# Patient Record
Sex: FEMALE | Race: WHITE | Employment: OTHER | URBAN - METROPOLITAN AREA
[De-identification: names, ages, dates, MRNs, and addresses within clinical notes are randomized per-mention and may not be internally consistent; named-entity substitution may affect disease eponyms.]

---

## 2019-05-13 NOTE — PROGRESS NOTES
Lawton Angst    Age 69 y.o.    female    1940    MRN [id number]    Date___________   Arrival Time_____________  OR Time____________Duration____     Procedure(s):  PHACOEMULSIFICATION OF CATARACT RIGHT EYE WITH INTRAOCULAR LENS IMPLANT    Surgeon  ________________________________  Lee Chin   General   [place]       Phone [phone] (home) [phone] (work)      [address]  Cell Work  ______________________________________________________________________________________________________________________________________________________________________________________________________________________________________________________________________________________________________________________________________________________________    PCP__________________________Phone__________________________________      H&P__________________Bringing      Chart              Epic    DOS           Called_______  EKG__________________Bringing      Chart              Epic    DOS           Called_______  LAB__________________ Bringing      Chart              Epic    DOS           Called_______  CardiacClearance _______Bringing      Chart              Epic    DOS           Called_______      Cardiologist________________________ Phone___________________________      ? Gnosticist concerns / Waiver on Chart            PAT Communications________________  ? Pre-op Instructions Given [place]          _________________________________  ? Directions to Surgery Center                          _________________________________  ? Transportation Home_______________      _________________________________  ?  Crutches/Walker__________________        _________________________________      ________Pre-op Orders   _______Transcribed    _______Wt.  ________Pharmacy          _______SCD  ______VTE     ______Beta Blocker  ________Consent

## 2019-05-20 ENCOUNTER — ANESTHESIA EVENT (OUTPATIENT)
Dept: OPERATING ROOM | Age: 79
End: 2019-05-20
Payer: MEDICARE

## 2019-05-21 ENCOUNTER — ANESTHESIA (OUTPATIENT)
Dept: OPERATING ROOM | Age: 79
End: 2019-05-21
Payer: MEDICARE

## 2019-05-21 ENCOUNTER — HOSPITAL ENCOUNTER (OUTPATIENT)
Age: 79
Setting detail: OUTPATIENT SURGERY
Discharge: HOME | End: 2019-05-21
Attending: OPHTHALMOLOGY | Admitting: OPHTHALMOLOGY
Payer: MEDICARE

## 2019-05-21 VITALS
HEART RATE: 74 BPM | HEIGHT: 65 IN | OXYGEN SATURATION: 100 % | WEIGHT: 109 LBS | RESPIRATION RATE: 15 BRPM | TEMPERATURE: 97.3 F | DIASTOLIC BLOOD PRESSURE: 68 MMHG | SYSTOLIC BLOOD PRESSURE: 164 MMHG | BODY MASS INDEX: 18.16 KG/M2

## 2019-05-21 VITALS — OXYGEN SATURATION: 100 % | DIASTOLIC BLOOD PRESSURE: 80 MMHG | SYSTOLIC BLOOD PRESSURE: 175 MMHG

## 2019-05-21 PROCEDURE — 6360000002 HC RX W HCPCS: Performed by: NURSE ANESTHETIST, CERTIFIED REGISTERED

## 2019-05-21 PROCEDURE — 7100000011 HC PHASE II RECOVERY - ADDTL 15 MIN: Performed by: OPHTHALMOLOGY

## 2019-05-21 PROCEDURE — 6370000000 HC RX 637 (ALT 250 FOR IP): Performed by: OPHTHALMOLOGY

## 2019-05-21 PROCEDURE — 6370000000 HC RX 637 (ALT 250 FOR IP)

## 2019-05-21 PROCEDURE — 3600000013 HC SURGERY LEVEL 3 ADDTL 15MIN: Performed by: OPHTHALMOLOGY

## 2019-05-21 PROCEDURE — V2632 POST CHMBR INTRAOCULAR LENS: HCPCS | Performed by: OPHTHALMOLOGY

## 2019-05-21 PROCEDURE — 2500000003 HC RX 250 WO HCPCS: Performed by: NURSE ANESTHETIST, CERTIFIED REGISTERED

## 2019-05-21 PROCEDURE — 2580000003 HC RX 258: Performed by: OPHTHALMOLOGY

## 2019-05-21 PROCEDURE — 7100000010 HC PHASE II RECOVERY - FIRST 15 MIN: Performed by: OPHTHALMOLOGY

## 2019-05-21 PROCEDURE — 6360000002 HC RX W HCPCS: Performed by: OPHTHALMOLOGY

## 2019-05-21 PROCEDURE — 2580000003 HC RX 258: Performed by: ANESTHESIOLOGY

## 2019-05-21 PROCEDURE — 3700000001 HC ADD 15 MINUTES (ANESTHESIA): Performed by: OPHTHALMOLOGY

## 2019-05-21 PROCEDURE — 3600000003 HC SURGERY LEVEL 3 BASE: Performed by: OPHTHALMOLOGY

## 2019-05-21 PROCEDURE — 3700000000 HC ANESTHESIA ATTENDED CARE: Performed by: OPHTHALMOLOGY

## 2019-05-21 PROCEDURE — 2500000003 HC RX 250 WO HCPCS: Performed by: OPHTHALMOLOGY

## 2019-05-21 PROCEDURE — 2709999900 HC NON-CHARGEABLE SUPPLY: Performed by: OPHTHALMOLOGY

## 2019-05-21 DEVICE — ACRYSOF(R) SINGLE-PIECE ACRYLIC FOLDABLE IOL,UV-ABSORBING POSTERIOR CHAMBER LENS (IOL/PC),13.0MM LENGTH, 6.0MM BICONVEX OPTIC, PLANARHAPTICS.
Type: IMPLANTABLE DEVICE | Site: LENS | Status: FUNCTIONAL
Brand: ACRYSOF®

## 2019-05-21 RX ORDER — LIDOCAINE HYDROCHLORIDE 10 MG/ML
0.3 INJECTION, SOLUTION EPIDURAL; INFILTRATION; INTRACAUDAL; PERINEURAL
Status: DISCONTINUED | OUTPATIENT
Start: 2019-05-21 | End: 2019-05-21 | Stop reason: HOSPADM

## 2019-05-21 RX ORDER — PROPOFOL 10 MG/ML
INJECTION, EMULSION INTRAVENOUS PRN
Status: DISCONTINUED | OUTPATIENT
Start: 2019-05-21 | End: 2019-05-21 | Stop reason: SDUPTHER

## 2019-05-21 RX ORDER — SODIUM CHLORIDE 0.9 % (FLUSH) 0.9 %
10 SYRINGE (ML) INJECTION EVERY 12 HOURS SCHEDULED
Status: DISCONTINUED | OUTPATIENT
Start: 2019-05-21 | End: 2019-05-21 | Stop reason: HOSPADM

## 2019-05-21 RX ORDER — SODIUM CHLORIDE 0.9 % (FLUSH) 0.9 %
10 SYRINGE (ML) INJECTION PRN
Status: DISCONTINUED | OUTPATIENT
Start: 2019-05-21 | End: 2019-05-21 | Stop reason: HOSPADM

## 2019-05-21 RX ORDER — MAGNESIUM HYDROXIDE 1200 MG/15ML
LIQUID ORAL PRN
Status: DISCONTINUED | OUTPATIENT
Start: 2019-05-21 | End: 2019-05-21 | Stop reason: ALTCHOICE

## 2019-05-21 RX ORDER — SODIUM CHLORIDE, SODIUM LACTATE, POTASSIUM CHLORIDE, CALCIUM CHLORIDE 600; 310; 30; 20 MG/100ML; MG/100ML; MG/100ML; MG/100ML
INJECTION, SOLUTION INTRAVENOUS CONTINUOUS
Status: DISCONTINUED | OUTPATIENT
Start: 2019-05-21 | End: 2019-05-21 | Stop reason: HOSPADM

## 2019-05-21 RX ORDER — TOBRAMYCIN AND DEXAMETHASONE 3; 1 MG/ML; MG/ML
SUSPENSION/ DROPS OPHTHALMIC PRN
Status: DISCONTINUED | OUTPATIENT
Start: 2019-05-21 | End: 2019-05-21 | Stop reason: ALTCHOICE

## 2019-05-21 RX ORDER — LIDOCAINE HYDROCHLORIDE 20 MG/ML
INJECTION, SOLUTION INFILTRATION; PERINEURAL PRN
Status: DISCONTINUED | OUTPATIENT
Start: 2019-05-21 | End: 2019-05-21 | Stop reason: SDUPTHER

## 2019-05-21 RX ADMIN — PROPOFOL 70 MG: 10 INJECTION, EMULSION INTRAVENOUS at 11:31

## 2019-05-21 RX ADMIN — Medication 0.3 ML: at 10:11

## 2019-05-21 RX ADMIN — LIDOCAINE HYDROCHLORIDE 50 MG: 20 INJECTION, SOLUTION INFILTRATION; PERINEURAL at 11:31

## 2019-05-21 RX ADMIN — BROMFENAC SODIUM 1 DROP: 0.7 SOLUTION/ DROPS OPHTHALMIC at 10:10

## 2019-05-21 RX ADMIN — SODIUM CHLORIDE, POTASSIUM CHLORIDE, SODIUM LACTATE AND CALCIUM CHLORIDE: 600; 310; 30; 20 INJECTION, SOLUTION INTRAVENOUS at 10:24

## 2019-05-21 ASSESSMENT — PULMONARY FUNCTION TESTS
PIF_VALUE: 1

## 2019-05-21 ASSESSMENT — PAIN - FUNCTIONAL ASSESSMENT: PAIN_FUNCTIONAL_ASSESSMENT: 0-10

## 2019-05-21 ASSESSMENT — PAIN SCALES - GENERAL: PAINLEVEL_OUTOF10: 0

## 2019-05-21 ASSESSMENT — LIFESTYLE VARIABLES: SMOKING_STATUS: 0

## 2019-05-21 NOTE — ANESTHESIA POSTPROCEDURE EVALUATION
Department of Anesthesiology  Postprocedure Note    Patient: Fabiola Thayer  MRN: [id number]  YOB: 1940  Date of evaluation: 5/21/2019    Procedure Summary     Date:  05/21/19 Room / Location:  [facility] OR 03 / [facility] OR    Anesthesia Start:  1128 Anesthesia Stop:  9576    Procedure:  PHACOEMULSIFICATION OF CATARACT RIGHT EYE WITH INTRAOCULAR LENS IMPLANT (Right Eye) Diagnosis:       Age-related nuclear cataract of right eye      (Age-related nuclear cataract of right eye [H25.11])    Surgeon:  Skye Winter MD Responsible Provider:  Sonda Kocher, MD    Anesthesia Type:  TIVA ASA Status:  2     Anesthesia Type: TIVA    Moon Phase I: Moon Score: 10    Moon Phase II: Moon Score: 10    Anesthesia Post Evaluation   Anesthetic Problems: no   Last Vitals:     BP: 143/82 Pulse: 71   Resp: 18 SpO2: 100   Temp: 97.3 °F (36.3 °C)     Cardiovascular System Stable: yes  Respiratory Function: Airway Patent yes  ETT no  Ventilator no  Level of consciousness: awake, alert and oriented  Post-op pain: adequate analgesia  Hydration Adequate: yes  Nausea/Vomiting:no  Other Issues:     Dave Corrales MD

## 2019-05-21 NOTE — PROGRESS NOTES
Family to bedside patient and family updated per md. Discharge instructions reviewed with patient and responsible adult. Discharge instructions signed and copy given with no additional questions. Patient to be discharged home with belongings. Eye kit and card given.

## 2019-05-21 NOTE — ANESTHESIA PRE PROCEDURE
Department of Anesthesiology  Preprocedure Note       Name:  Lilibeth Rondon   Age:  69 y.o.  :  1940                                          MRN:  [id number]         Date:  2019      Surgeon:  Samantha Lopez MD    Procedure: PHACOEMULSIFICATION OF CATARACT RIGHT EYE WITH INTRAOCULAR LENS IMPLANT (Right Eye)    Medications prior to admission:    Multiple Vitamins-Minerals  Take  by mouth. Calcium Carbonate (CALCIUM 500 PO) Take  by mouth. Allergies:     Penicillins      Problem List:     Left knee pain M25.562    Left patella fracture S82.002A    Contusion of left knee S80. Sundara.Lark     Past Medical History:  History reviewed. No pertinent past medical history.     Past Surgical History:     JOINT REPLACEMENT      both hips     Social History:     Smoking status: Never Smoker    Smokeless tobacco: Never Used   Substance Use Topics    Alcohol use: Yes     Comment: wine with dinner     Vital Signs (Current):            BP: 143/82 Pulse: 71   Resp: 18 SpO2: 100   Temp: 97.3 °F (36.3 °C)   Height: 5' 5\" (1.651 m)  (19) Weight: 109 lb (49.4 kg)  (19)   BMI: 18.2                            BP Readings from Last 3 Encounters:   06/15/16 132/67   06/10/16 112/72   01/07/15 115/70     NPO Status:> 8 hrs    Anesthesia Evaluation  Patient summary reviewed and Nursing notes reviewed  Airway: Mallampati: II  TM distance: >3 FB   Neck ROM: limited  Mouth opening: > = 3 FB Dental:          Pulmonary:       (-) COPD, asthma, sleep apnea and not a current smoker                           Cardiovascular:  Exercise tolerance: good (>4 METS),       (-) hypertension,  angina and  CANTU                Neuro/Psych:      (-) seizures, TIA and CVA           GI/Hepatic/Renal:        (-) GERD and no renal disease       Endo/Other:    (+) : arthritis: OA., .    (-) diabetes mellitus, hypothyroidism                ROS comment: S/P B MARGOTH  Abdominal:           Vascular:     - DVT and PE. Anesthesia Plan      TIVA     ASA 2       Induction: intravenous. MIPS: Prophylactic antiemetics administered. Anesthetic plan and risks discussed with patient. Plan discussed with CRNA.             Tommie Goodwin MD

## 2019-07-01 ENCOUNTER — ANESTHESIA EVENT (OUTPATIENT)
Dept: OPERATING ROOM | Age: 79
End: 2019-07-01
Payer: MEDICARE

## 2019-07-02 ENCOUNTER — HOSPITAL ENCOUNTER (OUTPATIENT)
Age: 79
Setting detail: OUTPATIENT SURGERY
Discharge: HOME | End: 2019-07-02
Attending: OPHTHALMOLOGY | Admitting: OPHTHALMOLOGY
Payer: MEDICARE

## 2019-07-02 ENCOUNTER — ANESTHESIA (OUTPATIENT)
Dept: OPERATING ROOM | Age: 79
End: 2019-07-02
Payer: MEDICARE

## 2019-07-02 VITALS
SYSTOLIC BLOOD PRESSURE: 118 MMHG | RESPIRATION RATE: 16 BRPM | HEART RATE: 80 BPM | WEIGHT: 110 LBS | OXYGEN SATURATION: 100 % | TEMPERATURE: 97 F | HEIGHT: 65 IN | BODY MASS INDEX: 18.33 KG/M2 | DIASTOLIC BLOOD PRESSURE: 58 MMHG

## 2019-07-02 VITALS — OXYGEN SATURATION: 100 % | DIASTOLIC BLOOD PRESSURE: 68 MMHG | SYSTOLIC BLOOD PRESSURE: 138 MMHG

## 2019-07-02 PROCEDURE — 6370000000 HC RX 637 (ALT 250 FOR IP): Performed by: OPHTHALMOLOGY

## 2019-07-02 PROCEDURE — 2580000003 HC RX 258: Performed by: ANESTHESIOLOGY

## 2019-07-02 PROCEDURE — 6360000002 HC RX W HCPCS: Performed by: OPHTHALMOLOGY

## 2019-07-02 PROCEDURE — 2500000003 HC RX 250 WO HCPCS: Performed by: OPHTHALMOLOGY

## 2019-07-02 PROCEDURE — 2580000003 HC RX 258: Performed by: OPHTHALMOLOGY

## 2019-07-02 PROCEDURE — 3600000003 HC SURGERY LEVEL 3 BASE: Performed by: OPHTHALMOLOGY

## 2019-07-02 PROCEDURE — 3700000000 HC ANESTHESIA ATTENDED CARE: Performed by: OPHTHALMOLOGY

## 2019-07-02 PROCEDURE — 3600000013 HC SURGERY LEVEL 3 ADDTL 15MIN: Performed by: OPHTHALMOLOGY

## 2019-07-02 PROCEDURE — 7100000010 HC PHASE II RECOVERY - FIRST 15 MIN: Performed by: OPHTHALMOLOGY

## 2019-07-02 PROCEDURE — V2632 POST CHMBR INTRAOCULAR LENS: HCPCS | Performed by: OPHTHALMOLOGY

## 2019-07-02 PROCEDURE — 3700000001 HC ADD 15 MINUTES (ANESTHESIA): Performed by: OPHTHALMOLOGY

## 2019-07-02 PROCEDURE — 7100000011 HC PHASE II RECOVERY - ADDTL 15 MIN: Performed by: OPHTHALMOLOGY

## 2019-07-02 PROCEDURE — 6370000000 HC RX 637 (ALT 250 FOR IP)

## 2019-07-02 PROCEDURE — 6360000002 HC RX W HCPCS: Performed by: NURSE ANESTHETIST, CERTIFIED REGISTERED

## 2019-07-02 PROCEDURE — 2500000003 HC RX 250 WO HCPCS: Performed by: NURSE ANESTHETIST, CERTIFIED REGISTERED

## 2019-07-02 PROCEDURE — 2709999900 HC NON-CHARGEABLE SUPPLY: Performed by: OPHTHALMOLOGY

## 2019-07-02 DEVICE — ACRYSOF(R) SINGLE-PIECE ACRYLIC FOLDABLE IOL,UV-ABSORBING POSTERIOR CHAMBER LENS (IOL/PC),13.0MM LENGTH, 6.0MM BICONVEX OPTIC, PLANARHAPTICS.
Type: IMPLANTABLE DEVICE | Site: EYE | Status: FUNCTIONAL
Brand: ACRYSOF®

## 2019-07-02 RX ORDER — LABETALOL HYDROCHLORIDE 5 MG/ML
5 INJECTION, SOLUTION INTRAVENOUS EVERY 10 MIN PRN
Status: DISCONTINUED | OUTPATIENT
Start: 2019-07-02 | End: 2019-07-02 | Stop reason: HOSPADM

## 2019-07-02 RX ORDER — OXYCODONE HYDROCHLORIDE AND ACETAMINOPHEN 5; 325 MG/1; MG/1
1 TABLET ORAL PRN
Status: DISCONTINUED | OUTPATIENT
Start: 2019-07-02 | End: 2019-07-02 | Stop reason: HOSPADM

## 2019-07-02 RX ORDER — TOBRAMYCIN AND DEXAMETHASONE 3; 1 MG/ML; MG/ML
SUSPENSION/ DROPS OPHTHALMIC PRN
Status: DISCONTINUED | OUTPATIENT
Start: 2019-07-02 | End: 2019-07-02 | Stop reason: ALTCHOICE

## 2019-07-02 RX ORDER — MEPERIDINE HYDROCHLORIDE 50 MG/ML
12.5 INJECTION INTRAMUSCULAR; INTRAVENOUS; SUBCUTANEOUS EVERY 5 MIN PRN
Status: DISCONTINUED | OUTPATIENT
Start: 2019-07-02 | End: 2019-07-02 | Stop reason: HOSPADM

## 2019-07-02 RX ORDER — SODIUM CHLORIDE 0.9 % (FLUSH) 0.9 %
10 SYRINGE (ML) INJECTION EVERY 12 HOURS SCHEDULED
Status: DISCONTINUED | OUTPATIENT
Start: 2019-07-02 | End: 2019-07-02 | Stop reason: HOSPADM

## 2019-07-02 RX ORDER — TETRACAINE HYDROCHLORIDE 5 MG/ML
SOLUTION OPHTHALMIC PRN
Status: DISCONTINUED | OUTPATIENT
Start: 2019-07-02 | End: 2019-07-02 | Stop reason: ALTCHOICE

## 2019-07-02 RX ORDER — SODIUM CHLORIDE, SODIUM LACTATE, POTASSIUM CHLORIDE, CALCIUM CHLORIDE 600; 310; 30; 20 MG/100ML; MG/100ML; MG/100ML; MG/100ML
INJECTION, SOLUTION INTRAVENOUS CONTINUOUS
Status: DISCONTINUED | OUTPATIENT
Start: 2019-07-02 | End: 2019-07-02 | Stop reason: HOSPADM

## 2019-07-02 RX ORDER — HYDRALAZINE HYDROCHLORIDE 20 MG/ML
5 INJECTION INTRAMUSCULAR; INTRAVENOUS EVERY 10 MIN PRN
Status: DISCONTINUED | OUTPATIENT
Start: 2019-07-02 | End: 2019-07-02 | Stop reason: HOSPADM

## 2019-07-02 RX ORDER — LIDOCAINE HYDROCHLORIDE 20 MG/ML
INJECTION, SOLUTION INFILTRATION; PERINEURAL PRN
Status: DISCONTINUED | OUTPATIENT
Start: 2019-07-02 | End: 2019-07-02 | Stop reason: SDUPTHER

## 2019-07-02 RX ORDER — ONDANSETRON 2 MG/ML
4 INJECTION INTRAMUSCULAR; INTRAVENOUS PRN
Status: DISCONTINUED | OUTPATIENT
Start: 2019-07-02 | End: 2019-07-02 | Stop reason: HOSPADM

## 2019-07-02 RX ORDER — PROPOFOL 10 MG/ML
INJECTION, EMULSION INTRAVENOUS PRN
Status: DISCONTINUED | OUTPATIENT
Start: 2019-07-02 | End: 2019-07-02 | Stop reason: SDUPTHER

## 2019-07-02 RX ORDER — DIPHENHYDRAMINE HYDROCHLORIDE 50 MG/ML
12.5 INJECTION INTRAMUSCULAR; INTRAVENOUS
Status: DISCONTINUED | OUTPATIENT
Start: 2019-07-02 | End: 2019-07-02 | Stop reason: HOSPADM

## 2019-07-02 RX ORDER — MORPHINE SULFATE 2 MG/ML
1 INJECTION, SOLUTION INTRAMUSCULAR; INTRAVENOUS EVERY 5 MIN PRN
Status: DISCONTINUED | OUTPATIENT
Start: 2019-07-02 | End: 2019-07-02 | Stop reason: HOSPADM

## 2019-07-02 RX ORDER — SODIUM CHLORIDE 0.9 % (FLUSH) 0.9 %
10 SYRINGE (ML) INJECTION PRN
Status: DISCONTINUED | OUTPATIENT
Start: 2019-07-02 | End: 2019-07-02 | Stop reason: HOSPADM

## 2019-07-02 RX ORDER — PROMETHAZINE HYDROCHLORIDE 25 MG/ML
6.25 INJECTION, SOLUTION INTRAMUSCULAR; INTRAVENOUS
Status: DISCONTINUED | OUTPATIENT
Start: 2019-07-02 | End: 2019-07-02 | Stop reason: HOSPADM

## 2019-07-02 RX ORDER — OXYCODONE HYDROCHLORIDE AND ACETAMINOPHEN 5; 325 MG/1; MG/1
2 TABLET ORAL PRN
Status: DISCONTINUED | OUTPATIENT
Start: 2019-07-02 | End: 2019-07-02 | Stop reason: HOSPADM

## 2019-07-02 RX ORDER — MORPHINE SULFATE 2 MG/ML
2 INJECTION, SOLUTION INTRAMUSCULAR; INTRAVENOUS EVERY 5 MIN PRN
Status: DISCONTINUED | OUTPATIENT
Start: 2019-07-02 | End: 2019-07-02 | Stop reason: HOSPADM

## 2019-07-02 RX ORDER — MAGNESIUM HYDROXIDE 1200 MG/15ML
LIQUID ORAL PRN
Status: DISCONTINUED | OUTPATIENT
Start: 2019-07-02 | End: 2019-07-02 | Stop reason: ALTCHOICE

## 2019-07-02 RX ORDER — ERGOCALCIFEROL 1.25 MG/1
50000 CAPSULE ORAL DAILY
Status: ON HOLD | COMMUNITY
Start: 2018-05-02 | End: 2022-04-06 | Stop reason: HOSPADM

## 2019-07-02 RX ORDER — PILOCARPINE HYDROCHLORIDE 20 MG/ML
SOLUTION/ DROPS OPHTHALMIC PRN
Status: DISCONTINUED | OUTPATIENT
Start: 2019-07-02 | End: 2019-07-02 | Stop reason: ALTCHOICE

## 2019-07-02 RX ADMIN — Medication 0.3 ML: at 07:24

## 2019-07-02 RX ADMIN — PROPOFOL 70 MG: 10 INJECTION, EMULSION INTRAVENOUS at 08:36

## 2019-07-02 RX ADMIN — SODIUM CHLORIDE, POTASSIUM CHLORIDE, SODIUM LACTATE AND CALCIUM CHLORIDE: 600; 310; 30; 20 INJECTION, SOLUTION INTRAVENOUS at 07:34

## 2019-07-02 RX ADMIN — LIDOCAINE HYDROCHLORIDE 40 MG: 20 INJECTION, SOLUTION INFILTRATION; PERINEURAL at 08:36

## 2019-07-02 RX ADMIN — BROMFENAC SODIUM 1 DROP: 0.7 SOLUTION/ DROPS OPHTHALMIC at 07:23

## 2019-07-02 ASSESSMENT — PULMONARY FUNCTION TESTS
PIF_VALUE: 0

## 2019-07-02 ASSESSMENT — PAIN SCALES - GENERAL: PAINLEVEL_OUTOF10: 0

## 2019-07-02 ASSESSMENT — PAIN - FUNCTIONAL ASSESSMENT: PAIN_FUNCTIONAL_ASSESSMENT: 0-10

## 2019-07-02 NOTE — ANESTHESIA PRE PROCEDURE
Department of Anesthesiology  Preprocedure Note       Name:  Tarsha Olivarez   Age:  69 y.o.  :  1940                                          MRN:  [id number]         Date:  2019      Surgeon: Alexa Mirza):  Gail Broderick MD    Procedure: PHACOEMULSIFICATION OF CATARACT LEFT EYE WITH INTRAOCULAR LENS IMPLANT (Left Eye)    Medications prior to admission:   Prior to Admission medications    Medication Sig Start Date End Date Taking? Authorizing Provider   Multiple Vitamins-Minerals (MULTIVITAMIN & MINERAL PO) Take  by mouth. Historical Provider, MD   Calcium Carbonate (CALCIUM 500 PO) Take  by mouth. Historical Provider, MD       Current medications:    Current Facility-Administered Medications   Medication Dose Route Frequency Provider Last Rate Last Dose    bupivacaine 0.75%, phenylephrine 10%, tropicamide 1%, cyclopentolate 1%, moxifloxacin 0.5%, ketorolac 0.5% in lidocaine 2% jelly ophthalmic solution  0.3 mL Left Eye Q10 Min Gail Broderick MD        bromfenac (PROLENSA) 0.07 % ophthalmic solution 1 drop  1 drop Left Eye Once Gail Broderick MD        lactated ringers infusion   Intravenous Continuous Briana Palmer MD        sodium chloride flush 0.9 % injection 10 mL  10 mL Intravenous 2 times per day Briana Palmer MD        sodium chloride flush 0.9 % injection 10 mL  10 mL Intravenous PRN Briana Palmer MD           Allergies: Allergies   Allergen Reactions    Penicillins        Problem List:    Patient Active Problem List   Diagnosis Code    Left knee pain M25.562    Left patella fracture S82.002A    Contusion of left knee S80. Wilmer.Dom       Past Medical History:  History reviewed. No pertinent past medical history.     Past Surgical History:        Procedure Laterality Date    INTRACAPSULAR CATARACT EXTRACTION Right 2019    PHACOEMULSIFICATION OF CATARACT RIGHT EYE WITH INTRAOCULAR LENS IMPLANT performed by Gail Broderick MD at [place] [address]

## 2019-12-31 ENCOUNTER — HOSPITAL ENCOUNTER (INPATIENT)
Age: 79
LOS: 10 days | Discharge: NURSING, REHAB OR LONG-TERM CARE | DRG: 312 | End: 2020-01-10
Attending: PHYSICAL MEDICINE & REHABILITATION | Admitting: PHYSICAL MEDICINE & REHABILITATION
Payer: MEDICARE

## 2019-12-31 PROBLEM — I95.1 ORTHOSTATIC HYPOTENSION: Status: ACTIVE | Noted: 2019-12-31

## 2019-12-31 PROCEDURE — 6370000000 HC RX 637 (ALT 250 FOR IP): Performed by: PHYSICAL MEDICINE & REHABILITATION

## 2019-12-31 PROCEDURE — 1280000000 HC REHAB R&B

## 2019-12-31 RX ORDER — ACETAMINOPHEN 325 MG/1
650 TABLET ORAL EVERY 4 HOURS PRN
Status: DISCONTINUED | OUTPATIENT
Start: 2019-12-31 | End: 2020-01-10 | Stop reason: HOSPADM

## 2019-12-31 RX ORDER — TRAZODONE HYDROCHLORIDE 50 MG/1
50 TABLET ORAL NIGHTLY PRN
Status: DISCONTINUED | OUTPATIENT
Start: 2019-12-31 | End: 2020-01-10 | Stop reason: HOSPADM

## 2019-12-31 RX ORDER — FLUDROCORTISONE ACETATE 0.1 MG/1
0.1 TABLET ORAL DAILY
Status: DISCONTINUED | OUTPATIENT
Start: 2019-12-31 | End: 2020-01-10 | Stop reason: HOSPADM

## 2019-12-31 RX ORDER — TRAMADOL HYDROCHLORIDE 50 MG/1
50 TABLET ORAL EVERY 6 HOURS PRN
Status: DISCONTINUED | OUTPATIENT
Start: 2019-12-31 | End: 2020-01-10 | Stop reason: HOSPADM

## 2019-12-31 RX ORDER — ERGOCALCIFEROL 1.25 MG/1
50000 CAPSULE ORAL WEEKLY
Status: DISCONTINUED | OUTPATIENT
Start: 2019-12-31 | End: 2020-01-10 | Stop reason: HOSPADM

## 2019-12-31 RX ORDER — POLYETHYLENE GLYCOL 3350 17 G/17G
17 POWDER, FOR SOLUTION ORAL DAILY
Status: DISCONTINUED | OUTPATIENT
Start: 2019-12-31 | End: 2020-01-10 | Stop reason: HOSPADM

## 2019-12-31 RX ORDER — ONDANSETRON 4 MG/1
8 TABLET, ORALLY DISINTEGRATING ORAL EVERY 8 HOURS PRN
Status: DISCONTINUED | OUTPATIENT
Start: 2019-12-31 | End: 2020-01-10 | Stop reason: HOSPADM

## 2019-12-31 RX ORDER — MIDODRINE HYDROCHLORIDE 5 MG/1
5 TABLET ORAL
Status: DISCONTINUED | OUTPATIENT
Start: 2019-12-31 | End: 2020-01-10 | Stop reason: HOSPADM

## 2019-12-31 RX ORDER — HYDRALAZINE HYDROCHLORIDE 25 MG/1
50 TABLET, FILM COATED ORAL EVERY 6 HOURS PRN
Status: DISCONTINUED | OUTPATIENT
Start: 2019-12-31 | End: 2020-01-10 | Stop reason: HOSPADM

## 2019-12-31 RX ORDER — FOLIC ACID 1 MG/1
1 TABLET ORAL DAILY
Status: DISCONTINUED | OUTPATIENT
Start: 2019-12-31 | End: 2020-01-10 | Stop reason: HOSPADM

## 2019-12-31 RX ADMIN — TRAZODONE HYDROCHLORIDE 50 MG: 50 TABLET ORAL at 20:59

## 2019-12-31 ASSESSMENT — PAIN SCALES - GENERAL
PAINLEVEL_OUTOF10: 0
PAINLEVEL_OUTOF10: 0

## 2019-12-31 NOTE — PROGRESS NOTES
4 Eyes Skin Assessment     The patient is being assess for   Admission    I agree that 2 RN's have performed a thorough Head to Toe Skin Assessment on the patient. ALL assessment sites listed below have been assessed. Areas assessed by both nurses:   [x]   Head, Face, and Ears   [x]   Shoulders, Back, and Chest, Abdomen  [x]   Arms, Elbows, and Hands   [x]   Coccyx, Sacrum, and Ischium  [x]   Legs, Feet, and Heels         **SHARE this note so that the co-signing nurse is able to place an eSignature**    Co-signer eSignature: {Esignature:[id number]}    Does the Patient have Skin Breakdown?   No          Mike Prevention initiated:  Yes  Wound Care Orders initiated:  No      WOC nurse consulted for Pressure Injury (Stage 3,4, Unstageable, DTI, NWPT, Complex wounds)and New or Established Ostomies:  No      Primary Nurse eSignature: Electronically signed by Kaitlynn Howard RN on 12/31/19 at 4:09 PM

## 2019-12-31 NOTE — PLAN OF CARE
injury/management      [] Medication Use   [] Surgical intervention   [] Safety   [] Body mechanics and or joint protection   [] Health maintenance         PHYSICAL THERAPY:  Goals:                  Short term goals  Time Frame for Short term goals: 5 days (1/4)  Short term goal 1: Pt will be indep with bed mobility. Short term goal 2: Pt will be supervision for transfers with no device. Short term goal 3: Pt will ambulate 150 ft with SBA and no device. Short term goal 4: Pt will negotiate 4\" curb step with no device and SBA. Short term goal 5: pt will negotiate 12 stairs with bilateral HR and SBA. Long term goals  Time Frame for Long term goals : 11 days (1/10)  Long term goal 1: Pt will be indep with transfers. Long term goal 2: Pt will be indep with ambulation 150 ft. Long term goal 3: Pt will negotiate 2 4\" curb steps mod I.  Long term goal 4: Pt will negotiate 12 steps with bilateral HR Mod I.  Long term goal 5: Pt will improve Hernandez Balance Test to greater than 45/56 to reflect improved balance and decreased fall risk. These goals were reviewed with this patient at the time of assessment and Sulema Goyal is in agreement.      Plan of Care: Pt to be seen 5 out of 7 days per week, 90   mins (exact) per day for 11 days (exact)                   Current Treatment Recommendations: Strengthening, Gait Training, Patient/Caregiver Education & Training, ROM, Stair training, Equipment Evaluation, Education, & procurement, Balance Training, Neuromuscular Re-education, Functional Mobility Training, Endurance Training, Transfer Training, Safety Education & Training, Home Exercise Program      OCCUPATIONAL THERAPY:  Goals:             Short term goals  Time Frame for Short term goals: 1/5/20  Short term goal 1: Pt will complete functional mobility in room/bathroom with LRAD SBA  Short term goal 2: Pt will complete UE/LE dressing SBA with LRAD and AE PRN  Short term goal 3: Pt will tolerate >7 minutes Comprehension    Expression    Social Interaction    Problem Solving    Memory         Brittany Dunbar will be seen a minimum of 3 hours of therapy per day, a minimum of 5 out of 7 days per week. [] In this rare instance due to the nature of this patient's medical involvement, this patient will be seen 15 hours per week (900 minutes within a 7 day period). Treatments may include therapeutic exercises, gait training, neuromuscular re-ed, transfer training, community reintegration, bed mobility, w/c mobility and training, self care, home mgmt, cognitive training, energy conservation,dysphagia tx, speech/language/communication therapy, group therapy, and patient/family education. In addition, dietician/nutritionist may monitor calorie count as well as intake and collaboratively work with SLP on dietary upgrades. Neuropsychology/Psychology may evaluate and provide necessary support. Medical issues being managed closely and that require 24 hour availability of a physician:   [] Swallowing Precautions  [x] Bowel/Bladder Fx  [] Weight bearing precautions   [] Wound Care    [x] Pain Mgmt   [x] Infection Protection   [x] DVT Prophylaxis   [x] Fall Precautions  [x] Fluid/Electrolyte/Nutrition Balance   [] Voice Protection   [] Respiratory  [] Other:    Medical Prognosis: [x] Good  [] Fair    [] Guarded   Total expected IRF days 10  Anticipated discharge destination:    [] Home Independently   [] Home Modified Independent  [x] Home with supervision    []SNF     [] Other                                           Physician anticipated functional outcomes:  Home w/ assist PRN  IPOC brief synthesis: 67year old female admitted to ARU to address strengthening, endurance, balance, gait, ADLs, assistive/adaptive device needs, patient/family training, pain control. This plan has been reviewed with Brittany Dunbar on 1/1  in a language the patient understands.   Brittany Dunbar has had the opportunity to include input with the

## 2020-01-01 LAB
A/G RATIO: 1 (ref 1.1–2.2)
ALBUMIN SERPL-MCNC: 3.1 G/DL (ref 3.4–5)
ALP BLD-CCNC: 110 U/L (ref 40–129)
ALT SERPL-CCNC: 11 U/L (ref 10–40)
ANION GAP SERPL CALCULATED.3IONS-SCNC: 12 MMOL/L (ref 3–16)
AST SERPL-CCNC: 18 U/L (ref 15–37)
BILIRUB SERPL-MCNC: 0.3 MG/DL (ref 0–1)
BUN BLDV-MCNC: 15 MG/DL (ref 7–20)
CALCIUM SERPL-MCNC: 8.5 MG/DL (ref 8.3–10.6)
CHLORIDE BLD-SCNC: 105 MMOL/L (ref 99–110)
CO2: 23 MMOL/L (ref 21–32)
CREAT SERPL-MCNC: <0.5 MG/DL (ref 0.6–1.2)
GFR AFRICAN AMERICAN: >60
GFR NON-AFRICAN AMERICAN: >60
GLOBULIN: 3.1 G/DL
GLUCOSE BLD-MCNC: 82 MG/DL (ref 70–99)
HCT VFR BLD CALC: 27.9 % (ref 36–48)
HEMOGLOBIN: 9.7 G/DL (ref 12–16)
MCH RBC QN AUTO: 34.6 PG (ref 26–34)
MCHC RBC AUTO-ENTMCNC: 34.8 G/DL (ref 31–36)
MCV RBC AUTO: 99.5 FL (ref 80–100)
PDW BLD-RTO: 17.6 % (ref 12.4–15.4)
PLATELET # BLD: 334 K/UL (ref 135–450)
PMV BLD AUTO: 6.8 FL (ref 5–10.5)
POTASSIUM REFLEX MAGNESIUM: 3.8 MMOL/L (ref 3.5–5.1)
RBC # BLD: 2.8 M/UL (ref 4–5.2)
SODIUM BLD-SCNC: 140 MMOL/L (ref 136–145)
TOTAL PROTEIN: 6.2 G/DL (ref 6.4–8.2)
WBC # BLD: 6 K/UL (ref 4–11)

## 2020-01-01 PROCEDURE — 97116 GAIT TRAINING THERAPY: CPT

## 2020-01-01 PROCEDURE — 6360000002 HC RX W HCPCS: Performed by: PHYSICAL MEDICINE & REHABILITATION

## 2020-01-01 PROCEDURE — 1280000000 HC REHAB R&B

## 2020-01-01 PROCEDURE — 97166 OT EVAL MOD COMPLEX 45 MIN: CPT

## 2020-01-01 PROCEDURE — 97162 PT EVAL MOD COMPLEX 30 MIN: CPT

## 2020-01-01 PROCEDURE — 80053 COMPREHEN METABOLIC PANEL: CPT

## 2020-01-01 PROCEDURE — 97530 THERAPEUTIC ACTIVITIES: CPT

## 2020-01-01 PROCEDURE — 97110 THERAPEUTIC EXERCISES: CPT

## 2020-01-01 PROCEDURE — 36415 COLL VENOUS BLD VENIPUNCTURE: CPT

## 2020-01-01 PROCEDURE — 85027 COMPLETE CBC AUTOMATED: CPT

## 2020-01-01 PROCEDURE — 97535 SELF CARE MNGMENT TRAINING: CPT

## 2020-01-01 PROCEDURE — 6370000000 HC RX 637 (ALT 250 FOR IP): Performed by: PHYSICAL MEDICINE & REHABILITATION

## 2020-01-01 RX ADMIN — MIDODRINE HYDROCHLORIDE 5 MG: 5 TABLET ORAL at 17:27

## 2020-01-01 RX ADMIN — TRAZODONE HYDROCHLORIDE 50 MG: 50 TABLET ORAL at 21:20

## 2020-01-01 RX ADMIN — POLYETHYLENE GLYCOL 3350 17 G: 17 POWDER, FOR SOLUTION ORAL at 07:22

## 2020-01-01 RX ADMIN — FOLIC ACID 1 MG: 1 TABLET ORAL at 07:23

## 2020-01-01 RX ADMIN — ENOXAPARIN SODIUM 40 MG: 40 INJECTION SUBCUTANEOUS at 07:22

## 2020-01-01 ASSESSMENT — PAIN SCALES - GENERAL
PAINLEVEL_OUTOF10: 0
PAINLEVEL_OUTOF10: 0

## 2020-01-01 NOTE — H&P
weight change. EYES: negative for blurred vision, eye discharge, visual disturbance and icterus. HEENT: negative for hearing loss, tinnitus, ear drainage, sinus pressure, nasal congestion, epistaxis and snoring. RESPIRATORY: Negative for hemoptysis, cough, sputum production. CARDIOVASCULAR: negative for chest pain, palpitations, exertional chest pressure/discomfort, edema, syncope. GASTROINTESTINAL: negative for nausea, vomiting, diarrhea, constipation, blood in stool and abdominal pain. GENITOURINARY: negative for frequency, dysuria, urinary incontinence, decreased urine volume, and hematuria. HEMATOLOGIC/LYMPHATIC: negative for easy bruising, bleeding and lymphadenopathy. ALLERGIC/IMMUNOLOGIC: negative for recurrent infections, angioedema, anaphylaxis and drug reactions. ENDOCRINE: negative for weight changes and diabetic symptoms including polyuria, polydipsia and polyphagia. MUSCULOSKELETAL: negative for pain, joint swelling, decreased range of motion and muscle weakness. NEUROLOGICAL: negative for headaches, slurred speech, unilateral weakness. PSYCHIATRIC/BEHAVIORAL: negative for hallucinations, behavioral problems, confusion and agitation. All pertinent positives are noted in the HPI. Physical Examination:  Vitals:   Patient Vitals for the past 24 hrs:   BP Temp Temp src Pulse Resp SpO2 Height Weight   01/01/20 0715 (!) 172/77 97.5 °F (36.4 °C) Oral 72 18 97 % -- --   12/31/19 2045 -- -- -- -- -- -- 5' 5\" (1.651 m) 116 lb (52.6 kg)   12/31/19 2000 (!) 157/77 98.1 °F (36.7 °C) Oral 81 18 98 % -- --   12/31/19 1542 (!) 165/83 98.4 °F (36.9 °C) Oral 84 18 99 % 5' 5\" (1.651 m) --     Psych: Stable mood, normal judgement, normal affect   Const: No distress  Eyes: Conjunctiva noninjected, no icterus noted; pupils equal, round, and reactive to light. HENT: Atraumatic, normocephalic; Oral mucosa moist  Neck: Trachea midline, neck supple. No thyromegaly noted.   CV: Regular rate and rhythm, no murmur rub or gallop noted  Resp: Lungs clear to auscultation bilaterally, no rales wheezes or ronchi, no retractions. Respirations unlabored. GI: Soft, nontender, nondistended. Normal bowel sounds. No palpable masses. Neuro: Alert, oriented, appropriate. No cranial nerve deficits appreciated. Sensation intact to light touch. Motor examination reveals normal strength in all four limbs diffusely. No abnormalities with finger/nose or heel/shin noted. Reflexes normal and symmetric. Skin: Normal temperature and turgor  MSK: No joint abnormalities noted. Ext: No significant edema appreciated. No varicosities. Lab Results   Component Value Date    WBC 6.0 2020    HGB 9.7 (L) 2020    HCT 27.9 (L) 2020    MCV 99.5 2020     2020     No results found for: INR, PROTIME  Lab Results   Component Value Date    CREATININE <0.5 (L) 2020    BUN 15 2020     2020    K 3.8 2020     2020    CO2 23 2020     Lab Results   Component Value Date    ALT 11 2020    AST 18 2020    ALKPHOS 110 2020    BILITOT 0.3 2020                                 Carotid Duplex Report        Name: Diana mSith   : 1940                   Age: 67 yrs    EPI: [id number]              GenderCherylene Lyons   Accession Number: [id number]    Order Number: [id number]    Account Number: [[id number]]   [facility]    Study Date: 2019 10:09 AM        Procedure: 78107 Complete Carotid Duplex/ B Mode study. Study was performed as    an inpatient.            Indication: I77.1 Arterial stenosis ([address]         Quality: Adequate.        Study Description: The carotid vessels were visualized with ultrasound and    correlated with color flow Doppler and spectral analysis.         Right Carotid Velocities       Left Carotid Velocities    Prox CCA = .35 m/sec.          Prox CCA = .41 m/sec.    Dist CCA = .44 m/sec.   disease left lung base. HEART: Unremarkable  MEDIASTINUM/RENALDO: Unremarkable  BONES: Unremarkable  OTHER: None     IMPRESSION      Patchy left lower lobe airspace disease which could represent atelectasis or developing pneumonia.                       XR CHEST AP PORTABLE (12/23/2019 3:45 PM EST)   Performing Organization Address City/State/Zipcode Phone Number   P[facility]             Back to top of Imaging Results       CT CERVICAL SPINE WO CONTRAST (12/23/2019 3:16 PM EST)  CT CERVICAL SPINE WO CONTRAST (12/23/2019 3:16 PM EST)   Specimen         CT CERVICAL SPINE WO CONTRAST (12/23/2019 3:16 PM EST)   Impressions Performed At           Multilevel degenerative disc changes and facet arthropathy        No evidence of acute traumatic abnormality               [facility]       CT CERVICAL SPINE WO CONTRAST (12/23/2019 3:16 PM EST)   Narrative Performed At   HISTORY:  C-spine trauma, high clinical risk (NEXUS/CCR)  fall, syncopy    COMPARISON: None    TECHNIQUE:  Noncontrast axial CT images with coronal and sagittal reconstructions of the cervical spine    NOTE:  If there are questions about the content of this report, please contact [address] [place] [address] radiology by calling [phone]        FINDINGS:     ALIGNMENT: No abnormal curvature    BONES: Unremarkable.  No aggressive osseous lesion or fracture    SOFT TISSUES:  Unremarkable        DISC LEVELS:    C1-2:  Severe narrowing and spurring of the predental joint.     C2-3:  Disc is unremarkable. Mild bilateral facet arthropathy.      C3-4:  Mild degenerative change in the disc. Moderate bilateral facet arthropathy.      C4-5:  Moderate degenerative change in the disc. Moderate bilateral facet arthropathy.      C5-6:  Moderate degenerative change in the disc. Moderate bilateral facet arthropathy.      C6-7:  Mild degenerative change in the disc. Moderate bilateral facet arthropathy.      C7-T1:  Disc is unremarkable.  Moderate bilateral facet arthropathy.          OTHER:  None       [facility]       CT CERVICAL SPINE WO CONTRAST (12/23/2019 3:16 PM EST)   Procedure Note   Interface, Rad Results In - 12/23/2019 3:33 PM EST    HISTORY: C-spine trauma, high clinical risk (NEXUS/CCR) fall, syncopy  COMPARISON: None  TECHNIQUE: Noncontrast axial CT images with coronal and sagittal reconstructions of the cervical spine  NOTE: If there are questions about the content of this report, please contact [address] [place] [address] radiology by calling [phone]    FINDINGS:   ALIGNMENT: No abnormal curvature  BONES: Unremarkable. No aggressive osseous lesion or fracture  SOFT TISSUES: Unremarkable    DISC LEVELS:  C1-2: Severe narrowing and spurring of the predental joint. C2-3: Disc is unremarkable. Mild bilateral facet arthropathy. C3-4: Mild degenerative change in the disc. Moderate bilateral facet arthropathy. C4-5: Moderate degenerative change in the disc. Moderate bilateral facet arthropathy. C5-6: Moderate degenerative change in the disc. Moderate bilateral facet arthropathy. C6-7: Mild degenerative change in the disc. Moderate bilateral facet arthropathy. C7-T1: Disc is unremarkable. Moderate bilateral facet arthropathy. OTHER: None    IMPRESSION      Multilevel degenerative disc changes and facet arthropathy    No evidence of acute traumatic abnormality        The above laboratory data have been reviewed. The above imaging data have been reviewed. The above medical testing have been reviewed. Body mass index is 19.3 kg/m². Barriers to Discharge: Weakness, endurance, balance, orthostasis    POST ADMISSION PHYSICIAN EVALUATION  The patient has agreed to being admitted to our comprehensive inpatient  rehabilitation facility consisting of at least 180 minutes of therapy a day,  5 out of 7 days a week. The patient/family has a good understanding of our discharge process.  The  patient has potential to make improvement and is in need of at least two of  the following

## 2020-01-01 NOTE — PROGRESS NOTES
Tonsillectomy; Intracapsular cataract extraction (Right, 5/21/2019); and Intracapsular cataract extraction (Left, 7/2/2019). Restrictions  Restrictions/Precautions  Restrictions/Precautions: General Precautions  Position Activity Restriction  Other position/activity restrictions: Medium fall risk per nursing    Subjective   General  Chart Reviewed: Yes, Imaging, Labs, Orders, History and Physical  Patient assessed for rehabilitation services?: Yes  Family / Caregiver Present: No  Referring Practitioner: Dr. Cale Russell  Diagnosis: Frequent falls due to syncope/hypotension  Subjective  Subjective: Pt in chair very pleasant and agreeable to OT eval and tx. General Comment  Comments: RN cleared pt for eval/tx, states held BP meds due to BP being high this AM    Social/Functional History  Social/Functional History  Lives With: Alone(daughter lives one block away; works part time)  Type of Home: [place]: One level, Laundry in basement(laundry in basement with bilaterall HR. Pt states ~11 steps)  Home Access: Stairs to enter without rails  Entrance Stairs - Number of Steps: 2 FELIX through front door, no rails, but uses wall for balance if needed. States ~4\" steps  Bathroom Shower/Tub: Walk-in shower  Bathroom Toilet: Standard  Bathroom Equipment: Grab bars in shower  Home Equipment: 4 wheeled walker  ADL Assistance: Independent  Homemaking Assistance: [facility] Responsibilities: Yes  Ambulation Assistance: Independent  Transfer Assistance: Independent  Active : Yes  Occupation: Retired  Type of occupation:   Leisure & Hobbies: has a cat, read, go out to dinner  Additional Comments: Frequent falls d/t syncope; states too many falls to count. Pt able to stand up from floor or crawl to couch to stand without difficulty. Denies injuries besides bruises and stiffness after falls.        Objective        Orientation  Overall Orientation Status: Within Functional Coordinated: Yes     Bed mobility  Supine to Sit: Unable to assess  Sit to Supine: Unable to assess  Scooting: Unable to assess  Comment: in recliner upon arrival and departure  Transfers  Stand Step Transfers: Contact guard assistance  Sit to stand: Contact guard assistance  Stand to sit: Contact guard assistance  Transfer Comments: CGA with cues for safety/mechanics  Vision - Basic Assessment  Prior Vision: Wears glasses only for reading  Visual History: No significant visual history  Patient Visual Report: No visual complaint reported. Visual Field Cut: No  Oculo Motor Control:  WNL  Cognition  Overall Cognitive Status: WFL  Perception  Overall Perceptual Status: WFL     Sensation  Overall Sensation Status: WFL(\"sometimes my feet will turn numb\")  Type of ROM/Therapeutic Exercise  Type of ROM/Therapeutic Exercise: Free weights  Comment: 1# free weights seated in recliner, 2x10 reps with PRN rest breaks  Exercises  Scapular Protraction: x20 no weight  Scapular Retraction: x20 no weight  Shoulder Depression: x20 no weight  Shoulder Elevation: x20 no weight  Shoulder Flexion: 2x10 1#  Shoulder ABduction: 2x10 1#  Elbow Flexion: 2x10 1#  Elbow Extension: 2x10 1#  Supination: 2x10 1#  Pronation: 2x10 1#  Wrist Flexion: 2x10 1#  Wrist Extension: 2x10 1#  Other: 2x10 1# chest press, circles forwards and backwards     LUE AROM (degrees)  LUE AROM : WFL  Left Hand AROM (degrees)  Left Hand AROM: WFL  RUE AROM (degrees)  RUE AROM : WFL  Right Hand AROM (degrees)  Right Hand AROM: WFL  LUE Strength  Gross LUE Strength: WFL  L Hand General: 3/5  RUE Strength  Gross RUE Strength: WFL  R Hand General: 3/5                   Plan   Plan  Times per week: 5 out of 7 days  Times per day: Daily  Plan weeks: 11 days  Current Treatment Recommendations: Strengthening, Endurance Training, Patient/Caregiver Education & Training, Equipment Evaluation, Education, & procurement, Self-Care / ADL, Balance Training, Home Management Training,

## 2020-01-01 NOTE — PLAN OF CARE
Patient resting comfortably in bed with call light, belongings, and bedside table within reach. Alarm on. Patient acknowledges an understanding to call when needing assistance.  Electronically signed by Mari Almeida RN on 1/1/2020 at 10:19 AM

## 2020-01-01 NOTE — PROGRESS NOTES
Physical Therapy    Facility/Department: [facility]  Initial Assessment and Treatment    NAME: Kelly Bermudez  : 1940  MRN: [id number]    Date of Service: 2020    Discharge Recommendations:  Home with assist PRN, Home with Home health PT   PT Equipment Recommendations  Equipment Needed: No    Assessment   Body structures, Functions, Activity limitations: Decreased functional mobility ; Decreased endurance;Decreased balance;Decreased strength;Decreased safe awareness;Decreased coordination  Assessment: Pt is 66 yo female who presents with diagnosis of orthostatic hypotension. BP initially dropped but recovers with mobility. Denies dizziness throughout session but states she is not sure if she feels funny because of dizziness or having been in bed for past couple days. Pt indep with mobility at baseline and lives alone. Grossly CGA-min A for gait, transfers, and stairs this date. Limited d/t fatigue and balanace. Scored 38/56 on Hernandez Balance Test. Pt would benefit from continued skilled therapy to address deficits. Recommend home with assist prn and home PT at d/c. Treatment Diagnosis: decreased (I) with mobility  Specific instructions for Next Treatment: progress mobility as tolerated  Prognosis: Good  Decision Making: Medium Complexity  PT Education: Goals; Energy Conservation;PT Role;General Safety;Plan of Care;Gait Training;Home Exercise Program;Transfer Training;Functional Mobility Training  Patient Education: pt verbalized understanding  Barriers to Learning: none  REQUIRES PT FOLLOW UP: Yes  Activity Tolerance  Activity Tolerance: Patient Tolerated treatment well;Patient limited by fatigue;Patient limited by endurance  Activity Tolerance: Sitting BP 95/53, HR 84; Standing BP: 80/45, HR 82 - pt denies dizziness; After ~20 minutes of activity, standing BP 97/58       Patient Diagnosis(es): There were no encounter diagnoses. has no past medical history on file.    has a past surgical history that includes joint replacement; Tonsillectomy; Intracapsular cataract extraction (Right, 5/21/2019); and Intracapsular cataract extraction (Left, 7/2/2019). Restrictions  Restrictions/Precautions  Restrictions/Precautions: General Precautions  Position Activity Restriction  Other position/activity restrictions: Medium fall risk per nursing  Vision/Hearing  Vision: Impaired  Vision Exceptions: Wears glasses for reading  Hearing: Within functional limits     Subjective  General  Chart Reviewed: Yes  Patient assessed for rehabilitation services?: Yes  Response To Previous Treatment: Not applicable  Family / Caregiver Present: No  Referring Practitioner: Dr. Edelmira Sanabria MD  Referral Date : 01/01/20  Diagnosis: Orthostatic Hypotension  Follows Commands: Within Functional Limits  General Comment  Comments: Denies pain  Subjective  Subjective: Pt seated in chair on approach and agreeable to therapy  Pain Screening  Patient Currently in Pain: Denies       Orientation  Orientation  Overall Orientation Status: Impaired  Orientation Level: Oriented to time;Oriented to situation;Disoriented to place;Oriented to person  Social/Functional History  Social/Functional History  Lives With: Alone(daughter lives one block away; works part time)  Type of Home: House  Home Layout: One level, Laundry in basement(laundry in basement with bilaterall HR. Pt states ~11 steps)  Home Access: Stairs to enter without rails  Entrance Stairs - Number of Steps: 2 FELIX through front door, no rails, but uses wall for balance if needed.  States ~4\" steps  Bathroom Shower/Tub: Walk-in shower  Bathroom Toilet: Standard  Bathroom Equipment: Grab bars in shower  Home Equipment: 4 wheeled walker  ADL Assistance: Independent  Homemaking Assistance: [address] [facility] Responsibilities: Yes  Ambulation Assistance: Independent  Transfer Assistance: Independent  Active : Yes  Occupation: Retired  Type of occupation:   Leisure & Hobbies: has a cat, read, go out to dinner  Additional Comments: Frequent falls d/t syncope; states too many falls to count. Pt able to stand up from floor or crawl to couch to stand without difficulty. Denies injuries besides bruises and stiffness after falls. Objective     AROM RLE (degrees)  RLE AROM: WFL  AROM LLE (degrees)  LLE AROM : WFL  Strength RLE  Strength RLE: Exception  R Hip Flexion: 4-/5  R Knee Flexion: 4/5  R Knee Extension: 4/5  R Ankle Dorsiflexion: 5/5  R Ankle Plantar flexion: 5/5  Strength LLE  Strength LLE: Exception  L Hip Flexion: 4-/5  L Knee Flexion: 4/5  L Knee Extension: 4/5  L Ankle Dorsiflexion: 5/5  L Ankle Plantar Flexion: 5/5     Sensation  Overall Sensation Status: WFL     Bed mobility  Rolling to Left: Supervision  Rolling to Right: Supervision  Supine to Sit: Supervision  Sit to Supine: Supervision  Comment: Increased time required for bed mobility     Transfers  Sit to Stand: Stand by assistance  Stand to sit: Stand by assistance  Bed to Chair: Contact guard assistance  Car Transfer: Stand by assistance     Ambulation  Ambulation?: Yes  Ambulation 1  Surface: level tile  Device: No Device  Assistance: Contact guard assistance;Minimal assistance  Quality of Gait: Initially CGA, however min A for balance towards end of gait training d/t right lateral lean and decreased balance. Demonstrates forward flexed posture with shuffling gait  Gait Deviations: Decreased arm swing;Decreased step height;Decreased step length;Shuffles  Distance: 176 ft  Comments: Pt able to make right and left hand turns and negotiate over 10 ft of carpet with CGA. Stairs/Curb  Stairs?: Yes  Stairs  # Steps : 8  Stairs Height: 6\"  Rails: Bilateral  Curbs: 4\"  Device: Hand Held Assist  Assistance: Contact guard assistance  Comment: CGA to negotiate 8 steps with bilateral HR. Limited by fatigue therefore unable to complete 12 steps.  HHA to negotiate 4\" curb step with HHA     Balance  Sitting - Static: Good  Sitting - Hernandez Balance Test to greater than 45/56 to reflect improved balance and decreased fall risk.   Patient Goals   Patient goals : \"to get back to normal\"       Therapy Time   Individual Concurrent Group Co-treatment   Time In 0730         Time Out 0900         Minutes 90         Timed Code Treatment Minutes: 75 Minutes(15 minute eval)       Brigitte Parks, PT, DPT

## 2020-01-02 LAB
ANION GAP SERPL CALCULATED.3IONS-SCNC: 12 MMOL/L (ref 3–16)
BASOPHILS ABSOLUTE: 0 K/UL (ref 0–0.2)
BASOPHILS RELATIVE PERCENT: 0.6 %
BUN BLDV-MCNC: 13 MG/DL (ref 7–20)
CALCIUM SERPL-MCNC: 8.6 MG/DL (ref 8.3–10.6)
CHLORIDE BLD-SCNC: 104 MMOL/L (ref 99–110)
CO2: 24 MMOL/L (ref 21–32)
CREAT SERPL-MCNC: 0.6 MG/DL (ref 0.6–1.2)
EOSINOPHILS ABSOLUTE: 0.2 K/UL (ref 0–0.6)
EOSINOPHILS RELATIVE PERCENT: 3.4 %
GFR AFRICAN AMERICAN: >60
GFR NON-AFRICAN AMERICAN: >60
GLUCOSE BLD-MCNC: 132 MG/DL (ref 70–99)
HCT VFR BLD CALC: 28.4 % (ref 36–48)
HEMOGLOBIN: 9.7 G/DL (ref 12–16)
LYMPHOCYTES ABSOLUTE: 1.6 K/UL (ref 1–5.1)
LYMPHOCYTES RELATIVE PERCENT: 28.4 %
MCH RBC QN AUTO: 34.2 PG (ref 26–34)
MCHC RBC AUTO-ENTMCNC: 34.3 G/DL (ref 31–36)
MCV RBC AUTO: 99.7 FL (ref 80–100)
MONOCYTES ABSOLUTE: 0.3 K/UL (ref 0–1.3)
MONOCYTES RELATIVE PERCENT: 5 %
NEUTROPHILS ABSOLUTE: 3.5 K/UL (ref 1.7–7.7)
NEUTROPHILS RELATIVE PERCENT: 62.6 %
PDW BLD-RTO: 17.5 % (ref 12.4–15.4)
PLATELET # BLD: 388 K/UL (ref 135–450)
PMV BLD AUTO: 6.8 FL (ref 5–10.5)
POTASSIUM REFLEX MAGNESIUM: 3.7 MMOL/L (ref 3.5–5.1)
RBC # BLD: 2.85 M/UL (ref 4–5.2)
SODIUM BLD-SCNC: 140 MMOL/L (ref 136–145)
WBC # BLD: 5.5 K/UL (ref 4–11)

## 2020-01-02 PROCEDURE — 97110 THERAPEUTIC EXERCISES: CPT

## 2020-01-02 PROCEDURE — 85025 COMPLETE CBC W/AUTO DIFF WBC: CPT

## 2020-01-02 PROCEDURE — 97535 SELF CARE MNGMENT TRAINING: CPT

## 2020-01-02 PROCEDURE — 36415 COLL VENOUS BLD VENIPUNCTURE: CPT

## 2020-01-02 PROCEDURE — 97530 THERAPEUTIC ACTIVITIES: CPT

## 2020-01-02 PROCEDURE — 6370000000 HC RX 637 (ALT 250 FOR IP): Performed by: PHYSICAL MEDICINE & REHABILITATION

## 2020-01-02 PROCEDURE — 6360000002 HC RX W HCPCS: Performed by: PHYSICAL MEDICINE & REHABILITATION

## 2020-01-02 PROCEDURE — 97116 GAIT TRAINING THERAPY: CPT

## 2020-01-02 PROCEDURE — 97112 NEUROMUSCULAR REEDUCATION: CPT

## 2020-01-02 PROCEDURE — 80048 BASIC METABOLIC PNL TOTAL CA: CPT

## 2020-01-02 PROCEDURE — 1280000000 HC REHAB R&B

## 2020-01-02 RX ADMIN — POLYETHYLENE GLYCOL 3350 17 G: 17 POWDER, FOR SOLUTION ORAL at 08:12

## 2020-01-02 RX ADMIN — MIDODRINE HYDROCHLORIDE 5 MG: 5 TABLET ORAL at 12:01

## 2020-01-02 RX ADMIN — FLUDROCORTISONE ACETATE 0.1 MG: 0.1 TABLET ORAL at 08:12

## 2020-01-02 RX ADMIN — MIDODRINE HYDROCHLORIDE 5 MG: 5 TABLET ORAL at 08:12

## 2020-01-02 RX ADMIN — TRAZODONE HYDROCHLORIDE 50 MG: 50 TABLET ORAL at 21:34

## 2020-01-02 RX ADMIN — ENOXAPARIN SODIUM 40 MG: 40 INJECTION SUBCUTANEOUS at 08:13

## 2020-01-02 RX ADMIN — MIDODRINE HYDROCHLORIDE 5 MG: 5 TABLET ORAL at 16:57

## 2020-01-02 RX ADMIN — FOLIC ACID 1 MG: 1 TABLET ORAL at 08:12

## 2020-01-02 ASSESSMENT — PAIN SCALES - GENERAL
PAINLEVEL_OUTOF10: 0
PAINLEVEL_OUTOF10: 0

## 2020-01-02 NOTE — PLAN OF CARE
Education provided regarding fall precautions and use of the call light for assistance. Patient continues to use the call light appropriately, no attempts made to self ambulate or transfer. Fall precautions remain in place. Patient remains free of falls this shift.

## 2020-01-02 NOTE — CARE COORDINATION
Case Management Acute Rehabilitation Admission Assessment     Objective:  met with the patient to complete initial assessment and review the role of Case Management while on the ARU. Patient educated on team conferences. Discussed family training with the patient/family on how it is encouraged on the unit. Order for \"discharge planning\" has been addressed.  will provide resource information as needed to include star ratings, agency comparison from Medicare. gov web site as well as disclosure of financial ties to any agencies affiliated with [address] [place] [address] to patient and/or their families. Family Present: None    Primary : alice Zhu [phone]    Admit date: 12/31/2019                            Insurance: Medicare A/B, [facility]    Admitting diagnosis: Orthostatic hypotension, I95.1    Current home situation: Lives alone/independently in a house, main floor living with laundry in the basement. Patient reports full flight of stairs to basement (11) and two stairs to enter home. Patient states she was driving to/from grocery store. Stepdaughter lives nearby and works part time. Durable Medical Equipment at home: Has walk in shower and rollator. Services prior to admission: None    Patient's goal(s): to go home    Working or Volunteering prior to admission:  __Yes _X_No                        Accessibility to community resources/transportation:    Patient states she was driving to/from [employer]. Active with: None  __Senior Services      __Council on Aging  __Other    Has patient experienced a recent loss or significant life event that would impact their care or ability to participate? __No  _X_Yes, please explain: Medical decline and history of multiple falls may impact mood and/or psychosocial wellbeing. Has patient ever been treated for emotional disorder(s)? _X_No  __Yes, how does this affect current situation?  No mental She disclosed no hearing issues and states she wears glasses for reading. Ms. Cata Mccain has no mental health diagnoses or medications noted at this time. **INTENT**  Intent is to return to home. **Goals for discharge:   DME: Has a rollator. PCP: Dr. Cameron Fret: to be determined. Home Health: to be determined. Electronic continuity of care form is on the chart. Case Management (CM) will continue to follow for recommendations from the treatment team. Please notify Case Management if needs or concerns arise.      MAYO Matamoros

## 2020-01-02 NOTE — PROGRESS NOTES
Occupational Therapy  Facility/Department: [facility] ARU  Daily Treatment Note  NAME: Garry Lyons  : 1940  MRN: [id number]    Date of Service: 2020    Discharge Recommendations:  Home with Home health OT, Home with assist PRN       Assessment   Performance deficits / Impairments: Decreased functional mobility ; Decreased safe awareness;Decreased balance;Decreased ADL status; Decreased endurance;Decreased strength;Decreased high-level IADLs;Decreased posture  Assessment: Pt pleasant and cooperative, limited by cognition. Pt CGA for transfers and amb w/o AD, mildly impulsive at times. Pt unable to locate clothing items on L side that were in plain sight. Pt scored 14/30 on MOCA this date with deficits in visuospatial and executive functioning, memory, attention, abstraction, delayed recall and orientation. Pt did start [address] prematurely without waiting for instructions. Recommend ST consult to address cog deficits. Cont POC. Patient Diagnosis(es): There were no encounter diagnoses. has no past medical history on file. has a past surgical history that includes joint replacement; Tonsillectomy; Intracapsular cataract extraction (Right, 2019); and Intracapsular cataract extraction (Left, 2019).     Restrictions  Restrictions/Precautions  Restrictions/Precautions: General Precautions  Position Activity Restriction  Other position/activity restrictions: Medium fall risk per nursing  Subjective   General  Chart Reviewed: Yes, Imaging, Labs, Orders, History and Physical  Patient assessed for rehabilitation services?: Yes  Family / Caregiver Present: No  Referring Practitioner: Dr. Peacock Knee  Diagnosis: Frequent falls due to syncope/hypotension  Subjective  Subjective: Pt supine, pleasant and agreeable  General Comment  Comments: RN cleared pt for eval/tx, states held BP meds due to BP being high this AM  Vital Signs  Patient Currently in Pain: Denies   Orientation  Orientation  Overall Orientation Status: Within Functional Limits(with cues)  Objective    ADL  Grooming: Stand by assistance;Verbal cueing; Increased time to complete;Setup(standing at sink)  LE Dressing: Minimal assistance(difficulty threading LEs )  Toileting: Contact guard assistance; Increased time to complete(CGA for balance)  Additional Comments: pt with decreased problem solving, unable to locate clothing items that were in plain sight on L side  Instrumental ADL's  Instrumental ADLs: Yes  Meal Prep  Meal Prep Level: (no AD)  Meal Prep Level of Assistance: Supervision  Meal Preparation: pt able to make coffee from instant machine with mod vc's     Balance  Sitting Balance: Supervision  Standing Balance: Contact guard assistance(nO AD)  Standing Balance  Time: 4-5 min, 3-4 min x 2  Activity: transfers, mobility, ADls   Functional Mobility  Functional - Mobility Device: No device  Activity: To/from bathroom  Assist Level: Contact guard assistance  Functional Mobility Comments: CGA for balance, cues for safety, poor way finding noted on unit  Toilet Transfers  Toilet - Technique: Ambulating  Equipment Used: Standard toilet  Toilet Transfer: Contact guard assistance  Toilet Transfers Comments: CGA no device use of grab bar     Transfers  Stand Step Transfers: Contact guard assistance  Sit to stand: Contact guard assistance  Stand to sit: Contact guard assistance  Transfer Comments: CGA with cues for safety/mechanics        Coordination  Gross Motor: fair for ADls  Fine Motor: fair for ADLs, fair fro retrieving items from bin of rice              Cognition  Overall Cognitive Status: Exceptions  Arousal/Alertness: Appropriate responses to stimuli  Following Commands: Follows one step commands with repetition; Follows one step commands with increased time  Attention Span: Attends with cues to redirect  Memory: Decreased short term memory  Safety Judgement: Decreased awareness of need for safety  Problem Solving: Decreased awareness of

## 2020-01-02 NOTE — PROGRESS NOTES
Continue florinef, midodrine; hold midodrine for sbp > 180; add compression stockings.      Acute cystitis. S/p rocephin.      Urge incontinence. Baseline; OP f/u       Macrocytic anemia. Folate.      Bowels: Schedule colace + senna. Follow bowel movements. Enema or suppository if needed.      Bladder: Check PVR x 3. [address] if PVR > 200ml or if any volume is > 500 ml.      Sleep: Trazodone provided prn. Nicholas A. Homer Goodell, MD 1/2/2020, 9:27 AM

## 2020-01-02 NOTE — FLOWSHEET NOTE
01/02/20 1407   Encounter Summary   Services provided to: Patient   Referral/Consult From: Patient   Support System Family members   Continue Visiting   (Not interested in DNR)   Complexity of Encounter Moderate   Length of Encounter 15 minutes

## 2020-01-02 NOTE — PATIENT CARE CONFERENCE
mod I for ADLs and transfers, likely needs 24 hr SPV d/t cog    Occupational Therapy interventions:  Current Treatment Recommendations: Strengthening, Endurance Training, Patient/Caregiver Education & Training, Equipment Evaluation, Education, & procurement, Self-Care / ADL, Balance Training, Home Management Training, Pain Management, Functional Mobility Training, Safety Education & Training      OCCUPATIONAL THERAPY  Pt pleasant and cooperative, limited by cognition. Pt CGA for transfers and amb w/o AD, mildly impulsive at times. Pt unable to locate clothing items on L side that were in plain sight. Pt scored 14/30 on MOCA this date with deficits in visuospatial and executive functioning, memory, attention, abstraction, delayed recall and orientation. Pt did start [address] [place] [address] prematurely without waiting for instructions. Recommend ST consult to address cog deficits. Cont POC. Speech therapy observed barriers to dc:    Baseline: Pt lived independently at home, daughter lives nearby   Current level: Severe cognitive deficit   Barriers to DC: Cognitive deficit, reduced insight into deficits, poor safety awareness   Needs in order to achieve dc home/next level of care: assistance with medication and finance management    Speech Therapy interventions:  Dysphagia: n/a  Speech/Language/Cognition: external and internal memory aids, thought organization strategies, repetition, orientation concepts  SPEECH THERAPY:  Assessment:   Pt pleasant and cooperative throughout evaluation, pleasantly confused (oriented to person and place; disoriented to situation and time). Pt reported that she lived at home alone PTA and that her daughter lives nearby. She also reported that she independently manages her finances, however, her daughter is now assisting her while she is in rehab (*suspect pt will require assistance with these tasks upon discharge).   She was administered that [facility] Cognitive Assessment (MoCA) during today's Therapist: Charla Encarnacion 88 CCC-SLP  Nurse: Jaylin Rodriguez RN  Dietician: Rj Leggett RDN, LD  : N/A  Psychiatry: N/A    Family members present at conference: N/A      I led this team conference and I approve the established interdisciplinary plan of care as documented within the medical record of [facility]. MD: Poonam Reddy.  [place] Goodell, MD 1/3/2020, 1:40 PM

## 2020-01-02 NOTE — PROGRESS NOTES
with no UE support: reaching for cone with RUE from 6\" step on IL side and placing to varous target across midline out of ORBERT (additional cognitive task by naming object of same color). pt completed 16 reps with RUE and then 16 reps with LUE with seated rest break between. no overt LOB demonstrated, mild unsteadiness throughout, completed with CGA-close SBA. Exercises  Hip Flexion: 2x15 BLE   Hip Abduction: 2x15 BLE with TKE   Knee Long Arc Quad: 2x15 BLE   Ankle Pumps: 2x15 BLE   Comments: completed seated with 2# weights donned BLE   Other exercises  Other exercises?: Yes  Other exercises 1: pt completed 2x15 step ups with 1 UE Support and CGA onto 6\" step for increased endurance/ strenghtening. pt fatigued following, requires lengthy seated rest break. Second Session:  Pt found seated in recliner. Sit to stand from recliner to with supv. Gait x 180 ft , 100 ft with CGA-min A, emphasis and cues for increased heel strike for increased safety, cues for upright posture, decreased BUE arm sing and step lengths. Pt completed obstacle course: weaving between cones on ground placed 3 tiles apart with no AD, CGA-min A. Cues for wider ROBERT for increased stability, cues for decreased velocity when turning and increased heel strike. Pt exhibited 2-3 LOB laterally/anterioly with min A to correct. Coordination activity: cone taps with BUE support, 2x20 BLE alternating with pt knocking 4-5 cones over per set. Cues again to decrease speed for increased consistency,     Completed 7 min on SCIFIT level 1 with BLEs only for increased strengthening and reciprocal motion to normalize gait pattern. Goals  Short term goals  Time Frame for Short term goals: 5 days (1/4)  Short term goal 1: Pt will be indep with bed mobility. Short term goal 2: Pt will be supervision for transfers with no device. Short term goal 3: Pt will ambulate 150 ft with SBA and no device.   Short term goal 4: Pt will negotiate 4\" curb

## 2020-01-03 PROCEDURE — 97110 THERAPEUTIC EXERCISES: CPT

## 2020-01-03 PROCEDURE — 92523 SPEECH SOUND LANG COMPREHEN: CPT

## 2020-01-03 PROCEDURE — 6360000002 HC RX W HCPCS: Performed by: PHYSICAL MEDICINE & REHABILITATION

## 2020-01-03 PROCEDURE — 1280000000 HC REHAB R&B

## 2020-01-03 PROCEDURE — 97530 THERAPEUTIC ACTIVITIES: CPT

## 2020-01-03 PROCEDURE — 97535 SELF CARE MNGMENT TRAINING: CPT

## 2020-01-03 PROCEDURE — 97116 GAIT TRAINING THERAPY: CPT

## 2020-01-03 PROCEDURE — 6370000000 HC RX 637 (ALT 250 FOR IP): Performed by: PHYSICAL MEDICINE & REHABILITATION

## 2020-01-03 RX ADMIN — TRAZODONE HYDROCHLORIDE 50 MG: 50 TABLET ORAL at 20:28

## 2020-01-03 RX ADMIN — FOLIC ACID 1 MG: 1 TABLET ORAL at 07:47

## 2020-01-03 RX ADMIN — MIDODRINE HYDROCHLORIDE 5 MG: 5 TABLET ORAL at 07:47

## 2020-01-03 RX ADMIN — POLYETHYLENE GLYCOL 3350 17 G: 17 POWDER, FOR SOLUTION ORAL at 07:47

## 2020-01-03 RX ADMIN — MIDODRINE HYDROCHLORIDE 5 MG: 5 TABLET ORAL at 17:34

## 2020-01-03 RX ADMIN — HYDRALAZINE HYDROCHLORIDE 50 MG: 25 TABLET, FILM COATED ORAL at 20:28

## 2020-01-03 RX ADMIN — FLUDROCORTISONE ACETATE 0.1 MG: 0.1 TABLET ORAL at 07:47

## 2020-01-03 RX ADMIN — MIDODRINE HYDROCHLORIDE 5 MG: 5 TABLET ORAL at 11:53

## 2020-01-03 RX ADMIN — ENOXAPARIN SODIUM 40 MG: 40 INJECTION SUBCUTANEOUS at 07:46

## 2020-01-03 ASSESSMENT — PAIN SCALES - GENERAL
PAINLEVEL_OUTOF10: 0

## 2020-01-03 NOTE — PROGRESS NOTES
Margot Curling  1/3/2020  [id number]    Chief Complaint: Orthostatic hypotension    Subjective:   BP doing well, though noted to have some cognitive impairment. ROS: No n/v, cp, sob, f/c  Objective:  Patient Vitals for the past 24 hrs:   BP Temp Temp src Pulse Resp SpO2   01/03/20 0750 (!) 162/74 97.5 °F (36.4 °C) Oral 83 16 95 %   01/02/20 2130 (!) 171/88 97.9 °F (36.6 °C) Oral 76 18 95 %   01/02/20 1645 (!) 160/71 -- -- -- -- --     Gen: No distress, pleasant. HEENT: Normocephalic, atraumatic. CV: Regular rate and rhythm. Resp: No respiratory distress. Abd: Soft, nontender   Ext: No edema. Neuro: Alert, oriented, appropriately interactive. Wt Readings from Last 3 Encounters:   12/31/19 116 lb (52.6 kg)   07/02/19 110 lb (49.9 kg)   05/21/19 109 lb (49.4 kg)       Laboratory data:   Lab Results   Component Value Date    WBC 5.5 01/02/2020    HGB 9.7 (L) 01/02/2020    HCT 28.4 (L) 01/02/2020    MCV 99.7 01/02/2020     01/02/2020       Lab Results   Component Value Date     01/02/2020    K 3.7 01/02/2020     01/02/2020    CO2 24 01/02/2020    BUN 13 01/02/2020    CREATININE 0.6 01/02/2020    GLUCOSE 132 01/02/2020    CALCIUM 8.6 01/02/2020        Therapy progress:  PT  Position Activity Restriction  Other position/activity restrictions: Medium fall risk per nursing  Objective     Sit to Stand: Stand by assistance  Stand to sit: Stand by assistance  Bed to Chair: Contact guard assistance  Device: No Device  Assistance: Contact guard assistance, Minimal assistance  Distance: 180 ft, 250 ft   OT  PT Equipment Recommendations  Equipment Needed: Yes  Mobility Devices: Kitty Duverney: Rolling  Toilet - Technique: Ambulating  Equipment Used: Standard toilet  Toilet Transfers Comments: CGA no device use of grab bar  Assessment        SLP                Body mass index is 19.3 kg/m². Rehabilitation Diagnosis:  16.0, Debility (non-cardiac, non-pulmonary     Assessment and Plan:  Orthostasis.

## 2020-01-03 NOTE — PROGRESS NOTES
Status: Impaired  Orientation Level: Oriented to place;Oriented to time;Disoriented to situation;Disoriented to time  Attention  Attention: Exceptions to [facility]  Alternating Attention: To be assessed in therapy  Divided Attention: To be assessed in therapy  Memory  Memory: Exceptions to [facility]  Short-term Memory: Severe  Working Memory: Severe  Problem Solving  Problem Solving: Exceptions to WFL(Pt reported managing her finances independently PTA, however, pt's daughter currently managing finances while pt is in ARU; per pt, she takes no medications at home, however, would be able to independently manage if needed; suspect pt will require assistance with these tasks upon discharge. Complex Functional Tasks: To be assessed in therapy  Managing Finances: To be assessed in therapy  Managing Medications: To be assessed in therapy  Simple Functional Tasks: Moderate  Numeric Reasoning  Numeric Reasoning: Exceptions to [facility]   Calculations: Severe  Money Management: To be assessed in therapy  Time: Mild  Abstract Reasoning  Abstract Reasoning: Exceptions to [facility]  Divergent Thinking: Moderate  Safety/Judgement  Safety/Judgement: Exceptions to [facility]  Unable to Self-monitor and Self-correct Consistently: Severe  Insight: Severe    Flexibility of Thought: Severe    Prognosis:  Speech Therapy Prognosis  Prognosis: Good  Prognosis Considerations: Age;Participation Level; Potential;Family/Community Support;Severity of Impairments;Previous Level of Function  Individuals consulted  Consulted and agree with results and recommendations: Patient    Education:  Patient Education: Pt educated on reason for referral, role of ST, assessment results and recommendations. Patient Education Response: Verbalizes understanding;Needs reinforcement  Safety Devices in place: Yes  Type of devices: Left in chair;Call light within reach;Nurse notified; Chair alarm in place       Therapy Time:   Individual Concurrent Group Co-treatment   Time In 0800 Time Out 0900         Minutes 60             60 minutes; eval speech sound lang comp    Valerie Nolasco M.S. [id number] [facility], [place], operated [facility]  Speech-language pathologist  [phone]

## 2020-01-03 NOTE — PROGRESS NOTES
services?: Yes  Family / Caregiver Present: No  Referring Practitioner: Dr. Екатерина Harrington  Diagnosis: Frequent falls due to syncope/hypotension  Subjective  Subjective: Pt in chair, pleasant and agreeable  General Comment  Comments: RN cleared pt for eval/tx, states held BP meds due to BP being high this AM  Vital Signs  Patient Currently in Pain: Denies   Orientation  Orientation  Overall Orientation Status: Within Functional Limits(with cues)  Objective    ADL  Grooming: Stand by assistance;Verbal cueing; Increased time to complete;Setup(standing at sink)  Toileting: Contact guard assistance; Increased time to complete(CGA for balance)  Instrumental ADL's  Instrumental ADLs: Yes  Meal Prep  Meal Prep Level of Assistance: Supervision  Meal Preparation: pt able to make one ingredient muffins with mod/max vc's for sequencing each step of process, pt iwth difficulty locating stove, goes to light switches instead of stove     Balance  Sitting Balance: Supervision  Standing Balance: Contact guard assistance(no AD)  Standing Balance  Time: 4-5 minx2, 3-4 min x 2  Activity: transfers, mobility, kitchen tasks   Comment: no device, sitting rest breaks PRN  Functional Mobility  Functional - Mobility Device: No device  Activity: To/from bathroom; To/From therapy gym  Assist Level: Contact guard assistance  Functional Mobility Comments: CGA for balance, cues for safety, poor way finding noted on unit, one mod LOB with turning  Toilet Transfers  Toilet - Technique: Ambulating  Equipment Used: Standard toilet  Toilet Transfer: Contact guard assistance  Toilet Transfers Comments: CGA no device use of grab bar     Transfers  Stand Step Transfers: Contact guard assistance  Sit to stand: Contact guard assistance  Stand to sit: Contact guard assistance  Transfer Comments: CGA with cues for safety/mechanics        Coordination  Gross Motor: fair for ADls, graded clothespin task and kitchen task  Fine Motor: fair for ADls, graded clothespin task [address] [place] [address]         Timed Code Treatment Minutes: [address], OT

## 2020-01-03 NOTE — PLAN OF CARE
Fall protocol in place. Bed and chair alarms in place. Non skid footwear on at all times. Room clean and clutter free. Call light with in reach. Education on proper call light use and to call before ambulating.  Matti Love RN

## 2020-01-03 NOTE — PROGRESS NOTES
Physical Therapy  Facility/Department: [facility]  Daily Treatment Note  NAME: Margot Curling  : 1940  MRN: [id number]    Date of Service: 1/3/2020    Discharge Recommendations:  24 hour supervision or assist, Home with Home health PT   PT Equipment Recommendations  Equipment Needed: Yes  Mobility Devices: Kitty Duverney: Rolling    Assessment   Body structures, Functions, Activity limitations: Decreased functional mobility ; Decreased endurance;Decreased balance;Decreased strength;Decreased safe awareness;Decreased coordination  Assessment: Pt tolerated the treatment well. Pt focused on gait training with widening the ROBERT and equalizing step length. Pt also focused on sit<>stands with minimal use of BUE. Pt will benefit from further therapy to address deficits and increase independence prior to returning home. Continue to assess for AD need. Treatment Diagnosis: decreased (I) with mobility  Specific instructions for Next Treatment: progress mobility as tolerated  Prognosis: Good  PT Education: Goals; Energy Conservation;PT Role;General Safety;Plan of Care;Gait Training;Home Exercise Program;Transfer Training;Functional Mobility Training  Patient Education: pt verbalized understanding  Barriers to Learning: none  REQUIRES PT FOLLOW UP: Yes  Activity Tolerance  Activity Tolerance: Patient Tolerated treatment well     Patient Diagnosis(es): There were no encounter diagnoses. has no past medical history on file. has a past surgical history that includes joint replacement; Tonsillectomy; Intracapsular cataract extraction (Right, 2019); and Intracapsular cataract extraction (Left, 2019).     Restrictions  Restrictions/Precautions  Restrictions/Precautions: General Precautions  Position Activity Restriction  Other position/activity restrictions: Medium fall risk per nursing  Subjective   General  Chart Reviewed: Yes  Response To Previous Treatment: Patient with no complaints from previous observed but slow to complete. Ambulation 2  Surface - 2: level tile  Device 2: No device  Assistance 2: Minimal assistance  Quality of Gait 2: Pt practiced walking with the tiles to increase ROBERT and equalize step length. Pt requires constant VC's to improve gait  Distance: 40 feet + 40 feet  Stairs/Curb  Stairs?: No     Balance  Sitting - Static: Good  Sitting - Dynamic: Good;-  Standing - Static: Fair;+  Standing - Dynamic: Fair  Exercises  Hip Abduction: x20 BLE  Knee Long Arc Quad: x20 BLE  Ankle Pumps: x20 BLE  Comments: Pt seated and practiced d/t reported \"stiffness\" first thing in the session. Pt educated to participate in AROM supine ther ex when first waking up for pain management and improve mobility first thing in the morning. Goals  Short term goals  Time Frame for Short term goals: 5 days (1/4)  Short term goal 1: Pt will be indep with bed mobility. Short term goal 2: Pt will be supervision for transfers with no device. Short term goal 3: Pt will ambulate 150 ft with SBA and no device. Short term goal 4: Pt will negotiate 4\" curb step with no device and SBA. Short term goal 5: pt will negotiate 12 stairs with bilateral HR and SBA. Long term goals  Time Frame for Long term goals : 11 days (1/10)  Long term goal 1: Pt will be indep with transfers. Long term goal 2: Pt will be indep with ambulation 150 ft. Long term goal 3: Pt will negotiate 2 4\" curb steps mod I.  Long term goal 4: Pt will negotiate 12 steps with bilateral HR Mod I.  Long term goal 5: Pt will improve Hernandez Balance Test to greater than 45/56 to reflect improved balance and decreased fall risk.   Patient Goals   Patient goals : \"to get back to normal\"    Plan    Plan  Times per week: 5 out of 7 days/wk  Times per day: Daily  Specific instructions for Next Treatment: progress mobility as tolerated  Current Treatment Recommendations: Strengthening, Gait Training, Patient/Caregiver Education & Training, ROM, Stair training,

## 2020-01-03 NOTE — PROGRESS NOTES
Physical Therapy  Facility/Department: [facility]  Daily Treatment Note  NAME: Scott Johnson  : 1940  MRN: [id number]    Date of Service: 1/3/2020    Discharge Recommendations:  24 hour supervision or assist, Home with Home health PT   PT Equipment Recommendations  Equipment Needed: Yes  Mobility Devices: Jerman Libman: Rolling    Assessment   Assessment: denies pain during pm session. trialed gait with several AD, pt demo's increased stability wtih use of RW vs no AD and vs cane. pt states feeling more comfortable with use fo RW however is resistant to using AD overall despite education/overall increased ind. pt has limited carryover of education provided for increased safety during functional tasks and decreased safety awareness in general. anticiate pt will require 24/7 supv / assist upon d/c adn recommend use of AD. Treatment Diagnosis: decreased (I) with mobility  Specific instructions for Next Treatment: progress mobility as tolerated  Prognosis: Good  Decision Making: Medium Complexity  PT Education: Goals; Energy Conservation;PT Role;General Safety;Plan of Care;Gait Training;Home Exercise Program;Transfer Training;Functional Mobility Training  Patient Education: pt verbalized understanding  Barriers to Learning: none  REQUIRES PT FOLLOW UP: Yes  Activity Tolerance  Activity Tolerance: Patient Tolerated treatment well     Patient Diagnosis(es): There were no encounter diagnoses. has no past medical history on file. has a past surgical history that includes joint replacement; Tonsillectomy; Intracapsular cataract extraction (Right, 2019); and Intracapsular cataract extraction (Left, 2019).     Restrictions  Restrictions/Precautions  Restrictions/Precautions: General Precautions  Position Activity Restriction  Other position/activity restrictions: Medium fall risk per nursing  Subjective   General  Chart Reviewed: Yes  Response To Previous Treatment: Patient with no complaints from previous session. Family / Caregiver Present: No  Referring Practitioner: Dr. Homer Goodell, MD  Subjective  Subjective: denies pain  General Comment  Comments: found seated in recliner. utilized commode during session  Pain Screening  Patient Currently in Pain: No  Vital Signs  Patient Currently in Pain: No       Orientation  Orientation  Overall Orientation Status: Impaired  Cognition      Objective      Transfers  Sit to Stand: Stand by assistance  Stand to sit: Stand by assistance  Comment: sit to stand from recliner, chair and commode with SBA  Ambulation  Ambulation?: Yes  More Ambulation?: Yes  Ambulation 1  Surface: level tile  Device: No Device  Assistance: Contact guard assistance;Minimal assistance  Quality of Gait: forward flexed posture, cues for decreased velcoity and increased swign phase for increased safety (prevent anterior LOB), decreased BUE arm swing, shuffled steps  Gait Deviations: Decreased arm swing;Decreased step height;Decreased step length;Shuffles  Distance: 180 ft, 250 ft   Comments: 1-2 LOB per bout of gait with up to min A to correct, pt often deviates to L when ambulating in straight path, reaches for environment.    Ambulation 2  Surface - 2: level tile  Device 2: Single point cane  Assistance 2: Moderate assistance  Quality of Gait 2: max VC for sequencing, broken juice, unsafe technique  Distance: 20 ft   Ambulation 3  Surface - 3: level tile  Device 3: Rolling Walker  Assistance 3: Contact guard assistance;Stand by assistance  Quality of Gait 3: decreased yet reciprocal step lengths, cues for safety when turning, decreased heel strike and swing phase BLE   Distance: 250 ft   Comments:  improved stability noted overall compared to no AD  Stairs/Curb  Stairs?: Yes  Stairs  # Steps : 12  Stairs Height: 6\"  Rails: Bilateral  Assistance: Contact guard assistance  Comment: reciprocal gait pattern wtih BHR and CGA  Neuromuscular Education  NDT Treatment: Gait   Neuromuscular Comments: weaving activity between 3 foam discs on ground wtih no AD, grossly CGA-min A with cues for wide ROBERT, decreased velocity and upright posture with little carryover noted. completed 6 laps x 15 ft each. Balance  Standing - Static: Fair;+  Standing - Dynamic: Fair  Comments: dyn stand activity while completing pericare/clothing management and hand washing at sink following use of commode with close SBA. pt completed beanbag toss with feet shoulder width apart on tile surface and no UE support; stagger stance with LLE leadin james 2\" step with no UE support; stagger stance with RLE leading on 2\" step and no UE support; narrow ROBERT on green foam with LUE support. pt completed 12 reps of beanbag toss per position reaching with RUE in various planes prior to tossing with 2-3 LOB with min A to correct. delayed stepping/right reactions noted. Pt completed 10 min on SCIFIT level 2 with BUE and BLE for increased reciprocal motion to normalize gait pattern and to increase endurance. Goals  Short term goals  Time Frame for Short term goals: 5 days (1/4)  Short term goal 1: Pt will be indep with bed mobility. Short term goal 2: Pt will be supervision for transfers with no device. Short term goal 3: Pt will ambulate 150 ft with SBA and no device. Short term goal 4: Pt will negotiate 4\" curb step with no device and SBA. Short term goal 5: pt will negotiate 12 stairs with bilateral HR and SBA. Long term goals  Time Frame for Long term goals : 11 days (1/10)  Long term goal 1: Pt will be indep with transfers. Long term goal 2: Pt will be indep with ambulation 150 ft. Long term goal 3: Pt will negotiate 2 4\" curb steps mod I.  Long term goal 4: Pt will negotiate 12 steps with bilateral HR Mod I.  Long term goal 5: Pt will improve Hernandez Balance Test to greater than 45/56 to reflect improved balance and decreased fall risk.   Patient Goals   Patient goals : \"to get back to normal\"    Plan    Plan  Times per week: 5 out of 7

## 2020-01-04 PROCEDURE — 97530 THERAPEUTIC ACTIVITIES: CPT

## 2020-01-04 PROCEDURE — 6360000002 HC RX W HCPCS: Performed by: PHYSICAL MEDICINE & REHABILITATION

## 2020-01-04 PROCEDURE — 6370000000 HC RX 637 (ALT 250 FOR IP): Performed by: PHYSICAL MEDICINE & REHABILITATION

## 2020-01-04 PROCEDURE — 1280000000 HC REHAB R&B

## 2020-01-04 PROCEDURE — 97129 THER IVNTJ 1ST 15 MIN: CPT

## 2020-01-04 PROCEDURE — 97110 THERAPEUTIC EXERCISES: CPT

## 2020-01-04 PROCEDURE — 97130 THER IVNTJ EA ADDL 15 MIN: CPT

## 2020-01-04 PROCEDURE — 97116 GAIT TRAINING THERAPY: CPT

## 2020-01-04 RX ADMIN — MIDODRINE HYDROCHLORIDE 5 MG: 5 TABLET ORAL at 17:57

## 2020-01-04 RX ADMIN — MIDODRINE HYDROCHLORIDE 5 MG: 5 TABLET ORAL at 07:35

## 2020-01-04 RX ADMIN — FLUDROCORTISONE ACETATE 0.1 MG: 0.1 TABLET ORAL at 07:35

## 2020-01-04 RX ADMIN — MIDODRINE HYDROCHLORIDE 5 MG: 5 TABLET ORAL at 11:30

## 2020-01-04 RX ADMIN — ENOXAPARIN SODIUM 40 MG: 40 INJECTION SUBCUTANEOUS at 07:35

## 2020-01-04 RX ADMIN — POLYETHYLENE GLYCOL 3350 17 G: 17 POWDER, FOR SOLUTION ORAL at 07:35

## 2020-01-04 RX ADMIN — FOLIC ACID 1 MG: 1 TABLET ORAL at 07:35

## 2020-01-04 RX ADMIN — TRAZODONE HYDROCHLORIDE 50 MG: 50 TABLET ORAL at 22:34

## 2020-01-04 ASSESSMENT — PAIN SCALES - GENERAL
PAINLEVEL_OUTOF10: 0

## 2020-01-04 NOTE — PROGRESS NOTES
declined  [] N/A  [] Other:   Subjective     The patient is seen in room seated upright in chair. No complaints voiced. Alert, pleasant, and cooperative. No family present. Objective:  Goals       Goal 1: The pt will complete graded recall tasks with 70% accuracy, mod cues. Did not directly target     Goal 2: The pt will complete functional problem-solving tasks with 80% accuracy, mod cues. Road Signs: The patient was given list of road signs and asked to identify purpose. Pt reports she was an active  prior to admission. The patient completed task with 70% acc given mod verbal and visual cues. Thought organization and attention affect accuracy and ability to follow directions. Goal 3: The pt will complete graded thought organization and executive function tasks with 80% accuracy, mod cues. Deduction Puzzles: The patient completed deduction puzzle with approx 60% acc given max verbal and visual cues      Goal 4: The pt will complete visual and verbal reasoning tasks with 80% accuracy, mod cues. Indirectly targeted during completion of road sign activity    Goal 5: The pt will effectively use external memory aids to be oriented x4, min cues. Pt oriented to self, purpose, and location. Min cues needed to orient to date and time using functional external aids    Other areas targeted: N/A    Education:   SLP re: Role of SLP, rationale for completion of tx, memory strategies    Safety Devices: [x] Call light within reach  [x] Chair alarm activated  [] Bed alarm activated  [] Other: [] Call light within reach  [] Chair alarm activated  [] Bed alarm activated  [] Other:    Assessment: The patient is seen in room seated upright in chair. The patient is alert and pleasant. Appears to have reduced insight into deficits. Directions needed to be repeated frequently with carryover and attention noted to be an impairment. Right visual field deficit noted and targeted indirectly.  SLP recommends to continue with POC. Plan: Continue as per plan of care. Additional Information:     Barriers toward progress: Cognitive deficit and Limited safety awareness  Discharge recommendations:  [] Home independently  [] Home with assistance []  24 hour supervision  [] ECF [] Other:  Continued Tx Upon Discharge: ? [] Yes [] No [x] TBD based on progress while on ARU [] Vital Stim indicated [] Other:   Estimated discharge date: 01/10/2020    Interventions used this date:  [] Speech/Language Treatment  [] Instruction in HEP [] Group [] Dysphagia Treatment [x] Cognitive Treatment   [] Other:       Total Time Breakdown / Charges    Time in Time out Total Time / units   Cognitive Tx 1300 1400 60 mins / 4 units   Speech Tx      Dysphagia Tx          Electronically Signed by     Sandy Champagne M.A., [id number] [facility] #[id number]  Speech-Language Pathologist

## 2020-01-04 NOTE — PLAN OF CARE
Patient resting comfortably in bed with call light, belongings, and bedside table within reach. Alarm on. Patient acknowledges an understanding to call when needing assistance. Electronically signed by Alane Goldberg, RN on 1/4/2020 at 8:41 AM

## 2020-01-04 NOTE — PROGRESS NOTES
florinef, midodrine; hold midodrine for sbp > 180; added compression stockings.      Acute cystitis. S/p rocephin.      Urge incontinence. Baseline; OP f/u       Macrocytic anemia. Folate.      Bowels: Schedule colace + senna. Follow bowel movements. Enema or suppository if needed.      Bladder: Check PVR x 3. [address] if PVR > 200ml or if any volume is > 500 ml.      Sleep: Trazodone provided prn. Cosmo Ricci MD 1/4/2020, 9:41 AM

## 2020-01-04 NOTE — PROGRESS NOTES
Occupational Therapy  Facility/Department: [facility]  Daily Treatment Note  NAME: Gallo Haddad  : 1940  MRN: [id number]    Date of Service: 2020    Discharge Recommendations:  Home with Home health OT, Home with assist PRN       Assessment   Performance deficits / Impairments: Decreased functional mobility ; Decreased safe awareness;Decreased balance;Decreased ADL status; Decreased endurance;Decreased strength;Decreased high-level IADLs;Decreased posture  Assessment: Patient demonstrated good tolerance to graded UE coordination/balance task. Patient complete functional mobility to and from gym with supervision and use of RW. Prognosis: Good  OT Education: OT Role;ADL Adaptive Strategies;Transfer Training;Precautions; Energy Conservation;Plan of Care  REQUIRES OT FOLLOW UP: Yes  Activity Tolerance  Activity Tolerance: Patient Tolerated treatment well  Safety Devices  Safety Devices in place: Yes  Type of devices: Gait belt;Patient at risk for falls;Call light within reach; Left in chair;Chair alarm in place;Nurse notified         Patient Diagnosis(es): There were no encounter diagnoses. has no past medical history on file. has a past surgical history that includes joint replacement; Tonsillectomy; Intracapsular cataract extraction (Right, 2019); and Intracapsular cataract extraction (Left, 2019).     Restrictions  Restrictions/Precautions  Restrictions/Precautions: General Precautions  Position Activity Restriction  Other position/activity restrictions: Medium fall risk per nursing  Subjective   General  Chart Reviewed: Yes, Imaging, Labs, Orders, History and Physical  Patient assessed for rehabilitation services?: Yes  Family / Caregiver Present: No  Referring Practitioner: Dr. Ander Taylor  Diagnosis: Frequent falls due to syncope/hypotension  Subjective  Subjective: Pt in chair, pleasant and agreeable  General Comment  Comments: RN cleared pt for tx  Vital Signs  Patient Currently in Pain: Denies Objective    ADL  Additional Comments: Declined ADLs this date         Balance  Sitting Balance: Supervision  Standing Balance: Supervision(with RW )  Standing Balance  Activity: UE gross motor coordination task; crossword puzzle in stance on dynamic surface; catch and release tasks   Comment: no device, sitting rest breaks PRN  Functional Mobility  Functional - Mobility Device: Rolling Walker  Activity: To/From therapy gym  Assist Level: Supervision  Functional Mobility Comments: CGA without device      Transfers  Sit to stand: Supervision  Stand to sit: Supervision        Coordination  Gross Motor: UE catch and release task of therapy ball; bouncing and catching ball while ambulating;   Fine Motor: hand writting task on large crossword puzzle              Cognition  Overall Cognitive Status: Exceptions  Arousal/Alertness: Appropriate responses to stimuli  Following Commands: Follows one step commands with repetition; Follows one step commands with increased time  Attention Span: Attends with cues to redirect  Memory: Decreased short term memory  Safety Judgement: Decreased awareness of need for safety  Problem Solving: Decreased awareness of errors  Insights: Decreased awareness of deficits  Initiation: Requires cues for some  Sequencing: Requires cues for some               Plan   Plan  Times per week: 5 out of 7 days  Times per day: Daily  Plan weeks: 11 days  Current Treatment Recommendations: Strengthening, Endurance Training, Patient/Caregiver Education & Training, Equipment Evaluation, Education, & procurement, Self-Care / ADL, Balance Training, Home Management Training, Pain Management, Functional Mobility Training, Safety Education & Training    Goals  Short term goals  Time Frame for Short term goals: 1/5/20  Short term goal 1: Pt will complete functional mobility in room/bathroom with LRAD SBA - GOAL MET 1/4  Short term goal 2: Pt will complete UE/LE dressing SBA with LRAD and AE PRN- GOAL MET

## 2020-01-05 PROCEDURE — 1280000000 HC REHAB R&B

## 2020-01-05 PROCEDURE — 6370000000 HC RX 637 (ALT 250 FOR IP): Performed by: PHYSICAL MEDICINE & REHABILITATION

## 2020-01-05 PROCEDURE — 6360000002 HC RX W HCPCS: Performed by: PHYSICAL MEDICINE & REHABILITATION

## 2020-01-05 RX ADMIN — FOLIC ACID 1 MG: 1 TABLET ORAL at 08:51

## 2020-01-05 RX ADMIN — MIDODRINE HYDROCHLORIDE 5 MG: 5 TABLET ORAL at 17:21

## 2020-01-05 RX ADMIN — ENOXAPARIN SODIUM 40 MG: 40 INJECTION SUBCUTANEOUS at 08:51

## 2020-01-05 RX ADMIN — TRAZODONE HYDROCHLORIDE 50 MG: 50 TABLET ORAL at 19:28

## 2020-01-05 RX ADMIN — POLYETHYLENE GLYCOL 3350 17 G: 17 POWDER, FOR SOLUTION ORAL at 08:51

## 2020-01-05 RX ADMIN — MIDODRINE HYDROCHLORIDE 5 MG: 5 TABLET ORAL at 08:52

## 2020-01-05 RX ADMIN — FLUDROCORTISONE ACETATE 0.1 MG: 0.1 TABLET ORAL at 08:52

## 2020-01-05 ASSESSMENT — PAIN SCALES - GENERAL
PAINLEVEL_OUTOF10: 0
PAINLEVEL_OUTOF10: 0

## 2020-01-05 NOTE — PROGRESS NOTES
Gaviota Chavis  1/5/2020  [id number]    Chief Complaint: Orthostatic hypotension    Subjective:   No issues overnight; making good progress. ROS: No n/v, cp, sob, f/c  Objective:  Patient Vitals for the past 24 hrs:   BP Temp Temp src Pulse Resp SpO2 Weight   01/05/20 1300 (!) 178/81 -- -- 74 -- -- --   01/05/20 0845 108/60 97.4 °F (36.3 °C) Oral 83 18 95 % --   01/05/20 0448 -- -- -- -- -- -- 112 lb 12.8 oz (51.2 kg)   01/04/20 2138 (!) 160/73 -- -- -- -- -- --   01/04/20 2131 (!) 186/82 97.6 °F (36.4 °C) Oral 81 16 93 % --     Gen: No distress, pleasant. HEENT: Normocephalic, atraumatic. CV: Regular rate and rhythm. Resp: No respiratory distress. Abd: Soft, nontender   Ext: No edema. Neuro: Alert, oriented, appropriately interactive.    Wt Readings from Last 3 Encounters:   01/05/20 112 lb 12.8 oz (51.2 kg)   07/02/19 110 lb (49.9 kg)   05/21/19 109 lb (49.4 kg)       Laboratory data:   Lab Results   Component Value Date    WBC 5.5 01/02/2020    HGB 9.7 (L) 01/02/2020    HCT 28.4 (L) 01/02/2020    MCV 99.7 01/02/2020     01/02/2020       Lab Results   Component Value Date     01/02/2020    K 3.7 01/02/2020     01/02/2020    CO2 24 01/02/2020    BUN 13 01/02/2020    CREATININE 0.6 01/02/2020    GLUCOSE 132 01/02/2020    CALCIUM 8.6 01/02/2020        Therapy progress:  PT  Position Activity Restriction  Other position/activity restrictions: Medium fall risk per nursing  Objective     Sit to Stand: Stand by assistance  Stand to sit: Stand by assistance  Bed to Chair: Contact guard assistance  Device: No Device  Assistance: Stand by assistance, Contact guard assistance  Distance: 180 + 75 feet  OT  PT Equipment Recommendations  Equipment Needed: Yes  Mobility Devices: [place]: Rolling  Toilet - Technique: Ambulating  Equipment Used: Standard toilet  Toilet Transfers Comments: CGA no device use of grab bar  Assessment        SLP                Body mass index is 18.77

## 2020-01-05 NOTE — PLAN OF CARE
Patient resting comfortably in bed with call light, belongings, and bedside table within reach. Alarm on. Patient acknowledges an understanding to call when needing assistance.  Electronically signed by Elle Piedra RN on 1/5/2020 at 9:37 AM

## 2020-01-06 LAB
ANION GAP SERPL CALCULATED.3IONS-SCNC: 12 MMOL/L (ref 3–16)
BASOPHILS ABSOLUTE: 0 K/UL (ref 0–0.2)
BASOPHILS RELATIVE PERCENT: 0.4 %
BUN BLDV-MCNC: 14 MG/DL (ref 7–20)
CALCIUM SERPL-MCNC: 8.8 MG/DL (ref 8.3–10.6)
CHLORIDE BLD-SCNC: 100 MMOL/L (ref 99–110)
CO2: 24 MMOL/L (ref 21–32)
CREAT SERPL-MCNC: 0.8 MG/DL (ref 0.6–1.2)
EOSINOPHILS ABSOLUTE: 0.2 K/UL (ref 0–0.6)
EOSINOPHILS RELATIVE PERCENT: 2.1 %
GFR AFRICAN AMERICAN: >60
GFR NON-AFRICAN AMERICAN: >60
GLUCOSE BLD-MCNC: 92 MG/DL (ref 70–99)
HCT VFR BLD CALC: 28.1 % (ref 36–48)
HEMOGLOBIN: 9.6 G/DL (ref 12–16)
LYMPHOCYTES ABSOLUTE: 2.4 K/UL (ref 1–5.1)
LYMPHOCYTES RELATIVE PERCENT: 29.4 %
MAGNESIUM: 1.8 MG/DL (ref 1.8–2.4)
MCH RBC QN AUTO: 34.3 PG (ref 26–34)
MCHC RBC AUTO-ENTMCNC: 34.2 G/DL (ref 31–36)
MCV RBC AUTO: 100.2 FL (ref 80–100)
MONOCYTES ABSOLUTE: 0.6 K/UL (ref 0–1.3)
MONOCYTES RELATIVE PERCENT: 7.1 %
NEUTROPHILS ABSOLUTE: 4.9 K/UL (ref 1.7–7.7)
NEUTROPHILS RELATIVE PERCENT: 61 %
PDW BLD-RTO: 17.2 % (ref 12.4–15.4)
PLATELET # BLD: 458 K/UL (ref 135–450)
PMV BLD AUTO: 6.8 FL (ref 5–10.5)
POTASSIUM REFLEX MAGNESIUM: 3.5 MMOL/L (ref 3.5–5.1)
RBC # BLD: 2.8 M/UL (ref 4–5.2)
SODIUM BLD-SCNC: 136 MMOL/L (ref 136–145)
WBC # BLD: 8.1 K/UL (ref 4–11)

## 2020-01-06 PROCEDURE — 85025 COMPLETE CBC W/AUTO DIFF WBC: CPT

## 2020-01-06 PROCEDURE — 1280000000 HC REHAB R&B

## 2020-01-06 PROCEDURE — 6360000002 HC RX W HCPCS: Performed by: PHYSICAL MEDICINE & REHABILITATION

## 2020-01-06 PROCEDURE — 6370000000 HC RX 637 (ALT 250 FOR IP): Performed by: PHYSICAL MEDICINE & REHABILITATION

## 2020-01-06 PROCEDURE — 36415 COLL VENOUS BLD VENIPUNCTURE: CPT

## 2020-01-06 PROCEDURE — 97130 THER IVNTJ EA ADDL 15 MIN: CPT

## 2020-01-06 PROCEDURE — 80048 BASIC METABOLIC PNL TOTAL CA: CPT

## 2020-01-06 PROCEDURE — 97112 NEUROMUSCULAR REEDUCATION: CPT

## 2020-01-06 PROCEDURE — 97116 GAIT TRAINING THERAPY: CPT

## 2020-01-06 PROCEDURE — 97530 THERAPEUTIC ACTIVITIES: CPT

## 2020-01-06 PROCEDURE — 97129 THER IVNTJ 1ST 15 MIN: CPT

## 2020-01-06 PROCEDURE — 97110 THERAPEUTIC EXERCISES: CPT

## 2020-01-06 PROCEDURE — 83735 ASSAY OF MAGNESIUM: CPT

## 2020-01-06 RX ADMIN — POLYETHYLENE GLYCOL 3350 17 G: 17 POWDER, FOR SOLUTION ORAL at 08:29

## 2020-01-06 RX ADMIN — FOLIC ACID 1 MG: 1 TABLET ORAL at 08:29

## 2020-01-06 RX ADMIN — MIDODRINE HYDROCHLORIDE 5 MG: 5 TABLET ORAL at 08:29

## 2020-01-06 RX ADMIN — MIDODRINE HYDROCHLORIDE 5 MG: 5 TABLET ORAL at 11:57

## 2020-01-06 RX ADMIN — FLUDROCORTISONE ACETATE 0.1 MG: 0.1 TABLET ORAL at 08:29

## 2020-01-06 RX ADMIN — TRAZODONE HYDROCHLORIDE 50 MG: 50 TABLET ORAL at 21:26

## 2020-01-06 RX ADMIN — ENOXAPARIN SODIUM 40 MG: 40 INJECTION SUBCUTANEOUS at 08:28

## 2020-01-06 RX ADMIN — MIDODRINE HYDROCHLORIDE 5 MG: 5 TABLET ORAL at 17:17

## 2020-01-06 ASSESSMENT — PAIN SCALES - GENERAL
PAINLEVEL_OUTOF10: 0

## 2020-01-06 NOTE — PROGRESS NOTES
Speech Language Pathology  [facility]: ACUTE REHAB UNIT  SPEECH-LANGUAGE PATHOLOGY      [x] Daily  [] Weekly Care Conference Note  [] Discharge    Patient:Yulissa Taylor      :1940  QGN:[id number]  Rehab Dx/Hx: Orthostatic hypotension [I95.1]    Precautions: falls  Home situation: Lives at home alone   ST Dx: [] Aphasia  [] Dysarthria  [] Apraxia   [] Oropharyngeal dysphagia [x] Cognitive Impairment  [] Other:   Date of Admit: 2019  Room #: 5666/1205-50    Current functional status (updated daily):         Pt being seen for : [] Speech/Language Treatment  [] Dysphagia Treatment [x] Cognitive Treatment  [] Other:  Communication: [x]WFL  [] Aphasia  [] Dysarthria  [] Apraxia  [] Pragmatic Impairment [] Non-verbal  [] Hearing Loss  [] Other:   Cognition: [] WFL  [x] Mild  [x] Moderate  [] Severe [] Unable to Assess  [] Other:  Memory: [] WFL  [] Mild  [x] Moderate  [] Severe [] Unable to Assess  [] Other:  Behavior: [x] Alert  [x] Cooperative  [x]  Pleasant  [] Confused  [] Agitated  [] Uncooperative  [] Distractible [] Motivated  [] Self-Limiting [] Anxious  [] Other:  Endurance:  [x] Adequate for participation in SLP sessions  [] Reduced overall  [] Lethargic  [] Other:  Safety: [] No concerns at this time  [x] Reduced insight into deficits  []  Reduced safety awareness [] Not following call light procedures  [] Unable to Assess  [] Other:    Current Diet Order:DIET GENERAL;  Swallowing Precautions: N/A        Date: 2020      Tx session 1  [id number]  Zack Mendez MA, CCC-SLP Tx session 2  [id number]  Carmelo Lemon MS CCC-SLP     Total Timed Code Min 30 30   Total Treatment Minutes 30 30   Individual Treatment Minutes 30 30   Group Treatment Minutes 0 0   Co-Treat Minutes 0 0   Variance/Reason:  n/a n/a   Pain No complaints of pain at this time No c/o pain   Pain Intervention [] RN notified  [] Repositioned  [] Intervention offered and patient declined  [x] N/A  [] Other: [] RN notified  [] Repositioned  [] Intervention offered and patient declined  [x] N/A  [] Other:   Subjective     Pt seen upright in chair in community room. Pt pleasant and cooperative for tx session. Pt seen in community room for session, her daughter-in-law arrived partway through session. Objective:  Goals       Goal 1: The pt will complete graded recall tasks with 70% accuracy, mod cues. Goal addressed. SLP discussed internal memory strategy of chunking. Pt required mod cues to implement strategy on structured task. Pt able to immediately recall 37% (6/16) of word list. Given mod cues, pt able to improve accuracy to 68% (11/16). Did not directly target   Goal 2: The pt will complete functional problem-solving tasks with 80% accuracy, mod cues. Goal not addressed. Time word problems: 6/7 (86% accuracy), min-mod cues; she benefited from problems being simplified into smaller steps as able     Goal 3: The pt will complete graded thought organization and executive function tasks with 80% accuracy, mod cues. Goal not addressed. Divergent naming task (5 items per concrete category): 6/7 (86% accuracy), min cues; she was typically able to provide 3 items per category independently     Goal 4: The pt will complete visual and verbal reasoning tasks with 80% accuracy, mod cues. Goal addressed. Pt completed written sequencing tasks with the following accuracy:  75% (3/4) for 4 steps with min cues   87% (7/8) for 5 steps with min cues  Did not directly target   Goal 5: The pt will effectively use external memory aids to be oriented x4, min cues. Goal addressed. Pt correctly stated month, day of week, and year independently, no cues. Pt stated the date was the \"8th\" and SLP provided re-orientation. Did not directly target   Other areas targeted: N/A *Of note, the pt independently recalled having to use the call light for assistance to go to the restroom at end of session. SLP assisted pt to and from restroom.     Education: SLP re: Role of SLP, rationale for completion of tx, memory strategies SLP re: word finding strategies for improved word retrieval during divergent naming task. Safety Devices: [] Call light within reach  [] Chair alarm activated  [] Bed alarm activated  [x] Other: Pt left in tello in community room with SLP to complete remainder of tx session  [x] Call light within reach  [x] Chair alarm activated  [] Bed alarm activated  [x] Other: Pt's belongings in reach, DIL at bedside. Assessment: Session 1: Pt seen this date for SLP tx session. Pt pleasant and cooperative for session. SLP reviewed internal memory strategy and pt required mod cues to implement strategy with ability to recall 37% of items without cues, improved to 87% with cues. Good accuracy on 4 and 5 step sequencing tasks with min cues needed at times. Recommend ongoing SLP services to target the above goals. Session 2: Pt was pleasant and cooperative throughout session, seen in community room. She benefited from min cues to complete the majority of structured tasks. The pt's short-term recall deficits continue to negatively impact her overall accuracy at times. Pt will likely require 24-hour supervision after discharge d/t cognitive deficits. Continue with goals per POC. Plan: Continue as per plan of care. Additional Information:     Barriers toward progress: Cognitive deficit and Limited safety awareness  Discharge recommendations:  [] Home independently  [] Home with assistance []  24 hour supervision  [] ECF [] Other:  Continued Tx Upon Discharge: ? [] Yes [] No [x] TBD based on progress while on ARU [] Vital Stim indicated [] Other:   Estimated discharge date: 01/10/2020    Interventions used this date:  [] Speech/Language Treatment  [] Instruction in HEP [] Group [] Dysphagia Treatment [x] Cognitive Treatment   [] Other:       Total Time Breakdown / Charges    Time in Time out Total Time / units   Cognitive Tx 1330  1400 1400  1430 30 mins / 2 units  30 mins / 2 units    Speech Tx      Dysphagia Tx          Electronically Signed by     Session 1:  Monroe Lawrence, [address] [facility]  Speech-Language Pathologist    Session 2:  Lucio Crowley M.S. [id number] [facility]  Speech-language pathologist  [id number]

## 2020-01-06 NOTE — PLAN OF CARE
Med Fall Risk:  No falls noted on this shift so far today. Bed in lowest position. Non skid socks on. SAFE sign outside door. Call light within reach. Patient instructed to call for assistance if needed.

## 2020-01-06 NOTE — PROGRESS NOTES
Occupational Therapy  Facility/Department: [id number] [place] [address]  Daily Treatment Note  NAME: Andi Garcia  : 1940  MRN: [id number]    Date of Service: 2020    Discharge Recommendations:  24 hour supervision or assist, Home with Home health OT       Assessment   Performance deficits / Impairments: Decreased functional mobility ; Decreased safe awareness;Decreased balance;Decreased ADL status; Decreased endurance;Decreased strength;Decreased high-level IADLs;Decreased posture  Assessment: pt cooperative, toelrates increased standing tolerance this date, even on balance discs. Pt requires min to mod A for balance while standing on balance disc and performing ex with med swiss ball. Pt requries max cues to make cup of coffee from instant coffee machine, with poor carryover for coffee machine use during session. Pt requires cues to form short intersecting words for [employer]. Pt requires max cues for way finding back to room, appears to demo some visual deficits at times. Second session: Pt in chair, agreeable. Pt reports some fatigue from prior sessions. given options of activity, pt choses UE ex, tolerates well. Recommend  for pt after D/C d/t poor functional cognition. Cont POC. OT Education: OT Role;ADL Adaptive Strategies;Transfer Training;Precautions; Energy Conservation;Plan of Care  Activity Tolerance  Activity Tolerance: Patient Tolerated treatment well;Treatment limited secondary to decreased cognition  Safety Devices  Safety Devices in place: Yes  Type of devices: Gait belt;Patient at risk for falls;Call light within reach; Left in chair;Chair alarm in place;Nurse notified         Patient Diagnosis(es): There were no encounter diagnoses. has no past medical history on file. has a past surgical history that includes joint replacement; Tonsillectomy;  Intracapsular cataract extraction (Right, 2019); and Intracapsular cataract extraction (Left, step commands with repetition; Follows one step commands with increased time  Attention Span: Attends with cues to redirect  Memory: Decreased short term memory  Safety Judgement: Decreased awareness of need for safety  Problem Solving: Decreased awareness of errors  Insights: Decreased awareness of deficits  Initiation: Requires cues for some  Sequencing: Requires cues for some  Cognition Comment: pt requires mod/max cues for sequencing simple kitchen task                    Type of ROM/Therapeutic Exercise  Type of ROM/Therapeutic Exercise: Free weights  Comment: 1# weights  Exercises  Scapular Protraction: x15  Scapular Retraction: x15  Shoulder Flexion: x15  Shoulder ABduction: x15  Shoulder ADduction: x15  Horizontal ABduction: x15  Horizontal ADduction: x15  Elbow Flexion: x15  Elbow Extension: x15  Supination: x15  Pronation: x15  Wrist Flexion: x15  Wrist Extension: x15  Other: ceiling press, chest press, trunk rotation with med swiss ball while standing on balance disc, min to mod A for balance                    Plan   Plan  Times per week: 5 out of 7 days  Times per day: Daily  Plan weeks: 11 days  Current Treatment Recommendations: Strengthening, Endurance Training, Patient/Caregiver Education & Training, Equipment Evaluation, Education, & procurement, Self-Care / ADL, Balance Training, Home Management Training, Pain Management, Functional Mobility Training, Safety Education & Training    Goals  Short term goals  Time Frame for Short term goals: 1/5/20  Short term goal 1: Pt will complete functional mobility in room/bathroom with LRAD SBA - GOAL MET 1/4  Short term goal 2: Pt will complete UE/LE dressing SBA with LRAD and AE PRN- GOAL MET 1/4  Short term goal 3: Pt will tolerate >7 minutes standing ADL with LRAD SBA- GOAL MET 1/4  Short term goal 4: Pt will tolerate BUE ex 10-15 reps to tolerance to increase strength/endurance for ADLs- GOAL MET 1/4  Long term goals  Time Frame for Long term goals : 1/11/20  Long term goal 1: Pt will complete functional mobility for ADL with LRAD Dev  Long term goal 2: Pt will complete 1 simple meal prep and/or IADL Dev with LRAD  Patient Goals   Patient goals :  To go home soon         Therapy Time   Individual Concurrent Group Co-treatment   Time In 1030         Time Out 1130         Minutes 60         Timed Code Treatment Minutes: 60 Minutes    Therapy Time   Individual Concurrent Group Co-treatment   Time In 1230         Time Out 1300         Minutes 30         Timed Code Treatment Minutes: [address], OT

## 2020-01-06 NOTE — PROGRESS NOTES
Physical Therapy  Facility/Department: [facility]  Daily Treatment Note  NAME: Vonda Alcala  : 1940  MRN: [id number]    Date of Service: 2020    Discharge Recommendations:  24 hour supervision or assist, Home with Home health PT   PT Equipment Recommendations  Equipment Needed: Yes  Walker: Rolling    Assessment   Body structures, Functions, Activity limitations: Decreased functional mobility ; Decreased endurance;Decreased balance;Decreased strength;Decreased safe awareness;Decreased coordination  Assessment: pt demo's decreased safety awarenss with therapy tasks, limited carryvoer of education provided for general safety and to improve gait mehcanics. pt contineus to demo impulsive behaviors. grossly SBa for transfers/gait wtih use of RW, CGA without AD. fair (-) to fair dyn stand balance demonstrataed. continue to recommend home with 24/7 supv and HHPT due to impaired cog/safety awareness. DME: RW.   Treatment Diagnosis: decreased (I) with mobility  Specific instructions for Next Treatment: progress mobility as tolerated  Prognosis: Good  Decision Making: Medium Complexity  PT Education: Goals; Energy Conservation;PT Role;General Safety;Plan of Care;Gait Training;Home Exercise Program;Transfer Training;Functional Mobility Training  Patient Education: pt verbalized understanding however needs reinforcement   Barriers to Learning: none  REQUIRES PT FOLLOW UP: Yes  Activity Tolerance  Activity Tolerance: Patient Tolerated treatment well     Patient Diagnosis(es): There were no encounter diagnoses. has no past medical history on file. has a past surgical history that includes joint replacement; Tonsillectomy; Intracapsular cataract extraction (Right, 2019); and Intracapsular cataract extraction (Left, 2019).     Restrictions  Restrictions/Precautions  Restrictions/Precautions: General Precautions  Position Activity Restriction  Other position/activity restrictions: Medium fall risk per

## 2020-01-06 NOTE — PATIENT CARE CONFERENCE
[address] [facility]  Inpatient Rehabilitation  Weekly Team Conference Note    Date: 2020  Patient Name: Dennise Damon        MRN: [id number]    : 1940  [de-identified]67 y.o.)  Gender: female   Referring Practitioner: Dr. Bill Osei MD  Diagnosis: Orthostatic Hypotension      Interventions to be utilized toward barriers to discharge, per discipline:  [address] observed barriers to dc: Pain, Decreased endurance and Medication managment  Nursing interventions:Nurse encouraged patient to report pain or discomfort in order to better control discomfort before level of pain rises. Patient is redirected to watch steps and slow down when ambulating as to not trip and fall. To allow time when turning to prevent dizzy spells. Family Education: not done  Fall Risk:  Yes      Physical therapy observed barriers to dc:     Baseline: ind with no AD               Current level: supv - mod ind transfers, SBA gait with AD (requires CGA-min A without AD), CGA-close SBa stair navigation              Barriers to DC:  Impulsive/decreased safety awareness, decreased dyn balance              Needs in order to achieve dc home/next level of care: pt will require 24/7 supv due to impaired cog/safety awareness. do not anticipate pt will be above a supv level for functional mobility.      Physical therapy interventions:   Current Treatment Recommendations: Strengthening, Gait Training, Patient/Caregiver Education & Training, ROM, Stair training, Equipment Evaluation, Education, & procurement, Balance Training, Neuromuscular Re-education, Functional Mobility Training, Endurance Training, Transfer Training, Safety Education & Training, Home Exercise Program      PHYSICAL THERAPY           PT Equipment Recommendations  Equipment Needed: Yes  Mobility Devices: Ardia Jones: Rolling    Assessment: pt demo's decreased safety awarenss with therapy tasks, limited carryvoer of education provided for general safety and to improve gait level of care: Assistance with tasks involving cognition (e.g. managing medications, finances). Speech Therapy interventions:  Dysphagia: N/A  Speech/Language/Cognition: Cognitive-linguistic therapy, compensatory strategy training, patient/caregiver education       SPEECH THERAPY :  Assessment: Patient participating well in therapy sessions this date. At start of first session, PT reported pt had been feeling somewhat dizzy earlier, however no further complaint arose. Pt benefited well from repeated instructions and min to moderate verbal/visual cues with recall, problem solving, and thought organization tasks. Cont goals. NUTRITION  Weight: 112 lb 12.8 oz (51.2 kg) / Body mass index is 18.77 kg/m². Diet Order: DIET GENERAL;  PO Meals Eaten (%): 76 - 100%  Education: declined      CASE MANAGEMENT  Assessment: Lives alone, daughter lives nearby but works part time. Drives to/from grocery. Prior level: independent but increased amount of falls recently. Has a cat in the home. Home in [address] Family supportive of skilled stay. Patient wants to drive. Nursing: no concerns. PT: cognition is terrible. No carryover, gets frustrated easily, does not want to use walker. OT: ADLs not bad, but cognition is large barrier, cannot find room. ST: cognitive deficits. At baseline. Interdisciplinary Goals:   1.) pt will complete functional transfer with mod ind and LRAD. 2.)Pt will complete functional mobility for ADL with LRAD Dev  3.)Pt will recall safety strategies. 4.)  5.)    Discharge Plan   Estimated discharge date: 1/10/2020  Destination: Skilled stay v LTC. Pass:No  Services at Discharge: [facility] vs skilled nursing  Physical Therapy, Occupational Therapy, Speech Therapy and Nursing Eval only  Equipment at Discharge: to be determined.      Team Members Present at Conference:  : Obie HUBER [facility]    Occupational Therapist: Monica Martinez, OTR/L  Physical Therapist: Perry Chadwick PT,

## 2020-01-07 PROCEDURE — 97129 THER IVNTJ 1ST 15 MIN: CPT

## 2020-01-07 PROCEDURE — 97530 THERAPEUTIC ACTIVITIES: CPT

## 2020-01-07 PROCEDURE — 97535 SELF CARE MNGMENT TRAINING: CPT

## 2020-01-07 PROCEDURE — 97116 GAIT TRAINING THERAPY: CPT

## 2020-01-07 PROCEDURE — 97112 NEUROMUSCULAR REEDUCATION: CPT

## 2020-01-07 PROCEDURE — 97110 THERAPEUTIC EXERCISES: CPT

## 2020-01-07 PROCEDURE — 6370000000 HC RX 637 (ALT 250 FOR IP): Performed by: PHYSICAL MEDICINE & REHABILITATION

## 2020-01-07 PROCEDURE — 97130 THER IVNTJ EA ADDL 15 MIN: CPT

## 2020-01-07 PROCEDURE — 6360000002 HC RX W HCPCS: Performed by: PHYSICAL MEDICINE & REHABILITATION

## 2020-01-07 PROCEDURE — 1280000000 HC REHAB R&B

## 2020-01-07 RX ADMIN — ERGOCALCIFEROL 50000 UNITS: 1.25 CAPSULE ORAL at 08:16

## 2020-01-07 RX ADMIN — ENOXAPARIN SODIUM 40 MG: 40 INJECTION SUBCUTANEOUS at 08:16

## 2020-01-07 RX ADMIN — FOLIC ACID 1 MG: 1 TABLET ORAL at 08:16

## 2020-01-07 RX ADMIN — FLUDROCORTISONE ACETATE 0.1 MG: 0.1 TABLET ORAL at 08:16

## 2020-01-07 RX ADMIN — MIDODRINE HYDROCHLORIDE 5 MG: 5 TABLET ORAL at 17:31

## 2020-01-07 RX ADMIN — MIDODRINE HYDROCHLORIDE 5 MG: 5 TABLET ORAL at 08:16

## 2020-01-07 RX ADMIN — MIDODRINE HYDROCHLORIDE 5 MG: 5 TABLET ORAL at 13:26

## 2020-01-07 ASSESSMENT — PAIN SCALES - GENERAL
PAINLEVEL_OUTOF10: 0

## 2020-01-07 NOTE — PROGRESS NOTES
Gallo Haddad  1/7/2020  [id number]    Chief Complaint: Orthostatic hypotension    Subjective:   No issues overnight; doing well; BP remains quite labile    ROS: No n/v, cp, sob, f/c  Objective:  Patient Vitals for the past 24 hrs:   BP Temp Temp src Pulse Resp SpO2   01/07/20 1315 (!) 149/81 -- -- 82 -- --   01/07/20 0751 (!) 74/42 -- -- -- -- --   01/07/20 0750 (!) 78/48 98.3 °F (36.8 °C) Oral 83 16 94 %   01/06/20 1945 (!) 169/97 98.1 °F (36.7 °C) Oral 91 16 93 %   01/06/20 1715 (!) 146/74 -- -- -- -- --     Gen: No distress, pleasant. HEENT: Normocephalic, atraumatic. CV: Regular rate and rhythm. Resp: No respiratory distress. Abd: Soft, nontender   Ext: No edema. Neuro: Alert, oriented, appropriately interactive.    Wt Readings from Last 3 Encounters:   01/05/20 112 lb 12.8 oz (51.2 kg)   07/02/19 110 lb (49.9 kg)   05/21/19 109 lb (49.4 kg)       Laboratory data:   Lab Results   Component Value Date    WBC 8.1 01/06/2020    HGB 9.6 (L) 01/06/2020    HCT 28.1 (L) 01/06/2020    .2 (H) 01/06/2020     (H) 01/06/2020       Lab Results   Component Value Date     01/06/2020    K 3.5 01/06/2020     01/06/2020    CO2 24 01/06/2020    BUN 14 01/06/2020    CREATININE 0.8 01/06/2020    GLUCOSE 92 01/06/2020    CALCIUM 8.8 01/06/2020        Therapy progress:  PT  Position Activity Restriction  Other position/activity restrictions: Medium fall risk per nursing  Objective     Sit to Stand: Supervision  Stand to sit: Supervision  Bed to Chair: Contact guard assistance  Device: Rolling Walker  Assistance: Stand by assistance, Contact guard assistance  Distance: 167 ft   OT  PT Equipment Recommendations  Equipment Needed: Yes  Mobility Devices: Naoma Stakes: Rolling  Toilet - Technique: Ambulating  Equipment Used: Standard toilet  Toilet Transfers Comments: CGA no device use of grab bar, pt impulsive and leaves RW behind when going to restroom   Assessment        SLP                Body mass index is 18.77 kg/m². Rehabilitation Diagnosis:  16.0, Debility (non-cardiac, non-pulmonary     Assessment and Plan:  Orthostasis. Continue florinef, midodrine; hold midodrine for sbp > 180; added compression stockings.      Acute cystitis. S/p rocephin.      Urge incontinence. Baseline; OP f/u       Macrocytic anemia. Folate.      Bowels: Schedule colace + senna. Follow bowel movements. Enema or suppository if needed.      Bladder: Check PVR x 3. [address] if PVR > 200ml or if any volume is > 500 ml.      Sleep: Trazodone provided prn. LEILA: 1/10 home with home pt/ot/slp/rn/hha/sw    Nate Rinaldi MD 1/7/2020, 2:34 PM

## 2020-01-07 NOTE — PROGRESS NOTES
Speech Language Pathology  [facility]: ACUTE REHAB UNIT  SPEECH-LANGUAGE PATHOLOGY      [x] Daily  [x] Weekly Care Conference Note  [] Discharge    Patient:Yulissa Peterson      :1940  [facility]:[id number]  Rehab Dx/Hx: Orthostatic hypotension [I95.1]    Precautions: falls  Home situation: Lives at home alone   ST Dx: [] Aphasia  [] Dysarthria  [] Apraxia   [] Oropharyngeal dysphagia [x] Cognitive Impairment  [] Other:   Date of Admit: 2019  Room #: 9641/9268-40    Current functional status (updated daily):         Pt being seen for : [] Speech/Language Treatment  [] Dysphagia Treatment [x] Cognitive Treatment  [] Other:  Communication: [x]WFL  [] Aphasia  [] Dysarthria  [] Apraxia  [] Pragmatic Impairment [] Non-verbal  [] Hearing Loss  [] Other:   Cognition: [] WFL  [x] Mild  [x] Moderate  [] Severe [] Unable to Assess  [] Other:  Memory: [] WFL  [] Mild  [x] Moderate  [] Severe [] Unable to Assess  [] Other:  Behavior: [x] Alert  [x] Cooperative  [x]  Pleasant  [] Confused  [] Agitated  [] Uncooperative  [] Distractible [] Motivated  [] Self-Limiting [] Anxious  [] Other:  Endurance:  [x] Adequate for participation in SLP sessions  [] Reduced overall  [] Lethargic  [] Other:  Safety: [] No concerns at this time  [x] Reduced insight into deficits  []  Reduced safety awareness [] Not following call light procedures  [] Unable to Assess  [] Other:    Current Diet Order:DIET GENERAL;  Swallowing Precautions: N/A        Date: 2020       Tx session 1  [id number] Tx session 2  5002-6931   Weekly Care Conference     Total Timed Code Min 30 30    Total Treatment Minutes 30 30    Individual Treatment Minutes 30 30    Group Treatment Minutes 0 0    Co-Treat Minutes 0 0    Variance/Reason:  n/a n/a    Pain None stated No c/o pain    Pain Intervention [] RN notified  [] Repositioned  [] Intervention offered and patient declined  [x] N/A  [] Other: [] RN notified  [] Repositioned  [] Intervention offered and patient instructions and min-mod verbal/visual cues with tasks. Cont goals. Patient continues to present with cognitive-linguistic deficits, including recall, thought organization, reasoning. Progressing through goals. Plan: Continue as per plan of care. Additional Information:     Barriers toward progress: Cognitive deficit and Limited safety awareness  Discharge recommendations:  [] Home independently  [] Home with assistance []  24 hour supervision  [] ECF [] Other:  Continued Tx Upon Discharge: ? [] Yes [] No [x] TBD based on progress while on ARU [] Vital Stim indicated [] Other:   Estimated discharge date: 01/10/2020    Interventions used this date:  [] Speech/Language Treatment  [] Instruction in HEP [] Group [] Dysphagia Treatment [x] Cognitive Treatment   [] Other:       Total Time Breakdown / Charges    Time in Time out Total Time / units   Cognitive Tx 0900  1430 0930  1500 30 mins / 2 units  30 mins / 2 units    Speech Tx      Dysphagia Tx          Electronically Signed by     Ani Poole M.A., Ginna Dennison   Speech-Language Pathologist

## 2020-01-07 NOTE — PROGRESS NOTES
Physical Therapy  Facility/Department: [facility]  Daily Treatment Note  NAME: Pam Arriola  : 1940  MRN: [id number]    Date of Service: 2020    Discharge Recommendations:  24 hour supervision or assist, Home with Home health PT   PT Equipment Recommendations  Equipment Needed: Yes  Walker: Rolling    Assessment   Body structures, Functions, Activity limitations: Decreased functional mobility ; Decreased endurance;Decreased balance;Decreased strength;Decreased safe awareness;Decreased coordination  Assessment: pt am session limited by low BP throughout. pt requires consistent cues to utilize seated rest breaks, pt attempting to \"power through\" symptoms resulting in several LOB. pt eventually agreeable to seated ther ex, BP increases in seated position. RN made aware of all pt's vitals throughout. pt continues to demo poor safety awareness despite education provided. resistant to use of RW for mobility despite education / increased stability demonstrated. grossly CGA-close SBA for all functional tasks this am due to dizziness. several LOB demonstrated wiht up to min A to correct. Due to decreased safety awareness/cog, pt will require 24/7 supv upon d/c. If not available, recommend to d/c to SNF or assisted living for appropriate level of supv. Treatment Diagnosis: decreased (I) with mobility  Specific instructions for Next Treatment: progress mobility as tolerated  Prognosis: Good  Decision Making: Medium Complexity  PT Education: Goals; Energy Conservation;PT Role;General Safety;Plan of Care;Gait Training;Home Exercise Program;Transfer Training;Functional Mobility Training  Patient Education: pt verbalized understanding however needs reinforcement   Barriers to Learning: cognition  REQUIRES PT FOLLOW UP: Yes  Activity Tolerance  Activity Tolerance: Patient limited by fatigue; Other  Activity Tolerance: initial BP= 78/48 at EOB. RN aware, provided pt with appropriate am medication.  Bp in gym follwoing first bout of gait= 94/58. BP after item retrieval = 81/49. pt utilizes lengthy seated rest break, dizziness decreases with time, BP after 5 mi= 95/60. RN made aware of vitals throughout. Patient Diagnosis(es): There were no encounter diagnoses. has no past medical history on file. has a past surgical history that includes joint replacement; Tonsillectomy; Intracapsular cataract extraction (Right, 5/21/2019); and Intracapsular cataract extraction (Left, 7/2/2019). Restrictions  Restrictions/Precautions  Restrictions/Precautions: General Precautions  Position Activity Restriction  Other position/activity restrictions: Medium fall risk per nursing  Subjective   General  Chart Reviewed: Yes  Response To Previous Treatment: Patient reporting fatigue but able to participate. Family / Caregiver Present: No  Referring Practitioner: Dr. Joann Teixeira MD  Subjective  Subjective: denies pain  General Comment  Comments: found seated EOB, Rn present, pt reporting dizziness (see activity tolerance)  Pain Screening  Patient Currently in Pain: No  Vital Signs  Patient Currently in Pain: No       Orientation  Orientation  Overall Orientation Status: Within Functional Limits  Cognition      Objective      Transfers  Sit to Stand: Supervision  Stand to sit: Supervision  Stand Pivot Transfers: Supervision  Comment: sit to stand from EOB , commode , chair to RW with supv.    Ambulation  Ambulation?: Yes  More Ambulation?: Yes  Ambulation 1  Surface: level tile  Device: Rolling Walker  Assistance: Stand by assistance;Contact guard assistance  Quality of Gait: forward flexed posture, decreased step lenght/height, downward directed gaze thus cues for environmetn awareness   Gait Deviations: Decreased arm swing;Decreased step height;Decreased step length;Shuffles  Distance: 167 ft   Ambulation 2  Surface - 2: level tile  Device 2: Rolling Walker  Assistance 2: Contact guard assistance  Quality of Gait 2: forward flexed posture, decreased

## 2020-01-07 NOTE — PROGRESS NOTES
Occupational Therapy  Facility/Department: [facility]  Daily Treatment Note  NAME: Kelly Bermudez  : 1940  MRN: [id number]    Date of Service: 2020    Discharge Recommendations:  24 hour supervision or assist, Home with Home health OT       Assessment   Performance deficits / Impairments: Decreased functional mobility ; Decreased safe awareness;Decreased balance;Decreased ADL status; Decreased endurance;Decreased strength;Decreased high-level IADLs;Decreased posture  Assessment: Pt pleasantly confused but cooperative. Pt perseverates on \"feeling woozy\" with standing, VSS in stance. when distracted, pt does not c/o \"being woozy\", and tolerates stnading > 30 min for completing large wall crossword with cues. Pt tolerates B UE ex well with cues for form and ex. Pt unable to find room despite multiple cues, walks to end of gabriel and states \"what do I do now? \". Pt impulsive with poor problem solving with amb to restroom, leaves RW behind. Pt limited by cognition. Cont POC. Activity Tolerance  Activity Tolerance: Patient Tolerated treatment well;Treatment limited secondary to decreased cognition  Safety Devices  Safety Devices in place: Yes  Type of devices: Gait belt;Patient at risk for falls;Call light within reach; Left in chair;Chair alarm in place;Nurse notified         Patient Diagnosis(es): There were no encounter diagnoses. has no past medical history on file. has a past surgical history that includes joint replacement; Tonsillectomy; Intracapsular cataract extraction (Right, 2019); and Intracapsular cataract extraction (Left, 2019).     Restrictions  Restrictions/Precautions  Restrictions/Precautions: General Precautions  Position Activity Restriction  Other position/activity restrictions: Medium fall risk per nursing  Subjective   General  Chart Reviewed: Yes, Imaging, Labs, Orders, History and Physical  Patient assessed for rehabilitation services?: Yes  Family / Caregiver Present: Comment: pt requires mod/max cues for sequencing most tasks                     Type of ROM/Therapeutic Exercise  Type of ROM/Therapeutic Exercise: Free weights  Comment: 2# weights  Exercises  Scapular Protraction: x15  Shoulder Flexion: x15  Shoulder ABduction: x15  Shoulder ADduction: x15  Horizontal ABduction: x15  Horizontal ADduction: x15  Supination: x20  Pronation: x20  Wrist Flexion: x20  Wrist Extension: x20                    Plan   Plan  Times per week: 5 out of 7 days  Times per day: Daily  Plan weeks: 11 days  Current Treatment Recommendations: Strengthening, Endurance Training, Patient/Caregiver Education & Training, Equipment Evaluation, Education, & procurement, Self-Care / ADL, Balance Training, Home Management Training, Pain Management, Functional Mobility Training, Safety Education & Training    Goals  Short term goals  Time Frame for Short term goals: 1/5/20  Short term goal 1: Pt will complete functional mobility in room/bathroom with LRAD SBA - GOAL MET 1/4  Short term goal 2: Pt will complete UE/LE dressing SBA with LRAD and AE PRN- GOAL MET 1/4  Short term goal 3: Pt will tolerate >7 minutes standing ADL with LRAD SBA- GOAL MET 1/4  Short term goal 4: Pt will tolerate BUE ex 10-15 reps to tolerance to increase strength/endurance for ADLs- GOAL MET 1/4  Long term goals  Time Frame for Long term goals : 1/11/20  Long term goal 1: Pt will complete functional mobility for ADL with LRAD Dev-not met d/t impulsivity and decreased sasfety awareness  Long term goal 2: Pt will complete 1 simple meal prep and/or IADL Dev with LRAD-not met 2* to cognition  Patient Goals   Patient goals :  To go home soon       Therapy Time   Individual Concurrent Group Co-treatment   Time In 1000         Time Out 1130         Minutes 90         Timed Code Treatment Minutes: Young Lazo OT

## 2020-01-08 PROCEDURE — 6360000002 HC RX W HCPCS: Performed by: PHYSICAL MEDICINE & REHABILITATION

## 2020-01-08 PROCEDURE — 97129 THER IVNTJ 1ST 15 MIN: CPT

## 2020-01-08 PROCEDURE — 97530 THERAPEUTIC ACTIVITIES: CPT

## 2020-01-08 PROCEDURE — 97535 SELF CARE MNGMENT TRAINING: CPT

## 2020-01-08 PROCEDURE — 97130 THER IVNTJ EA ADDL 15 MIN: CPT

## 2020-01-08 PROCEDURE — 97116 GAIT TRAINING THERAPY: CPT

## 2020-01-08 PROCEDURE — 6370000000 HC RX 637 (ALT 250 FOR IP): Performed by: PHYSICAL MEDICINE & REHABILITATION

## 2020-01-08 PROCEDURE — 1280000000 HC REHAB R&B

## 2020-01-08 PROCEDURE — 97110 THERAPEUTIC EXERCISES: CPT

## 2020-01-08 RX ADMIN — POLYETHYLENE GLYCOL 3350 17 G: 17 POWDER, FOR SOLUTION ORAL at 07:50

## 2020-01-08 RX ADMIN — FOLIC ACID 1 MG: 1 TABLET ORAL at 07:50

## 2020-01-08 RX ADMIN — HYDRALAZINE HYDROCHLORIDE 50 MG: 25 TABLET, FILM COATED ORAL at 22:27

## 2020-01-08 RX ADMIN — MIDODRINE HYDROCHLORIDE 5 MG: 5 TABLET ORAL at 11:54

## 2020-01-08 RX ADMIN — ENOXAPARIN SODIUM 40 MG: 40 INJECTION SUBCUTANEOUS at 07:50

## 2020-01-08 RX ADMIN — MIDODRINE HYDROCHLORIDE 5 MG: 5 TABLET ORAL at 07:50

## 2020-01-08 RX ADMIN — HYDRALAZINE HYDROCHLORIDE 50 MG: 25 TABLET, FILM COATED ORAL at 16:49

## 2020-01-08 RX ADMIN — FLUDROCORTISONE ACETATE 0.1 MG: 0.1 TABLET ORAL at 07:50

## 2020-01-08 ASSESSMENT — PAIN SCALES - GENERAL: PAINLEVEL_OUTOF10: 0

## 2020-01-08 NOTE — PROGRESS NOTES
5/21/2019); and Intracapsular cataract extraction (Left, 7/2/2019). Restrictions  Restrictions/Precautions  Restrictions/Precautions: General Precautions  Position Activity Restriction  Other position/activity restrictions: Medium fall risk per nursing  Subjective   General  Chart Reviewed: Yes, Imaging, Labs, Orders, History and Physical  Patient assessed for rehabilitation services?: Yes  Family / Caregiver Present: No  Referring Practitioner: Dr. Joe Whiteside  Diagnosis: Frequent falls due to syncope/hypotension  Subjective  Subjective: Pt in chair, pleasantly confused and agreeable  General Comment  Comments: RN cleared pt for eval/tx, states held BP meds due to BP being high this AM  Vital Signs  Patient Currently in Pain: No   Orientation  Orientation  Overall Orientation Status: Within Functional Limits(with cues)  Objective    ADL  Grooming: Stand by assistance;Verbal cueing; Increased time to complete;Setup(standing at sink)  Toileting: Contact guard assistance; Increased time to complete(CGA for balance with RW)        Balance  Sitting Balance: Supervision  Standing Balance: Stand by assistance(RW)  Standing Balance  Time: 4-5 min x 5, >20 min   Activity: to/from gym with inability to find room, memory task, tabletop activity   Comment: no device, sitting rest breaks PRN  Functional Mobility  Functional - Mobility Device: Rolling Walker  Activity: To/From therapy gym; To/from bathroom  Assist Level: Stand by assistance  Functional Mobility Comments: CGA without device   Toilet Transfers  Toilet - Technique: Ambulating  Equipment Used: Standard toilet  Toilet Transfer: Contact guard assistance  Toilet Transfers Comments: CGA no device use of grab bar, pt impulsive and leaves RW behind when going to restroom      Transfers  Sit to stand: Supervision  Stand to sit: Supervision  Transfer Comments: SPV with cues for safety/mechanics        Coordination  Gross Motor: fair for ADls  Fine Motor: fair for manipulating

## 2020-01-08 NOTE — PROGRESS NOTES
Repositioned  [] Intervention offered and patient declined  [x] N/A  [] Other:   Subjective     Pt seen upright in chair in community room. Pt pleasant and cooperative throughout session. Patient alert and cooperative. Rendered treatment in community room. Objective:  Goals     Goal 1: The pt will complete graded recall tasks with 70% accuracy, mod cues. Did not directly taget Did not target. Goal 2: The pt will complete functional problem-solving tasks with 80% accuracy, mod cues. Medication management:      - 70% average accuracy, consistent mod cues required     - Pt benefited from cues to reread instructions, double check her work, and \"talk through\" each instruction prior to filling out pill organizer.     - SLP emphasized that pt will be discharging with new medications (per pt, she was previously not taking any medications) and will need to establish new schedule / routine. Did not target. Goal 3: The pt will complete graded thought organization and executive function tasks with 80% accuracy, mod cues. SLP and pt discussed importance of establishing a new routine / schedule d/t pt requiring new medications upon discharge. Pt verbalized understanding, but would benefit from reinforcement. She required consistent mod cues to complete medication management task. However, pt occasionally make appropriate statements regarding safety with medications, I.e. taking pill 3x/daily not 3 at once. Sequence 4-step  - Pt organized 4-step picture cards to complete sequence. Visual cues (e.g., numbers 1-4) were used to assist with sequencing. Independently pt sequenced picture cards with 13% (1 out of 8 opportunities). Improved to 100% given min-mod cues. Goal 4: The pt will complete visual and verbal reasoning tasks with 80% accuracy, mod cues. Did not directly target Did not target. Goal 5: The pt will effectively use external memory aids to be oriented x4, min cues.  SAM: independent  Date: min cues   Date: min cues. SAM: Independent  Year: independent  Location: Independent     Other areas targeted: N/A    Education:   SLP re: importance of targeting medication management, carryover of learned strategies. SLP re: importance of double checking work, thought organization and carryover of compensatory strategies. Safety Devices: [x] Call light within reach  [x] Chair alarm activated  [] Bed alarm activated  [x] Other: Pt's belongings within reach. [x] Call light within reach  [x] Chair alarm activated  [] Bed alarm activated  [] Other:    Assessment: Session 1: Pt was pleasant and cooperative throughout session, attended to task without difficulty. Pt required consistent mod cues, simplification as able, and repetition when completing medication management task. Pt will require 24-hour supervision / assistance with finances, medications, etc. upon discharge. Continue with goals per POC. Session 2: Pt. Was pleasant, cooperative, and was attentive to all tasks. Pt. required min-mod cues to sequence 4-picture scene. Pt independently oriented to date, SAM, and year. Continues to demonstrate organization deficits. Continue with goals per POC. Plan: Continue as per plan of care. Additional Information:     Barriers toward progress: Cognitive deficit and Limited safety awareness  Discharge recommendations:  [] Home independently  [] Home with assistance [x]  24 hour supervision  [] ECF [] Other:  Continued Tx Upon Discharge: ? [x] Yes [] No [x] TBD based on progress while on ARU [] Vital Stim indicated [] Other:   Estimated discharge date: 01/10/2020    Interventions used this date:  [] Speech/Language Treatment  [] Instruction in HEP [] Group [] Dysphagia Treatment [x] Cognitive Treatment   [] Other:       Total Time Breakdown / Charges    Time in Time out Total Time / units   Cognitive Tx 0900  1230   0930  1300 30 mins / 2 units   30 min/ 2 units   Speech Tx -- -- --   Dysphagia Tx -- -- --

## 2020-01-08 NOTE — PROGRESS NOTES
Physical Therapy  Facility/Department: [facility]  Daily Treatment Note  NAME: Gaviota Chavis  : 1940  MRN: [id number]    Date of Service: 2020    Discharge Recommendations:  24 hour supervision or assist, Home with Home health PT   PT Equipment Recommendations  Equipment Needed: Yes  Walker: Rolling    Assessment   Body structures, Functions, Activity limitations: Decreased functional mobility ; Decreased endurance;Decreased balance;Decreased strength;Decreased safe awareness;Decreased coordination  Assessment: pt continues to demo lack of safety awareness. improved ind with functional mobility with use of RW vs noAD however pt is resistant to utilizing AD depsite increased stability. pt with poor recall of directions provided during obstacle course/activities, requires step by step directions to be repeated multiple times. grossly supv for trnasfers, SBA for gait with AD, CGA-min A without AD. pt demo's delayed stepping/righting reactions with balance challenges. conitnue to recommend pt have 24/7 supv upon d/c du to decreased safety/cog impairements. if not available, recommend pt d/c SNF or nursing home for appropriate level of supv. Treatment Diagnosis: decreased (I) with mobility  Specific instructions for Next Treatment: progress mobility as tolerated  Prognosis: Good  Decision Making: Medium Complexity  PT Education: Goals; Energy Conservation;PT Role;General Safety;Plan of Care;Gait Training;Home Exercise Program;Transfer Training;Functional Mobility Training  Patient Education: pt verbalized understanding however needs reinforcement   Barriers to Learning: cognition  REQUIRES PT FOLLOW UP: Yes  Activity Tolerance  Activity Tolerance: Patient limited by fatigue  Activity Tolerance: BP= 116/76. no compliants of dizziness      Patient Diagnosis(es): There were no encounter diagnoses. has no past medical history on file. has a past surgical history that includes joint replacement;  Tonsillectomy; rotation on green foam-> stepping ocer cane with 1 UE support. pt often attempting to reach for other bar for increased support, demo's suspensory strategy when fatigue/balance challenged, max VC for upright posture. several LOB posterior (durign cervical motions/step ups) with up to min A to correct. Ambulation 3  Surface - 3: level tile  Device 3: No device  Assistance 3: Contact guard assistance;Minimal assistance  Quality of Gait 3: cues for forward gaze, cues for wide ROBERT/consistent juice, decreased stpe length/height resutling in shuffled steps, 1 LOB with turnign with min A to correct. Gait Deviations: Slow Juice;Decreased step length;Decreased step height;Shuffles;Decreased head and trunk rotation  Distance: 250 ft   Comments: improved balance when gaze is directed forward and pt extends trunk for upright posture, resultign in increased step legnth/height (decrease risk of shortened steps and trippinf over toes). Neuromuscular Education  NDT Treatment: Gait   Neuromuscular Comments: frward / backward walking 6 laps each of 10 ft with no UE support, CGA and max VC with visual cue for forwrd directed gaze. Balance  Standing - Static: Fair;+  Standing - Dynamic: Fair  Comments: dyn stand activity: ring toss with shoulder width ROBERT on green foam for additional balance challenge, reaching x 6 reps across midline in various planes for ring; 6 reps with LYE across midline in various planes, requires CGa-min A. demo's ankle strategy. pt then completed static standing actiivyt with no UE support and shoulder width ROBERT on green foam x 2 min with CGA-min A due to posterior trunk, able to correct with cues. dyn stand task while completing clothign management/hand washing at sink following use of commode with SBa of therapist (1 LOB posterior while donnig pants with min A and pt reahcing for grab bar to correct).    Exercises  Straight Leg Raise: 2x10 BLE   Heelslides: 2x10 BLE   Hip Abduction: 2x10 BLE   Knee

## 2020-01-08 NOTE — PROGRESS NOTES
Maida Hunger  1/8/2020  [id number]    Chief Complaint: Orthostatic hypotension    Subjective:   No issues overnight; working with PT    ROS: No n/v, cp, sob, f/c  Objective:  Patient Vitals for the past 24 hrs:   BP Temp Temp src Pulse Resp SpO2   01/08/20 0730 (!) 157/76 97.6 °F (36.4 °C) Oral 67 16 98 %   01/07/20 2345 (!) 177/83 -- -- -- -- --   01/07/20 2209 (!) 175/86 -- -- -- -- --   01/07/20 2208 (!) 182/86 -- -- -- -- --   01/07/20 2200 (!) 175/86 97.6 °F (36.4 °C) Oral 79 16 95 %   01/07/20 1715 (!) 174/87 -- -- -- -- --   01/07/20 1315 (!) 149/81 -- -- 82 -- --     Gen: No distress, pleasant. HEENT: Normocephalic, atraumatic. CV: Regular rate and rhythm. Resp: No respiratory distress. Abd: Soft, nontender   Ext: No edema. Neuro: Alert, oriented, appropriately interactive.    Wt Readings from Last 3 Encounters:   01/05/20 112 lb 12.8 oz (51.2 kg)   07/02/19 110 lb (49.9 kg)   05/21/19 109 lb (49.4 kg)       Laboratory data:   Lab Results   Component Value Date    WBC 8.1 01/06/2020    HGB 9.6 (L) 01/06/2020    HCT 28.1 (L) 01/06/2020    .2 (H) 01/06/2020     (H) 01/06/2020       Lab Results   Component Value Date     01/06/2020    K 3.5 01/06/2020     01/06/2020    CO2 24 01/06/2020    BUN 14 01/06/2020    CREATININE 0.8 01/06/2020    GLUCOSE 92 01/06/2020    CALCIUM 8.8 01/06/2020        Therapy progress:  PT  Position Activity Restriction  Other position/activity restrictions: Medium fall risk per nursing  Objective     Sit to Stand: Supervision  Stand to sit: Supervision  Bed to Chair: Contact guard assistance  Device: Rolling Walker  Assistance: Stand by assistance, Supervision  Distance: 180 ft (10 ft carept)  OT  PT Equipment Recommendations  Equipment Needed: Yes  Mobility Devices: Delphia Miguel: Rolling  Toilet - Technique: Ambulating  Equipment Used: Standard toilet  Toilet Transfers Comments: CGA no device use of grab bar, pt impulsive and leaves RW behind when going to restroom   Assessment        SLP                Body mass index is 18.77 kg/m². Rehabilitation Diagnosis:  16.0, Debility (non-cardiac, non-pulmonary     Assessment and Plan:  Orthostasis. Continue florinef, midodrine; hold midodrine for sbp > 180; added compression stockings.      Acute cystitis. S/p rocephin.      Urge incontinence. Baseline; OP f/u       Macrocytic anemia. Folate.      Bowels: Schedule colace + senna. Follow bowel movements. Enema or suppository if needed.      Bladder: Check PVR x 3. [address] if PVR > 200ml or if any volume is > 500 ml.      Sleep: Trazodone provided prn. LIELA: 1/10 skilled vs ltc    Interdisciplinary team conference was held today with entire rehab treatment team including PT, OT, SLP, Dietician, RN, and SW. Discussion focused on progress toward rehab goals and discharge planning. Barriers: cognition, orthostasis. Total treatment time >35 min with greater than 50% spent in care coordination. Nicholas A. Homer Goodell, MD 1/8/2020, 11:21 AM

## 2020-01-09 LAB
ANION GAP SERPL CALCULATED.3IONS-SCNC: 12 MMOL/L (ref 3–16)
BASOPHILS ABSOLUTE: 0 K/UL (ref 0–0.2)
BASOPHILS RELATIVE PERCENT: 0.6 %
BUN BLDV-MCNC: 12 MG/DL (ref 7–20)
CALCIUM SERPL-MCNC: 8.7 MG/DL (ref 8.3–10.6)
CHLORIDE BLD-SCNC: 104 MMOL/L (ref 99–110)
CO2: 24 MMOL/L (ref 21–32)
CREAT SERPL-MCNC: 0.7 MG/DL (ref 0.6–1.2)
EOSINOPHILS ABSOLUTE: 0.1 K/UL (ref 0–0.6)
EOSINOPHILS RELATIVE PERCENT: 2.2 %
GFR AFRICAN AMERICAN: >60
GFR NON-AFRICAN AMERICAN: >60
GLUCOSE BLD-MCNC: 144 MG/DL (ref 70–99)
HCT VFR BLD CALC: 28.2 % (ref 36–48)
HEMOGLOBIN: 9.7 G/DL (ref 12–16)
LYMPHOCYTES ABSOLUTE: 1.8 K/UL (ref 1–5.1)
LYMPHOCYTES RELATIVE PERCENT: 32.8 %
MAGNESIUM: 1.8 MG/DL (ref 1.8–2.4)
MCH RBC QN AUTO: 34.2 PG (ref 26–34)
MCHC RBC AUTO-ENTMCNC: 34.4 G/DL (ref 31–36)
MCV RBC AUTO: 99.6 FL (ref 80–100)
MONOCYTES ABSOLUTE: 0.4 K/UL (ref 0–1.3)
MONOCYTES RELATIVE PERCENT: 7.2 %
NEUTROPHILS ABSOLUTE: 3.1 K/UL (ref 1.7–7.7)
NEUTROPHILS RELATIVE PERCENT: 57.2 %
PDW BLD-RTO: 16.7 % (ref 12.4–15.4)
PLATELET # BLD: 402 K/UL (ref 135–450)
PMV BLD AUTO: 6.8 FL (ref 5–10.5)
POTASSIUM REFLEX MAGNESIUM: 3.3 MMOL/L (ref 3.5–5.1)
RBC # BLD: 2.83 M/UL (ref 4–5.2)
SODIUM BLD-SCNC: 140 MMOL/L (ref 136–145)
WBC # BLD: 5.5 K/UL (ref 4–11)

## 2020-01-09 PROCEDURE — 80048 BASIC METABOLIC PNL TOTAL CA: CPT

## 2020-01-09 PROCEDURE — 97129 THER IVNTJ 1ST 15 MIN: CPT

## 2020-01-09 PROCEDURE — 97110 THERAPEUTIC EXERCISES: CPT

## 2020-01-09 PROCEDURE — 36415 COLL VENOUS BLD VENIPUNCTURE: CPT

## 2020-01-09 PROCEDURE — 97116 GAIT TRAINING THERAPY: CPT

## 2020-01-09 PROCEDURE — 97535 SELF CARE MNGMENT TRAINING: CPT

## 2020-01-09 PROCEDURE — 97130 THER IVNTJ EA ADDL 15 MIN: CPT

## 2020-01-09 PROCEDURE — 97530 THERAPEUTIC ACTIVITIES: CPT

## 2020-01-09 PROCEDURE — 83735 ASSAY OF MAGNESIUM: CPT

## 2020-01-09 PROCEDURE — 85025 COMPLETE CBC W/AUTO DIFF WBC: CPT

## 2020-01-09 PROCEDURE — 1280000000 HC REHAB R&B

## 2020-01-09 PROCEDURE — 6360000002 HC RX W HCPCS: Performed by: PHYSICAL MEDICINE & REHABILITATION

## 2020-01-09 PROCEDURE — 6370000000 HC RX 637 (ALT 250 FOR IP): Performed by: PHYSICAL MEDICINE & REHABILITATION

## 2020-01-09 RX ORDER — POTASSIUM CHLORIDE 20 MEQ/1
20 TABLET, EXTENDED RELEASE ORAL 3 TIMES DAILY
Status: COMPLETED | OUTPATIENT
Start: 2020-01-09 | End: 2020-01-10

## 2020-01-09 RX ORDER — FOLIC ACID 1 MG/1
1 TABLET ORAL DAILY
Qty: 30 TABLET | Refills: 3
Start: 2020-01-10

## 2020-01-09 RX ORDER — MIDODRINE HYDROCHLORIDE 5 MG/1
5 TABLET ORAL
Qty: 90 TABLET | Refills: 3 | Status: ON HOLD | OUTPATIENT
Start: 2020-01-09 | End: 2022-04-06 | Stop reason: HOSPADM

## 2020-01-09 RX ORDER — FLUDROCORTISONE ACETATE 0.1 MG/1
0.1 TABLET ORAL DAILY
Qty: 30 TABLET | Refills: 3
Start: 2020-01-10 | End: 2020-02-09

## 2020-01-09 RX ADMIN — POTASSIUM CHLORIDE 20 MEQ: 20 TABLET, EXTENDED RELEASE ORAL at 22:21

## 2020-01-09 RX ADMIN — ENOXAPARIN SODIUM 40 MG: 40 INJECTION SUBCUTANEOUS at 07:53

## 2020-01-09 RX ADMIN — POLYETHYLENE GLYCOL 3350 17 G: 17 POWDER, FOR SOLUTION ORAL at 07:54

## 2020-01-09 RX ADMIN — POTASSIUM CHLORIDE 20 MEQ: 20 TABLET, EXTENDED RELEASE ORAL at 15:24

## 2020-01-09 RX ADMIN — FOLIC ACID 1 MG: 1 TABLET ORAL at 07:54

## 2020-01-09 RX ADMIN — MIDODRINE HYDROCHLORIDE 5 MG: 5 TABLET ORAL at 07:54

## 2020-01-09 RX ADMIN — FLUDROCORTISONE ACETATE 0.1 MG: 0.1 TABLET ORAL at 07:54

## 2020-01-09 ASSESSMENT — PAIN SCALES - GENERAL: PAINLEVEL_OUTOF10: 0

## 2020-01-09 NOTE — CARE COORDINATION
CASE MANAGEMENT DISCHARGE SUMMARY      [facility]    PASSR completed: 1/9/2020 and faxed to agency. Transportation:    Family/car: Family to provide transportation to facility via private vehicle.  time: 1/10/2020 @ 10am.     Notified:    Family: Hanny Titus, step son in law [phone]   Facility: AVS to be faxed to [phone]   RN: Nursing staff notified of discharge planning for RN follow up. Phone number for report to facility: [phone]    Note: Discharging nurse to complete MINERVA, reconcile AVS, and place final copy with patient's discharge packet. RN to ensure that written prescriptions for  Level II medications are sent with patient to the facility as per protocol.     MAYO Matamoros

## 2020-01-09 NOTE — DISCHARGE SUMMARY
with supv  Bed mobility with ind  Recommend HHPT in order to maintain strength/enduranceand increase dyn balance  Pt. Safe to return home with assistance from [id number] spuv.  If 24/7 spv not available (required due to severely impaired cog/safety), recommend SNF to LTC transition     Electronically signed by Christina Goldberg PT on 1/9/2020 at 3:20 PM

## 2020-01-09 NOTE — PROGRESS NOTES
throughout session. denies dizziness, vertigo, statign feels \"off balance\". BP at start of sessions seated = 93/59, pulse= 79 bpm. BP stnding= 91/58, pulse= 80 bpm. BP senior living through session= 108/67, pulse= 80 bpm. Rn notified, KENN hose donned      Patient Diagnosis(es): There were no encounter diagnoses. has no past medical history on file. has a past surgical history that includes joint replacement; Tonsillectomy; Intracapsular cataract extraction (Right, 5/21/2019); and Intracapsular cataract extraction (Left, 7/2/2019). Restrictions  Restrictions/Precautions  Restrictions/Precautions: General Precautions  Position Activity Restriction  Other position/activity restrictions: Medium fall risk per nursing  Subjective   General  Chart Reviewed: Yes  Response To Previous Treatment: Patient reporting fatigue but able to participate. Family / Caregiver Present: No  Referring Practitioner: Dr. Екатерина Harrington MD  Subjective  Subjective: denies pain  General Comment  Comments: found seated in recliner. pleasantly confused, reports feeling \"woozy\" during session, see activity tolernace. RN notified and donned KENN hose BLE. Pain Screening  Patient Currently in Pain: No  Pain Assessment  Pain Assessment: 0-10  Vital Signs  Patient Currently in Pain: No       Orientation  Orientation  Overall Orientation Status: Within Functional Limits  Cognition      Objective   Bed mobility  Rolling to Left: Independent  Rolling to Right: Independent  Supine to Sit: Independent  Sit to Supine: Independent  Transfers  Sit to Stand: Supervision  Stand to sit: Supervision  Car Transfer: Supervision  Comment: from recliner, low mat, car trnasfer completed with supv.    Ambulation  Ambulation?: Yes  More Ambulation?: Yes  Ambulation 1  Surface: level tile  Device: Rolling Walker  Assistance: Supervision  Quality of Gait: forward flexed posture, decreased step lenght/height, downward directed gaze thus cues for environmetn awareness   Gait Deviations: Decreased arm swing;Decreased step height;Decreased step length;Shuffles  Distance: 235 ft (10 ft carpet)  Comments: cue for safe tile to carpet transition   Ambulation 2  Surface - 2: level tile  Device 2: No device  Assistance 2: Contact guard assistance;Stand by assistance  Quality of Gait 2: cues for obstacle awareness, cues for forward directed gaze, pt often reaching for environment for stability, decreased step legnth/heel strike, difficulty maintaingin linear path  Distance: 50 ft, 250 ft   Stairs/Curb  Stairs?: Yes  Stairs  # Steps : 12  Stairs Height: 6\"  Rails: Bilateral  Device: No Device  Assistance: Contact guard assistance  Comment: ascend / descend flight of steps with BHR and CGA, reicprocla pattern. ascned/descend 6\" curb step with no AD, CGA x 2 reps. Balance  Comments: Hernandez Balance Scale completed wiht pt scoring 39/56, indicating medium fall risk. pt demo's most diffculty with alternating stepping, narrow ROBERT and SLS activities. pt retrieved objects from ground x 5 reps with CGA. pt completed alternate dyn stand task: reaching for cone from 4\" step with RUE on Il side and placing to various target across midline out of ROBERT to challnge limits of stability, then returnign cone to step with RUE x 16 reps with clsoe SBA. pt then reached across midline to 4\" step wtih LUE for cone to place to various target on IL side out of ROBERT x 16 reps with CGA-close SBA. pt exhibited 2-3 LOB with min A to correct and delaye righting reactions.    Exercises  Bridging: (x15 )  Straight Leg Raise: 2x15 BLE   Heelslides: 2x15 BLE   Hip Abduction: 2x15 BLE   Knee Short Arc Quad: 2x15 BLE   Ankle Pumps: 2x15 BLE   Comments: completed supine with 1.5# weights donned                         G-Code     OutComes Score  Hernandez Balance Score: 39 (01/09/20 0813)                                                  AM-PAC Score             Goals  Short term goals  Time Frame for Short term goals: 5 days (1/4)  Short term goal 1: Pt will be indep with bed mobility.- GOAL MET   Short term goal 2: Pt will be supervision for transfers with no device. - GOAL MET. completes with supv and no AD  Short term goal 3: Pt will ambulate 150 ft with SBA and no device. - GOAL NOT MET. reuqires CGA if ambulatign with AD, SBA with use of RW  Short term goal 4: Pt will negotiate 4\" curb step with no device and SBA.- GOAL NOT MET. requires cga  Short term goal 5: pt will negotiate 12 stairs with bilateral HR and SBA.- GOAL NOT MET. requries CGA and BHR   Long term goals  Time Frame for Long term goals : 11 days (1/10)  Long term goal 1: Pt will be indep with transfers.- GOAL NOT MET. requires supv due to safety concerns  Long term goal 2: Pt will be indep with ambulation 150 ft.- GOAL NOT MET. requris CGA-min A wtihtou AD, SBA with AD due to safety concerns  Long term goal 3: Pt will negotiate 2 4\" curb steps mod I.- GOAL NOT MET> requires CGA and cues for sequencing. Long term goal 4: Pt will negotiate 12 steps with bilateral HR Mod I.- GOAL NOT MET. completes with CGA-SBA  Long term goal 5: Pt will improve Hernandez Balance Test to greater than 45/56 to reflect improved balance and decreased fall risk.- gOAL NOT MET. scored 39/56   Patient Goals   Patient goals : \"to get back to normal\"    Plan    Plan  Times per week: 5 out of 7 days/wk  Times per day: Daily  Specific instructions for Next Treatment: progress mobility as tolerated  Current Treatment Recommendations: Strengthening, Gait Training, Patient/Caregiver Education & Training, ROM, Stair training, Equipment Evaluation, Education, & procurement, Balance Training, Neuromuscular Re-education, Functional Mobility Training, Endurance Training, Transfer Training, Safety Education & Training, Home Exercise Program  Safety Devices  Type of devices:  All fall risk precautions in place, Gait belt, Patient at risk for falls, Call light within reach, Chair alarm in place, Left in chair  Restraints  Initially in place: No     Therapy Time   Individual Concurrent Group Co-treatment   Time In 0800         Time Out 0930         Minutes 90         Timed Code Treatment Minutes: 90 Minutes       Raffy Reagan PT

## 2020-01-09 NOTE — DISCHARGE INSTR - COC
Continuity of Care Form    Patient Name: Jannie Lakhani   :  1940  MRN:  [id number]    [facility] date:  2019  Discharge date:  01/10/2020    Code Status Order: Full Code   Advance Directives:   Advance Care Flowsheet Documentation     Date/Time Healthcare Directive Type of Healthcare Directive Copy in [address] Agent's Name Healthcare Agent's Phone Number    [phone]  No, patient does not have an advance directive for healthcare treatment -- -- -- -- --          Admitting Physician:  Willa Brenner MD  PCP: Jayson Torrez    Discharging Nurse: Amalia Li RN [facility] Unit/Room#: 4495/4682-24  Discharging Unit Phone Number: [phone]    Emergency Contact:   Extended Emergency Contact Information  Primary Emergency Contact: [address] [facility] Phone: [phone]  Mobile Phone: [phone]  Relation: Child  Secondary Emergency Contact: Mine Crissy  Mobile Phone: [phone]  Relation: Other    Past Surgical History:  Past Surgical History:   Procedure Laterality Date    INTRACAPSULAR CATARACT EXTRACTION Right 2019    PHACOEMULSIFICATION OF CATARACT RIGHT EYE WITH INTRAOCULAR LENS IMPLANT performed by Miah Garcia MD at [facility] Left 2019    PHACOEMULSIFICATION OF CATARACT LEFT EYE WITH INTRAOCULAR LENS IMPLANT performed by Miah Garcia MD at [place] [address]      both hips    TONSILLECTOMY         Immunization History: There is no immunization history on file for this patient. Active Problems:  Patient Active Problem List   Diagnosis Code    Left knee pain M25.562    Left patella fracture S82.002A    Contusion of left knee S80. 02XA    Orthostatic hypotension I95.1       Isolation/Infection:   Isolation          No Isolation        Patient Infection Status     None to display          Nurse Assessment:  Last Vital Signs: BP (!) 96/56   Pulse 77   Temp 98.3 °F (36.8 °C) (Oral)   Resp 15   Ht 5' 5\" (1.651 m)   Wt 113 lb 12.8 oz (51.6 kg)   SpO2 99%   BMI 18.94 kg/m²     Last documented pain score (0-10 scale): Pain Level: 0  Last Weight:   Wt Readings from Last 1 Encounters:   01/09/20 113 lb 12.8 oz (51.6 kg)     Mental Status:  oriented, alert, coherent, logical and thought processes intact    IV Access:  - None    Nursing Mobility/ADLs:  Walking   Assisted  Transfer  Assisted  Bathing  Assisted  Dressing  Assisted  Toileting  Assisted  Feeding  Independent  Med Admin  Assisted  Med Delivery   whole    Wound Care Documentation and Therapy:  Wound 01/03/20 Pretibial Right (Active)   Wound Traumatic 1/7/2020 10:05 PM   Dressing/Treatment Open to air 1/9/2020  7:52 AM   Wound Assessment Other (Comment) 1/9/2020  7:52 AM   Drainage Amount Scant 1/9/2020  7:52 AM   Huyen-wound Assessment Dry;Pink;Swelling 1/9/2020  7:52 AM   Number of days: 5        Elimination:  Continence:   · Bowel: Yes  · Bladder: Yes  Urinary Catheter: None   Colostomy/Ileostomy/Ileal Conduit: No       Date of Last BM: 01/10/2020    Intake/Output Summary (Last 24 hours) at 1/9/2020 1308  Last data filed at 1/9/2020 0752  Gross per 24 hour   Intake 480 ml   Output --   Net 480 ml     I/O last 3 completed shifts: In: 5 [P.O.:720]  Out: -     Safety Concerns:     History of Falls (last 30 days) and At Risk for Falls    Impairments/Disabilities:      None    Nutrition Therapy:  Current Nutrition Therapy:   - Oral Diet:  General    Routes of Feeding: Oral  Liquids: Thin Liquids  Daily Fluid Restriction: no  Last Modified Barium Swallow with Video (Video Swallowing Test): not done    Treatments at the Time of Hospital Discharge:   Respiratory Treatments: ***  Oxygen Therapy:  is not on home oxygen therapy.   Ventilator:    - No ventilator support    Rehab Therapies: {THERAPEUTIC INTERVENTION:[id number]}  Weight Bearing Status/Restrictions: No weight bearing restirctions  Other Medical Equipment

## 2020-01-09 NOTE — PROGRESS NOTES
cataract extraction (Left, 7/2/2019).     Restrictions  Restrictions/Precautions  Restrictions/Precautions: General Precautions  Position Activity Restriction  Other position/activity restrictions: Medium fall risk per nursing  Subjective   General  Chart Reviewed: Yes, Imaging, Labs, Orders, History and Physical  Patient assessed for rehabilitation services?: Yes  Family / Caregiver Present: No  Referring Practitioner: Dr. Homer Goodell  Diagnosis: Frequent falls due to syncope/hypotension  Subjective  Subjective: Pt in chair, pleasantly confused and agreeable  General Comment  Comments: RN cleared pt for eval/tx, states held BP meds due to BP being high this AM  Vital Signs  Patient Currently in Pain: No   Orientation  Orientation  Overall Orientation Status: Within Functional Limits(with cues )  Objective    ADL  Feeding: Setup  Grooming: Supervision  UE Bathing: Supervision  LE Bathing: Supervision  UE Dressing: Supervision  LE Dressing: Supervision  Toileting: Supervision  Additional Comments: pt with cues for directionality of pants, way finding to restroom in her room  Instrumental ADL's  Instrumental ADLs: Yes  Meal Prep  Meal Prep Level: Walker(RW)  Meal Prep Level of Assistance: Supervision  Meal Preparation: pt requires max cues to make cup of coffee from instant machine, no recollection of performing this task before (previously did with this OT a few days ago), poor carryover during task     Balance  Sitting Balance: Supervision  Standing Balance: Supervision(RW)  Standing Balance  Time: 4-5 min x 5  Activity: ADLs/IADLs, mobility  Functional Mobility  Functional - Mobility Device: Rolling Walker  Activity: To/from bathroom  Assist Level: Supervision  Functional Mobility Comments: CGA without device   Toilet Transfers  Toilet - Technique: Ambulating  Equipment Used: Standard toilet  Toilet Transfer: Supervision  Toilet Transfers Comments:  SPV with RW, CGA no device use of grab bar, pt impulsive and leaves RW behind when going to restroom   [address] - Transfer Type: To and From  Shower - Transfer To: Transfer tub bench  Shower - Technique: Ambulating  Shower Transfers: Supervision  Shower Transfers Comments: cues for safety     Transfers  Sit to stand: Supervision  Stand to sit: Supervision  Transfer Comments: SPV with cues for safety/mechanics        Coordination  Gross Motor: WFL for ADLs              Cognition  Overall Cognitive Status: Exceptions  Arousal/Alertness: Appropriate responses to stimuli  Following Commands: Follows one step commands with repetition; Follows one step commands with increased time  Attention Span: Attends with cues to redirect  Memory: Decreased short term memory  Safety Judgement: Decreased awareness of need for safety  Problem Solving: Decreased awareness of errors  Insights: Decreased awareness of deficits  Initiation: Requires cues for some  Sequencing: Requires cues for some  Cognition Comment: pt requires mod/max cues for sequencing most tasks, functional recall is <10 sec                    Type of ROM/Therapeutic Exercise  Type of ROM/Therapeutic Exercise: Free weights  Comment: 2# weights  Exercises  Scapular Protraction: x15  Scapular Retraction: x15  Shoulder Flexion: x15  Shoulder ABduction: x15  Shoulder ADduction: x15  Horizontal ABduction: x15  Horizontal ADduction: x15  Elbow Flexion: x15  Elbow Extension: x15  Supination: x15  Pronation: x15  Wrist Flexion: x15  Wrist Extension: x15                    Plan   Plan  Times per week: 5 out of 7 days  Times per day: Daily  Plan weeks: 11 days  Current Treatment Recommendations: Strengthening, Endurance Training, Patient/Caregiver Education & Training, Equipment Evaluation, Education, & procurement, Self-Care / ADL, Balance Training, Home Management Training, Pain Management, Functional Mobility Training, Safety Education & Training    Goals  Short term goals  Time Frame for Short term goals: 1/5/20  Short term goal 1: Pt will complete functional mobility in room/bathroom with LRAD SBA - GOAL MET 1/4  Short term goal 2: Pt will complete UE/LE dressing SBA with LRAD and AE PRN- GOAL MET 1/4  Short term goal 3: Pt will tolerate >7 minutes standing ADL with LRAD SBA- GOAL MET 1/4  Short term goal 4: Pt will tolerate BUE ex 10-15 reps to tolerance to increase strength/endurance for ADLs- GOAL MET 1/4  Long term goals  Time Frame for Long term goals : 1/11/20  Long term goal 1: Pt will complete functional mobility for ADL with LRAD Dev-NOT MET d/t impulsivity and decreased sasfety awareness  Long term goal 2: Pt will complete 1 simple meal prep and/or IADL Dev with LRAD-NOT MET 2* to COGNITION  Patient Goals   Patient goals :  To go home soon         Therapy Time   Individual Concurrent Group Co-treatment   Time In 1030         Time Out 1130         Minutes 60         Timed Code Treatment Minutes: 60 Minutes      Therapy Time   Individual Concurrent Group Co-treatment   Time In 1300         Time Out 1330         Minutes 30         Timed Code Treatment Minutes: [address], OT

## 2020-01-09 NOTE — DISCHARGE SUMMARY
Occupational Therapy  Discharge Summary     Name:Yulissa George  LFX:[id number]  :1940  Treatment Diagnosis: decreased (I) with mobility  Diagnosis: Orthostatic Hypotension    Restrictions/Precautions  Restrictions/Precautions: General Precautions           Position Activity Restriction  Other position/activity restrictions: Medium fall risk per nursing     Goals:   Short term goals  Time Frame for Short term goals: 20  Short term goal 1: Pt will complete functional mobility in room/bathroom with LRAD SBA - GOAL MET   Short term goal 2: Pt will complete UE/LE dressing SBA with LRAD and AE PRN- GOAL MET   Short term goal 3: Pt will tolerate >7 minutes standing ADL with LRAD SBA- GOAL MET   Short term goal 4: Pt will tolerate BUE ex 10-15 reps to tolerance to increase strength/endurance for ADLs- GOAL MET    Long term goals  Time Frame for Long term goals : 20  Long term goal 1: Pt will complete functional mobility for ADL with LRAD Dev-NOT MET d/t impulsivity and decreased sasfety awareness  Long term goal 2: Pt will complete 1 simple meal prep and/or IADL Dev with LRAD-NOT MET 2* to COGNITION    Pt. Met 4/4 short term goals and 0/2 long term goals. Goals not met 2* to severity of cognitive deficits. Current Functional Status:   ADL  Feeding: Setup  Grooming: Supervision  UE Bathing: Supervision  LE Bathing: Supervision  UE Dressing: Supervision  LE Dressing: Supervision  Toileting: Supervision  Additional Comments: pt with cues for directionality of pants, way finding to restroom in her room    Bed mobility  Rolling to Left: Independent  Rolling to Right: Independent  Supine to Sit: Independent  Sit to Supine: Independent  Scooting: Independent(to edge of chair)  Comment: Pt up in chair in the gym at beginning and end of session. Functional Transfers:   Toilet Transfers  Toilet - Technique: Ambulating  Equipment Used: Standard toilet  Toilet Transfer: Supervision  Toilet Transfers Comments:  SPV with RW, CGA no device use of grab bar, pt impulsive and leaves RW behind when going to restroom          Shower Transfers  Shower - Transfer From: Other(recliner)  Shower - Transfer Type: To and From  Shower - Transfer To: Transfer tub bench  Shower - Technique: Ambulating  Shower Transfers: Supervision  Shower Transfers Comments: cues for safety           Functional Mobility  Functional - Mobility Device: Rolling Walker  Activity: To/from bathroom  Assist Level: Supervision  Functional Mobility Comments: CGA without device     Instrumental ADL's  Instrumental ADLs: Yes  Meal Prep  Meal Prep Level: Walker(RW)  Meal Prep Level of Assistance: Supervision  Meal Preparation: pt requires max cues to make cup of coffee from instant machine, no recollection of performing this task before (previously did with this OT a few days ago), poor carryover during task                   Perception  Overall Perceptual Status: WFL         UE Function:WFL       Assessment:   Assessment: Pt pleasantly confused and cooperative, remains limited by congition. Pt seated in recliner with restroom immediately to her left, pt reports needing to use restroom and unable to locate restroom. Pt attempts to walk out of room, then attempts to walk toward window, which is across the room from restroom. Pt requires max cues for way-finding. Pt requires SPV for all ADL tasks and to make cup of coffee from instant machine. Pt SPV fro ADls, IADLs, toileting, transfers and mobility with RW. Pt has plateaued with progress d/t cognition and poor STM. Pt tolerates B UE ex well with cues for tech and form. Pt scheduled for d/c next date. Recommend LTC if no 24 hr SPV available d/t severity of cognitive deficits.    Prognosis: Good     REQUIRES OT FOLLOW UP: Yes  Discharge Recommendations: [address], 24 hour supervision or assist(SNF vs LTC if no 24 hr available)    Equipment Recommendations:  Defer to

## 2020-01-09 NOTE — FLOWSHEET NOTE
01/09/20 9897   Encounter Summary   Services provided to: Patient   Continue Visiting   (Please visit to do AD)   Complexity of Encounter Moderate   Length of Encounter 15 minutes

## 2020-01-10 VITALS
WEIGHT: 113.8 LBS | SYSTOLIC BLOOD PRESSURE: 149 MMHG | DIASTOLIC BLOOD PRESSURE: 68 MMHG | TEMPERATURE: 97.5 F | OXYGEN SATURATION: 100 % | BODY MASS INDEX: 18.96 KG/M2 | HEIGHT: 65 IN | HEART RATE: 81 BPM | RESPIRATION RATE: 18 BRPM

## 2020-01-10 PROCEDURE — 6360000002 HC RX W HCPCS: Performed by: PHYSICAL MEDICINE & REHABILITATION

## 2020-01-10 PROCEDURE — 6370000000 HC RX 637 (ALT 250 FOR IP): Performed by: PHYSICAL MEDICINE & REHABILITATION

## 2020-01-10 RX ADMIN — POTASSIUM CHLORIDE 20 MEQ: 20 TABLET, EXTENDED RELEASE ORAL at 10:17

## 2020-01-10 RX ADMIN — POLYETHYLENE GLYCOL 3350 17 G: 17 POWDER, FOR SOLUTION ORAL at 10:22

## 2020-01-10 RX ADMIN — FLUDROCORTISONE ACETATE 0.1 MG: 0.1 TABLET ORAL at 10:17

## 2020-01-10 RX ADMIN — FOLIC ACID 1 MG: 1 TABLET ORAL at 10:17

## 2020-01-10 RX ADMIN — ENOXAPARIN SODIUM 40 MG: 40 INJECTION SUBCUTANEOUS at 10:17

## 2020-01-10 RX ADMIN — MIDODRINE HYDROCHLORIDE 5 MG: 5 TABLET ORAL at 10:17

## 2020-01-10 RX ADMIN — MIDODRINE HYDROCHLORIDE 5 MG: 5 TABLET ORAL at 13:18

## 2020-01-10 ASSESSMENT — PAIN SCALES - GENERAL
PAINLEVEL_OUTOF10: 0
PAINLEVEL_OUTOF10: 0

## 2020-01-10 NOTE — DISCHARGE SUMMARY
Physical Medicine & Rehabilitation  Discharge Summary     Patient Identification:  Carolyn Hu  : 1940  Admit date: 2019  Discharge date:  1/10/2020   Attending provider: Martha Jackson MD        Primary care provider: Mohan Yang     Discharge Diagnoses:   Patient Active Problem List   Diagnosis    Left knee pain    Left patella fracture    Contusion of left knee    Orthostatic hypotension       Discharge Functional Status:    Physical therapy:  Bed Mobility: Scooting: Independent(to edge of chair)  Transfers: Sit to Stand: Supervision  Stand to sit: Supervision  Bed to Chair: Contact guard assistance, Ambulation 1  Surface: level tile  Device: Rolling Walker  Assistance: Supervision  Quality of Gait: forward flexed posture, decreased step lenght/height, downward directed gaze thus cues for environmetn awareness   Gait Deviations: Decreased arm swing, Decreased step height, Decreased step length, Shuffles  Distance: 235 ft (10 ft carpet)  Comments: cue for safe tile to carpet transition , Stairs  # Steps : 12  Stairs Height: 6\"  Rails: Bilateral  Curbs: 6\"  Device: No Device  Assistance: Contact guard assistance  Comment: ascend / descend flight of steps with BHR and CGA, reicprocla pattern. ascned/descend 6\" curb step with no AD, CGA x 2 reps. Mobility:  , PT Equipment Recommendations  Equipment Needed: Yes  Mobility Devices: Dianahe Laud: Rolling, Assessment: pt set to d/c with recommendation fo 24/7 supv for increased safety due to safety/cog impairments. if appropriate level of supv not available, recommend pt d/c SNF with conitnuation to senior care for increased safety. pt is ind with bed mobility, rueqire sSBA-supv for trnasfers, SBA for gait with use of AD, CGA-min A for gait without AD and CGA for stair navigation. pt requires consistent cues for safe gait/stair mechanics with little carryover of education provided.  session limited by pts complaints of \"wooziness\", RN notified of pt's vitals, donned Zac hose and cleared pt to particpiate as tolerated. DME: recommend RW for increased safety with functional mobility, howeer pt resistant to using AD despite increased stability. Occupational therapy:  ,  , Assessment: Pt pleasantly confused and cooperative, remains limited by congition. Pt seated in recliner with restroom immediately to her left, pt reports needing to use restroom and unable to locate restroom. Pt attempts to walk out of room, then attempts to walk toward window, which is across the room from restroom. Pt requires max cues for way-finding. Pt requires SPV for all ADL tasks and to make cup of coffee from instant machine. Pt SPV fro ADls, IADLs, toileting, transfers and mobility with RW. Pt has plateaued with progress d/t cognition and poor STM. Pt tolerates B UE ex well with cues for tech and form. Pt scheduled for d/c next date. Recommend LTC if no 24 hr SPV available d/t severity of cognitive deficits. Speech therapy:       Inpatient Rehabilitation Course:   Marguerite Lange is a 67 y.o. female admitted to inpatient rehabilitation on 12/31/2019 s/p Orthostatic hypotension. The patient participated in an aggressive multidisciplinary inpatient rehabilitation program involving 3 hours of therapy per day, at least 5 days per week. The below items were addressed during her time on the ARU:    Orthostasis. Continue florinef, midodrine; hold midodrine for sbp > 180; added compression stockings.      Acute cystitis. S/p rocephin.      Urge incontinence. Baseline; OP f/u       Macrocytic anemia.  Folate.        Discharge Exam:  Constitutional: Pleasant, no distress  Head: Normocephalic  Eyes: Conjunctiva noninjected  Pulm: CTA bilat  CV: No murmurs noted  Abd: Soft, nontender  Ext: No edema  Neuro: Alert, oriented  MSK: No joint abnormalities noted     Significant Diagnostics:   Lab Results   Component Value Date    CREATININE 0.7 01/09/2020    BUN 12 01/09/2020

## 2020-01-10 NOTE — FLOWSHEET NOTE
01/10/20 1228   Encounter Summary   Services provided to: Patient and family together   Referral/Consult From: Family   Support System Family members   Continue Visiting   (1-10, completed advance directives)   Complexity of Encounter Moderate   Length of Encounter 30 minutes;1 hour   Advance Directives (For Healthcare)   Healthcare Directive Yes, patient has an advance directive for healthcare treatment   Type of Healthcare Directive Durable power of  for health care;Living will   Copy in Chart Yes, copy in chart   Chart Copy Status : Current   Date Reviewed and Current: 01/10/20   Healthcare Agent Appointed Adult [facility] [address] [place] [address] Agent's Name   Hodan Chua (dtr.))   Healthcare Agent's Phone Number   ([phone] (H), [phone] (c))   If you are unable to speak for yourself, does your Healthcare Agent or Legal Spokesperson know your healthcare wishes? Yes   Helped patient and daughter complete their advance directives. Patient states she wants to be a DNR. I explained the difference between a living will and DNR order, which a physician need to enter in her chart. I provided an [place] DNR form for her to review with her physician and complete as appropriate. PRN follow-up.

## 2020-01-10 NOTE — PROGRESS NOTES
Orders for discharge. Discharge instructions given to patient's daughter. Copy of discharge instructions placed on chart. Copy of discharge instructions faxed to [facility] KELSY DUVALL. [facility] [address] [facility], Prescription for proamatine given to patient's [facility] Patient escorted to private vehicle in no apparent distress. Personal belongings sent with patient.

## 2022-03-25 ENCOUNTER — HOSPITAL ENCOUNTER (INPATIENT)
Age: 82
LOS: 3 days | Discharge: NURSING, REHAB OR LONG-TERM CARE | DRG: 682 | End: 2022-03-28
Attending: EMERGENCY MEDICINE
Payer: MEDICARE

## 2022-03-25 ENCOUNTER — APPOINTMENT (OUTPATIENT)
Dept: CT IMAGING | Age: 82
DRG: 682 | End: 2022-03-25
Payer: MEDICARE

## 2022-03-25 DIAGNOSIS — N17.9 AKI (ACUTE KIDNEY INJURY) (HCC): ICD-10-CM

## 2022-03-25 DIAGNOSIS — I95.1 ORTHOSTASIS: ICD-10-CM

## 2022-03-25 DIAGNOSIS — R29.6 FALLS FREQUENTLY: Primary | ICD-10-CM

## 2022-03-25 PROBLEM — D64.9 ANEMIA: Status: ACTIVE | Noted: 2022-03-25

## 2022-03-25 LAB
A/G RATIO: 1.6 (ref 1.1–2.2)
ALBUMIN SERPL-MCNC: 4.5 G/DL (ref 3.4–5)
ALP BLD-CCNC: 89 U/L (ref 40–129)
ALT SERPL-CCNC: 15 U/L (ref 10–40)
ANION GAP SERPL CALCULATED.3IONS-SCNC: 15 MMOL/L (ref 3–16)
AST SERPL-CCNC: 29 U/L (ref 15–37)
BASOPHILS ABSOLUTE: 0 K/UL (ref 0–0.2)
BASOPHILS RELATIVE PERCENT: 0.4 %
BILIRUB SERPL-MCNC: 1.3 MG/DL (ref 0–1)
BUN BLDV-MCNC: 59 MG/DL (ref 7–20)
CALCIUM SERPL-MCNC: 9.2 MG/DL (ref 8.3–10.6)
CHLORIDE BLD-SCNC: 99 MMOL/L (ref 99–110)
CO2: 22 MMOL/L (ref 21–32)
CREAT SERPL-MCNC: 1.8 MG/DL (ref 0.6–1.2)
EKG ATRIAL RATE: 68 BPM
EKG DIAGNOSIS: NORMAL
EKG P AXIS: 67 DEGREES
EKG P-R INTERVAL: 222 MS
EKG Q-T INTERVAL: 428 MS
EKG QRS DURATION: 86 MS
EKG QTC CALCULATION (BAZETT): 455 MS
EKG R AXIS: 21 DEGREES
EKG T AXIS: 42 DEGREES
EKG VENTRICULAR RATE: 68 BPM
EOSINOPHILS ABSOLUTE: 0.1 K/UL (ref 0–0.6)
EOSINOPHILS RELATIVE PERCENT: 1.8 %
GFR AFRICAN AMERICAN: 33
GFR NON-AFRICAN AMERICAN: 27
GLUCOSE BLD-MCNC: 100 MG/DL (ref 70–99)
HCT VFR BLD CALC: 30.4 % (ref 36–48)
HEMOGLOBIN: 10.3 G/DL (ref 12–16)
LYMPHOCYTES ABSOLUTE: 0.9 K/UL (ref 1–5.1)
LYMPHOCYTES RELATIVE PERCENT: 14.2 %
MCH RBC QN AUTO: 33.2 PG (ref 26–34)
MCHC RBC AUTO-ENTMCNC: 33.8 G/DL (ref 31–36)
MCV RBC AUTO: 98.3 FL (ref 80–100)
MONOCYTES ABSOLUTE: 0.6 K/UL (ref 0–1.3)
MONOCYTES RELATIVE PERCENT: 9.6 %
NEUTROPHILS ABSOLUTE: 4.5 K/UL (ref 1.7–7.7)
NEUTROPHILS RELATIVE PERCENT: 74 %
PDW BLD-RTO: 14.8 % (ref 12.4–15.4)
PLATELET # BLD: 340 K/UL (ref 135–450)
PMV BLD AUTO: 6 FL (ref 5–10.5)
POTASSIUM SERPL-SCNC: 3.7 MMOL/L (ref 3.5–5.1)
RBC # BLD: 3.09 M/UL (ref 4–5.2)
SODIUM BLD-SCNC: 136 MMOL/L (ref 136–145)
TOTAL PROTEIN: 7.3 G/DL (ref 6.4–8.2)
WBC # BLD: 6.2 K/UL (ref 4–11)

## 2022-03-25 PROCEDURE — 97535 SELF CARE MNGMENT TRAINING: CPT

## 2022-03-25 PROCEDURE — 97530 THERAPEUTIC ACTIVITIES: CPT

## 2022-03-25 PROCEDURE — 2580000003 HC RX 258: Performed by: EMERGENCY MEDICINE

## 2022-03-25 PROCEDURE — 93010 ELECTROCARDIOGRAM REPORT: CPT | Performed by: INTERNAL MEDICINE

## 2022-03-25 PROCEDURE — 99284 EMERGENCY DEPT VISIT MOD MDM: CPT

## 2022-03-25 PROCEDURE — 6370000000 HC RX 637 (ALT 250 FOR IP): Performed by: INTERNAL MEDICINE

## 2022-03-25 PROCEDURE — 2580000003 HC RX 258: Performed by: INTERNAL MEDICINE

## 2022-03-25 PROCEDURE — 96360 HYDRATION IV INFUSION INIT: CPT

## 2022-03-25 PROCEDURE — 97166 OT EVAL MOD COMPLEX 45 MIN: CPT

## 2022-03-25 PROCEDURE — 85025 COMPLETE CBC W/AUTO DIFF WBC: CPT

## 2022-03-25 PROCEDURE — 93005 ELECTROCARDIOGRAM TRACING: CPT | Performed by: EMERGENCY MEDICINE

## 2022-03-25 PROCEDURE — 97162 PT EVAL MOD COMPLEX 30 MIN: CPT

## 2022-03-25 PROCEDURE — 80053 COMPREHEN METABOLIC PANEL: CPT

## 2022-03-25 PROCEDURE — 70450 CT HEAD/BRAIN W/O DYE: CPT

## 2022-03-25 PROCEDURE — 1200000000 HC SEMI PRIVATE

## 2022-03-25 PROCEDURE — 97116 GAIT TRAINING THERAPY: CPT

## 2022-03-25 RX ORDER — SODIUM CHLORIDE 9 MG/ML
1000 INJECTION, SOLUTION INTRAVENOUS CONTINUOUS
Status: DISCONTINUED | OUTPATIENT
Start: 2022-03-25 | End: 2022-03-27

## 2022-03-25 RX ORDER — FOLIC ACID 1 MG/1
1 TABLET ORAL DAILY
Status: DISCONTINUED | OUTPATIENT
Start: 2022-03-25 | End: 2022-03-28 | Stop reason: HOSPADM

## 2022-03-25 RX ORDER — ONDANSETRON 2 MG/ML
4 INJECTION INTRAMUSCULAR; INTRAVENOUS EVERY 6 HOURS PRN
Status: DISCONTINUED | OUTPATIENT
Start: 2022-03-25 | End: 2022-03-28 | Stop reason: HOSPADM

## 2022-03-25 RX ORDER — FLUDROCORTISONE ACETATE 0.1 MG/1
0.1 TABLET ORAL DAILY
Status: ON HOLD | COMMUNITY
End: 2022-04-06 | Stop reason: SDUPTHER

## 2022-03-25 RX ORDER — FERROUS SULFATE 325(65) MG
325 TABLET ORAL
Status: ON HOLD | COMMUNITY
End: 2022-04-06 | Stop reason: SDUPTHER

## 2022-03-25 RX ORDER — SODIUM CHLORIDE 0.9 % (FLUSH) 0.9 %
5-40 SYRINGE (ML) INJECTION PRN
Status: DISCONTINUED | OUTPATIENT
Start: 2022-03-25 | End: 2022-03-28 | Stop reason: HOSPADM

## 2022-03-25 RX ORDER — ONDANSETRON 4 MG/1
4 TABLET, ORALLY DISINTEGRATING ORAL EVERY 8 HOURS PRN
Status: DISCONTINUED | OUTPATIENT
Start: 2022-03-25 | End: 2022-03-28 | Stop reason: HOSPADM

## 2022-03-25 RX ORDER — CLONIDINE HYDROCHLORIDE 0.1 MG/1
0.1 TABLET ORAL PRN
Status: ON HOLD | COMMUNITY
End: 2022-04-06 | Stop reason: HOSPADM

## 2022-03-25 RX ORDER — DONEPEZIL HYDROCHLORIDE 10 MG/1
10 TABLET, FILM COATED ORAL NIGHTLY
COMMUNITY

## 2022-03-25 RX ORDER — SODIUM CHLORIDE 9 MG/ML
INJECTION, SOLUTION INTRAVENOUS CONTINUOUS
Status: ACTIVE | OUTPATIENT
Start: 2022-03-25 | End: 2022-03-26

## 2022-03-25 RX ORDER — ACETAMINOPHEN 325 MG/1
650 TABLET ORAL EVERY 6 HOURS PRN
Status: DISCONTINUED | OUTPATIENT
Start: 2022-03-25 | End: 2022-03-28 | Stop reason: HOSPADM

## 2022-03-25 RX ORDER — ACETAMINOPHEN 650 MG/1
650 SUPPOSITORY RECTAL EVERY 6 HOURS PRN
Status: DISCONTINUED | OUTPATIENT
Start: 2022-03-25 | End: 2022-03-28 | Stop reason: HOSPADM

## 2022-03-25 RX ORDER — POLYETHYLENE GLYCOL 3350 17 G/17G
17 POWDER, FOR SOLUTION ORAL DAILY PRN
Status: DISCONTINUED | OUTPATIENT
Start: 2022-03-25 | End: 2022-03-28 | Stop reason: HOSPADM

## 2022-03-25 RX ORDER — DONEPEZIL HYDROCHLORIDE 5 MG/1
10 TABLET, FILM COATED ORAL NIGHTLY
Status: DISCONTINUED | OUTPATIENT
Start: 2022-03-25 | End: 2022-03-28 | Stop reason: HOSPADM

## 2022-03-25 RX ORDER — PHENOL 1.4 %
1 AEROSOL, SPRAY (ML) MUCOUS MEMBRANE DAILY
Status: ON HOLD | COMMUNITY
End: 2022-10-07 | Stop reason: SDUPTHER

## 2022-03-25 RX ORDER — DIPHENHYDRAMINE HCL 25 MG
25 TABLET ORAL
Status: ON HOLD | COMMUNITY
End: 2022-04-06 | Stop reason: HOSPADM

## 2022-03-25 RX ORDER — SOLIFENACIN SUCCINATE 10 MG/1
10 TABLET, FILM COATED ORAL DAILY
Status: ON HOLD | COMMUNITY
End: 2022-10-07 | Stop reason: HOSPADM

## 2022-03-25 RX ORDER — SODIUM CHLORIDE 9 MG/ML
25 INJECTION, SOLUTION INTRAVENOUS PRN
Status: DISCONTINUED | OUTPATIENT
Start: 2022-03-25 | End: 2022-03-28 | Stop reason: HOSPADM

## 2022-03-25 RX ORDER — SODIUM CHLORIDE 0.9 % (FLUSH) 0.9 %
5-40 SYRINGE (ML) INJECTION EVERY 12 HOURS SCHEDULED
Status: DISCONTINUED | OUTPATIENT
Start: 2022-03-25 | End: 2022-03-28 | Stop reason: HOSPADM

## 2022-03-25 RX ORDER — FERROUS SULFATE 325(65) MG
325 TABLET ORAL
Status: DISCONTINUED | OUTPATIENT
Start: 2022-03-26 | End: 2022-03-27

## 2022-03-25 RX ORDER — LANOLIN ALCOHOL/MO/W.PET/CERES
1000 CREAM (GRAM) TOPICAL DAILY
COMMUNITY

## 2022-03-25 RX ORDER — FLUDROCORTISONE ACETATE 0.1 MG/1
0.1 TABLET ORAL DAILY
Status: DISCONTINUED | OUTPATIENT
Start: 2022-03-25 | End: 2022-03-28 | Stop reason: HOSPADM

## 2022-03-25 RX ADMIN — DONEPEZIL HYDROCHLORIDE 10 MG: 5 TABLET ORAL at 21:04

## 2022-03-25 RX ADMIN — FLUDROCORTISONE ACETATE 0.1 MG: 0.1 TABLET ORAL at 16:31

## 2022-03-25 RX ADMIN — SODIUM CHLORIDE 1000 ML: 9 INJECTION, SOLUTION INTRAVENOUS at 11:43

## 2022-03-25 RX ADMIN — FOLIC ACID 1 MG: 1 TABLET ORAL at 16:31

## 2022-03-25 RX ADMIN — SODIUM CHLORIDE: 9 INJECTION, SOLUTION INTRAVENOUS at 21:04

## 2022-03-25 RX ADMIN — SODIUM CHLORIDE: 9 INJECTION, SOLUTION INTRAVENOUS at 14:33

## 2022-03-25 ASSESSMENT — PAIN SCALES - GENERAL: PAINLEVEL_OUTOF10: 0

## 2022-03-25 NOTE — CARE COORDINATION
CASE MANAGEMENT INITIAL ASSESSMENT      Reviewed chart and completed assessment with patient:yes  Family present: NA  Explained Case Management role/services. yes    Primary contact information:daughter, [address] :           Can this person be reached and be able to respond quickly, such as within a few minutes or hours? Yes      Admit date/status:ER  Diagnosis:s/p fall   Is this a Readmission?:  No      Insurance:medicare   Precert required for SNF: No       3 night stay required: waived due to [address] arrangements, Adls, care needs, prior to admission:patient lives at home alone in ranch style home, patient reports she is indpendent in [address] [facility] [address] [facility] at home:  Walker_x_Cane__RTS__ BSC__Shower Chair__  02__ HHN__ CPAP__  BiPap__  Hospital Bed__ W/C___ Other_____    Services in the home and/or outpatient, prior to admission:none    Current PCP:Dr. Gerardo Guerra    Transportation needs: car     Dialysis Facility (if applicable)   · Name:  · Address:  · Dialysis Schedule:  · Phone:  · Fax:    PT/OT recs:    Hospital Exemption Notification (HEN):    Barriers to discharge:medical complications    Plan/comments:Referred to patient for d/c planning. Spoke to patient. Patient is an [de-identified]year old female in ER s/p fall. Patient usually lives at home alone. Patient reports she is independent in ADLs. Patient states she has been using a walker recently for mobility. Discussed PT/OT, home care and SNF. Patient declines. Patient agreeable to information on [facility], brochure provided. Son interested in patient going to SNF. Explained patient would have to agree. RN updated and encouraged to have PT/OT eval.  Electronically signed by GARETT Adkins LISW-S on 3/25/2022 at 11:22 AM      UPDATE:  Son-in-law spoke to patient and patient is now agreeable going to SNF. She has been to   [address] in past and prefers to return there. Referral sent.   Await response. Family can transport to SNF, per MD, anticipate d/c tomorrow.   Electronically signed by GARETT Caballero, KATHERINE on 3/25/2022 at 11:43 AM   [facility] on chart for MD signature

## 2022-03-25 NOTE — PROGRESS NOTES
Occupational Therapy   Occupational Therapy Initial Assessment/Treatment  Date: 3/25/2022   Patient Name: Alirio Prescott  MRN: [id number]     : 1940    Date of Service: 3/25/2022    Discharge Recommendations:  IP Rehab  OT Equipment Recommendations  Equipment Needed: Yes  Other: Pt could potentially benefit from RW. Assessment   Performance deficits / Impairments: Decreased functional mobility ; Decreased ADL status; Decreased strength;Decreased safe awareness;Decreased balance;Decreased endurance;Decreased posture;Decreased coordination    Assessment: Pt is an [de-identified] female with deficits in the areas listed above and presents to [facility] after experiencing frequent falls at home. Pt is typically mod (i) to (I) with ADLS/IADLs and functional mobility with a 4WW and live alone with A PRN available but no 24hr A. Today, pt performing functional t/fs and standing self-cares with CGA and functional mobility with CGA-min A with RW. Pt and pt's son in law expressing concerns about leg strength, changes in posture, frequent falls and decreased balance in the pt over the past month. Pt would continue to benefit from skilled OT services in acute care to increase balance, safety, endurance, stength and independence in ADLs and functional mobility. Recommend IPR at d/c. Prognosis: Good  Decision Making: Medium Complexity  OT Education: OT Role;Plan of Care;Transfer Training;Precautions  Patient Education: disease specific: use of call light, role of OT v. PT, general safety during hospitalization, potential d/c recommendations. Pt and family verbalized understanding. REQUIRES OT FOLLOW UP: Yes  Activity Tolerance  Activity Tolerance: Patient Tolerated treatment well  Activity Tolerance: O2 100%, HR 76, /83; After activity: O2 95%, HR 81, /73. Pt expressing some fatigue after activity but vitals WFL.   Safety Devices  Safety Devices in place: Yes  Type of devices: Left in bed;Call light within reach;Gait belt;Bed alarm in place;Nurse notified           Patient Diagnosis(es): The primary encounter diagnosis was Falls frequently. Diagnoses of Orthostasis and ROSE (acute kidney injury) ([facility] [address] were also pertinent to this visit. has a past medical history of ARF (acute renal failure) ([facility] [address] has a past surgical history that includes joint replacement; Tonsillectomy; Intracapsular cataract extraction (Right, 5/21/2019); and Intracapsular cataract extraction (Left, 7/2/2019). Restrictions  Position Activity Restriction  Other position/activity restrictions: IV    Subjective   General  Chart Reviewed: Yes  Patient assessed for rehabilitation services?: Yes  Response to previous treatment: Patient with no complaints from previous session  Family / Caregiver Present: Yes  Referring Practitioner: Liat Haddad MD  Diagnosis: frequent fall  Subjective  Subjective: Pt agreeable to therapy. General Comment  Comments: RN approved therapy.   Patient Currently in Pain: Denies  Pain Assessment  Pain Assessment: 0-10  Pain Level: 0  Vital Signs  Temp: 97.5 °F (36.4 °C)  Temp Source: Oral  Pulse: 89  Heart Rate Source: Monitor  Resp: 16  BP: (!) 154/73  MAP (mmHg): 100  Level of Consciousness: Alert (0)  Patient Currently in Pain: Denies  Oxygen Therapy  SpO2: 98 %  O2 Device: None (Room air)  Social/Functional History  Social/Functional History  Lives With: Alone  Type of Home: House  Home Layout: One level,Laundry in basement  Home Access: Level entry  Bathroom Shower/Tub: Walk-in shower  Bathroom Toilet: Handicap height  Bathroom Equipment: Grab bars in shower,Grab bars around toilet  Home Equipment: 4 wheeled walker,Crutches  ADL Assistance: Independent  Homemaking Assistance: Independent  Homemaking Responsibilities: Yes  Meal Prep Responsibility: Primary  Laundry Responsibility: Primary  Cleaning Responsibility: Primary  Bill Paying/Finance Responsibility: Primary  Shopping Responsibility: Primary  Ambulation Assistance: Independent  Transfer Assistance: Independent  Active : No  Occupation: Retired  Type of occupation: financial analysis  Additional Comments: Pt thinks she has had a dozen falls in the past 6 months most in the past couple months. Pt reports sometime she feels dizzy before she falls and other times her legs give out. Objective              Balance  Sitting Balance: Supervision  Standing Balance: Minimal assistance (CGA-min A with RW.)  Standing Balance  Time: ~7hugocfhx3, 5x~2seconds  Activity: self-cares in stance at sink, in room functional mobility, sit<>stands x5  Comment: RW used, vc's for safe hand placement, pt with initial posterior lean. Functional Mobility  Functional - Mobility Device: Rolling Walker  Activity: Other  Assist Level: Minimal assistance (CGA-min A)  ADL  Grooming: Contact guard assistance (to perform oral hygiene and comb hair in stance at sink.)  Tone RUE  RUE Tone: Normotonic  Tone LUE  LUE Tone: Normotonic  Coordination  Movements Are Fluid And Coordinated: No  Coordination and Movement description: Tremors; Left UE;Right UE (mild UE tremors)     Bed mobility  Supine to Sit: Supervision  Sit to Supine: Supervision  Comment: HOB elevated  Transfers  Sit to stand: Contact guard assistance  Stand to sit: Contact guard assistance  Transfer Comments: with RW, vc's for safe t/fs. Cognition  Overall Cognitive Status: Exceptions  Arousal/Alertness: Appropriate responses to stimuli  Following Commands:  Follows one step commands with repetition  Attention Span: Attends with cues to redirect  Memory: Appears intact  Safety Judgement: Decreased awareness of need for safety;Decreased awareness of need for assistance  Problem Solving: Decreased awareness of errors  Insights: Decreased awareness of deficits  Initiation: Does not require cues  Sequencing: Requires cues for some        Sensation  Overall Sensation Status: WFL        LUE AROM (degrees)  LUE AROM : WFL  Left Hand AROM (degrees)  Left Hand AROM: WFL  RUE AROM (degrees)  RUE AROM : WFL  Right Hand AROM (degrees)  Right Hand AROM: WFL  LUE Strength  Gross LUE Strength: WFL  L Hand General: 4/5  LUE Strength Comment: 4/5 grossly  RUE Strength  Gross RUE Strength: WFL  R Hand General: 4/5  RUE Strength Comment: 4/5 grossly                   Plan   Plan  Times per week: 3-5x/week  Current Treatment Recommendations: Strengthening,Functional Mobility Training,Endurance Training,Balance Training,Safety Education & Training,Self-Care / ADL,Patient/Caregiver Education & Training,Home Management Training,Equipment Evaluation, Education, & procurement,Stair training    AM-PAC Score        AM-PAC Inpatient Daily Activity Raw Score: 20 (03/25/22 1429)  AM-PAC Inpatient ADL T-Scale Score : 42.03 (03/25/22 1429)  ADL Inpatient CMS 0-100% Score: 38.32 (03/25/22 1429)  ADL Inpatient CMS G-Code Modifier : Tatiana Ashrafbrendandeonte (03/25/22 1429)    Goals  Short term goals  Time Frame for Short term goals: 1 week (4/1) unless stated otherwise. Short term goal 1: Pt will LBD with supervision and AD PRN. Short term goal 2: Pt will perform BUE ther ex x20 to increase strength and endurance for functional activites (3/29)  Short term goal 3: Pt will perform functional/toilet t/fs with supervision and AD PRN  Short term goal 4: Pt will toilet with supervision. Short term goal 5: Pt will perform bathroom mobility with supervision and AD PRN. Patient Goals   Patient goals : \"to get my legs stronger\"       Therapy Time   Individual Concurrent Group Co-treatment   Time In 9612         Time Out 1405         Minutes 39         Timed Code Treatment Minutes: 29 Minutes (10minute evaluation.)       Collin Mejia OTR/L  If pt discharges prior to next session, this note will serve as discharge summary. See case management note for discharge disposition.

## 2022-03-25 NOTE — PROGRESS NOTES
Physical Therapy    Facility/Department: [facility] C3 TELE/MED SURG/ONC  Initial Assessment    NAME: Shavon Serrano  : 1940  MRN: [id number]    Date of Service: 3/25/2022    Discharge Recommendations:  Continue to assess pending progress,IP Rehab   PT Equipment Recommendations  Equipment Needed: No    Assessment   Body structures, Functions, Activity limitations: Decreased functional mobility ; Decreased high-level IADLs;Decreased posture;Decreased ADL status; Decreased coordination;Decreased balance;Decreased strength;Decreased safe awareness; Increased pain  Assessment: Pt is [de-identified] y/o F who presents to [facility] w/ Dx: syncope s/p fall. Pt reports that PLOF req no assistance from another person and that she used 4WW. Pt currently presents below baseline in that she req CGA for all OOB mobility. Pt tolerated amb w/ RW well and presents w/ good motivation to participate and reach goals. Pt will benefit from skilled PT services to improve function towards goals. Pt will benefit from d/c to IPR/ARU d/t high risk of falls and significant history of falls, low support at home, and high motivation/ability to participate in 3 hours of therapy per day. Treatment Diagnosis: Decreased functional mobility  Specific instructions for Next Treatment: progress mobility  Prognosis: Good  Decision Making: Low Complexity  PT Education: Goals;Transfer Training;Disease Specific Education;PT Role;Plan of Care;General Safety;Gait Training  Patient Education: Pt educated on importance of PT for current deficits and differences between SNF vs. ARU/IPR. Pt demos understanding and states that at first she did not want to go to SNF, but she wants to [facility] whatever is going to be best in the long run\". REQUIRES PT FOLLOW UP: Yes  Activity Tolerance  Activity Tolerance: Patient limited by endurance       Patient Diagnosis(es): The primary encounter diagnosis was Falls frequently.  Diagnoses of Orthostasis and ROSE (acute kidney injury) ([facility] [address] were also pertinent to this visit. has a past medical history of ARF (acute renal failure) ([address] has a past surgical history that includes joint replacement; Tonsillectomy; Intracapsular cataract extraction (Right, 5/21/2019); and Intracapsular cataract extraction (Left, 7/2/2019). Restrictions  Position Activity Restriction  Other position/activity restrictions: IV    Subjective  General  Chart Reviewed: Yes  Patient assessed for rehabilitation services?: Yes  Additional Pertinent Hx: No H&P available at this time, per RN pt presents s/p fall.   Response To Previous Treatment: Not applicable  Family / Caregiver Present: Yes  Referring Practitioner: Sim Subramanian MD  Referral Date : 03/25/22  Diagnosis: No encounter Dx  Follows Commands: Within Functional Limits  General Comment  Comments: Pt cleared for PT eval  Subjective  Subjective: Pt agreeable to PT eval  Pain Screening  Patient Currently in Pain: Denies  Vital Signs  Patient Currently in Pain: Denies  Pre Treatment Pain Screening  Intervention List: Patient able to continue with treatment    Orientation  AXO x4   Social/Functional History  Social/Functional History  Lives With: Alone  Type of Home: House  Home Layout: One level,Laundry in basement  Home Access: Level entry  Bathroom Shower/Tub: Walk-in shower  Bathroom Toilet: Handicap height  Bathroom Equipment: Grab bars in shower,Grab bars around toilet  Home Equipment: 4 wheeled walker,Crutches  ADL Assistance: Independent  Homemaking Assistance: [address] Responsibilities: Yes  Meal Prep Responsibility: Primary  Laundry Responsibility: Primary  Cleaning Responsibility: Primary  Bill Paying/Finance Responsibility: Primary  Shopping Responsibility: Primary  Ambulation Assistance: Independent  Transfer Assistance: Independent  Active : No  Occupation: Retired  Type of occupation: financial analysis  Additional Comments: Pt thinks she has had a dozen falls in the past 6 months most in the past couple months. Pt reports sometime she feels dizzy before she falls and other times her legs give out. Objective  Transfers  Sit to Stand: Contact guard assistance  Stand to sit: Contact guard assistance  Comment: to RW  Ambulation  Ambulation?: Yes  Ambulation 1  Surface: level tile  Device: Rolling Walker  Assistance: Contact guard assistance  Quality of Gait: Pt presents w/ stooped posture and short step length/ht. Gait Deviations: Slow Bettie; Shuffles;Decreased step length;Decreased step height  Distance: 75 ft  Comments: Pt presents w/ no overt LOB and good obstacle avoidance, CGA for safety  Stairs/Curb  Stairs?: No     Balance  Posture: Fair  Sitting - Static: Good  Sitting - Dynamic: Good;-  Standing - Static: Fair;+  Standing - Dynamic: Fair  Comments: Pt presents w/ fair balance, able to tolerate standing activities w/o overt LOB, CGA for safety        Plan   Plan  Times per week: 3-5  Times per day: Daily  Specific instructions for Next Treatment: progress mobility  Current Treatment Recommendations: Cherene Gosselin Re-education,Patient/Caregiver Education & Training,Pain Management,ADL/Self-care Training,ROM,Balance Training,IADL Training,Gait Training,Home Exercise Program,Safety Education & Training,Stair training,Functional Mobility Training  Safety Devices  Type of devices:  All fall risk precautions in place,Patient at risk for falls,Left in bed,Bed alarm in place,Call light within reach,Gait belt,Nurse notified      AM-PAC Score     AM-PAC Inpatient Mobility without Stair Climbing Raw Score : 16 (03/25/22 1530)  AM-PAC Inpatient without Stair Climbing T-Scale Score : 45.54 (03/25/22 1530)  Mobility Inpatient CMS 0-100% Score: 40.64 (03/25/22 1530)  Mobility Inpatient without Stair CMS G-Code Modifier : CK (03/25/22 1530)       Goals  Short term goals  Time Frame for Short term goals: 1 week, 4/1/22  Short term goal 1: Pt will perform bed mobility w/ indep  Short term goal 2: Pt will perform sit to stand t/f w/ sup and LRAD  Short term goal 3: Pt will perform 100 ft of amb w/ LRAD and SBA  Short term goal 4: Pt will partake in 12-15 reps of BLE exercises to improve function towards goals by 3/28  Patient Goals   Patient goals : \"to go home\"       Therapy Time   Individual Concurrent Group Co-treatment   Time In 1455         Time Out 1520         Minutes 25         Timed Code Treatment Minutes: 15 Minutes plus 10 min eval Alm Ahumada, PT    If pt is unable to be seen after this session, please let this note serve as discharge summary. Please see case management note for discharge disposition. Thank you.

## 2022-03-25 NOTE — PROGRESS NOTES
Pt is a/o. Can be forgetful at times. Pt oriented to room. Pt stated no pain, no nausea; no emesis. Pt was able to ambulate from stretcher to bed with assistance. Pt instructed to call out for help in order to get out of bed. Wound care performed on skin tears x2 on left leg. Pt eating dinner. Yellow non skid footwear on. Bed locked in lowest position; side rails 2/4; call light and bedside table within reach of pt.

## 2022-03-25 NOTE — H&P
Hospital Medicine History & Physical      PCP: Sabina Ramos    Date of Admission: 3/25/2022    Date of Service: Pt seen/examined on 25 March and Admitted to Inpatient with expected LOS greater than two midnights due to medical therapy. Chief Complaint:  Falls      History Of Present Illness:     [de-identified] y.o. female who presented to [facility] with a several day hx of progressive weakness/falls. She has noted to have decreased PO intake and per family at bedside on admission has had some mild associated Mental Status Changes. The patient denies any fever/chills or other signs/sxs of systemic illness or identifiable aggravating/alleviating factors. Past Medical History:          Diagnosis Date    ARF (acute renal failure) ([facility] [address] 3/25/2022       Past Surgical History:          Procedure Laterality Date    INTRACAPSULAR CATARACT EXTRACTION Right 5/21/2019    PHACOEMULSIFICATION OF CATARACT RIGHT EYE WITH INTRAOCULAR LENS IMPLANT performed by Rhonda Toussaint MD at [facility] Left 7/2/2019    PHACOEMULSIFICATION OF CATARACT LEFT EYE WITH INTRAOCULAR LENS IMPLANT performed by Rhonda Toussaint MD at [place] [address]      both hips    TONSILLECTOMY         Medications Prior to Admission:      Prior to Admission medications    Medication Sig Start Date End Date Taking?  Authorizing Provider   donepezil (ARICEPT) 10 MG tablet Take 10 mg by mouth nightly   Yes Historical Provider, MD   cloNIDine (CATAPRES) 0.1 MG tablet Take 0.1 mg by mouth as needed for High Blood Pressure (SBP more than 160)   Yes Historical Provider, MD   diphenhydrAMINE (BENADRYL) 25 MG tablet Take 25 mg by mouth daily (with breakfast)   Yes Historical Provider, MD   ferrous sulfate (IRON 325) 325 (65 Fe) MG tablet Take 325 mg by mouth daily (with breakfast)   Yes Historical Provider, MD   fludrocortisone (FLORINEF) 0.1 MG tablet Take 0.1 mg by mouth daily   Yes Historical Provider, MD   vitamin B-12 (CYANOCOBALAMIN) 1000 MCG tablet Take 1,000 mcg by mouth daily   Yes Historical Provider, MD   calcium carbonate 600 MG TABS tablet Take 1 tablet by mouth daily Written as one tablet BID, actually taking 1 tablet daily   Yes Historical Provider, MD   folic acid (FOLVITE) 1 MG tablet Take 1 tablet by mouth daily 1/10/20   Lilibeth Richard MD   midodrine (PROAMATINE) 5 MG tablet Take 1 tablet by mouth 3 times daily (with meals)  Patient taking differently: Take 5 mg by mouth 3 times daily (with meals) Prn 1/9/20   Lilibeth Richard MD   vitamin D (ERGOCALCIFEROL) 24498 units CAPS capsule Take 50,000 Units by mouth daily  Patient not taking: Reported on 3/25/2022 5/2/18   Historical Provider, MD   Cholecalciferol (VITAMIN D PO) Take by mouth daily    Historical Provider, MD   Multiple Vitamins-Minerals (MULTIVITAMIN & MINERAL PO) Take  by mouth. Historical Provider, MD       Allergies:  Penicillins    Social History:      The patient currently lives independently    TOBACCO:   reports that she has never smoked. She has never used smokeless tobacco.  ETOH:   reports current alcohol use. Family History:      Reviewed in detail and negative for DM, CAD, CVA. Positive as follows:        Problem Relation Age of Onset    Cancer Mother        REVIEW OF SYSTEMS:   Pertinent positives as noted in the HPI. All other systems reviewed and negative. PHYSICAL EXAM:    BP (!) 149/67   Pulse 74   Temp 97.5 °F (36.4 °C) (Oral)   Resp 15   Ht 5' 5\" (1.651 m)   Wt 110 lb (49.9 kg)   SpO2 100%   BMI 18.30 kg/m²     General appearance:  No apparent distress, appears stated age and cooperative. HEENT:  Normal cephalic, atraumatic without obvious deformity. Pupils equal, round, and reactive to light. Extra ocular muscles intact. Conjunctivae/corneas clear. Neck: Supple, with full range of motion. No jugular venous distention. Trachea midline. Respiratory:  Normal respiratory effort.  Clear to auscultation, bilaterally without Rales/Wheezes/Rhonchi. Cardiovascular:  Regular rate and rhythm with normal S1/S2 without murmurs, rubs or gallops. Abdomen: Soft, non-tender, non-distended with normal bowel sounds. Musculoskeletal:  No clubbing, cyanosis or edema bilaterally. Full range of motion without deformity. Skin: Skin color, texture, turgor normal.  No rashes or lesions. Neurologic:  Neurovascularly intact without any focal sensory/motor deficits. Cranial nerves: II-XII intact, grossly non-focal.  Psychiatric:  Alert and oriented, thought content appropriate, normal insight  Capillary Refill: Brisk,< 3 seconds   Peripheral Pulses: +2 palpable, equal bilaterally       CXR:  I have reviewed the CXR with the following interpretation: N/A  EKG:  I have reviewed the EKG with the following interpretation: NSR w/out acute ischemic changes. Labs:     Recent Labs     03/25/22  0955   WBC 6.2   HGB 10.3*   HCT 30.4*        Recent Labs     03/25/22  0955      K 3.7   CL 99   CO2 22   BUN 59*   CREATININE 1.8*   CALCIUM 9.2     Recent Labs     03/25/22  0955   AST 29   ALT 15   BILITOT 1.3*   ALKPHOS 89     No results for input(s): INR in the last 72 hours. No results for input(s): Earlis Clarkston in the last 72 hours. Urinalysis:    No results found for: Delorse Lemming, BACTERIA, RBCUA, BLOODU, SPECGRAV, GLUCOSEU      Consults:    IP CONSULT TO HOSPITALIST      ASSESSMENT:    Active Hospital Problems    Diagnosis Date Noted    ARF (acute renal failure) ([facility] [address] [N17.9] 03/25/2022    Anemia [D64.9] 03/25/2022       PLAN:      ARF - w/ elevated BUN/Cr ratio c/w pre-renal azotemia. Given IVF hydration and follow serial labs. Reviewed and documented as above. U/A w/ C/S ordered and pending from ED - NOT YET started on empiric ABX. Falls - likely 2nd to above.      Anemia - etiology clinically unable to determine but has been on Iron at home - continued, w/out evidence of active bleeding/hemolysis. Asymptomatic w/out indication for transfusion. Follow serial labs. Reviewed and documented as above. DVT Prophylaxis: LMWH  Diet: Regular  Code Status: Full Code      PT/OT Eval Status: ordered and pending    Dispo - Patient is likely to remain in-house at least until Sunday/Monday 27/28 March pending clinical course and PT/OT eval/recs. Roni Morris MD    Thank you Sabina Ramos for the opportunity to be involved in this patient's care. If you have any questions or concerns please feel free to contact me at [phone].

## 2022-03-25 NOTE — ED PROVIDER NOTES
Emergency Physician Note        Note Open Time: 3:52 PM EDT    Chief Complaint  Fall (pt reporting dizzy spells with frequent falls over the past several weeks. pt has abdominal bruising and bilateral lower leg bruising. pt lives alone with children who live close. daughter reporting pt \"slightly confused today\" pt denies any pain ) and Dizziness       History of Present Illness  Laney Joy is a [de-identified] y.o. female who presents to the ED for frequent falls. Patient reports she is fallen 5 or 6 times in the last week including once this morning. She states primarily dull, on when she gets up from lying down in the morning. She states she will stand up and then is very dizzy and falls. She states that she does not think she loses consciousness but does fall on her head many times. She is also sustained multiple other injuries, mostly minor bruises and scrapes. She denies any chest pain, shortness of breath or palpitations. 10 systems reviewed, pertinent positives per HPI otherwise noted to be negative    I have reviewed the following from the nursing documentation:      Prior to Admission medications    Medication Sig Start Date End Date Taking?  Authorizing Provider   donepezil (ARICEPT) 10 MG tablet Take 10 mg by mouth nightly   Yes Historical Provider, MD   cloNIDine (CATAPRES) 0.1 MG tablet Take 0.1 mg by mouth as needed for High Blood Pressure (SBP more than 160)   Yes Historical Provider, MD   diphenhydrAMINE (BENADRYL) 25 MG tablet Take 25 mg by mouth daily (with breakfast)   Yes Historical Provider, MD   ferrous sulfate (IRON 325) 325 (65 Fe) MG tablet Take 325 mg by mouth daily (with breakfast)   Yes Historical Provider, MD   fludrocortisone (FLORINEF) 0.1 MG tablet Take 0.1 mg by mouth daily   Yes Historical Provider, MD   vitamin B-12 (CYANOCOBALAMIN) 1000 MCG tablet Take 1,000 mcg by mouth daily   Yes Historical Provider, MD   calcium carbonate 600 MG TABS tablet Take 1 tablet by mouth daily Written as one tablet BID, actually taking 1 tablet daily   Yes Historical Provider, MD   solifenacin (VESICARE) 10 MG tablet Take 10 mg by mouth daily   Yes Historical Provider, MD   mirabegron (MYRBETRIQ) 50 MG TB24 Take 50 mg by mouth daily   Yes Historical Provider, MD   folic acid (FOLVITE) 1 MG tablet Take 1 tablet by mouth daily 1/10/20   Anibal Marie MD   midodrine (PROAMATINE) 5 MG tablet Take 1 tablet by mouth 3 times daily (with meals)  Patient taking differently: Take 5 mg by mouth 3 times daily (with meals) Prn 1/9/20   Anibal Marie MD   vitamin D (ERGOCALCIFEROL) 88996 units CAPS capsule Take 50,000 Units by mouth daily  Patient not taking: Reported on 3/25/2022 5/2/18   Historical Provider, MD   Cholecalciferol (VITAMIN D PO) Take by mouth daily    Historical Provider, MD   Multiple Vitamins-Minerals (MULTIVITAMIN & MINERAL PO) Take  by mouth.       Historical Provider, MD       Allergies as of 03/25/2022 - Fully Reviewed 03/25/2022   Allergen Reaction Noted    Penicillins  03/14/2012       Past Medical History:   Diagnosis Date    ARF (acute renal failure) ([facility] [address] 3/25/2022        Surgical History:   Past Surgical History:   Procedure Laterality Date    INTRACAPSULAR CATARACT EXTRACTION Right 5/21/2019    PHACOEMULSIFICATION OF CATARACT RIGHT EYE WITH INTRAOCULAR LENS IMPLANT performed by Billy Jacob MD at \[facility]\"" EXTRACTION Left 7/2/2019    PHACOEMULSIFICATION OF CATARACT LEFT EYE WITH INTRAOCULAR LENS IMPLANT performed by Billy Jacob MD at [place] [address]      both hips    TONSILLECTOMY          Family History:    Family History   Problem Relation Age of Onset    Cancer Mother        Social History     Socioeconomic History    Marital status:      Spouse name: Not on file    Number of children: Not on file    Years of education: Not on file    Highest education level: Not on file   Occupational History    Not on file Tobacco Use    Smoking status: Never Smoker    Smokeless tobacco: Never Used   Vaping Use    Vaping Use: Never used   Substance and Sexual Activity    Alcohol use: Yes     Comment: wine with dinner    Drug use: Never    Sexual activity: Not on file   Other Topics Concern    Not on file   Social History Narrative    Not on file     Social Determinants of Health     Financial Resource Strain:     Difficulty of Paying Living Expenses: Not on file   Food Insecurity:     Worried About Running Out of Food in the Last Year: Not on file    Vidal of Food in the Last Year: Not on file   Transportation Needs:     Lack of Transportation (Medical): Not on file    Lack of Transportation (Non-Medical): Not on file   Physical Activity:     Days of Exercise per Week: Not on file    Minutes of Exercise per Session: Not on file   Stress:     Feeling of Stress : Not on file   Social Connections:     Frequency of Communication with Friends and Family: Not on file    Frequency of Social Gatherings with Friends and Family: Not on file    Attends Yazidism Services: Not on file    Active Member of [address] [place] [address] or Organizations: Not on file    Attends Club or Organization Meetings: Not on file    Marital Status: Not on file   Intimate Partner Violence:     Fear of Current or Ex-Partner: Not on file    Emotionally Abused: Not on file    Physically Abused: Not on file    Sexually Abused: Not on file   Housing Stability:     Unable to Pay for Housing in the Last Year: Not on file    Number of [place] in the Last Year: Not on file    Unstable Housing in the Last Year: Not on file       Nursing notes reviewed.     ED Triage Vitals [03/25/22 0859]   Enc Vitals Group      /74      Pulse 81      Resp 16      Temp 97.5 °F (36.4 °C)      Temp Source Oral      SpO2 95 %      Weight 110 lb (49.9 kg)      Height 5' 5\" (1.651 m)      Head Circumference       Peak Flow       Pain Score       Pain Loc       Pain Edu? Excl. in [address]? GENERAL:  Awake, alert. Well developed, well nourished with no apparent distress. HENT:  Normocephalic, Atraumatic, moist mucous membranes. EYES:  Pupils equal round and reactive to light, Conjunctiva normal, extraocular movements normal.  NECK:  No meningeal signs, Supple. CHEST:  Regular rate and rhythm, chest wall non-tender. LUNGS:  Clear to auscultation bilaterally. ABDOMEN:  Soft, non-tender, no rebound, rigidity or guarding, non-distended, normal bowel sounds. No costovertebral angle tenderness to palpation. BACK:  No tenderness. EXTREMITIES:  Normal range of motion, no edema, no bony tenderness, no deformity, distal pulses present. SKIN: Warm, dry and multiple ecchymoses and abrasions on the trunk and extremities. Lorean Snare NEUROLOGIC: Normal mental status. Moving all extremities to command. LABS and DIAGNOSTIC RESULTS  EKG  The Ekg interpreted by me shows  normal sinus rhythm with a rate of 68  Axis is   Normal  QTc is  normal  First-degree AV block  ST Segments: normal  No prior EKG available for comparison. RADIOLOGY  X-RAYS:  I have reviewed radiologic plain film image(s). ALL OTHER NON-PLAIN FILM IMAGES SUCH AS CT, ULTRASOUND AND MRI HAVE BEEN READ BY THE RADIOLOGIST. CT Head WO Contrast   Final Result   1. No acute intracranial abnormality.               LABS  Labs Reviewed   CBC WITH AUTO DIFFERENTIAL - Abnormal; Notable for the following components:       Result Value    RBC 3.09 (*)     Hemoglobin 10.3 (*)     Hematocrit 30.4 (*)     Lymphocytes Absolute 0.9 (*)     All other components within normal limits   COMPREHENSIVE METABOLIC PANEL - Abnormal; Notable for the following components:    Glucose 100 (*)     BUN 59 (*)     CREATININE 1.8 (*)     GFR Non- 27 (*)     GFR  33 (*)     Total Bilirubin 1.3 (*)     All other components within normal limits   URINALYSIS WITH REFLEX TO CULTURE       MEDICAL DECISION MAKING          The total Critical Care time is 45 minutes which excludes separately billable procedures. The critical care was concerning IV fluid bolus for treatment of dehydration/orthostasis and ROSE. This time is exclusive of any time documented by any other providers. I spoke with the hospitalist. We thoroughly discussed the history, physical exam, laboratory and imaging studies, as well as, emergency department course. Based upon that discussion, we've decided to admit Vangie Novoa for further observation and evaluation of Yulissa Doyle's ROSE. As I have deemed necessary from their history, physical, and studies, I have considered and evaluated Vangie Novoa for the following diagnoses:        FINAL IMPRESSION  1. Falls frequently    2. Orthostasis    3. ROES (acute kidney injury) ([facility] [address]        Vitals:  Blood pressure (!) 154/73, pulse 89, temperature 97.5 °F (36.4 °C), temperature source Oral, resp. rate 16, height 5' 5\" (1.651 m), weight 110 lb (49.9 kg), SpO2 98 %, not currently breastfeeding. Disposition  Pt is in stable condition upon Admit to telemetry. This chart was generated using the [address] [place] [address] dictation system. I created this record but it may contain dictation errors.           Lamont Coats MD  03/25/22 8315

## 2022-03-26 ENCOUNTER — APPOINTMENT (OUTPATIENT)
Dept: CT IMAGING | Age: 82
DRG: 682 | End: 2022-03-26
Payer: MEDICARE

## 2022-03-26 LAB
ALBUMIN SERPL-MCNC: 3.5 G/DL (ref 3.4–5)
ANION GAP SERPL CALCULATED.3IONS-SCNC: 12 MMOL/L (ref 3–16)
BILIRUBIN URINE: NEGATIVE
BLOOD, URINE: NEGATIVE
BUN BLDV-MCNC: 38 MG/DL (ref 7–20)
CALCIUM SERPL-MCNC: 8.1 MG/DL (ref 8.3–10.6)
CHLORIDE BLD-SCNC: 104 MMOL/L (ref 99–110)
CLARITY: CLEAR
CO2: 20 MMOL/L (ref 21–32)
COLOR: YELLOW
CREAT SERPL-MCNC: 1.1 MG/DL (ref 0.6–1.2)
GFR AFRICAN AMERICAN: 58
GFR NON-AFRICAN AMERICAN: 48
GLUCOSE BLD-MCNC: 93 MG/DL (ref 70–99)
GLUCOSE URINE: 100 MG/DL
HCT VFR BLD CALC: 25 % (ref 36–48)
HCT VFR BLD CALC: 25.7 % (ref 36–48)
HEMOGLOBIN: 8.6 G/DL (ref 12–16)
HEMOGLOBIN: 8.7 G/DL (ref 12–16)
IRON SATURATION: 25 % (ref 15–50)
IRON: 39 UG/DL (ref 37–145)
KETONES, URINE: NEGATIVE MG/DL
LEUKOCYTE ESTERASE, URINE: NEGATIVE
MAGNESIUM: 1.6 MG/DL (ref 1.8–2.4)
MCH RBC QN AUTO: 33.1 PG (ref 26–34)
MCHC RBC AUTO-ENTMCNC: 33.9 G/DL (ref 31–36)
MCV RBC AUTO: 97.6 FL (ref 80–100)
MICROSCOPIC EXAMINATION: ABNORMAL
NITRITE, URINE: NEGATIVE
PDW BLD-RTO: 14.7 % (ref 12.4–15.4)
PH UA: 5.5 (ref 5–8)
PHOSPHORUS: 2.8 MG/DL (ref 2.5–4.9)
PLATELET # BLD: 291 K/UL (ref 135–450)
PMV BLD AUTO: 6.3 FL (ref 5–10.5)
POTASSIUM SERPL-SCNC: 3.4 MMOL/L (ref 3.5–5.1)
PROTEIN UA: NEGATIVE MG/DL
RBC # BLD: 2.63 M/UL (ref 4–5.2)
SODIUM BLD-SCNC: 136 MMOL/L (ref 136–145)
SPECIFIC GRAVITY UA: 1.02 (ref 1–1.03)
TOTAL IRON BINDING CAPACITY: 155 UG/DL (ref 260–445)
TSH REFLEX: 1.96 UIU/ML (ref 0.27–4.2)
URINE REFLEX TO CULTURE: ABNORMAL
URINE TYPE: ABNORMAL
UROBILINOGEN, URINE: 0.2 E.U./DL
VITAMIN D 25-HYDROXY: 15.8 NG/ML
WBC # BLD: 5.9 K/UL (ref 4–11)

## 2022-03-26 PROCEDURE — 36415 COLL VENOUS BLD VENIPUNCTURE: CPT

## 2022-03-26 PROCEDURE — 80069 RENAL FUNCTION PANEL: CPT

## 2022-03-26 PROCEDURE — 85027 COMPLETE CBC AUTOMATED: CPT

## 2022-03-26 PROCEDURE — 81003 URINALYSIS AUTO W/O SCOPE: CPT

## 2022-03-26 PROCEDURE — 83540 ASSAY OF IRON: CPT

## 2022-03-26 PROCEDURE — 85014 HEMATOCRIT: CPT

## 2022-03-26 PROCEDURE — 83735 ASSAY OF MAGNESIUM: CPT

## 2022-03-26 PROCEDURE — 6370000000 HC RX 637 (ALT 250 FOR IP): Performed by: INTERNAL MEDICINE

## 2022-03-26 PROCEDURE — 83550 IRON BINDING TEST: CPT

## 2022-03-26 PROCEDURE — 2580000003 HC RX 258: Performed by: INTERNAL MEDICINE

## 2022-03-26 PROCEDURE — 85018 HEMOGLOBIN: CPT

## 2022-03-26 PROCEDURE — 6360000002 HC RX W HCPCS: Performed by: HOSPITALIST

## 2022-03-26 PROCEDURE — 84443 ASSAY THYROID STIM HORMONE: CPT

## 2022-03-26 PROCEDURE — 1200000000 HC SEMI PRIVATE

## 2022-03-26 PROCEDURE — 82306 VITAMIN D 25 HYDROXY: CPT

## 2022-03-26 PROCEDURE — 6370000000 HC RX 637 (ALT 250 FOR IP): Performed by: HOSPITALIST

## 2022-03-26 PROCEDURE — 74176 CT ABD & PELVIS W/O CONTRAST: CPT

## 2022-03-26 RX ORDER — POTASSIUM CHLORIDE 20 MEQ/1
40 TABLET, EXTENDED RELEASE ORAL 2 TIMES DAILY
Status: COMPLETED | OUTPATIENT
Start: 2022-03-26 | End: 2022-03-26

## 2022-03-26 RX ORDER — MAGNESIUM SULFATE IN WATER 40 MG/ML
2000 INJECTION, SOLUTION INTRAVENOUS ONCE
Status: COMPLETED | OUTPATIENT
Start: 2022-03-26 | End: 2022-03-26

## 2022-03-26 RX ADMIN — FOLIC ACID 1 MG: 1 TABLET ORAL at 07:40

## 2022-03-26 RX ADMIN — DONEPEZIL HYDROCHLORIDE 10 MG: 5 TABLET ORAL at 22:03

## 2022-03-26 RX ADMIN — FLUDROCORTISONE ACETATE 0.1 MG: 0.1 TABLET ORAL at 07:46

## 2022-03-26 RX ADMIN — SODIUM CHLORIDE, PRESERVATIVE FREE 10 ML: 5 INJECTION INTRAVENOUS at 20:42

## 2022-03-26 RX ADMIN — MAGNESIUM SULFATE HEPTAHYDRATE 2000 MG: 40 INJECTION, SOLUTION INTRAVENOUS at 09:57

## 2022-03-26 RX ADMIN — POTASSIUM CHLORIDE 40 MEQ: 20 TABLET, EXTENDED RELEASE ORAL at 22:03

## 2022-03-26 RX ADMIN — FERROUS SULFATE TAB 325 MG (65 MG ELEMENTAL FE) 325 MG: 325 (65 FE) TAB at 07:40

## 2022-03-26 RX ADMIN — SODIUM CHLORIDE: 9 INJECTION, SOLUTION INTRAVENOUS at 04:52

## 2022-03-26 RX ADMIN — POTASSIUM CHLORIDE 40 MEQ: 20 TABLET, EXTENDED RELEASE ORAL at 09:53

## 2022-03-26 RX ADMIN — ACETAMINOPHEN 650 MG: 325 TABLET ORAL at 22:03

## 2022-03-26 ASSESSMENT — PAIN DESCRIPTION - ORIENTATION: ORIENTATION: RIGHT

## 2022-03-26 ASSESSMENT — PAIN DESCRIPTION - PAIN TYPE: TYPE: ACUTE PAIN

## 2022-03-26 ASSESSMENT — PAIN SCALES - GENERAL
PAINLEVEL_OUTOF10: 2
PAINLEVEL_OUTOF10: 2

## 2022-03-26 ASSESSMENT — PAIN DESCRIPTION - LOCATION: LOCATION: RIB CAGE

## 2022-03-26 NOTE — PROGRESS NOTES
Pt is alert and oriented, calm and cooperative with care. Denies pain. Head to toe completed. Patient is resting comfortable in bed with call light with in reach.  Electronically signed by Saranya Cason on 3/25/2022 at 9:31 PM

## 2022-03-26 NOTE — PROGRESS NOTES
Perfect serve sent to Dr. Areli Hay: Hemoglobin dropped from 10.3 to 8.7. Abdomen bruised from fall c/o RUQ pain. Do you want imaging?

## 2022-03-26 NOTE — PROGRESS NOTES
Physician Progress Note      PATIENT:               Elsy Lee  CSN #:                  [id number]  :                       1940  ADMIT DATE:       3/25/2022 8:55 AM  DISCH DATE:  RESPONDING  PROVIDER #:        Stef Florez MD          QUERY TEXT:    Patient admitted with ROSE and falls, noted to have low BMI-18. 3. No history   of malnutrition and no dietary notes at this time. If possible, please   document in progress notes and discharge summary if you are evaluating and/or   treating any of the following: The medical record reflects the following:  Risk Factors: advanced age  Clinical Indicators: bmi-18.3  Treatment: regular diet, IVF    Thank you for your assistance,  Scooter Diaz RN,BSN,CCDS,CRCR  Options provided:  -- Underweight with bmi-18.3  -- Other - I will add my own diagnosis  -- Disagree - Not applicable / Not valid  -- Disagree - Clinically unable to determine / Unknown  -- Refer to Clinical Documentation Reviewer    PROVIDER RESPONSE TEXT:    The patient is underweight with bmi-18.3.     Query created by: Dain Griffin on 3/26/2022 8:57 AM      Electronically signed by:  Stef Florez MD 3/26/2022 12:08 PM

## 2022-03-26 NOTE — PROGRESS NOTES
Hospitalist Progress Note      PCP: Lila Cabrera    Date of Admission: 3/25/2022    Chief Complaint:  Critical access hospital Course: This is a 81-year-old female admitted with several days of progressive weakness and falls, acute kidney injury, lives home alone today noted to have a drop in hemoglobin, CT of the abdomen pelvis order for concerning hematoma. Subjective: Patient denies any chest pain or shortness of breath complains of lightheaded dizziness and falls lives home alone with her cats. Medications:  Reviewed    Infusion Medications    sodium chloride Stopped (03/25/22 1432)    sodium chloride       Scheduled Medications    donepezil  10 mg Oral Nightly    ferrous sulfate  325 mg Oral Daily with breakfast    fludrocortisone  0.1 mg Oral Daily    folic acid  1 mg Oral Daily    sodium chloride flush  5-40 mL IntraVENous 2 times per day    enoxaparin  30 mg SubCUTAneous Daily     PRN Meds: sodium chloride flush, sodium chloride, ondansetron **OR** ondansetron, polyethylene glycol, acetaminophen **OR** acetaminophen      Intake/Output Summary (Last 24 hours) at 3/26/2022 0844  Last data filed at 3/26/2022 0742  Gross per 24 hour   Intake 2956.53 ml   Output --   Net 2956.53 ml       Physical Exam Performed:    BP (!) 178/76   Pulse 68   Temp 97.8 °F (36.6 °C) (Oral)   Resp 16   Ht 5' 5\" (1.651 m)   Wt 110 lb (49.9 kg)   SpO2 98%   BMI 18.30 kg/m²     General appearance: No apparent distress, appears stated age and cooperative. HEENT: Pupils equal, round, and reactive to light. Conjunctivae/corneas clear. Neck: Supple, with full range of motion. No jugular venous distention. Trachea midline. Respiratory:  Normal respiratory effort. Clear to auscultation, bilaterally without Rales/Wheezes/Rhonchi. Cardiovascular: Regular rate and rhythm with normal S1/S2 without murmurs, rubs or gallops. Abdomen: Soft, non-tender, non-distended with normal bowel sounds.   Musculoskeletal: No clubbing, cyanosis or edema bilaterally. Full range of motion without deformity. Skin: Skin color, texture, turgor normal.  No rashes or lesions. Bruising right abdomen wall  Neurologic:  Neurovascularly intact without any focal sensory/motor deficits. Cranial nerves: II-XII intact, grossly non-focal.  Psychiatric: Alert and oriented, thought content appropriate, normal insight  Capillary Refill: Brisk,3 seconds, normal   Peripheral Pulses: +2 palpable, equal bilaterally       Labs:   Recent Labs     03/25/22  0955 03/26/22  0521   WBC 6.2 5.9   HGB 10.3* 8.7*   HCT 30.4* 25.7*    291     Recent Labs     03/25/22  0955 03/26/22  0521    136   K 3.7 3.4*   CL 99 104   CO2 22 20*   BUN 59* 38*   CREATININE 1.8* 1.1   CALCIUM 9.2 8.1*   PHOS  --  2.8     Recent Labs     03/25/22 0955   AST 29   ALT 15   BILITOT 1.3*   ALKPHOS 89     No results for input(s): INR in the last 72 hours. No results for input(s): Deborah Comment in the last 72 hours. Urinalysis:    No results found for: Ceola Beery, BACTERIA, RBCUA, BLOODU, Kike Killer, [address]    Radiology:  CT Head WO Contrast   Final Result   1. No acute intracranial abnormality. Assessment/Plan:    Active Hospital Problems    Diagnosis     ARF (acute renal failure) (HCC) [N17.9]     Anemia [D64.9]      1. This is a 25-year-old female admitted with a frequent fall and weakness today drop in hemoglobin hold Lovenox, monitor H&H every 8, check CT of the abdomen and pelvis type and cross transfuse for hemoglobin less than 7. Consult physical Occupational Therapy  2. Anemia check iron studies follow with serial H&H. Type and cross transfuse for hemoglobin less than 7.  3.  Acute renal failure improved with IV hydration. 4.  Frequent falls check TSH level vitamin D level, consulted physical Occupational Therapy.   CT of the abdomen pelvis negative for hematoma positive for multiple rib fracture, continue with the Lovenox subcu for DVT prophylaxis monitor with serial H&H.    DVT Prophylaxis: SCD Lovenox on hold due to hematoma and drop in hemoglobin  Diet: ADULT DIET;  Regular  Code Status: Full Code    PT/OT Eval Status: Consulted    Dispo -Will need a placement    Shahnaz Segura MD

## 2022-03-26 NOTE — PROGRESS NOTES
Shift assessment updated. Patient a/ox4. Bed alarm remains in use for patient safety. Patient eating breakfast. Call light in reach.

## 2022-03-27 LAB
ANION GAP SERPL CALCULATED.3IONS-SCNC: 11 MMOL/L (ref 3–16)
BUN BLDV-MCNC: 29 MG/DL (ref 7–20)
CALCIUM SERPL-MCNC: 8.6 MG/DL (ref 8.3–10.6)
CHLORIDE BLD-SCNC: 100 MMOL/L (ref 99–110)
CO2: 21 MMOL/L (ref 21–32)
CREAT SERPL-MCNC: 0.9 MG/DL (ref 0.6–1.2)
GFR AFRICAN AMERICAN: >60
GFR NON-AFRICAN AMERICAN: >60
GLUCOSE BLD-MCNC: 103 MG/DL (ref 70–99)
HCT VFR BLD CALC: 25.1 % (ref 36–48)
HEMOGLOBIN: 8.5 G/DL (ref 12–16)
POTASSIUM REFLEX MAGNESIUM: 3.7 MMOL/L (ref 3.5–5.1)
SODIUM BLD-SCNC: 132 MMOL/L (ref 136–145)

## 2022-03-27 PROCEDURE — 85018 HEMOGLOBIN: CPT

## 2022-03-27 PROCEDURE — 2580000003 HC RX 258: Performed by: HOSPITALIST

## 2022-03-27 PROCEDURE — 97530 THERAPEUTIC ACTIVITIES: CPT

## 2022-03-27 PROCEDURE — 36415 COLL VENOUS BLD VENIPUNCTURE: CPT

## 2022-03-27 PROCEDURE — 1200000000 HC SEMI PRIVATE

## 2022-03-27 PROCEDURE — 6370000000 HC RX 637 (ALT 250 FOR IP): Performed by: HOSPITALIST

## 2022-03-27 PROCEDURE — 6360000002 HC RX W HCPCS: Performed by: HOSPITALIST

## 2022-03-27 PROCEDURE — 97110 THERAPEUTIC EXERCISES: CPT

## 2022-03-27 PROCEDURE — 6370000000 HC RX 637 (ALT 250 FOR IP): Performed by: INTERNAL MEDICINE

## 2022-03-27 PROCEDURE — 85014 HEMATOCRIT: CPT

## 2022-03-27 PROCEDURE — 80048 BASIC METABOLIC PNL TOTAL CA: CPT

## 2022-03-27 PROCEDURE — 2580000003 HC RX 258: Performed by: INTERNAL MEDICINE

## 2022-03-27 RX ORDER — VITAMIN B COMPLEX
1000 TABLET ORAL DAILY
Status: DISCONTINUED | OUTPATIENT
Start: 2022-03-27 | End: 2022-03-28 | Stop reason: HOSPADM

## 2022-03-27 RX ADMIN — IRON SUCROSE 200 MG: 20 INJECTION, SOLUTION INTRAVENOUS at 15:36

## 2022-03-27 RX ADMIN — Medication 1000 UNITS: at 15:02

## 2022-03-27 RX ADMIN — FOLIC ACID 1 MG: 1 TABLET ORAL at 09:50

## 2022-03-27 RX ADMIN — SODIUM CHLORIDE, PRESERVATIVE FREE 10 ML: 5 INJECTION INTRAVENOUS at 09:52

## 2022-03-27 RX ADMIN — ACETAMINOPHEN 650 MG: 325 TABLET ORAL at 19:57

## 2022-03-27 RX ADMIN — SODIUM CHLORIDE 25 ML: 9 INJECTION, SOLUTION INTRAVENOUS at 15:34

## 2022-03-27 RX ADMIN — FLUDROCORTISONE ACETATE 0.1 MG: 0.1 TABLET ORAL at 09:50

## 2022-03-27 RX ADMIN — DONEPEZIL HYDROCHLORIDE 10 MG: 5 TABLET ORAL at 19:57

## 2022-03-27 RX ADMIN — SODIUM CHLORIDE, PRESERVATIVE FREE 10 ML: 5 INJECTION INTRAVENOUS at 19:58

## 2022-03-27 RX ADMIN — FERROUS SULFATE TAB 325 MG (65 MG ELEMENTAL FE) 325 MG: 325 (65 FE) TAB at 10:04

## 2022-03-27 ASSESSMENT — PAIN SCALES - GENERAL
PAINLEVEL_OUTOF10: 0

## 2022-03-27 NOTE — PROGRESS NOTES
Shift assessment completed and charted. VSS. Standard safety measures in place. Left AC oculuded and was removed. Left PIV in hand placed at 1527. Dressing clean, dry, intact. No complications occurred. Pt denies needs at this time. Pt stable when writer left room.

## 2022-03-27 NOTE — PROGRESS NOTES
Hospitalist Progress Note      PCP: Charles Lord    Date of Admission: 3/25/2022    Chief Complaint: [facility] Hospital Course: This is a 43-year-old female admitted with several days of progressive weakness and falls, acute kidney injury, lives home alone today noted to have a drop in hemoglobin, CT of the abdomen pelvis order for concerning hematoma negative.       Subjective: Patient is lying in bed complains of her weakness denies any chest pain or shortness of breath no nausea vomiting. Medications:  Reviewed    Infusion Medications    sodium chloride Stopped (03/25/22 1432)    sodium chloride       Scheduled Medications    donepezil  10 mg Oral Nightly    ferrous sulfate  325 mg Oral Daily with breakfast    fludrocortisone  0.1 mg Oral Daily    folic acid  1 mg Oral Daily    sodium chloride flush  5-40 mL IntraVENous 2 times per day    enoxaparin  30 mg SubCUTAneous Daily     PRN Meds: sodium chloride flush, sodium chloride, ondansetron **OR** ondansetron, polyethylene glycol, acetaminophen **OR** acetaminophen      Intake/Output Summary (Last 24 hours) at 3/27/2022 0716  Last data filed at 3/26/2022 2213  Gross per 24 hour   Intake 1193.17 ml   Output 400 ml   Net 793.17 ml       Physical Exam Performed:    BP (!) 169/76   Pulse 69   Temp 98 °F (36.7 °C) (Oral)   Resp 16   Ht 5' 5\" (1.651 m)   Wt 110 lb (49.9 kg)   SpO2 94%   BMI 18.30 kg/m²     General appearance: No apparent distress, appears stated age and cooperative. HEENT: Pupils equal, round, and reactive to light. Conjunctivae/corneas clear. Neck: Supple, with full range of motion. No jugular venous distention. Trachea midline. Respiratory:  Normal respiratory effort. Clear to auscultation, bilaterally without Rales/Wheezes/Rhonchi. Cardiovascular: Regular rate and rhythm with normal S1/S2 without murmurs, rubs or gallops. Abdomen: Soft, non-tender, non-distended with normal bowel sounds.   Musculoskeletal: No clubbing, cyanosis or edema bilaterally. Full range of motion without deformity. Skin: Skin color, texture, turgor normal.  No rashes or lesions. Neurologic:  Neurovascularly intact without any focal sensory/motor deficits. Cranial nerves: II-XII intact, grossly non-focal.  Psychiatric: Alert and oriented, thought content appropriate, normal insight  Capillary Refill: Brisk,3 seconds, normal   Peripheral Pulses: +2 palpable, equal bilaterally       Labs:   Recent Labs     03/25/22 0955 03/25/22 0955 03/26/22 0521 03/26/22 2014 03/27/22 0321   WBC 6.2  --  5.9  --   --    HGB 10.3*   < > 8.7* 8.6* 8.5*   HCT 30.4*   < > 25.7* 25.0* 25.1*     --  291  --   --     < > = values in this interval not displayed. Recent Labs     03/25/22 0955 03/26/22 0521 03/27/22 0321    136 132*   K 3.7 3.4* 3.7   CL 99 104 100   CO2 22 20* 21   BUN 59* 38* 29*   CREATININE 1.8* 1.1 0.9   CALCIUM 9.2 8.1* 8.6   PHOS  --  2.8  --      Recent Labs     03/25/22 0955   AST 29   ALT 15   BILITOT 1.3*   ALKPHOS 89     No results for input(s): INR in the last 72 hours. No results for input(s): Kan Clap in the last 72 hours. Urinalysis:      Lab Results   Component Value Date    NITRU Negative 03/26/2022    BLOODU Negative 03/26/2022    SPECGRAV 1.025 03/26/2022    GLUCOSEU 100 03/26/2022       Radiology:  CT ABDOMEN PELVIS WO CONTRAST Additional Contrast? None   Preliminary Result   1. Acute fractures of the right 4th through 12th ribs, with a   mild-to-moderate right hemothorax and associated partial consolidation of the   right lung base, most likely passive atelectasis, less likely aspiration or   pneumonia. 2. Small left pleural effusion with mild passive atelectasis within the left   lower lobe. 3. No acute traumatic soft tissue abnormality within the abdomen or pelvis,   within the limitations of a noncontrast study.    4. Age-indeterminate wedge compression deformities of T12, L1, and L2, possibly acute. Consider further characterization with a lumbar MRI. CT Head WO Contrast   Final Result   1. No acute intracranial abnormality. Assessment/Plan:    Active Hospital Problems    Diagnosis     ARF (acute renal failure) (HCC) [N17.9]     Anemia [D64.9]      1. This is a 70-year-old female admitted with a frequent fall and weakness physical Occupational Therapy consulted recommending acute rehab  2. Anemia , iron studies consistent with iron deficiency anemia hemoglobin has remained stable CT of the abdomen pelvis negative for hematoma will start on IV Venofer. CT of abdomen on 3/26/21-  1. Acute fractures of the right 4th through 12th ribs, with a   mild-to-moderate right hemothorax and associated partial consolidation of the   right lung base, most likely passive atelectasis, less likely aspiration or   pneumonia. 2. Small left pleural effusion with mild passive atelectasis within the left   lower lobe. 3. No acute traumatic soft tissue abnormality within the abdomen or pelvis,   within the limitations of a noncontrast study. 4. Age-indeterminate wedge compression deformities of T12, L1, and L2,   possibly acute.  Consider further characterization with a lumbar MRI. 3.  Acute renal failure improved with IV hydration. 4.  Frequent falls normal TSH level , vitamin D level pending, consulted physical Occupational Therapy. 5. BMI 18.3 severe protein calorie malnutrition started on supplement. 6.  Dementia continue with Aricept. James Chopra DVT Prophylaxis: Lovenox subcu  Diet: ADULT DIET;  Regular  ADULT ORAL NUTRITION SUPPLEMENT; Breakfast, Lunch, Dinner; Standard High Calorie/High Protein Oral Supplement  Code Status: Full Code    PT/OT Eval Status: Consulted    Dispo -waiting for Ivan Ayon MD

## 2022-03-27 NOTE — PLAN OF CARE
Bed locked and in low position, bedside table and call light within reach, nonskid socks and bed alarm on, up with assist of 1 and use of walker.   Problem: Falls - Risk of:  Goal: Will remain free from falls  Description: Will remain free from falls  Outcome: Ongoing  Goal: Absence of physical injury  Description: Absence of physical injury  Outcome: Ongoing

## 2022-03-27 NOTE — CARE COORDINATION
CM spoke with Haris Rodriguez, in regards to discharge plan. Barbara Vance states she went to [place] about 2 years ago then to [facility]. Barbara Pinky would like her to go to [place]. MD calderón with referral to ARU. Referral made and confirmed with Crystal. Of note Barbara Vance also wanted to inform CM that patient does go through a box of wine a week but tells people she doesn't drink. CM relayed this information to the RN in case this information is needed for treatment.

## 2022-03-27 NOTE — CARE COORDINATION
JEFF spoke with Crystal from ARU who states they are able to take patient if H and H remain stable. [facility] will follow up with JEFF on Monday.

## 2022-03-27 NOTE — PROGRESS NOTES
Occupational Therapy  Facility/Department: [facility] C3 TELE/MED SURG/ONC  Daily Treatment Note  NAME: Wilhelmina Severs  : 1940  MRN: [id number]    Date of Service: 3/27/2022    Discharge Recommendations:  IP Rehab   Barriers to home discharge:   [x] Reported available assist at home upon discharge limited   [x]  family requests DC to other than home            Assessment   Performance deficits / Impairments: Decreased functional mobility ; Decreased ADL status; Decreased strength;Decreased safe awareness;Decreased balance;Decreased endurance;Decreased posture;Decreased coordination  Assessment: Pt is an [de-identified] female with deficits in the areas listed above and presents to [facility] after experiencing frequent falls at home. Pt is typically mod (I) to (I) with ADLS/IADLs and functional mobility with a 4WW and live alone with A PRN available but no 24hr A. Today, pt performing functional transfers with CGA & cues for safe hand placement & mobility in room with min assist to manage walker, Pt would continue to benefit from skilled OT services in acute care to increase balance, safety, endurance, stength and independence in ADLs and functional mobility. Recommend IPR at d/c.  OT Education: OT Role;Plan of Care;Precautions  Patient Education: disease specific: importance of OOB to chair for meals & short periods of time, use of RED/nurse call light for assist with transfers/ADL needs  Barriers to Learning: none perceived  REQUIRES OT FOLLOW UP: Yes  Activity Tolerance  Activity Tolerance: Patient Tolerated treatment well  Activity Tolerance: semi-dutta's: BP = 167/81, HR = 74, 96 % O 2 sats on RA; sitting in chair after walking around bed: BP = 101/54, HR = 80; sitting in chair 5 minutes: BP = 155/73, HR = 71; sitting in chair after 15 minutes:  BP = 147/73, HR = 71; pt with decreased BP with minimal standing, but assymptomatic; Safety Devices  Safety Devices in place: Yes  Type of devices: Call light within reach; Chair alarm in place; Left in chair;Nurse notified         Patient Diagnosis(es): The primary encounter diagnosis was Falls frequently. Diagnoses of Orthostasis and ROSE (acute kidney injury) ([facility] [address] were also pertinent to this visit. has a past medical history of ARF (acute renal failure) ([facility] [address] has a past surgical history that includes joint replacement; Tonsillectomy; Intracapsular cataract extraction (Right, 5/21/2019); and Intracapsular cataract extraction (Left, 7/2/2019). Restrictions  Restrictions/Precautions  Restrictions/Precautions: General Precautions,Fall Risk  Position Activity Restriction  Other position/activity restrictions: Up with assist  Subjective   General  Chart Reviewed: Yes  Patient assessed for rehabilitation services?: Yes  Response to previous treatment: Patient with no complaints from previous session  Family / Caregiver Present: No  Referring Practitioner: Stephani Gurrola MD  Diagnosis: frequent fall  Subjective  Subjective: Pt agreeable to therapy. General Comment  Comments: RN cleared pt for OT; pt awake in bed, agreeable to therapy  Vital Signs  Patient Currently in Pain: Denies   Orientation  Orientation  Overall Orientation Status: Within Functional Limits  Objective    ADL  Grooming: Setup (in chair to brush teeth, wash face & hands)        Balance  Sitting Balance: Supervision  Standing Balance: Minimal assistance (to manage std. walker(RW not available))  Standing Balance  Time: 1 minute x 1  Activity: walking around bed to chair  Bed mobility  Supine to Sit: Supervision  Sit to Supine: Unable to assess (Left up in chair upon exiting, pt agreeable)  Transfers  Sit to stand: Contact guard assistance  Stand to sit: Contact guard assistance  Transfer Comments: cues for safe hand placement                       Cognition  Overall Cognitive Status: Exceptions  Arousal/Alertness: Appropriate responses to stimuli  Following Commands:  Follows one step commands consistently  Attention Span: Attends with cues to redirect  Memory: Decreased recall of precautions  Safety Judgement: Decreased awareness of need for safety  Insights: Decreased awareness of deficits  Initiation: Does not require cues  Sequencing: Requires cues for some  Cognition Comment: slightly impulsive    Type of ROM/Therapeutic Exercise: AROM  Comment: BUE seated in chair  Hand flex/ext: x 15   Reps  Wrist flex/ext:  X 15  Reps  Elbow flex/ext:  x 15   Reps  Forearm sup/pron:  x  15  Reps  Shld flex/ext:  x  15  Reps      LUE AROM : WFL  RUE AROM : WFL                 Plan   Plan  Times per week: 3-5x/week  Current Treatment Recommendations: Strengthening,Functional Mobility Training,Endurance Training,Balance Training,Safety Education & Training,Self-Care / ADL,Patient/Caregiver Education & Training,Home Management Training,Equipment Evaluation, Education, & procurement    AM-PAC Score        AM-[facility] Inpatient Daily Activity Raw Score: 18 (03/27/22 1453)  AM-PAC Inpatient ADL T-Scale Score : 38.66 (03/27/22 1453)  ADL Inpatient CMS 0-100% Score: 46.65 (03/27/22 1453)  ADL Inpatient CMS G-Code Modifier : CK (03/27/22 1453)    Goals  Short term goals  Time Frame for Short term goals: 1 week (4/1) unless stated otherwise. Short term goal 1: Pt will LBD with supervision and AD PRN. Short term goal 2: Pt will perform BUE ther ex x20 to increase strength and endurance for functional activites; 3/27 tolerated 15 reps BUE AROM exercises  Short term goal 3: Pt will perform functional/toilet t/fs with supervision and AD PRN; 3/27 CGA with functional transfers  Short term goal 4: Pt will toilet with supervision.   Short term goal 5: Pt will perform bathroom mobility with supervision and AD PRN.; 3/27 min assist to manage walker in room to walk around bed-->chair  Patient Goals   Patient goals : \"to get my legs stronger\"       Therapy Time   Individual Concurrent Group Co-treatment   Time In 1332         Time Out 1406         Minutes 34 Ramona Parnell, OT

## 2022-03-28 ENCOUNTER — HOSPITAL ENCOUNTER (INPATIENT)
Age: 82
LOS: 12 days | Discharge: HOME HEALTH | DRG: 948 | End: 2022-04-09
Attending: STUDENT IN AN ORGANIZED HEALTH CARE EDUCATION/TRAINING PROGRAM | Admitting: STUDENT IN AN ORGANIZED HEALTH CARE EDUCATION/TRAINING PROGRAM
Payer: MEDICARE

## 2022-03-28 VITALS
OXYGEN SATURATION: 96 % | WEIGHT: 110 LBS | RESPIRATION RATE: 16 BRPM | SYSTOLIC BLOOD PRESSURE: 147 MMHG | DIASTOLIC BLOOD PRESSURE: 80 MMHG | TEMPERATURE: 98 F | BODY MASS INDEX: 18.33 KG/M2 | HEART RATE: 77 BPM | HEIGHT: 65 IN

## 2022-03-28 PROBLEM — R53.81 DEBILITY: Status: ACTIVE | Noted: 2022-03-28

## 2022-03-28 LAB
ANION GAP SERPL CALCULATED.3IONS-SCNC: 11 MMOL/L (ref 3–16)
BUN BLDV-MCNC: 17 MG/DL (ref 7–20)
CALCIUM SERPL-MCNC: 8.8 MG/DL (ref 8.3–10.6)
CHLORIDE BLD-SCNC: 102 MMOL/L (ref 99–110)
CO2: 23 MMOL/L (ref 21–32)
CREAT SERPL-MCNC: 0.8 MG/DL (ref 0.6–1.2)
GFR AFRICAN AMERICAN: >60
GFR NON-AFRICAN AMERICAN: >60
GLUCOSE BLD-MCNC: 91 MG/DL (ref 70–99)
HCT VFR BLD CALC: 28.2 % (ref 36–48)
HEMOGLOBIN: 9.6 G/DL (ref 12–16)
MAGNESIUM: 1.5 MG/DL (ref 1.8–2.4)
MCH RBC QN AUTO: 33.2 PG (ref 26–34)
MCHC RBC AUTO-ENTMCNC: 34.1 G/DL (ref 31–36)
MCV RBC AUTO: 97.5 FL (ref 80–100)
PDW BLD-RTO: 14.9 % (ref 12.4–15.4)
PLATELET # BLD: 345 K/UL (ref 135–450)
PMV BLD AUTO: 6.6 FL (ref 5–10.5)
POTASSIUM REFLEX MAGNESIUM: 3.5 MMOL/L (ref 3.5–5.1)
RBC # BLD: 2.89 M/UL (ref 4–5.2)
SARS-COV-2, NAAT: NOT DETECTED
SODIUM BLD-SCNC: 136 MMOL/L (ref 136–145)
WBC # BLD: 6.3 K/UL (ref 4–11)

## 2022-03-28 PROCEDURE — 97110 THERAPEUTIC EXERCISES: CPT

## 2022-03-28 PROCEDURE — 2580000003 HC RX 258: Performed by: INTERNAL MEDICINE

## 2022-03-28 PROCEDURE — 6370000000 HC RX 637 (ALT 250 FOR IP): Performed by: INTERNAL MEDICINE

## 2022-03-28 PROCEDURE — 36415 COLL VENOUS BLD VENIPUNCTURE: CPT

## 2022-03-28 PROCEDURE — 6370000000 HC RX 637 (ALT 250 FOR IP): Performed by: HOSPITALIST

## 2022-03-28 PROCEDURE — 97530 THERAPEUTIC ACTIVITIES: CPT

## 2022-03-28 PROCEDURE — 1280000000 HC REHAB R&B

## 2022-03-28 PROCEDURE — 6370000000 HC RX 637 (ALT 250 FOR IP): Performed by: STUDENT IN AN ORGANIZED HEALTH CARE EDUCATION/TRAINING PROGRAM

## 2022-03-28 PROCEDURE — 87635 SARS-COV-2 COVID-19 AMP PRB: CPT

## 2022-03-28 PROCEDURE — 83735 ASSAY OF MAGNESIUM: CPT

## 2022-03-28 PROCEDURE — 6360000002 HC RX W HCPCS: Performed by: HOSPITALIST

## 2022-03-28 PROCEDURE — 2580000003 HC RX 258: Performed by: HOSPITALIST

## 2022-03-28 PROCEDURE — 80048 BASIC METABOLIC PNL TOTAL CA: CPT

## 2022-03-28 PROCEDURE — 85027 COMPLETE CBC AUTOMATED: CPT

## 2022-03-28 PROCEDURE — 6360000002 HC RX W HCPCS: Performed by: INTERNAL MEDICINE

## 2022-03-28 RX ORDER — DONEPEZIL HYDROCHLORIDE 5 MG/1
10 TABLET, FILM COATED ORAL NIGHTLY
Status: DISCONTINUED | OUTPATIENT
Start: 2022-03-28 | End: 2022-04-09 | Stop reason: HOSPADM

## 2022-03-28 RX ORDER — ACETAMINOPHEN 650 MG/1
650 SUPPOSITORY RECTAL EVERY 6 HOURS PRN
Status: CANCELLED | OUTPATIENT
Start: 2022-03-28

## 2022-03-28 RX ORDER — ACETAMINOPHEN 650 MG/1
650 SUPPOSITORY RECTAL EVERY 6 HOURS PRN
Status: DISCONTINUED | OUTPATIENT
Start: 2022-03-28 | End: 2022-04-09 | Stop reason: HOSPADM

## 2022-03-28 RX ORDER — MAGNESIUM HYDROXIDE/ALUMINUM HYDROXICE/SIMETHICONE 120; 1200; 1200 MG/30ML; MG/30ML; MG/30ML
30 SUSPENSION ORAL EVERY 6 HOURS PRN
Status: CANCELLED | OUTPATIENT
Start: 2022-03-28

## 2022-03-28 RX ORDER — FOLIC ACID 1 MG/1
1 TABLET ORAL DAILY
Status: CANCELLED | OUTPATIENT
Start: 2022-03-28

## 2022-03-28 RX ORDER — FLUDROCORTISONE ACETATE 0.1 MG/1
0.1 TABLET ORAL DAILY
Status: CANCELLED | OUTPATIENT
Start: 2022-03-28

## 2022-03-28 RX ORDER — ONDANSETRON 4 MG/1
4 TABLET, ORALLY DISINTEGRATING ORAL EVERY 8 HOURS PRN
Status: DISCONTINUED | OUTPATIENT
Start: 2022-03-28 | End: 2022-04-09 | Stop reason: HOSPADM

## 2022-03-28 RX ORDER — VITAMIN B COMPLEX
1000 TABLET ORAL DAILY
Status: CANCELLED | OUTPATIENT
Start: 2022-03-28

## 2022-03-28 RX ORDER — BISACODYL 10 MG
10 SUPPOSITORY, RECTAL RECTAL DAILY PRN
Status: DISCONTINUED | OUTPATIENT
Start: 2022-03-28 | End: 2022-04-09 | Stop reason: HOSPADM

## 2022-03-28 RX ORDER — VITAMIN B COMPLEX
1000 TABLET ORAL DAILY
Status: DISCONTINUED | OUTPATIENT
Start: 2022-03-28 | End: 2022-04-09 | Stop reason: HOSPADM

## 2022-03-28 RX ORDER — ACETAMINOPHEN 325 MG/1
650 TABLET ORAL EVERY 6 HOURS PRN
Status: CANCELLED | OUTPATIENT
Start: 2022-03-28

## 2022-03-28 RX ORDER — ONDANSETRON 4 MG/1
4 TABLET, ORALLY DISINTEGRATING ORAL EVERY 8 HOURS PRN
Status: CANCELLED | OUTPATIENT
Start: 2022-03-28

## 2022-03-28 RX ORDER — POLYETHYLENE GLYCOL 3350 17 G/17G
17 POWDER, FOR SOLUTION ORAL DAILY PRN
Status: DISCONTINUED | OUTPATIENT
Start: 2022-03-28 | End: 2022-04-09 | Stop reason: HOSPADM

## 2022-03-28 RX ORDER — FOLIC ACID 1 MG/1
1 TABLET ORAL DAILY
Status: DISCONTINUED | OUTPATIENT
Start: 2022-03-28 | End: 2022-04-09 | Stop reason: HOSPADM

## 2022-03-28 RX ORDER — ONDANSETRON 2 MG/ML
4 INJECTION INTRAMUSCULAR; INTRAVENOUS EVERY 6 HOURS PRN
Status: DISCONTINUED | OUTPATIENT
Start: 2022-03-28 | End: 2022-04-09 | Stop reason: HOSPADM

## 2022-03-28 RX ORDER — FLUDROCORTISONE ACETATE 0.1 MG/1
0.1 TABLET ORAL DAILY
Status: DISCONTINUED | OUTPATIENT
Start: 2022-03-28 | End: 2022-03-29

## 2022-03-28 RX ORDER — ONDANSETRON 2 MG/ML
4 INJECTION INTRAMUSCULAR; INTRAVENOUS EVERY 6 HOURS PRN
Status: CANCELLED | OUTPATIENT
Start: 2022-03-28

## 2022-03-28 RX ORDER — MAGNESIUM HYDROXIDE/ALUMINUM HYDROXICE/SIMETHICONE 120; 1200; 1200 MG/30ML; MG/30ML; MG/30ML
30 SUSPENSION ORAL EVERY 6 HOURS PRN
Status: DISCONTINUED | OUTPATIENT
Start: 2022-03-28 | End: 2022-04-09 | Stop reason: HOSPADM

## 2022-03-28 RX ORDER — DONEPEZIL HYDROCHLORIDE 5 MG/1
10 TABLET, FILM COATED ORAL NIGHTLY
Status: CANCELLED | OUTPATIENT
Start: 2022-03-28

## 2022-03-28 RX ORDER — BISACODYL 10 MG
10 SUPPOSITORY, RECTAL RECTAL DAILY PRN
Status: CANCELLED | OUTPATIENT
Start: 2022-03-28

## 2022-03-28 RX ORDER — POLYETHYLENE GLYCOL 3350 17 G/17G
17 POWDER, FOR SOLUTION ORAL DAILY PRN
Status: CANCELLED | OUTPATIENT
Start: 2022-03-28

## 2022-03-28 RX ORDER — ACETAMINOPHEN 325 MG/1
650 TABLET ORAL EVERY 6 HOURS PRN
Status: DISCONTINUED | OUTPATIENT
Start: 2022-03-28 | End: 2022-04-09 | Stop reason: HOSPADM

## 2022-03-28 RX ADMIN — FOLIC ACID 1 MG: 1 TABLET ORAL at 09:45

## 2022-03-28 RX ADMIN — DONEPEZIL HYDROCHLORIDE 10 MG: 5 TABLET ORAL at 20:19

## 2022-03-28 RX ADMIN — IRON SUCROSE 200 MG: 20 INJECTION, SOLUTION INTRAVENOUS at 14:15

## 2022-03-28 RX ADMIN — SODIUM CHLORIDE, PRESERVATIVE FREE 10 ML: 5 INJECTION INTRAVENOUS at 09:53

## 2022-03-28 RX ADMIN — Medication 1000 UNITS: at 09:45

## 2022-03-28 RX ADMIN — FLUDROCORTISONE ACETATE 0.1 MG: 0.1 TABLET ORAL at 09:52

## 2022-03-28 RX ADMIN — ENOXAPARIN SODIUM 30 MG: 30 INJECTION SUBCUTANEOUS at 12:16

## 2022-03-28 ASSESSMENT — PAIN SCALES - GENERAL
PAINLEVEL_OUTOF10: 0
PAINLEVEL_OUTOF10: 0

## 2022-03-28 NOTE — PROGRESS NOTES
Physical Therapy  Facility/Department: [facility] C3 TELE/MED SURG/ONC  Daily Treatment Note  NAME: Vicky Montgomery  : 1940  MRN: [id number]    Date of Service: 3/28/2022    Discharge Recommendations:  IP Rehab   PT Equipment Recommendations  Equipment Needed: No  Other: defer    Assessment   Body structures, Functions, Activity limitations: Decreased functional mobility ; Decreased high-level IADLs;Decreased posture;Decreased ADL status; Decreased coordination;Decreased balance;Decreased strength;Decreased safe awareness; Increased pain  Assessment: pt expressing anticipation for rehab, able to tolerate bed mobility with verbal cues for technique for bridging and scooting toward head of bed and laterally, pt will benefit from continued Acute Inpatiet Skilled PT to address functional mobility deficits, agree with previous PT recommendation for IPR  Treatment Diagnosis: Decreased functional mobility  Specific instructions for Next Treatment: progress mobility  PT Education: Goals;Transfer Training;Disease Specific Education;PT Role;Plan of Care;General Safety;Gait Training  REQUIRES PT FOLLOW UP: Yes  Activity Tolerance  Activity Tolerance: Patient Tolerated treatment well     Patient Diagnosis(es): The primary encounter diagnosis was Falls frequently. Diagnoses of Orthostasis and ROSE (acute kidney injury) ([facility] [address] were also pertinent to this visit. has a past medical history of ARF (acute renal failure) ([address] has a past surgical history that includes joint replacement; Tonsillectomy; Intracapsular cataract extraction (Right, 2019); and Intracapsular cataract extraction (Left, 2019). Restrictions  Restrictions/Precautions  Restrictions/Precautions: General Precautions,Fall Risk  Position Activity Restriction  Other position/activity restrictions: Up with assist  Subjective   General  Chart Reviewed: Yes  Response To Previous Treatment: Patient with no complaints from previous session.   Family / Caregiver Present: Yes (friend)  Subjective  Subjective: pt agreeable to repositioning, pt wanting to visit with friend and awaiting transfer to rehab unit soon  General Comment  Comments: pt resting in bed upon arrival  Pain Screening  Patient Currently in Pain: No  Vital Signs  Patient Currently in Pain: No       Orientation  Orientation  Overall Orientation Status: Within Functional Limits  Cognition      Objective   Bed mobility  Bridging: Stand by assistance (5 reps for lateral mobility in bed)  Scooting:  Moderate assistance (for 3 scoots to head of bed)  Transfers  Comment: pt politely declining oob mobility due to visiting with friend and awaiting transfer to rehab           Exercises  Ankle Pumps: x10 B LE supine  Core Strengthening: bridging x 5 reps                          AM-PAC Score     AM-PAC Inpatient Mobility without Stair Climbing Raw Score : 15 (03/28/22 1527)  AM-PAC Inpatient without Stair Climbing T-Scale Score : 43.03 (03/28/22 1527)  Mobility Inpatient CMS 0-100% Score: 47.43 (03/28/22 1527)  Mobility Inpatient without Stair CMS G-Code Modifier : CK (03/28/22 1527)       Goals  Short term goals  Time Frame for Short term goals: 1 week, 4/1/22  Short term goal 1: Pt will perform bed mobility w/ indep - 3/28 min A for scooting to head of bed  Short term goal 2: Pt will perform sit to stand t/f w/ sup and LRAD  Short term goal 3: Pt will perform 100 ft of amb w/ LRAD and SBA  Short term goal 4: Pt will partake in 12-15 reps of BLE exercises to improve function towards goals by 3/28 - 3/28 partially met, 10 reps  Patient Goals   Patient goals : \"to go home\"    Plan    Plan  Times per week: 3-5  Times per day: Daily  Specific instructions for Next Treatment: progress mobility  Current Treatment Recommendations: Dalila Philippe Re-education,Patient/Caregiver Education & Training,Pain Management,ADL/Self-care Training,ROM,Balance Training,IADL Training,Gait Training,Home Exercise Program,Safety Education & Training,Stair training,Functional Mobility Training  Safety Devices  Type of devices: All fall risk precautions in place,Bed alarm in place,Call light within reach,Patient at risk for falls,Left in bed,Nurse notified     Therapy Time   Individual Concurrent Group Co-treatment   Time In 1505         Time Out 1518         Minutes 13         Timed Code Treatment Minutes: 13 Minutes     If pt is unable to be seen after this session, please let this note serve as discharge summary. Please see case management note for discharge disposition. Thank you.   Sigifredo Franco, PT

## 2022-03-28 NOTE — DISCHARGE SUMMARY
Discharge Summary    Patient ID: Marc Pop      Patient's PCP: Shobha Fees Date: 3/25/2022     Discharge Date:   3/28/22     Admitting Physician: No admitting provider for patient encounter. Discharge Physician: Illa Boas, DO     Discharge Diagnoses: Fall risk due to deconditioning       Active Hospital Problems    Diagnosis     ARF (acute renal failure) ([facility] [address] [N17.9]     Anemia [D64.9]        The patient was seen and examined on day of discharge and this discharge summary is in conjunction with any daily progress note from day of discharge. Hospital Course: [de-identified] y.o. female who presented to [facility] with a several day hx of progressive weakness/falls. She was found to have ROSE which resolved with IV fluids. She admits that she drinks 4 or fewer glasses of water a day. Recommend increased intake, 8 glasses of water a day. PT/ OT recommended acute inpatient rehab for strengthening. H/H was down trending during hospitalization but CT of abdomen and pelvis was was negative for hematoma. H/H improved with IV iron. Physical Exam Performed:     /62   Pulse 66   Temp 97.6 °F (36.4 °C) (Oral)   Resp 16   Ht 5' 5\" (1.651 m)   Wt 110 lb (49.9 kg)   SpO2 99%   BMI 18.30 kg/m²       General appearance:  No apparent distress, appears stated age and cooperative. HEENT:  Normal cephalic, atraumatic without obvious deformity. Pupils equal, round, and reactive to light. Extra ocular muscles intact. Conjunctivae/corneas clear. Neck: Supple, with full range of motion. No jugular venous distention. Trachea midline. Respiratory:  Normal respiratory effort. Clear to auscultation, bilaterally without Rales/Wheezes/Rhonchi. Cardiovascular:  Regular rate and rhythm with normal S1/S2 without murmurs, rubs or gallops. Abdomen: Soft, non-tender, non-distended with normal bowel sounds. Musculoskeletal:  No clubbing, cyanosis or edema bilaterally.   Full range of motion without deformity. Skin: Skin color, texture, turgor normal.  No rashes or lesions. Chronic bruising bilateral shins. Neurologic:  Neurovascularly intact without any focal sensory/motor deficits. Cranial nerves: II-XII intact, grossly non-focal.  Psychiatric:  Alert and oriented, thought content appropriate, normal insight  Capillary Refill: Brisk,< 3 seconds   Peripheral Pulses: +2 palpable, equal bilaterally       Labs: For convenience and continuity at follow-up the following most recent labs are provided:      CBC:    Lab Results   Component Value Date    WBC 6.3 03/28/2022    HGB 9.6 03/28/2022    HCT 28.2 03/28/2022     03/28/2022       Renal:    Lab Results   Component Value Date     03/28/2022    K 3.5 03/28/2022     03/28/2022    CO2 23 03/28/2022    BUN 17 03/28/2022    CREATININE 0.8 03/28/2022    CALCIUM 8.8 03/28/2022    PHOS 2.8 03/26/2022         Significant Diagnostic Studies    Radiology:   CT ABDOMEN PELVIS WO CONTRAST Additional Contrast? None   Preliminary Result   1. Acute fractures of the right 4th through 12th ribs, with a   mild-to-moderate right hemothorax and associated partial consolidation of the   right lung base, most likely passive atelectasis, less likely aspiration or   pneumonia. 2. Small left pleural effusion with mild passive atelectasis within the left   lower lobe. 3. No acute traumatic soft tissue abnormality within the abdomen or pelvis,   within the limitations of a noncontrast study. 4. Age-indeterminate wedge compression deformities of T12, L1, and L2,   possibly acute. Consider further characterization with a lumbar MRI. CT Head WO Contrast   Final Result   1. No acute intracranial abnormality.                 Consults:     IP CONSULT TO HOSPITALIST  IP CONSULT TO PHYSICAL MEDICINE REHAB    Disposition:  Acute inpatient rehab     Condition at Discharge: Stable    Discharge Instructions/Follow-up: Continue iron, folic acid supplement, and multivitamin. Consider further work up for chronic anemia outpatient. Code Status:  Full Code     Activity: activity as tolerated    Diet: regular diet      Discharge Medications:     Current Discharge Medication List           Details   donepezil (ARICEPT) 10 MG tablet Take 10 mg by mouth nightly      cloNIDine (CATAPRES) 0.1 MG tablet Take 0.1 mg by mouth as needed for High Blood Pressure (SBP more than 160)      diphenhydrAMINE (BENADRYL) 25 MG tablet Take 25 mg by mouth daily (with breakfast)      ferrous sulfate (IRON 325) 325 (65 Fe) MG tablet Take 325 mg by mouth daily (with breakfast)      fludrocortisone (FLORINEF) 0.1 MG tablet Take 0.1 mg by mouth daily      vitamin B-12 (CYANOCOBALAMIN) 1000 MCG tablet Take 1,000 mcg by mouth daily      calcium carbonate 600 MG TABS tablet Take 1 tablet by mouth daily Written as one tablet BID, actually taking 1 tablet daily      solifenacin (VESICARE) 10 MG tablet Take 10 mg by mouth daily      mirabegron (MYRBETRIQ) 50 MG TB24 Take 50 mg by mouth daily      folic acid (FOLVITE) 1 MG tablet Take 1 tablet by mouth daily  Qty: 30 tablet, Refills: 3      midodrine (PROAMATINE) 5 MG tablet Take 1 tablet by mouth 3 times daily (with meals)  Qty: 90 tablet, Refills: 3      vitamin D (ERGOCALCIFEROL) 42575 units CAPS capsule Take 50,000 Units by mouth daily      Cholecalciferol (VITAMIN D PO) Take by mouth daily      Multiple Vitamins-Minerals (MULTIVITAMIN & MINERAL PO) Take  by mouth. Time Spent on discharge is more than 30 minutes in the examination, evaluation, counseling and review of medications and discharge plan. Signed:    Simon Dudley DO   3/28/2022      Thank you Sharad Simms for the opportunity to be involved in this patient's care. If you have any questions or concerns please feel free to contact me at [phone].

## 2022-03-28 NOTE — PROGRESS NOTES
Shift assessment completed. Pt A&O x4. VSS. AM medications given whole per MAR. Pt resting comfortably in bed with no apparent signs of distress. Pt is tolerating diet. Denies any further needs at this time. Bed locked and in lowest position. Bed alarm on. Call light within reach. Will continue to monitor.  Electronically signed by Janeen Patel RN on 3/28/2022 at 12:05 PM

## 2022-03-28 NOTE — CONSULTS
Patient: Alirio Prescott  [id number]  Date: 3/28/2022      Chief Complaint: Falls    History of Present Illness/Hospital Course:  Alirio Prescott is an [de-identified]year old female with past medical history significant for urge incontinence, anemia, and memory loss who presented to [facility] on 3/25/22 with several days of progressive weakness and falls. She was found to have ROSE which resolved with IVF. Her hospital course was complicated by progressive anemia. CT abdomen and pelvis was negative for hematoma. She is motivated to come to rehab to work on her strength and balance. has a past medical history of ARF (acute renal failure) ([facility] [address] has a past surgical history that includes joint replacement; Tonsillectomy; Intracapsular cataract extraction (Right, 5/21/2019); and Intracapsular cataract extraction (Left, 7/2/2019). reports that she has never smoked. She has never used smokeless tobacco. She reports current alcohol use. She reports that she does not use drugs. family history includes Cancer in her mother. REVIEW OF SYSTEMS:   CONSTITUTIONAL: negative for fevers, chills, diaphoresis, activity change, appetite change, fatigue, night sweats and unexpected weight change.    EYES: negative for blurred vision, eye discharge, visual disturbance and icterus  HEENT: negative for hearing loss, tinnitus, ear drainage, sinus pressure, nasal congestion, epistaxis and snoring  RESPIRATORY: Negative for hemoptysis, cough, sputum production  CARDIOVASCULAR: negative for chest pain, palpitations, exertional chest pressure/discomfort, edema, syncope  GASTROINTESTINAL: negative for nausea, vomiting, diarrhea, constipation, blood in stool and abdominal pain  GENITOURINARY: negative for frequency, dysuria, urinary incontinence, decreased urine volume, and hematuria  HEMATOLOGIC/LYMPHATIC: negative for easy bruising, bleeding and lymphadenopathy  ALLERGIC/IMMUNOLOGIC: negative for recurrent infections, angioedema, anaphylaxis and drug reactions  ENDOCRINE: negative for weight changes and diabetic symptoms including polyuria, polydipsia and polyphagia  MUSCULOSKELETAL: negative for pain, joint swelling, decreased range of motion and muscle weakness  NEUROLOGICAL: negative for headaches, slurred speech, unilateral weakness  PSYCHIATRIC/BEHAVIORAL: negative for hallucinations, behavioral problems, confusion and agitation. Physical Examination:  Vitals: Patient Vitals for the past 24 hrs:   BP Temp Temp src Pulse Resp SpO2   03/28/22 0944 119/62 97.6 °F (36.4 °C) Oral 66 16 99 %   03/27/22 2317 (!) 188/80 97.9 °F (36.6 °C) Oral 63 16 95 %   03/27/22 1530 (!) 166/79 98.1 °F (36.7 °C) Oral 69 16 97 %     Mood: Stable  Const: No distress  ENT: Oral mucosa moist  Eyes: No discharge or injection  CV: Regular rate and rhythm, no murmur rub or gallop noted  Resp: Lungs clear to auscultation bilaterally, no rales wheezes or ronchi  GI: Soft, nontender, nondistended. Normal bowel sounds. Neuro: Alert, oriented, appropriate. No cranial nerve deficits appreciated. Sensation intact to light touch. Motor examination reveals normal strength in all four limbs diffusely. No abnormalities with finger/nose or heel/shin noted. Reflexes normal and symmetric. Skin: No lesions or rashes noted. MSK: No joint abnormalities noted. Lab Results   Component Value Date    WBC 6.3 03/28/2022    HGB 9.6 (L) 03/28/2022    HCT 28.2 (L) 03/28/2022    MCV 97.5 03/28/2022     03/28/2022     No results found for: INR, PROTIME  Lab Results   Component Value Date    CREATININE 0.8 03/28/2022    BUN 17 03/28/2022     03/28/2022    K 3.5 03/28/2022     03/28/2022    CO2 23 03/28/2022     Lab Results   Component Value Date    ALT 15 03/25/2022    AST 29 03/25/2022    ALKPHOS 89 03/25/2022    BILITOT 1.3 (H) 03/25/2022     Most recent EKG 3/25/22:  Sinus rhythm with 1st degree A-V blockOtherwise normal     IMAGING    CTAP 3/26/22:  1. Acute fractures of the right 4th through 12th ribs, with a   mild-to-moderate right hemothorax and associated partial consolidation of the   right lung base, most likely passive atelectasis, less likely aspiration or   pneumonia. 2. Small left pleural effusion with mild passive atelectasis within the left   lower lobe. 3. No acute traumatic soft tissue abnormality within the abdomen or pelvis,   within the limitations of a noncontrast study. 4. Age-indeterminate wedge compression deformities of T12, L1, and L2,   possibly acute.  Consider further characterization with a lumbar MRI. CT head 3/25/22:  BRAIN/VENTRICLES: There is no acute intracerebral hemorrhage or extra-axial   fluid collection. There is mild cerebral and cerebellar atrophy with mild   periventricular and deep white matter small vessel ischemic disease.       ORBITS: Status post bilateral cataract removal.       SINUSES: The visualized paranasal sinuses and mastoid air cells are clear.       SOFT TISSUES/SKULL:  The calvarium is intact. Assessment:  1. Debility  2. ROSE  3. falls  4. anemia    Recommendations:  Patient with new functional deficits and ongoing medical complexity. Demonstrates ability to tolerate 3 hours therapy/day. Marc Pop is a good candidate for acute inpatient rehab when medically appropriate. Thank you for this consult. Please contact me with any questions or concerns. Mayte Yan MD 3/28/2022, 1:25 PM

## 2022-03-28 NOTE — DISCHARGE INSTR - COC
Continuity of Care Form    Patient Name: Laney Joy   :  1940  MRN:  [id number]    [facility] date:  3/25/2022  Discharge date:  ***    Code Status Order: Full Code   Advance Directives:      Admitting Physician:  No admitting provider for patient encounter. PCP: Kati Villegas    Discharging Nurse: [facility] Care Unit/Room#: 1767/0694-54  Discharging Unit Phone Number: ***    Emergency Contact:   Extended Emergency Contact Information  Primary Emergency Contact: [facility] Phone: [phone]  Mobile Phone: [phone]  Relation: Child  Secondary Emergency Contact: [facility] Phone: [phone]  Mobile Phone: [phone]  Relation: Other    Past Surgical History:  Past Surgical History:   Procedure Laterality Date    INTRACAPSULAR CATARACT EXTRACTION Right 2019    PHACOEMULSIFICATION OF CATARACT RIGHT EYE WITH INTRAOCULAR LENS IMPLANT performed by Benjamín Spicer MD at [facility] [address] Left 2019    PHACOEMULSIFICATION OF CATARACT LEFT EYE WITH INTRAOCULAR LENS IMPLANT performed by Benjamín Spicer MD at [facility] 75      both hips    TONSILLECTOMY         Immunization History: There is no immunization history on file for this patient. Active Problems:  Patient Active Problem List   Diagnosis Code    Left knee pain M25.562    Left patella fracture S82.002A    Contusion of left knee S80. 02XA    Orthostatic hypotension I95.1    ARF (acute renal failure) ([facility]) N17.9    Anemia D64.9       Isolation/Infection:   Isolation            No Isolation          Patient Infection Status       None to display            Nurse Assessment:  Last Vital Signs: /62   Pulse 66   Temp 97.6 °F (36.4 °C) (Oral)   Resp 16   Ht 5' 5\" (1.651 m)   Wt 110 lb (49.9 kg)   SpO2 99%   BMI 18.30 kg/m²     Last documented pain score (0-10 scale): Pain Level: 0  Last Weight:   Wt Readings from Last 1 Encounters:   22 110 lb (49.9 kg)     Mental Status:  {IP PT MENTAL STATUS:}    IV Access:  { [facility] IV ACCESS:059261763}    Nursing Mobility/ADLs:  Walking   {CHP DME NNND:663828618}  Transfer  {CHP DME GPZN:641855082}  Bathing  {CHP DME NOCN:081813625}  Dressing  {CHP DME MMPM:845844707}  Toileting  {CHP DME NEYH:148996240}  Feeding  {CHP DME WVFJ:537096268}  Med Admin  {CHP DME UDHZ:832221846}  Med Delivery   { [facility] MED Delivery:253958955}    Wound Care Documentation and Therapy:  Wound 20 Pretibial Right (Active)   Number of days: 000        Elimination:  Continence: Bowel: {YES / WD:24290}  Bladder: {YES / RQ:67998}  Urinary Catheter: {Urinary Catheter:220498182}   Colostomy/Ileostomy/Ileal Conduit: {YES / QD:72454}       Date of Last BM: ***    Intake/Output Summary (Last 24 hours) at 3/28/2022 1308  Last data filed at 3/28/2022 1215  Gross per 24 hour   Intake 931.16 ml   Output 1 ml   Net 930.16 ml     I/O last 3 completed shifts:   In: 1091.2 [P.O.:1080; I.V.:10; IV Piggyback:1.2]  Out: 701 [Urine:701]    Safety Concerns:     [employer] Safety Concerns:300421747}    Impairments/Disabilities:      [employer] Impairments/Disabilities:583569431}    Nutrition Therapy:  Current Nutrition Therapy:   [employer] Diet List:089198495}    Routes of Feeding: {P DME Other Feedings:807054642}  Liquids: {Slp liquid thickness:83188}  Daily Fluid Restriction: {CHP DME Yes amt example:850883430}  Last Modified Barium Swallow with Video (Video Swallowing Test): {Done Not Done D}    Treatments at the Time of Hospital Discharge:   Respiratory Treatments: ***  Oxygen Therapy:  {Therapy; copd oxygen:77273}  Ventilator:    { CC Vent OHVZ:811937957}    Rehab Therapies: {THERAPEUTIC INTERVENTION:2902445739}  Weight Bearing Status/Restrictions: [address] Weight Bearin}  Other Medical Equipment (for information only, NOT a DME order):  {EQUIPMENT:218304696}  Other Treatments: ***    Patient's personal belongings (please select all that are sent with patient):  {CASEY DME Belongings:071532133}    RN SIGNATURE:  {Esignature:815465326}    CASE MANAGEMENT/SOCIAL WORK SECTION    Inpatient Status Date: ***    Readmission Risk Assessment Score:  Readmission Risk              Risk of Unplanned Readmission:  14           Discharging to Facility/ Agency   Name: ARU  Address:  [facility]:[id number]  Fax:      / signature: Electronically signed by Parvin Daniel RN on 3/28/22 at 1:24 PM EDT    PHYSICIAN SECTION    Prognosis: Good    Condition at Discharge: Stable    Rehab Potential (if transferring to Rehab): Fair    Recommended Labs or Other Treatments After Discharge: Follow up with PCP for anemia work up    Physician Certification: I certify the above information and transfer of Elizabeth Jones  is necessary for the continuing treatment of the diagnosis listed and that she requires Acute Rehab for less 30 days.      Update Admission H&P: No change in H&P      PHYSICIAN SIGNATURE:  Electronically signed by Maria Ines Lopez DO on 3/28/22 at 1:09 PM EDT

## 2022-03-28 NOTE — PLAN OF CARE
Problem: Falls - Risk of:  Goal: Will remain free from falls  Description: Will remain free from falls  3/28/2022 1206 by Joan Aviles RN  Outcome: Ongoing  3/28/2022 0618 by Nicole Petersen RN  Outcome: Ongoing  Goal: Absence of physical injury  Description: Absence of physical injury  3/28/2022 1206 by Joan Aviles RN  Outcome: Ongoing  3/28/2022 0618 by Nicloe Petersen RN  Outcome: Ongoing     Problem: Pain:  Goal: Pain level will decrease  Description: Pain level will decrease  Outcome: Ongoing  Goal: Control of acute pain  Description: Control of acute pain  Outcome: Ongoing  Goal: Control of chronic pain  Description: Control of chronic pain  Outcome: Ongoing

## 2022-03-28 NOTE — PROGRESS NOTES
Occupational Therapy  Facility/Department: [facility] C3 TELE/MED SURG/ONC  Daily Treatment Note  NAME: Ernestine Notice  : 1940  MRN: [id number]    Date of Service: 3/28/2022    Discharge Recommendations:  IP Rehab  OT Equipment Recommendations  Equipment Needed: Yes  Other: Pt could potentially benefit from RW. Assessment   Performance deficits / Impairments: Decreased functional mobility ; Decreased ADL status; Decreased balance;Decreased safe awareness;Decreased strength;Decreased endurance;Decreased posture    Assessment: Pt is an [de-identified] female with deficits in the areas listed above and presents to [facility] after experiencing frequent falls at home. Pt is typically mod (I) to (I) with ADLS/IADLs and functional mobility with a 4WW and live alone with A PRN available but no 24hr A. Today, pt performing functional transfers with CGA and verbal cues for safety with hand placement and pt leaning back against surface. Pt with orthostatic BP in stance and returned to supine for ther ex. Pt performing BUE ther ex in supine with good activity tolerance. Pt sitting up on EOB again with orthostatic BP again but no dizziness. Pt performing ther ex at EOB with BP continuing to decrease. Pt returning to supine. Pt would continue to benefit from skilled OT services in acute care to increase balance, safety, endurance, stength and independence in ADLs and functional mobility. Recommend IPR at d/c. Prognosis: Good  OT Education: OT Role;Plan of Care;Precautions  Patient Education: disease specific: importance of OOB to chair for meals & short periods of time, use of RED/nurse call light for assist with transfers/ADL needs, ther ex, safety with transfers and mobility with orthostatic BP. Pt verbalized understanding. REQUIRES OT FOLLOW UP: Yes  Activity Tolerance  Activity Tolerance: Treatment limited secondary to medical complications (free text)  Activity Tolerance: In supine: /68, HR 70, O2 97%.  Pt feeling dizzy in stance and returning to sitting. BP 73/51. Pt returning to supine. /63. After ther ex 151/69. Pt seated EOB again. BP 99/59. After ther ex at EOB. BP 90/51. Returning to supine  Safety Devices  Safety Devices in place: Yes  Type of devices: Nurse notified;Call light within reach; Left in bed;Bed alarm in place;Gait belt         Patient Diagnosis(es): The primary encounter diagnosis was Falls frequently. Diagnoses of Orthostasis and ROSE (acute kidney injury) ([facility] [address] were also pertinent to this visit. has a past medical history of ARF (acute renal failure) ([facility] [address] has a past surgical history that includes joint replacement; Tonsillectomy; Intracapsular cataract extraction (Right, 5/21/2019); and Intracapsular cataract extraction (Left, 7/2/2019). Restrictions  Restrictions/Precautions  Restrictions/Precautions: General Precautions,Fall Risk  Position Activity Restriction  Other position/activity restrictions: Up with assist     Subjective   General  Chart Reviewed: Yes  Patient assessed for rehabilitation services?: Yes  Response to previous treatment: Patient with no complaints from previous session  Family / Caregiver Present: No  Referring Practitioner: Sim Subramanian MD  Diagnosis: frequent fall  Subjective  Subjective: Pt agreeable to therapy. General Comment  Comments: RN cleared pt for OT; pt awake in bed, agreeable to therapy  Vital Signs  Patient Currently in Pain: Denies     Orientation  Orientation  Overall Orientation Status: Within Functional Limits     Objective             Balance  Sitting Balance: Supervision  Standing Balance: Contact guard assistance  Standing Balance  Time: ~40s  Activity: standing at EOB  Comment: SW used, vc's for safe hand placement, pt with initial posterior lean but correcting with vc's. Pt orthostatic in stance this date.      Bed mobility  Supine to Sit: Supervision  Sit to Supine: Supervision  Scooting: Supervision  Comment: HOB flat. vc's for log rolling to increase comfort with rib fractures. Transfers  Sit to stand: Contact guard assistance  Stand to sit: Contact guard assistance  Transfer Comments: cues for safe hand placement                       Cognition  Overall Cognitive Status: Exceptions  Arousal/Alertness: Appropriate responses to stimuli  Following Commands: Follows one step commands consistently  Attention Span: Attends with cues to redirect  Memory: Decreased recall of precautions;Decreased short term memory  Safety Judgement: Decreased awareness of need for safety  Problem Solving: Decreased awareness of errors  Insights: Decreased awareness of deficits  Initiation: Does not require cues  Sequencing: Requires cues for some  Cognition Comment: slightly impulsive                    Type of ROM/Therapeutic Exercise  Type of ROM/Therapeutic Exercise: AROM  Comment: BUE in semi fowlers first set, EOB second set. Exercises  Shoulder Flexion: 2x20  Shoulder Extension: 2x20  Elbow Flexion: 2x20  Elbow Extension: 2x20  Wrist Flexion: 2x20  Wrist Extension: 2x20  Grasp/Release: 2x20  Other: Chest press 2x20, ankle pumps 2x20                    Plan   Plan  Times per week: 3-5x/week  Current Treatment Recommendations: Strengthening,Functional Mobility Training,Endurance Training,Balance Training,Safety Education & Training,Self-Care / ADL,Patient/Caregiver Education & Training,Home Management Training,Equipment Evaluation, Education, & procurement    AM-PAC Score        AM-[facility] Inpatient Daily Activity Raw Score: 19 (03/28/22 1248)  -PAC Inpatient ADL T-Scale Score : 40.22 (03/28/22 1248)  ADL Inpatient CMS 0-100% Score: 42.8 (03/28/22 1248)  ADL Inpatient CMS G-Code Modifier : CK (03/28/22 1248)    Goals  Short term goals  Time Frame for Short term goals: 1 week (4/1) unless stated otherwise. Short term goal 1: Pt will LBD with supervision and AD PRN.   Short term goal 2: Pt will perform BUE ther ex x20 to increase strength and endurance for functional activites; MET 3/28 x20 BUE AROM exercises  Short term goal 3: Pt will perform functional/toilet t/fs with supervision and AD PRN; 3/28 CGA with functional transfers  Short term goal 4: Pt will toilet with supervision.   Short term goal 5: Pt will perform bathroom mobility with supervision and AD PRN.; 3/27 min assist to manage walker in room to walk around bed-->chair  Patient Goals   Patient goals : \"to get my legs stronger\"       Therapy Time   Individual Concurrent Group Co-treatment   Time In 0903         Time Out 0942         Minutes 39         Timed Code Treatment Minutes: CHET Ordaz/L

## 2022-03-28 NOTE — DISCHARGE INSTR - DIET

## 2022-03-28 NOTE — PLAN OF CARE
Bed locked and in low position, bedside table and call light within reach, nonskid socks and bed alarm on, ambulating with standby assist with walker and steady gait.   Problem: Falls - Risk of:  Goal: Will remain free from falls  Description: Will remain free from falls  Outcome: Ongoing  Goal: Absence of physical injury  Description: Absence of physical injury  Outcome: Ongoing

## 2022-03-28 NOTE — PROGRESS NOTES
Pt's verbal and written discharge instructions given, all questions answered. Follow up appointments made and discussed. New medications reviewed. Pt left in stable condition via wheelchair to ARU. Report given to Greg Washington RN.  Electronically signed by Oziel Garcia RN on 3/28/2022 at 5:46 PM

## 2022-03-28 NOTE — PROGRESS NOTES
NURSING ASSESSMENT: [facility] OF Levels Beyond    Patient:Yulissa [address] Dx/HxKimo Foster [R53.81]   PRY:[birth year]  TALAT:[id number]  Date of Admit: 3/28/2022  Room #: 0161/0161-01    Subjective:   Patient admitted to [id number] from C3 via w/c. Alert and oriented x4. Oriented to room and call light system. Oriented to rehab routine and therapy schedules. Informed about care conferences and ordering of meals with PCA. Drug / Medication Review:   Medications were reviewed by RN at time of admission  []  No potential or actual clinically significant medication issues were noted. []  Potential or actual clinically significant medication issues were found and MD was notified. 4 Eyes Skin Assessment   The patient is being assessed for: Admission     I agree that 2 RN's have performed a thorough Head to Toe Skin Assessment on the patient. ALL assessment sites listed below have been assessed. Areas assessed by both nurses:   [x]   Head, Face, and Ears   [x]   Shoulders, Back, and Chest, Abdomen  [x]   Arms, Elbows, and Hands   [x]   Coccyx, Sacrum, and Ischium  [x]   Legs, Feet, and Heel     Does the Patient have Skin Breakdown?   No         Mike Prevention initiated:  Yes   Wound Care Orders initiated:  Yes       [address]  nurse consulted for Pressure Injury (Stage 3,4, Unstageable, DTI, NWPT, Complex wounds)and New or Established Ostomies:  Not Applicable    Primary Nurse eSignature: Boogie Cedillo RN  Co-signer eSignature:

## 2022-03-28 NOTE — CARE COORDINATION
CASE MANAGEMENT DISCHARGE SUMMARY      Discharge to: ARU    Precertification completed: [facility] Exemption Notification (HENS) completed: na    IMM given: (date) na    New Durable Medical Equipment ordered/agency: none    Transportation: in house     Confirmed discharge plan with:     Patient: yes     Family:  Yes at bedside     Facility/Agency, name:  [facility] faxed ARU   Phone number for report to facility: [phone]     RN, name: Katelin Sandeep    Note: Discharging nurse to complete [facility], reconcile [facility], and place final copy with patient's discharge packet. RN to ensure that written prescriptions for  Level II medications are sent with patient to the facility as per protocol.   Dee Arredondo RN

## 2022-03-29 PROCEDURE — 6370000000 HC RX 637 (ALT 250 FOR IP): Performed by: STUDENT IN AN ORGANIZED HEALTH CARE EDUCATION/TRAINING PROGRAM

## 2022-03-29 PROCEDURE — 92610 EVALUATE SWALLOWING FUNCTION: CPT

## 2022-03-29 PROCEDURE — 97110 THERAPEUTIC EXERCISES: CPT

## 2022-03-29 PROCEDURE — 97162 PT EVAL MOD COMPLEX 30 MIN: CPT

## 2022-03-29 PROCEDURE — 97166 OT EVAL MOD COMPLEX 45 MIN: CPT

## 2022-03-29 PROCEDURE — 2580000003 HC RX 258: Performed by: STUDENT IN AN ORGANIZED HEALTH CARE EDUCATION/TRAINING PROGRAM

## 2022-03-29 PROCEDURE — 1280000000 HC REHAB R&B

## 2022-03-29 PROCEDURE — 97116 GAIT TRAINING THERAPY: CPT

## 2022-03-29 PROCEDURE — 6360000002 HC RX W HCPCS: Performed by: STUDENT IN AN ORGANIZED HEALTH CARE EDUCATION/TRAINING PROGRAM

## 2022-03-29 PROCEDURE — 97535 SELF CARE MNGMENT TRAINING: CPT

## 2022-03-29 PROCEDURE — 92523 SPEECH SOUND LANG COMPREHEN: CPT

## 2022-03-29 RX ORDER — FLUDROCORTISONE ACETATE 0.1 MG/1
0.1 TABLET ORAL 2 TIMES DAILY
Status: DISCONTINUED | OUTPATIENT
Start: 2022-03-29 | End: 2022-03-30

## 2022-03-29 RX ORDER — SODIUM CHLORIDE 9 MG/ML
INJECTION, SOLUTION INTRAVENOUS ONCE
Status: COMPLETED | OUTPATIENT
Start: 2022-03-29 | End: 2022-03-29

## 2022-03-29 RX ORDER — TRAZODONE HYDROCHLORIDE 50 MG/1
50 TABLET ORAL NIGHTLY PRN
Status: DISCONTINUED | OUTPATIENT
Start: 2022-03-29 | End: 2022-04-09 | Stop reason: HOSPADM

## 2022-03-29 RX ADMIN — TRAZODONE HYDROCHLORIDE 50 MG: 50 TABLET ORAL at 21:00

## 2022-03-29 RX ADMIN — DONEPEZIL HYDROCHLORIDE 10 MG: 5 TABLET ORAL at 20:59

## 2022-03-29 RX ADMIN — IRON SUCROSE 200 MG: 20 INJECTION, SOLUTION INTRAVENOUS at 13:52

## 2022-03-29 RX ADMIN — FOLIC ACID 1 MG: 1 TABLET ORAL at 07:33

## 2022-03-29 RX ADMIN — FLUDROCORTISONE ACETATE 0.1 MG: 0.1 TABLET ORAL at 07:33

## 2022-03-29 RX ADMIN — Medication 1000 UNITS: at 07:33

## 2022-03-29 RX ADMIN — ENOXAPARIN SODIUM 30 MG: 100 INJECTION SUBCUTANEOUS at 07:33

## 2022-03-29 RX ADMIN — SODIUM CHLORIDE: 9 INJECTION, SOLUTION INTRAVENOUS at 14:54

## 2022-03-29 ASSESSMENT — PAIN SCALES - GENERAL
PAINLEVEL_OUTOF10: 0
PAINLEVEL_OUTOF10: 0

## 2022-03-29 NOTE — PLAN OF CARE
[id number]  [facility], [address] [place] [address]   [phone]    Vangie Novoa    : 1940  Acct #: [de-identified]  MRN: [id number]   PHYSICIAN:  Nubia Gilbert MD  Primary Problem    Patient Active Problem List   Diagnosis    Left knee pain    Left patella fracture    Contusion of left knee    Orthostatic hypotension    ARF (acute renal failure) ([facility] [address]    Anemia    Debility       Rehabilitation Diagnosis:     Debility [R53.81]       ADMIT DATE:3/28/2022    Patient Goals: \"to take care of myself without help from others\"    Admitting Impairments: Pt. Admitted s/p progressive weakness and falls d/t ROSE resulting in  Decreased functional mobility ; Decreased ADL status; Decreased balance;Decreased safe awareness;Decreased strength;Decreased endurance;Decreased posture;Decreased cognition;Decreased high-level IADLs  Barriers: availability of support, orthostatic hypotension  Participation: Good     CARE PLAN     NURSING:  Vangie Williamclaudy while on this unit will:     [x] Be continent of bowel and bladder     [x] Have an adequate number of bowel movements  [] Urinate with no urinary retention >300ml in bladder  [] Complete bladder protocol with wogn removal  [] Maintain O2 SATs at ___%  [x] Have pain managed while on ARU       [] Be pain free by discharge   [x] Have no skin breakdown while on ARU  [] Have improved skin integrity via wound measurements  [] Have no signs/symptoms of infection at the wound site  [x] Be free from injury during hospitalization   [] Complete education with patient/family with understanding demonstrated for:  [] Adjustment   [] Other:   Nursing interventions may include bowel/bladder training, education for medical assistive devices, medication education, O2 saturation management, energy conservation, stress management techniques, fall prevention, alarms protocol, seating and positioning, skin/wound care, pressure relief instruction,dressing changes,  infection protection, DVT prophylaxis, and/or assistance with in room safety with transfers to bed, toilet, wheelchair, shower as well as bathroom activities and hygiene. Patient/caregiver education for:   [] Disease/sustained injury/management      [x] Medication Use   [] Surgical intervention   [x] Safety   [x] Body mechanics and or joint protection   [] Health maintenance         PHYSICAL THERAPY:  Goals:                  Short term goals  Time Frame for Short term goals: 1 week (4/4)  Short term goal 1: Pt will be SBA with supine<>Sit. Short term goal 2: Pt will be SBA with transfers with RW or 4WW. Short term goal 3: Pt will ambulate 150 ft with SBA and RW or 4WW. Short term goal 4: Pt will negotiate 12 stairs with CGA. Long term goals  Time Frame for Long term goals : 2 weeks (4/11)  Long term goal 1: Pt will be indep with supine<>Sit. Long term goal 2: Pt will be mod I with transfers. Long term goal 3: Pt will ambulate 150 ft mod I.  Long term goal 4: Pt will negotiate 12 stairs with SBA. Long term goal 5: Pt will be indep with LE HEP to facilitate continued strengthening and activity tolerance at d/c. These goals were reviewed with this patient at the time of assessment and Ana Friend is in agreement.      Plan of Care: Pt to be seen 5 out of 7 days per week, 60   mins (exact) per day for 14 days (exact)                   Current Treatment Recommendations: Shanna Situ Re-education,Patient/Caregiver Education & Training,Balance Training,Gait Training,Home Exercise Program,Safety Education & Training,Stair training,Functional Mobility Training,Equipment Evaluation, Education, & procurement      OCCUPATIONAL THERAPY:  Goals:             Short term goals  Time Frame for Short term goals: 1 week (4/5 22)  Short term goal 1: CGA LB dressing with AE as needed  Short term goal 2: SBA functional transfers to/from EOB, chair, toilet, WIS  Short term goal 3: CGA grooming in stance at sink  Short term goal 4: CGA toileting  Short term goal 5: CGA total body bathing with AE :  Long term goals  Time Frame for Long term goals : 2 weeks (4/12/22)  Long term goal 1: Mod I total body dressing  Long term goal 2: Mod I toileting  Long term goal 3: Mod I functional transfers to/from EOB, chair, toilet  Long term goal 4: Mod I grooming in stance at sink  Long term goal 5: Supervision total body bathing. :    These goals were reviewed with this patient at the time of assessment and Trudi Armenta is in agreement    Plan of Care:  Pt to be seen 5 out of 7 days per week, 60   mins (exact) per day for 14 days (exact)  Current Treatment Recommendations: Strengthening,Functional Mobility Training,Endurance Training,Balance Training,Safety Education & Training,Self-Care / ADL,Patient/Caregiver Education & Training,Home Management Training,Equipment Evaluation, Education, & procurement,Gait Training,Cognitive/Perceptual Training,Positioning      SPEECH THERAPY: Goals will be left blank if speech is not following this patient.   Goals:             Short-term Goals  Timeframe for Short-term Goals: 10 days (04/07/2022)  Goal 1: Pt will complete graded recall tasks using compensatory strategies with 80% acc given min cues  Goal 2: Pt will complete executive function tasks (e.g. meds, time, money, etc) with 80% acc given min cues  Goal 3: Pt will complete verbal and visual reasoning tasks with 80% acc given min cues  Goal 4: Pt will complete problem solving and thought organization tasks with 80% acc given min cues              Timeframe for Short-term Goals: 10 days (04/07/2022)    Long-term Goals  Timeframe for Long-term Goals: 14 days (04/11/2022)  Goal 1: Pt will improve overall cognitive-linguistic abilities to promote safe and independent return home PLOF     These goals were reviewed with this patient at the time of assessment and Gemma Wells [facility] is in agreement    Plan of Care: Pt to be seen 5 out of 7 days per week, 60 mins (exact) per day for 14 days (exact)             Dysphagia:  n/a           Speech/Language/Cognition: Compensatory strategy training and carryover, recall/STM, problem solving, reasoning, exec function, thought organization, attention. CASE MANAGEMENT:  Goals:   Assist patient/family with discharge planning, patient/family counseling,   and coordination with insurance during ARU stay.     QIM / IRF MIKE SCORES:  ITEM CURRENT SCORE GOAL   Eating CARE Score: 5 Discharge Goal: Independent   Oral Hygiene CARE Score: 4 Discharge Goal: Independent   Toileting Hygiene CARE Score: 3 Discharge Goal: Independent   Shower/Bathe Self CARE Score: 3 Discharge Goal: Supervision or touching assistance   Upper Body Dressing CARE Score: 3 Discharge Goal: Independent   Lower Body Dressing CARE Score: 3 Discharge Goal: Independent   On/Off Footwear CARE Score: 2 Discharge Goal: Independent   Roll Left & Right CARE Score: 4 Discharge Goal: Independent   Sit to Lying  CARE Score: 3 Discharge Goal: Independent   Lying to Sitting EOB CARE Score: 3 Discharge Goal: Independent   Sit to Stand CARE Score: 3 Discharge Goal: Independent   Chair/Bed to Chair Transfer CARE Score: 3 Discharge Goal: Independent   Toilet Transfer CARE Score: 3 Discharge Goal: Independent   Car Transfer CARE Score: 88 Discharge Goal: Independent   Walk 10 Feet CARE Score: 3 Discharge Goal: Independent   Walk 50 Feet, 2 Turns CARE Score: 88 Discharge Goal: Independent   Walk 150 Feet CARE Score: 88 Discharge Goal: Independent   Walk 10 Feet, Uneven Surface CARE Score: 88 Discharge Goal: Independent   1 Step (Curb) CARE Score: 3 Discharge Goal: Independent   4 Steps CARE Score: 88 Discharge Goal: Supervision or touching assistance   12 Steps CARE Score: 88 Discharge Goal: Supervision or touching assistance    Object CARE Score: 88 Discharge Goal: Independent   Wheel 50 Vivek on 3/29  in a language the patient understands. Marko Oliveros has had the opportunity to include input with the therapy team.      I have reviewed this initial plan of care and agree with its contents:    Title   Name    Date    Time    Physician: Loli Reddy.  Mj Artis MD    Case Mgmt: Albania Walden RN    OT: Jayson Clark, OTR/L [id number]    PT: Taina Melgoza, PT, DPT    RN: Lucina Webber RN    ST: Loretta Elaine MA CCC-SLP    : Amberly Escamilla, OTR/L    Other:

## 2022-03-29 NOTE — PATIENT CARE CONFERENCE
[facility]  Inpatient Rehabilitation  Weekly Team Conference Note    Date: 3/29/2022  Patient Name: Ernestine Pickard        MRN: [id number]    : 1940  ([de-identified] y.o.)  Gender: female   Referring Practitioner: Dr. Malachi Hatchet, MD  Diagnosis: Frequent falls, Debility      Interventions to be utilized toward barriers to discharge, per discipline:  [address] observed barriers to dc: Decreased endurance, Upper extremity weakness and Lower extremity weakness  Nursing interventions: Proper body mechanics, Propper call light usage  Family Education: Yes  Fall Risk:  Yes      Physical therapy observed barriers to dc:    Baseline: mod I with 4WW   Current level: min A with RW 10 ft, curb step, and transfers   Barriers to DC: orthostatic hypotension   Needs in order to achieve dc home/next level of care: mod I with gait, transfers, 3 FELIX home, laundry in basement      Physical therapy interventions:  PT Equipment Recommendations  Equipment Needed: No  Other: pt owns 4WW      PHYSICAL THERAPY  Assessment: Pt is [de-identified] yo female who presents with diagnosis of debility and frequent falls. Pt mod I with mobility at baseline with 4WW. Grossly min A for transfers and gait training with RW this date. Min A for balance and posture. Limited this date d/t symptomatic orthostatic hypotension. Pt reporting feeling \"woozy\" after 10 ft of gait training. Tolerated all exercises well. Pt would benefit from continued skilled therapy to address deficits. Recommend home with initial 24-hr Sup and home PT at d/c. Occupational therapy observed barriers to dc:    Baseline: lives alone, Mod I with ADLs and most IADLs, ambulates with 4WW. Family provides transportation and daughter checks in almost daily. Pt reports atleast 12 falls in the last 6 months   Current level:  Max A LB dressing, Min A UB dressing, Mod A sponge bathing, Set up feeding, Supervision seated grooming, Mod A toileting, Min A stand pivot transfers for BSC.    Barriers to DC: Low BP, orthostatic BP   Needs in order to achieve dc home/next level of care: Supervision-Mod I with ADLs and functional transfers. Anticipate needs shower stool, toilet safety frame, possibly long handled dressing equipment    Occupational Therapy interventions:  Current Treatment Recommendations: Strengthening,Functional Mobility Training,Endurance Training,Balance Training,Safety Education & Training,Self-Care / ADL,Patient/Caregiver Education & Training,Home Management Training,Equipment Evaluation, Education, & procurement,Gait Training,Cognitive/Perceptual Training,Positioning      OCCUPATIONAL THERAPY  Assessment: Pt with fair tolerance of OT evaluation. Session limited due to orthostatic BP upon transitions to sitting and standing. ADL completed at bed level due to low BP. Pt reports that she has been falling frequently at home over the last 6 months. Pt admitted for weakness, falls, ROSE, anemia. At baseline pt lives alone, ambulates with 4WW, Mod I with ADLs and most IADLs, family provides transporttion and assistance with grocery shopping. Family checks on pt daily, but does not provide 24/7 supervision/assist. Pt currently requiring Max A to Mod A with LB ADLs and Patrica to Set up with seated UB ADLs. Pt requiring Min A with simple transfers this date. Pt able to tolerate up to 20 seconds of consecutive standing before requiring seated rest due to low BP and c/o ligheadedness/dizziness. Pt is far from baseline level of occupational performance. Recommend continued OT services to Dr. Jerry's Smooth Move component deficits and increased safety and independence with I/ADLs. Pt highly motivated to participate in therapy in order to regain functional independence. Speech therapy observed barriers to dc:    Baseline: Lives alone. Independent with meds and finances. Not an active . Current level: Moderate cognitive-linguistic deficits    Barriers to DC:  Hx of memory loss, severity of deficits, reduced insight into deficits   Needs in order to achieve dc home/next level of care: carryover of compensatory strategies, ability to manage meds and finances, improved insight and safety awareness     Speech Therapy interventions:  Dysphagia:  n/a  Speech/Language/Cognition: Compensatory strategy training and carryover, recall/STM, problem solving, reasoning, exec function, thought organization, attention. SPEECH THERAPY:  Dysphagia eval assessment:  Pt alert and cooperative, agreeable to eval. Pt upright in bed. Pt previously seen by ST during ARU admission in January 2020, but was seen for cog tx only. No previous hx of dysphagia, and pt denies concerns. Oral [facility] exam WFL. Adequate dentition. Thin liquid and regular solid PO trials presented. Adequate mastication, A-P transit, oral clearance. Suspect timely swallow and adequate laryngeal elevation upon palpation of anterior neck. No overt s/s of aspiration - no coughing, throat clearing, wet vocal quality.      SLP recommends continue regular solids, thin liquids, meds PO. Hold PO and contact SLP if s/s of aspiration develop. Pt's overall swallowing function appears WFL. Further speech therapy for dysphagia is not warranted. SLP to follow for cog tx only.       Cog eval assessment:   Pt alert and cooperative, agreeable to eval. Pt upright in bed. Pt previously seen by ST during ARU admission in January 2020 for cog tx. Pt had scored 20/30 points on the MoCA for initial eval. Pt reports continued concerns re: short term memory. Pt lives alone in a house. Pt is independent with med management, finances, cooking, cleaning, laundry. Pt has a \"daughter and son-in-law\" that live close by (not biological - close friend she considers a daughter). The son-in-law takes pt to the store and they both shop. Pt does not have any yard work. Pt is not an active . Pt is a retired . Pt enjoys cooking and playing with her cat.    SLP administered the [facility] Cognitive Assessment (MoCA) for cognitive-linguistic assessment this date. Pt scored 18/30 points (criterion [facility] >26), which is judged to be a moderate deficit [place] considering prior level of independence and high level of function. Pt's areas of strength include orientation, word finding, language,  comprehension, abstraction. Pt's areas of deficit include recall, thought organization, calculation, executive function. Pt will benefit from continued speech therapy to promote improvement in these areas and achieve safe and independent return home at [facility] if safe and able. Discussed areas of deficit, goals, POC for ST with pt who is in agreement for participation in therapy. NUTRITION  Weight: 107 lb 2.3 oz (48.6 kg) / Body mass index is 17.83 kg/m². Diet Order: ADULT DIET; Regular  ADULT ORAL NUTRITION SUPPLEMENT; Breakfast, Dinner; Standard High Calorie/High Protein Oral Supplement  PO Meals Eaten (%): 76 - 100%  Education: Not indicated     CASE MANAGEMENT  Assessment: [de-identified] yr old female. DX:Debility. Independent prior level of function. Patient uses a rollator at times. Lives in a ranch style home with one small step entry into home. Daughter lives a block and half from patient and can assist with care. Patient has been [facility] at one time. Life line brochure given while on acute side. Therapy recommendations pending assessment. CM will continue to support for discharge needs. Interdisciplinary Goals:   1.) Pt will implement use of compensatory memory strategies within functional environment across all disciplines to assist with daily recall of staff, schedule, and safety precautions. 2.) Pt will complete toileting with CGA  3.) Pt will ambulate to/from bathroom with SBA and RW.       Discharge Plan   Estimated discharge date: 4/9/2022  Destination: home health  Pass:No  Services at Discharge: Home Health  Physical Therapy, Occupational Therapy, Speech Therapy and Nursing   Equipment at Discharge: Continue to assess pending progress. Team Members Present at Conference:  : Teresa Eddy, INOCENCIO    Occupational Therapist: Kimberlee Morales OTR/L   Physical Therapist: Sam Beckwith PT  Speech Therapist: Johnny Doshi, [id number] [facility]  Nurse: Radha Johns, INOCENCIO  Dietician: Cecy Soto RD, LD  : Juan M Soto, OTR/L  Psychiatry: N/A    Family members present at conference: No      I led this team conference and I approve the established interdisciplinary plan of care as documented within the medical record of [facility].     MD: Juhi Frank MD 3/30/22, [id number]

## 2022-03-29 NOTE — CONSULTS
Comprehensive Nutrition Assessment    Type and Reason for Visit:  Initial,Consult    Nutrition Recommendations/Plan:   1. Continue regular diet and encourage PO intake   2. Modify ensure to BID   3. Monitor nutrition adequacy, pertinent labs, bowel habits, wt changes, and clinical progress    Nutrition Assessment:  New consult for ONS: [de-identified] y.o female w/ PMH of anemia, and memory loss admitted w/ weakness and falls. New ARU admit. On regular diet w/ PO intakes of mostly % of meals. Pt reports improved appetite and intake. Assisted pt in ordering meals for tomorrow. Reports UBW= 110 lb. No significant wt loss noted in EMR. Pt enjoys current ensure, will modify to BID per pt request. Continue to encourage PO intake, will continue to monitor. Malnutrition Assessment:  Malnutrition Status:  No malnutrition    Context:  Acute Illness       Estimated Daily Nutrient Needs:  Energy (kcal):  6726-5870 kcals/day; Weight Used for Energy Requirements:  Ideal (57 kg)     Protein (g):  57-68 g/day; Weight Used for Protein Requirements:  Ideal (1-1.2 g/kg)        Method Used for Fluid Requirements:  1 ml/kcal      Nutrition Related Findings:  No recent labs. No BM recorded. Wounds:  Surgical Incision       Current Nutrition Therapies:    ADULT DIET;  Regular  ADULT ORAL NUTRITION SUPPLEMENT; Breakfast, Lunch, Dinner; Standard High Calorie/High Protein Oral Supplement    Anthropometric Measures:  · Height: 5' 5\" (165.1 cm)  · Current Body Weight: 107 lb (48.5 kg)    · Usual Body Weight: 110 lb (49.9 kg) (per pt)     · Ideal Body Weight: 125 lbs; % Ideal Body Weight 85.6 %   · BMI: 17.8  · BMI Categories: Underweight (BMI less than 22) age over 72       Nutrition Diagnosis:   · Increased nutrient needs related to increase demand for energy/nutrients as evidenced by other (comment) (Increased therapy regimen)    Nutrition Interventions:   Food and/or Nutrient Delivery:  Modify Oral Nutrition Supplement,Continue Current Diet  Nutrition Education/Counseling:  Education not indicated   Coordination of Nutrition Care:  Continue to monitor while inpatient    Goals:  Consume 50% or greater of 3 meals per day and ONS during this admission.        Nutrition Monitoring and Evaluation:   Behavioral-Environmental Outcomes:  None Identified   Food/Nutrient Intake Outcomes:  Food and Nutrient Intake,Supplement Intake  Physical Signs/Symptoms Outcomes:  Constipation,Weight     Discharge Planning:    Continue current diet,Continue Oral Nutrition Supplement     Electronically signed by Park Reynolds MS, RD, LD on 3/29/22 at 1:49 PM EDT    Contact: Office: [phone]; [address]: [id number]

## 2022-03-29 NOTE — CARE COORDINATION
Case Management ARU Admission Assessment     Objective:  will complete initial assessment and review the role of Case Management while on the ARU. Patient will be educated on team conferences as well as discuss family training and how it is encouraged on the unit. Order for \"discharge planning\" has been addressed. Family Present: No  Primary : Bety Willoughby (Child)   [phone]    Admit date: 03/28/2022           Insurance: P:MEDICARE PART A AND B    S:[facility]/[employer] MEDICARE SUPP    Admitting diagnosis: Debility    Current home situation: Independent prior level of function. Patient uses a rollator at times. Lives in a ranch style home with one small step entry into home. Daughter lives a block and half from patient and can assist with care. Durable Medical Equipment at home:  Walker_Rollator_Cane__RTS__ BSC__Shower Chair__  02__ HHN__ CPAP__  BiPap__  Hospital Bed__ W/C___ Other__________    Meds to Beds program: yes, if needed    Services prior to admission: Skilled nursing facility at one time [facility]    Preference for [place] [id number] or Outpatient therapy: No preference    Patient's goal(s):Return home to prior level of function    Working or Volunteering prior to admission:  __Yes _x_No                        Accessibility to community resources/transportation:  Family:Daughter      Has patient experienced a recent loss or significant life event that would impact their care or ability to participate? _x_No  __Yes, please explain:    Has patient ever been treated for emotional disorder(s)? _x_No  __Yes, how does this affect current situation? Is patient and family coping appropriately with stressful events and this hospitalization?   _x_Yes  __No, please explain:    Support system at home and in the community:Family    Who will be the one to contact for family training: Bety Willoughby (Child)   [phone]    24 hour care on discharge: TBD    PCP: Sunday Mccrary MD    Pharmacy: [facility] meds to bed    Discharge plan/Summary:   spoke with patient at bedside to complete initial assessment and review the role of Case Management while on the ARU. Patient educated on team conferences as well as discuss family training and how it is encouraged on the unit. Independent prior level of function. Patient uses a rollator at times. Lives in a ranch style home with one small step entry into home. Daughter lives a block and half from patient and can assist with care. Patient has been to [facility] at one time. Therapy recs pending. Life line brochure given on acute side.  Case Management (CM) will continue to follow for discharge planning recommendations from the treatment team.

## 2022-03-29 NOTE — PLAN OF CARE
Problem: Nutrition  Goal: Optimal nutrition therapy  Note: Nutrition Problem #1: Increased nutrient needs  Intervention: Food and/or Nutrient Delivery: Modify Oral Nutrition Supplement,Continue Current Diet  Nutritional Goals: Consume 50% or greater of 3 meals per day and ONS during this admission.

## 2022-03-29 NOTE — PROGRESS NOTES
Speech Language Pathology  Facility/Department: [facility]   CLINICAL BEDSIDE SWALLOW EVALUATION    NAME: Vikram Dotson  : 1940  MRN: [id number]    ADMISSION DATE: 3/28/2022  ADMITTING DIAGNOSIS: has Left knee pain; Left patella fracture; Contusion of left knee; Orthostatic hypotension; ARF (acute renal failure) ([address] Anemia; and Debility on their problem list.  ONSET DATE: Admitted to [facility] 2022    Recent Chest Xray/CT of Chest: None recent    Date of Eval: 3/29/2022  Evaluating Therapist: SANYA Caldwell    Current Diet level:  Current Diet : Regular  Current Liquid Diet : Thin      Primary Complaint  Patient Complaint: Pt denies difficulty with swallowing. Reports no instances of choking, pain when swallowing, globus sensation, difficulty masticating solids. Occ coughing due to \"tickle\" in throat. Pt's goals: \"I want to get back to normal\" and \"improve short term memory\"    Pain: Denies    Vitals/labs:   Temp: n/a  SpO2: 98%  RR: 16  BP: 137/65  HR: 65      Reason for Referral  Vikram Dotson was referred for a bedside swallow evaluation to assess the efficiency of her swallow function, identify signs and symptoms of aspiration and make recommendations regarding safe dietary consistencies, effective compensatory strategies, and safe eating environment. Impression  Dysphagia Diagnosis: Swallow function appears grossly intact  Dysphagia Outcome Severity Scale: Level 6: Within functional limits/Modified independence     ARU MD H&P not completed at time of eval. The following is the H&P from 22 for inpatient admission: \"81 y.o. female who presented to [facility] with a several day hx of progressive weakness/falls. She has noted to have decreased PO intake and per family at bedside on admission has had some mild associated Mental Status Changes.  The patient denies any fever/chills or other signs/sxs of systemic illness or identifiable aggravating/alleviating factors\"    Pt alert and cooperative, agreeable to eval. Pt upright in bed. Pt previously seen by ST during ARU admission in January 2020, but was seen for cog tx only. No previous hx of dysphagia, and pt denies concerns. Oral [facility] exam WFL. Adequate dentition. Thin liquid and regular solid PO trials presented. Adequate mastication, A-P transit, oral clearance. Suspect timely swallow and adequate laryngeal elevation upon palpation of anterior neck. No overt s/s of aspiration - no coughing, throat clearing, wet vocal quality. SLP recommends continue regular solids, thin liquids, meds PO. Hold PO and contact SLP if s/s of aspiration develop. Pt's overall swallowing function appears WFL. Further speech therapy for dysphagia is not warranted. SLP to follow for cog tx only. Treatment Plan  Requires SLP Intervention: No (pt to be seen for cog tx only)  Duration/Frequency of Treatment: 5x/week for LOS  D/C Recommendations: To be determined       Recommended Diet and Intervention  Diet Solids Recommendation: Regular  Liquid Consistency Recommendation: Thin  Recommended Form of Meds: PO    Compensatory Swallowing Strategies  Compensatory Swallowing Strategies: Alternate solids and liquids;Eat/Feed slowly;Upright as possible for all oral intake;Remain upright for 30-45 minutes after meals;Small bites/sips    Treatment/Goals  N/A - pt to be seen for cog tx only    General  Chart Reviewed: Yes  Comments: Chart reviewed for completion of BSE  Subjective  Subjective: Pt agreeable to BSE  Behavior/Cognition: Alert; Cooperative;Pleasant mood  Respiratory Status: Room air  O2 Device: None (Room air)  Communication Observation: Functional  Follows Directions: Simple  Dentition: Adequate  Patient Positioning: Upright in bed  Baseline Vocal Quality: Normal  Volitional Cough: Strong  Prior Dysphagia History: none per EMR  Consistencies Administered: Reg solid; Thin - cup    Vision/Hearing  Vision  Vision: Impaired  Vision Exceptions: Wears glasses at all times; Wears glasses for reading  Hearing  Hearing: Exceptions to [facility]  Hearing Exceptions: Hard of hearing/hearing concerns    Oral Motor Deficits  Oral/Motor  Oral Motor: Within functional limits    Oral Phase Dysfunction  Oral Phase  Oral Phase: WNL  Oral Phase  Oral Phase - Comment: Adequate mastication, A-P transit, oral clearance     Indicators of Pharyngeal Phase Dysfunction   Pharyngeal Phase  Pharyngeal Phase: WNL  Pharyngeal Phase   Pharyngeal: Suspect timely swallow and adequate laryngeal elevation upon palpation of anterior neck. No overt s/s of aspiration. Prognosis  Prognosis  Prognosis for safe diet advancement: good  Individuals consulted  Consulted and agree with results and recommendations: Patient    Education  Patient Education: SLP edu pt re: role of SLP, rationale of BSE, risk of aspiration, safe swallow strategies, diet recs, POC  Patient Education Response: Verbalizes understanding  Safety Devices in place: Yes  Type of devices: All fall risk precautions in place; Bed alarm in place;Call light within reach; Left in bed       Therapy Time  SLP Individual Minutes  Time In: 1000  Time Out: 6904  Minutes: [address]  Speech Language Pathologist

## 2022-03-29 NOTE — PROGRESS NOTES
Occupational Therapy   Occupational Therapy Initial Assessment  Date: 3/29/2022   Patient Name: Max Park  MRN: [id number]     : 1940    Date of Service: 3/29/2022    Discharge Recommendations:  24 hour supervision or assist,Home with [address] [place] [address] to assess pending progress  OT Equipment Recommendations  Equipment Needed: Yes  Mobility Devices: ADL Assistive Devices  ADL Assistive Devices: Hand-held Shower; Shower Chair without back; Toilet Safety Frame;Long-handled Sponge;Dressing Stick; Sock-Aid Soft    Assessment   Performance deficits / Impairments: Decreased functional mobility ; Decreased ADL status; Decreased balance;Decreased safe awareness;Decreased strength;Decreased endurance;Decreased posture;Decreased cognition;Decreased high-level IADLs  Assessment: Pt with fair tolerance of OT evaluation. Session limited due to orthostatic BP upon transitions to sitting and standing. ADL completed at bed level due to low BP. Pt reports that she has been falling frequently at home over the last 6 months. Pt admitted for weakness, falls, ROSE, anemia. At baseline pt lives alone, ambulates with 4WW, Mod I with ADLs and most IADLs, family provides transporttion and assistance with grocery shopping. Family checks on pt daily, but does not provide 24/ supervision/assist. Pt currently requiring Max A to Mod A with LB ADLs and Patrica to Set up with seated UB ADLs. Pt requiring Min A with simple transfers this date. Pt able to tolerate up to 20 seconds of consecutive standing before requiring seated rest due to low BP and c/o ligheadedness/dizziness. Pt is far from baseline level of occupational performance. Recommend continued OT services to [facility] component deficits and increased safety and independence with I/ADLs. Pt highly motivated to participate in therapy in order to regain functional independence.   Prognosis: Good  Decision Making: Medium Complexity    OT Education: OT Role;Plan of Care;Precautions;Transfer Training;Energy Conservation; ADL Adaptive Strategies  Patient Education: home safety concerns, importance of monitoring vitals with positional changes, safety during bathing/dressing, BSC transfers, hand placement for transfers, ARU expectations  Barriers to Learning: memory  REQUIRES OT FOLLOW UP: Yes    Activity Tolerance  Activity Tolerance: Treatment limited secondary to medical complications (free text)  Activity Tolerance: Session limited due to orthostatic BP upon transitions to sitting and standing. OT evaluation completed at bed level. Pt able to tolerate ~20 seconds of consecutive standing before requiring seated rest. Vitals supine: 92/50, 71bpm. Vitals seated: 64/35 76bpm. Vitals standin/33 89bpm. Vitals at conclusion of session while pt is semi fowlers: 91/52, 69bpm. RN made aware of orthostatic BP  Safety Devices  Safety Devices in place: Yes  Type of devices: Nurse notified;Call light within reach; Left in bed;Bed alarm in place;Gait belt           Patient Diagnosis(es): There were no encounter diagnoses. has a past medical history of ARF (acute renal failure) ([facility] [address] has a past surgical history that includes joint replacement; Tonsillectomy; Intracapsular cataract extraction (Right, 2019); and Intracapsular cataract extraction (Left, 2019). Restrictions  Restrictions/Precautions  Restrictions/Precautions: General Precautions,Fall Risk  Position Activity Restriction  Other position/activity restrictions: Up with assist, monitor for orthostatic BP    Subjective   General  Chart Reviewed: Yes  Additional Pertinent Hx: ARF, catarct surgery, bilateral MARGOTH, urge incontinence, anemia, memory loss  Family / Caregiver Present: No    Diagnosis: Pt admitted on 3/25/22 due to progressive weakness, frequent falls, ROSE, anemia. Pt transfered to ARU on 3/28/22    Subjective  Subjective: Pt reports feeling well and denies pain.  Pt reports dizziness and lightheadedness upon transitioning to sitting and standing. Pt with orthostatic BP upon sitting/standing; RN made aware. General Comment  Comments: RN Agreeable to OT session. Patient Currently in Pain: Denies  Pre Treatment Pain Screening  Intervention List: Patient able to continue with treatment;Nurse/Physician notified  Vital Signs  Pulse: 74  Heart Rate Source: Monitor  BP Location: Left upper arm  Orthostatic B/P and Pulse?: Yes  Blood Pressure Lyin/50  Pulse Lyin PER MINUTE  Blood Pressure Sittin/35  Pulse Sittin PER MINUTE  Blood Pressure Standin/33  Patient Currently in Pain: Denies  Orthostatic B/P and Pulse?: Yes  Oxygen Therapy  SpO2: 99 %  Pulse Oximeter Device Mode: Intermittent  Pulse Oximeter Device Location: Finger  O2 Device: None (Room air)     Social/Functional History  Social/Functional History  Lives With: Alone  Type of Home: House  Home Layout: One level,Laundry in basement  Home Access: Stairs to enter without rails  Entrance Stairs - Number of Steps: 1  Bathroom Shower/Tub: Walk-in shower  Bathroom Toilet: Handicap height  Bathroom Equipment: Grab bars in shower  Home Equipment: 4 wheeled walker,Crutches  ADL Assistance: Independent  Homemaking Assistance: Independent  Homemaking Responsibilities: Yes  Meal Prep Responsibility: Primary  Laundry Responsibility: Primary  Cleaning Responsibility: Primary  Bill Paying/Finance Responsibility: Primary  Shopping Responsibility: Secondary  Ambulation Assistance: Independent  Transfer Assistance: Independent  Active : No  Occupation: Retired  Type of occupation: financial analysis  Additional Comments: Pt thinks she has had a dozen falls in the past 6 months most in the past couple months. Pt reports sometime she feels dizzy before she falls and other times her legs give out. Family assists with transportation and accompanies pt when grocery shopping. Daughter checks in daily, but no 24/7 supervision available. Objective   Vision: Impaired  Vision Exceptions: Wears glasses at all times  Hearing: Exceptions to [facility]  Hearing Exceptions: Hard of hearing/hearing concerns      Orientation  Overall Orientation Status: Within Normal Limits     Balance  Sitting Balance: Supervision  Standing Balance: Contact guard assistance  Standing Balance  Time: up to 20 seconds  Activity: standing prn for sponge bathing, BSC transfers, LB dressing, toileting. Comment: CGA with RW. Standing tolerance limited due to low BP upon standing. Toilet Transfers  Toilet - Technique: Stand pivot  Equipment Used: Standard bedside commode  Toilet Transfer: Minimal assistance  Toilet Transfers Comments: stand pivot EOB <> BSC using RW. Use of BSC for safety due to low BP upon standing. ADL  Feeding: Setup (assistance to open containers)  Grooming: Supervision;Setup (seated EOB to brush teeth, comb hair, wash hands/face. Pt unable to tolerate standing for grooming due to low BP)  UE Bathing: Stand by assistance;Setup;Verbal cueing (sponge bathing seated EOB. Pt unable to tolerate bathing in shower today due to low BP upon standing.)  LE Bathing: Setup; Increased time to complete; Moderate assistance;Verbal cueing (sponge bathing seated EOB. Pt unable to tolerate bathing in shower today due to low BP upon standing.)  UE Dressing: Minimal assistance;Setup;Verbal cueing; Increased time to complete (seated to doff/don shirt.)  LE Dressing: Setup;Verbal cueing; Increased time to complete;Maximum assistance (pt completes 3/8 steps to don pants, brief and socks. cues for safety due to low BP)  Toileting:  Moderate assistance;Setup (pt requiring partial assistance for thoroughness of pericare and clothing management.)    Tone RUE  RUE Tone: Normotonic  Tone LUE  LUE Tone: Normotonic  Coordination  Movements Are Fluid And Coordinated: No  Coordination and Movement description: Decreased speed;Left UE;Right UE        Bed mobility  Supine to Sit: Minimal assistance  Sit to Supine: Minimal assistance  Comment: bedrail     Transfers  Stand Step Transfers: Minimal assistance  Stand Pivot Transfers: Minimal assistance  Sit to stand: Minimal assistance  Stand to sit: Minimal assistance  Transfer Comments: to/from EOB using RW with cues for hand placement. Vision - Basic Assessment  Prior Vision: Wears glasses all the time  Visual History: Cataracts;Surgical intervention  Patient Visual Report: No visual complaint reported. Visual Field Cut: No  Oculo Motor Control: WNL    Cognition  Overall Cognitive Status: Exceptions  Arousal/Alertness: Appropriate responses to stimuli  Following Commands: Follows one step commands consistently; Follows multistep commands with repitition  Attention Span: Attends with cues to redirect  Memory: Decreased recall of precautions;Decreased short term memory  Safety Judgement: Decreased awareness of need for safety  Problem Solving: Decreased awareness of errors  Insights: Decreased awareness of deficits  Initiation: Does not require cues  Sequencing: Requires cues for some           Sensation  Overall Sensation Status: WNL          LUE AROM (degrees)  LUE AROM : WFL  Left Hand AROM (degrees)  Left Hand AROM: WFL  RUE AROM (degrees)  RUE AROM : WFL  Right Hand AROM (degrees)  Right Hand AROM: WFL     LUE Strength  Gross LUE Strength: Exceptions to [facility]  L Shoulder Flex: 4-/5  L Elbow Flex: 4/5  L Elbow Ext: 4/5  L Hand General: 4/5  RUE Strength  Gross RUE Strength: Exceptions to [facility]  R Shoulder Flex: 4-/5  R Elbow Flex: 4/5  R Elbow Ext: 4/5  R Hand General: 4/5                   Plan   Plan  Times per week: 5-7 days/week  Plan weeks: 2 weeks  Current Treatment Recommendations: Strengthening,Functional Mobility Training,Endurance Training,Balance Training,Safety Education & Training,Self-Care / ADL,Patient/Caregiver Education & Training,Home Management Training,Equipment Evaluation, Education, & procurement,Gait Training,Cognitive/Perceptual Training,Positioning    G-Code     OutComes Score                                                  AM-PAC Score             Goals  Short term goals  Time Frame for Short term goals: 1 week (4/5 22)  Short term goal 1: CGA LB dressing with AE as needed  Short term goal 2: SBA functional transfers to/from EOB, chair, toilet, WIS  Short term goal 3: CGA grooming in stance at sink  Short term goal 4: CGA toileting  Short term goal 5: CGA total body bathing with AE  Long term goals  Time Frame for Long term goals : 2 weeks (4/12/22)  Long term goal 1: Mod I total body dressing  Long term goal 2: Mod I toileting  Long term goal 3: Mod I functional transfers to/from EOB, chair, toilet  Long term goal 4: Mod I grooming in stance at sink  Long term goal 5: Supervision total body bathing.   Patient Goals   Patient goals : \"to take care of myself without help from others\"       Therapy Time   Individual Concurrent Group Co-treatment   Time In 0800         Time Out 0930         Minutes 90         Timed Code Treatment Minutes: 75 Minutes (15 minute evaluation)       Tuan Quiñones, OT

## 2022-03-29 NOTE — PLAN OF CARE
Problem: Neurological  Intervention: Speech Evaluation/treatment  Note: SLP completed evaluation. Please refer to notes in EMR. Problem: Nutrition  Intervention: Swallowing evaluation  Note: SLP completed evaluation. Please refer to notes in EMR. Intervention: Aspiration precautions  Note: SLP completed evaluation. Please refer to notes in EMR.        Loretta Elaine MA [address] [facility]  Speech Language Pathologist

## 2022-03-29 NOTE — PLAN OF CARE
Problem: Falls - Risk of:  Goal: Will remain free from falls  Description: Will remain free from falls  3/29/2022 0829 by Liliya Thomas RN  Outcome: Ongoing     Problem: Falls - Risk of:  Goal: Absence of physical injury  Description: Absence of physical injury  Outcome: Ongoing

## 2022-03-29 NOTE — H&P
Patient: Tatiana Harrington  [id number]  Date: 3/29/2022      Chief Complaint: Falls    History of Present Illness/Hospital Course:  Tatiana Harrington is an [de-identified]year old female with past medical history significant for urge incontinence, anemia, and memory loss who presented to [facility] on 3/25/22 with several days of progressive weakness and falls. She was found to have ROSE which resolved with IVF. Her hospital course was complicated by progressive anemia. CT abdomen and pelvis was negative for hematoma. She was admitted to [facility] on 3/28/22 due to functional deficits below her baseline. Today she is seen after working with therapy this morning. She had severe orthostatic hypotension and was symptomatic. She denies other acute complaints. Prior Level of Function:  Independent with self care, indoor ambulation, stairs, and functional cognition. Used wheeled walker. Current Level of Function:  Supervision for oral hygiene, sit to lying, sit to stand, walk 10 feet with a rolling walker  Impaired cognition     has a past medical history of ARF (acute renal failure) ([facility] [address] has a past surgical history that includes joint replacement; Tonsillectomy; Intracapsular cataract extraction (Right, 5/21/2019); and Intracapsular cataract extraction (Left, 7/2/2019). reports that she has never smoked. She has never used smokeless tobacco. She reports current alcohol use. She reports that she does not use drugs. family history includes Cancer in her mother.     Current Facility-Administered Medications   Medication Dose Route Frequency Provider Last Rate Last Admin    acetaminophen (TYLENOL) tablet 650 mg  650 mg Oral Q6H PRN Ghazal Garcias MD        Or    acetaminophen (TYLENOL) suppository 650 mg  650 mg Rectal Q6H PRN Ghazal Garcias MD        enoxaparin (LOVENOX) injection 30 mg  30 mg SubCUTAneous Daily Ghazal Garcias MD   30 mg at 03/29/22 0733    ondansetron (ZOFRAN-ODT) disintegrating tablet 4 mg  4 mg Oral Q8H PRN Izaiah Rhodes MD        Or    ondansetron [facility]) injection 4 mg  4 mg IntraVENous Q6H PRN Izaiah Rhodes MD        polyethylene glycol [facility]) packet 17 g  17 g Oral Daily PRN Izaiah Rhodes MD        aluminum & magnesium hydroxide-simethicone (MAALOX) 200-200-20 MG/5ML suspension 30 mL  30 mL Oral Q6H PRN Izaiah Rhodes MD        bisacodyl (DULCOLAX) suppository 10 mg  10 mg Rectal Daily PRN Izaiah Rhodes MD        donepezil (ARICEPT) tablet 10 mg  10 mg Oral Nightly Izaiah Rhodes MD   10 mg at 03/28/22 2019    fludrocortisone (FLORINEF) tablet 0.1 mg  0.1 mg Oral Daily Izaiah Rhodes MD   0.1 mg at 37/86/82 0979    folic acid (FOLVITE) tablet 1 mg  1 mg Oral Daily Izaiah Rhodes MD   1 mg at 03/29/22 3307    iron sucrose (VENOFER) 200 mg in sodium chloride 0.9 % 100 mL IVPB  200 mg IntraVENous Q24H Izaiah Rhodes MD        Vitamin D (CHOLECALCIFEROL) tablet 1,000 Units  1,000 Units Oral Daily Izaiah Rhodes MD   1,000 Units at 03/29/22 0160       Allergies   Allergen Reactions    Penicillins        Current Facility-Administered Medications   Medication Dose Route Frequency Provider Last Rate Last Admin    acetaminophen (TYLENOL) tablet 650 mg  650 mg Oral Q6H PRN Izaiah Rhodes MD        Or    acetaminophen (TYLENOL) suppository 650 mg  650 mg Rectal Q6H PRN Izaiah Rhodes MD        enoxaparin (LOVENOX) injection 30 mg  30 mg SubCUTAneous Daily Izaiah Rhodes MD   30 mg at 03/29/22 0733    ondansetron (ZOFRAN-ODT) disintegrating tablet 4 mg  4 mg Oral Q8H PRN Izaiah Rhodes MD        Or    ondansetron [facility]) injection 4 mg  4 mg IntraVENous Q6H PRN Izaiah Rhodes MD        polyethylene glycol [facility]) packet 17 g  17 g Oral Daily PRN Andriette Carmelo, MD        aluminum & magnesium hydroxide-simethicone (MAALOX) 200-200-20 MG/5ML suspension 30 mL  30 mL Oral Q6H PRN Yousif Serrano MD        bisacodyl (DULCOLAX) suppository 10 mg  10 mg Rectal Daily PRN Yousif Serrano MD        donepezil (ARICEPT) tablet 10 mg  10 mg Oral Nightly Yousif Serrano MD   10 mg at 03/28/22 2019    fludrocortisone (FLORINEF) tablet 0.1 mg  0.1 mg Oral Daily Yousif Serrano MD   0.1 mg at 27/06/07 1443    folic acid (FOLVITE) tablet 1 mg  1 mg Oral Daily Yousif Serrano MD   1 mg at 03/29/22 5384    iron sucrose (VENOFER) 200 mg in sodium chloride 0.9 % 100 mL IVPB  200 mg IntraVENous Q24H Yousif Serrano MD        Vitamin D (CHOLECALCIFEROL) tablet 1,000 Units  1,000 Units Oral Daily Yousif Serrano MD   1,000 Units at 03/29/22 1663         REVIEW OF SYSTEMS:   CONSTITUTIONAL: negative for fevers, chills, diaphoresis, activity change, appetite change, fatigue, night sweats and unexpected weight change. EYES: negative for blurred vision, eye discharge, visual disturbance and icterus. HEENT: negative for hearing loss, tinnitus, ear drainage, sinus pressure, nasal congestion, epistaxis and snoring. RESPIRATORY: Negative for hemoptysis, cough, sputum production. CARDIOVASCULAR: negative for chest pain, palpitations, exertional chest pressure/discomfort, edema, syncope. GASTROINTESTINAL: negative for nausea, vomiting, diarrhea, constipation, blood in stool and abdominal pain. GENITOURINARY: negative for frequency, dysuria, urinary incontinence, decreased urine volume, and hematuria. HEMATOLOGIC/LYMPHATIC: negative for easy bruising, bleeding and lymphadenopathy. ALLERGIC/IMMUNOLOGIC: negative for recurrent infections, angioedema, anaphylaxis and drug reactions. ENDOCRINE: negative for weight changes and diabetic symptoms including polyuria, polydipsia and polyphagia. MUSCULOSKELETAL: negative for pain, joint swelling, decreased range of motion and muscle weakness. NEUROLOGICAL: negative for headaches, slurred speech, unilateral weakness. PSYCHIATRIC/BEHAVIORAL: negative for hallucinations, behavioral problems, confusion and agitation. All pertinent positives are noted in the HPI. Physical Examination:  Vitals:   Patient Vitals for the past 24 hrs:   BP Temp Temp src Pulse Resp SpO2 Height Weight   03/29/22 0815 -- -- -- 74 -- 99 % -- --   03/29/22 0730 (!) 162/71 97.8 °F (36.6 °C) Oral 66 16 98 % -- --   03/28/22 1945 116/67 97.5 °F (36.4 °C) Oral 79 16 97 % -- --   03/28/22 1639 (!) 159/71 97.6 °F (36.4 °C) Oral 76 16 95 % 5' 5\" (1.651 m) 107 lb 2.3 oz (48.6 kg)     Psych: Stable mood, normal judgement, normal affect   Const: No distress  Eyes: Conjunctiva noninjected, no icterus noted; pupils equal, round, and reactive to light. HENT: Atraumatic, normocephalic; Oral mucosa moist  Neck: Trachea midline, neck supple. No thyromegaly noted. CV: Regular rate and rhythm, no murmur rub or gallop noted  Resp: Lungs clear to auscultation bilaterally, no rales wheezes or ronchi, no retractions. Respirations unlabored. GI: Soft, nontender, nondistended. Normal bowel sounds. No palpable masses. Neuro: Alert, oriented, appropriate. No cranial nerve deficits appreciated. Sensation intact to light touch. Motor examination reveals normal strength in all four limbs diffusely. No abnormalities with finger/nose or heel/shin noted. Reflexes normal and symmetric. Skin: Normal temperature and turgor  MSK: No joint abnormalities noted. Ext: No significant edema appreciated. No varicosities.     Lab Results   Component Value Date    WBC 6.3 03/28/2022    HGB 9.6 (L) 03/28/2022    HCT 28.2 (L) 03/28/2022    MCV 97.5 03/28/2022     03/28/2022     No results found for: INR, PROTIME  Lab Results   Component Value Date    CREATININE 0.8 03/28/2022    BUN 17 03/28/2022     03/28/2022    K 3.5 03/28/2022     03/28/2022    CO2 23 03/28/2022     Lab Results   Component Value Date    ALT 15 03/25/2022    AST 29 03/25/2022    ALKPHOS 89 03/25/2022    BILITOT 1.3 (H) 03/25/2022     Most recent EKG 3/25/22:  Sinus rhythm with 1st degree A-V blockOtherwise normal      IMAGING     CTAP 3/26/22:  1. Acute fractures of the right 4th through 12th ribs, with a   mild-to-moderate right hemothorax and associated partial consolidation of the   right lung base, most likely passive atelectasis, less likely aspiration or   pneumonia. 2. Small left pleural effusion with mild passive atelectasis within the left   lower lobe. 3. No acute traumatic soft tissue abnormality within the abdomen or pelvis,   within the limitations of a noncontrast study. 4. Age-indeterminate wedge compression deformities of T12, L1, and L2,   possibly acute.  Consider further characterization with a lumbar MRI.      CT head 3/25/22:  BRAIN/VENTRICLES: There is no acute intracerebral hemorrhage or extra-axial   fluid collection. There is mild cerebral and cerebellar atrophy with mild   periventricular and deep white matter small vessel ischemic disease.       ORBITS: Status post bilateral cataract removal.       SINUSES: The visualized paranasal sinuses and mastoid air cells are clear.       SOFT TISSUES/SKULL:  The calvarium is intact.      The above laboratory data have been reviewed. The above imaging data have been reviewed. The above medical testing have been reviewed. Body mass index is 17.83 kg/m². Barriers to Discharge: availability of support, orthostatic hypotension    POST ADMISSION PHYSICIAN EVALUATION  The patient has agreed to being admitted to our comprehensive inpatient  rehabilitation facility consisting of at least 180 minutes of therapy a day,  5 out of 7 days a week. The patient/family has a good understanding of our discharge process.  The  patient has potential to make improvement and is in need of at least two of  the following multidisciplinary therapies including but not limited to  physical, occupational, respiratory, and speech, nutritional services, wound care, and prosthetics and orthotics. Given the patients complex condition  and risk of further medical complications, rehabilitation services cannot be  safely provided at a lower level of care such as a skilled nursing facility. I have compared the patients medical and functional status at the time of the  preadmission screening and the same on this date, and there are no significant changes. By signing this document, I acknowledge that I have personally performed a  full physical examination on this patient within 24 hours of admission to  this inpatient rehabilitation facility and have determined the patient to be  able to tolerate the above course of treatment at an intensive level for a  reasonable period of time. I will be completing a detailed individualized  Plan of Care for this patient by day four of the patients stay based upon the  Preadmission Screen, this Post-Admission Evaluation, and the therapy  evaluations. Rehabilitation Diagnosis:  16.0, Debility (non-cardiac, non-pulmonary    Assessment and Plan:  Nely Tsai is an [de-identified]year old female with past medical history significant for urge incontinence, anemia, and memory loss who presented to [facility] on 3/25/22 with several days of progressive weakness and falls. She was admitted to [facility] on 3/28/22 due to functional deficits below her baseline. Debility  - will falls at home  - PT, OT    Cognitive Impairment  - continue aricept  - ST    Orthostatic Hypotension  - increase florinef to BID  - KENN hose  - give 1 L NS    Anemia  - no evidence of acute bleed during acute workup  - monitor and transfuse for Hb<7  - Venofer for 2 doses, then consider starting oral iron    ROSE, resolved  - improved after IV hydration  - encourage PO intake  - monitor renal function    Bowels: Schedule stool softener. Follow bowel movements. Enema or suppository if needed. Bladder: Check PVR x 3.   [address] if PVR > 200ml or if any volume is > 500 ml. Sleep: Trazodone provided prn. PPx  DVT: lovenox  GI: not indicated    Follow up appointments: PCP    Simona Williamson.  Hilary Vergara MD 3/29/2022, 8:56 AM

## 2022-03-29 NOTE — PROGRESS NOTES
Speech Language Pathology  Facility/Department: [facility] ARU  Initial Speech/Language/Cognitive Assessment    NAME: Tatiana Harrington  : 1940   MRN: [id number]  ADMISSION DATE: 3/28/2022  ADMITTING DIAGNOSIS: has Left knee pain; Left patella fracture; Contusion of left knee; Orthostatic hypotension; ARF (acute renal failure) ([address] Anemia; and Debility on their problem list.  DATE ONSET: Admitted to [facility] 2022    Date of Eval: 3/29/2022   Evaluating Therapist: SANYA Owusu    RECENT RESULTS  CT OF HEAD/MRI: 2022  Impression   1. No acute intracranial abnormality. Primary Complaint:   Pt's complaints: short term memory, occ word finding     Pt's goals: \"I want to get back to normal\" and \"improve short term memory\"     Pain: Denies     Vitals/labs:   Temp: n/a  SpO2: 98%  RR: 16  BP: 137/65  HR: 65    Assessment:  Cognitive Diagnosis: Moderate cognitive-linguistic deficits     ARU MD H&P not completed at time of eval. The following is the H&P from 22 for inpatient admission: \"81 y.o. female who presented to [facility] with a several day hx of progressive weakness/falls. She has noted to have decreased PO intake and per family at bedside on admission has had some mild associated Mental Status Changes. The patient denies any fever/chills or other signs/sxs of systemic illness or identifiable aggravating/alleviating factors\"     Pt alert and cooperative, agreeable to eval. Pt upright in bed. Pt previously seen by ST during ARU admission in 2020 for cog tx. Pt had scored 20/30 points on the MoCA for initial eval. Pt reports continued concerns re: short term memory. Pt lives alone in a house. Pt is independent with med management, finances, cooking, cleaning, laundry. Pt has a \"daughter and son-in-law\" that live close by (not biological - close friend she considers a daughter). The son-in-law takes pt to the store and they both shop. Pt does not have any yard work.  Pt is not an active . Pt is a retired . Pt enjoys cooking and playing with her cat. SLP administered the [facility] Cognitive Assessment (MoCA) for cognitive-linguistic assessment this date. Pt scored 18/30 points (criterion [facility] >26), which is judged to be a moderate deficit [place] considering prior level of independence and high level of function. Pt's areas of strength include orientation, word finding, language,  comprehension, abstraction. Pt's areas of deficit include recall, thought organization, calculation, executive function. Pt will benefit from continued speech therapy to promote improvement in these areas and achieve safe and independent return home at [facility] if safe and able. Discussed areas of deficit, goals, POC for ST with pt who is in agreement for participation in therapy. [place] Cognitive Assessment (MoCA)    Section Subtest Score Comments   Visuospatial/ Executive [place] making 0/1     Copy Cube 0/1     Clock 1/3 Inaccurate numbers and hands   Naming Picture naming 3/3    Attention Number repetition 2/2     Selective attention 1/1     Serial 7s 2/3 3x errors, 2x correct   Language Repetition 2/2     Fluency 0/1 Named 7 words in 1 min (criterion >11)   Abstraction Comparisons 2/2    Delayed Recall Recall 5 novel words 0/5 +4 given category cue  +1 given MC cue   Orientation Spatial and Temporal 5/6 Off by 1 for date    Total:  18/30       Recommendations:  Requires SLP Intervention: Yes  Duration/Frequency of Treatment: 5x/week for LOS  D/C Recommendations:  To be determined       Plan:   Goals:  Short-term Goals  Timeframe for Short-term Goals: 10 days (04/07/2022)  Goal 1: Pt will complete graded recall tasks using compensatory strategies with 80% acc given min cues  Goal 2: Pt will complete executive function tasks (e.g. meds, time, money, etc) with 80% acc given min cues  Goal 3: Pt will complete verbal and visual reasoning tasks with 80% acc given min cues  Goal 4: Pt will complete problem solving and thought organization tasks with 80% acc given min cues  Long-term Goals  Timeframe for Long-term Goals: 14 days (04/11/2022)  Goal 1: Pt will improve overall cognitive-linguistic abilities to promote safe and independent return home PLOF   Patient/family involved in developing goals and treatment plan: yes    Subjective:   Previous level of function and limitations: Independent   General  Chart Reviewed: Yes  Patient assessed for rehabilitation services?: Yes  Additional Pertinent Hx: hx of memory loss  Family / Caregiver Present: No  General Comment  Comments: Chart reviewed for completion of cog eval  Subjective  Subjective: Pt alert and cooperative, agreeable to cog eval  Social/Functional History  Lives With: Alone  Type of Home: House  ADL Assistance: Independent  Homemaking Assistance: Independent  Homemaking Responsibilities: Yes  Meal Prep Responsibility: Primary  Laundry Responsibility: Primary  Cleaning Responsibility: Primary  Bill Paying/Finance Responsibility: Primary  Shopping Responsibility: Secondary  Health Care Management: Primary  Ambulation Assistance: Independent  Active : No  Patient's  Info: pt's \"daughter or son-in-law\" will drive her  Education: 2 bachelors  Occupation: Retired  Type of occupation: financial analysis  Vision  Vision: Impaired  Vision Exceptions: Wears glasses at all times; Wears glasses for reading  Hearing  Hearing: Exceptions to [facility]  Hearing Exceptions: Hard of hearing/hearing concerns           Objective:     Oral/Motor  Oral Motor: Within functional limits    Auditory Comprehension  Comprehension: Within Functional Limits    Expression  Primary Mode of Expression: Verbal    Verbal Expression  Verbal Expression: Within functional limits    Written Expression  Dominant Hand: Right  Written Expression: Within Functional Limits    Motor Speech  Motor Speech:  Within Functional Limits    Pragmatics/Social Functioning  Pragmatics: Within functional limits    Cognition:      Orientation  Overall Orientation Status: Within Normal Limits  Attention  Attention: Exceptions to [facility]  Memory  Memory: Exceptions to [facility]  Short-term Memory: Moderate  Working Memory: Mild  Problem Solving  Problem Solving: Exceptions to [facility]  Complex Functional Tasks: To be assessed in therapy  Managing Finances: To be assessed in therapy  Managing Medications: To be assessed in therapy  Performing Discharge Planning: To be assessed in therapy  Verbal Reasoning Skills: Mild  Numeric Reasoning  Numeric Reasoning: Exceptions to [facility]   Calculations: Moderate  Money Management: To be assessed in therapy  Time: To be assessed in therapy  Abstract Reasoning  Abstract Reasoning: Within Functional Limits  Safety/Judgement  Safety/Judgement: Exceptions to [facility]  Impulsive: Mild    Prognosis:  Speech Therapy Prognosis  Prognosis: Good  Prognosis Considerations: Previous Level of Function  Additional Comments: hx of memory loss  Individuals consulted  Consulted and agree with results and recommendations: Patient    Education:  Patient Education: SLP edu pt re; role of SLP, rationale of cog eval, results of cog eval, goals, POC  Patient Education Response: Verbalizes understanding  Safety Devices in place: Yes  Type of devices: All fall risk precautions in place; Bed alarm in place;Call light within reach; Left in bed    Therapy Time:   Individual   Time In 1015   Time Out [address]  Speech Language Pathologist

## 2022-03-29 NOTE — PROGRESS NOTES
Physical Therapy    Facility/Department: [facility] ARU  Initial Assessment    NAME: Trudi Armenta  : 1940  MRN: [id number]    Date of Service: 3/29/2022    Discharge Recommendations:  Home with Home health PT,24 hour supervision or assist   PT Equipment Recommendations  Equipment Needed: No  Other: pt owns 4WW    Assessment   Body structures, Functions, Activity limitations: Decreased functional mobility ; Decreased posture;Decreased ADL status; Decreased coordination;Decreased balance;Decreased strength; Increased pain;Decreased endurance  Assessment: Pt is [de-identified] yo female who presents with diagnosis of debility and frequent falls. Pt mod I with mobility at baseline with 4WW. Grossly min A for transfers and gait training with RW this date. Min A for balance and posture. Limited this date d/t symptomatic orthostatic hypotension. Pt reporting feeling \"woozy\" after 10 ft of gait training. Tolerated all exercises well. Pt would benefit from continued skilled therapy to address deficits. Recommend home with initial 24-hr Sup and home PT at d/c. Treatment Diagnosis: Decreased functional mobility  Specific instructions for Next Treatment: progress mobility  Prognosis: Good  Decision Making: Medium Complexity  PT Education: Goals;Transfer Training;Disease Specific Education;PT Role;Plan of Care;General Safety;Gait Training  Patient Education: Pt educated on importance and safe OOB, mobility limitations d/t BP, Role of PT, safe use of RW -- pt verbalized understanding  Barriers to Learning: none  REQUIRES PT FOLLOW UP: Yes  Activity Tolerance  Activity Tolerance: Patient Tolerated treatment well;Treatment limited secondary to medical complications (free text)  Activity Tolerance: Limited d/t orthostatic hypotension. BP 93/55, HR 71 resting in bed; post gait training resting in bed BP 92/47, HR 79; SpO2 94% on RA       Patient Diagnosis(es): There were no encounter diagnoses.      has a past medical history of [facility] has a past surgical history that includes joint replacement; Tonsillectomy; Intracapsular cataract extraction (Right, 5/21/2019); and Intracapsular cataract extraction (Left, 7/2/2019). Restrictions  Restrictions/Precautions  Restrictions/Precautions: General Precautions,Fall Risk  Position Activity Restriction  Other position/activity restrictions: Up with assist, monitor for orthostatic BP  Vision/Hearing  Vision: Impaired  Vision Exceptions: Wears glasses at all times; Wears glasses for reading  Hearing: Exceptions to [facility]  Hearing Exceptions: Hard of hearing/hearing concerns     Subjective  General  Chart Reviewed: Yes  Patient assessed for rehabilitation services?: Yes  Additional Pertinent Hx: Acute fractures of the right 4th through 12th ribs  Response To Previous Treatment: Not applicable  Family / Caregiver Present: Yes (daughter)  Referring Practitioner: Dr. Carri Haley MD  Referral Date : 03/29/22  Diagnosis: Frequent falls, Debility  Follows Commands: Within Functional Limits  General Comment  Comments: Pt resting in bed on approach  Subjective  Subjective: pt agreeable to therapy  Pain Screening  Patient Currently in Pain: Denies    Orientation  Orientation  Overall Orientation Status: Within Functional Limits  Social/Functional History  Social/Functional History  Lives With: Alone  Type of Home: [address]: One level,Laundry in basement  Home Access: Stairs to enter without rails  Entrance Stairs - Number of Steps: 1  Bathroom Shower/Tub: Walk-in shower  Bathroom Toilet: Handicap height  Bathroom Equipment: Grab bars in [address]: 4 wheeled walker,Crutches  ADL Assistance: Independent  Homemaking Assistance: Independent  Homemaking Responsibilities: Yes  Meal Prep Responsibility: Primary  Laundry Responsibility: Primary  Cleaning Responsibility: Primary  Bill Paying/Finance Responsibility: Primary  Shopping Responsibility: [address] Management: Primary  Ambulation Assistance: Independent  Transfer Assistance: Independent  Active : No  Patient's  Info: pt's \"daughter or son-in-law\" will drive her  Education: 2 bachelors  Occupation: Retired  Type of occupation: financial analysis  Additional Comments: Pt thinks she has had a dozen falls in the past 6 months most in the past couple months. Pt reports sometime she feels dizzy before she falls and other times her legs give out. Family assists with transportation and accompanies pt when grocery shopping. Daughter checks in daily, but no 24/7 supervision available. Objective     AROM RLE (degrees)  RLE AROM: WFL  AROM LLE (degrees)  LLE AROM : WFL  Strength RLE  Strength RLE: Exception  R Hip Flexion: 4/5  R Knee Extension: 4+/5  R Ankle Dorsiflexion: 4+/5  R Ankle Plantar flexion: 5/5  Strength LLE  Strength LLE: Exception  L Hip Flexion: 4/5  L Knee Extension: 4+/5  L Ankle Dorsiflexion: 4+/5  L Ankle Plantar Flexion: 5/5        Sensation  Overall Sensation Status: WFL     Bed mobility  Rolling to Left: Stand by assistance  Rolling to Right: Stand by assistance  Supine to Sit: Minimal assistance  Sit to Supine: Minimal assistance  Comment: HOB flat, no use of rails; increased time     Transfers  Sit to Stand: Minimal Assistance (from EOB x 2 reps. Increased time)  Stand to sit: Contact guard assistance  Bed to Chair: Minimal assistance (with RW)  Car Transfer: Unable to assess (deferred d/t orthostatic hypotension)     Ambulation  Ambulation?: Yes  Ambulation 1  Surface: level tile  Device: Rolling Walker  Assistance: Minimal assistance  Quality of Gait: Min A for balance and for posture. Pt able to correct posture with min A and verbal/tactile cues. No overt LOB but mild unsteadiness. Assist to turn RW  Gait Deviations: Slow Bettie; Shuffles;Decreased step length;Decreased step height  Distance: 10 ft  Comments: Distance limited d/t orthostatic hypotension and pt reporting feeling \"woozy\"    Stairs/Curb  Stairs?: Yes  Stairs  Curbs: 6\"  Device: Rolling walker  Assistance: Minimal assistance  Comment: Min A to negotiate curb step, ascending forward and descending backward. Cues for sequencing. Min A for balance. Further stairs deferred d/t orthostatic hypotension     Balance  Sitting - Static: Good  Sitting - Dynamic: Good  Standing - Static: Fair  Standing - Dynamic: Fair;-     Exercises  Quad Sets: x 20 BLE  Heelslides: x 15 BLE -- limited ROM on left d/t reports of pain at quad insertion  Gluteal Sets: x 20 BLE  Hip Flexion: Seated marches x 15 BLE  Hip Abduction: Seated clamshell x 15 BLE; supine x 15 BLE  Knee Long Arc Quad: x 15 BLE  Ankle Pumps: x 15 BLE  Comments: Cues for technique and to slow pace of activity to promote control     Plan   Plan  Times per week: 5 out of 7 days/wk  Times per day: Daily  Specific instructions for Next Treatment: progress mobility  Current Treatment Recommendations: Teodoro Juarez Re-education,Patient/Caregiver Education & Training,Balance Training,Gait Training,Home Exercise Program,Safety Education & Training,Stair training,Functional Mobility Training,Equipment Evaluation, Education, & procurement  Safety Devices  Type of devices: All fall risk precautions in place,Call light within reach,Gait belt,Nurse notified,Left in bed              Goals  Short term goals  Time Frame for Short term goals: 1 week (4/4)  Short term goal 1: Pt will be SBA with supine<>Sit. Short term goal 2: Pt will be SBA with transfers with RW or 4WW. Short term goal 3: Pt will ambulate 150 ft with SBA and RW or 4WW. Short term goal 4: Pt will negotiate 12 stairs with CGA. Long term goals  Time Frame for Long term goals : 2 weeks (4/11)  Long term goal 1: Pt will be indep with supine<>Sit. Long term goal 2: Pt will be mod I with transfers.   Long term goal 3: Pt will ambulate 150 ft mod I.  Long term goal 4: Pt will negotiate 12 stairs with SBA. Long term goal 5: Pt will be indep with LE HEP to facilitate continued strengthening and activity tolerance at d/c.   Patient Goals   Patient goals : \"to get back to normal and be able to do the things I did before\"       Therapy Time   Individual Concurrent Group Co-treatment   Time In 1330         Time Out 1430         Minutes 60         Timed Code Treatment Minutes: [address] Majano, PT, DPT

## 2022-03-30 PROCEDURE — 97129 THER IVNTJ 1ST 15 MIN: CPT

## 2022-03-30 PROCEDURE — 97530 THERAPEUTIC ACTIVITIES: CPT

## 2022-03-30 PROCEDURE — 97535 SELF CARE MNGMENT TRAINING: CPT

## 2022-03-30 PROCEDURE — 2580000003 HC RX 258: Performed by: STUDENT IN AN ORGANIZED HEALTH CARE EDUCATION/TRAINING PROGRAM

## 2022-03-30 PROCEDURE — 97110 THERAPEUTIC EXERCISES: CPT

## 2022-03-30 PROCEDURE — 6360000002 HC RX W HCPCS: Performed by: STUDENT IN AN ORGANIZED HEALTH CARE EDUCATION/TRAINING PROGRAM

## 2022-03-30 PROCEDURE — 6370000000 HC RX 637 (ALT 250 FOR IP): Performed by: STUDENT IN AN ORGANIZED HEALTH CARE EDUCATION/TRAINING PROGRAM

## 2022-03-30 PROCEDURE — 1280000000 HC REHAB R&B

## 2022-03-30 PROCEDURE — 97130 THER IVNTJ EA ADDL 15 MIN: CPT

## 2022-03-30 RX ORDER — FLUDROCORTISONE ACETATE 0.1 MG/1
0.2 TABLET ORAL DAILY
Status: DISCONTINUED | OUTPATIENT
Start: 2022-03-31 | End: 2022-04-04

## 2022-03-30 RX ADMIN — Medication 1000 UNITS: at 08:02

## 2022-03-30 RX ADMIN — FLUDROCORTISONE ACETATE 0.1 MG: 0.1 TABLET ORAL at 08:02

## 2022-03-30 RX ADMIN — ENOXAPARIN SODIUM 30 MG: 100 INJECTION SUBCUTANEOUS at 08:02

## 2022-03-30 RX ADMIN — DONEPEZIL HYDROCHLORIDE 10 MG: 5 TABLET ORAL at 20:40

## 2022-03-30 RX ADMIN — TRAZODONE HYDROCHLORIDE 50 MG: 50 TABLET ORAL at 20:40

## 2022-03-30 RX ADMIN — IRON SUCROSE 200 MG: 20 INJECTION, SOLUTION INTRAVENOUS at 14:53

## 2022-03-30 RX ADMIN — FOLIC ACID 1 MG: 1 TABLET ORAL at 08:02

## 2022-03-30 ASSESSMENT — PAIN SCALES - GENERAL
PAINLEVEL_OUTOF10: 0
PAINLEVEL_OUTOF10: 0

## 2022-03-30 NOTE — PLAN OF CARE
Problem: Falls - Risk of:  Goal: Absence of physical injury  Description: Absence of physical injury  Outcome: Ongoing     Problem: Falls - Risk of:  Goal: Will remain free from falls  Description: Will remain free from falls  3/30/2022 0850 by Russ Ocampo RN  Outcome: Ongoing

## 2022-03-30 NOTE — PROGRESS NOTES
Speech Language Pathology  [facility]: ACUTE REHAB UNIT  SPEECH-LANGUAGE PATHOLOGY      [x] Daily  [] Weekly Care Conference Note  [] Discharge    Patient:Yulissa Ballesteros      :1940  CGI:[id number]  Rehab Dx/Hx: Debility [R53.81]    Precautions: falls  Home situation: lives alone, manages own meds/finances, household tasks, has two friends who she considers \"daughter and son in law\" live close by and assist when needed, not an active    ST Dx: [] Aphasia  [] Dysarthria  [] Apraxia   [] Oropharyngeal dysphagia [x] Cognitive Impairment  [] Other:   Date of Admit: 3/28/2022  Room #: 0161/0161-01    Current functional status (updated daily):         Pt being seen for : [] Speech/Language Treatment  [] Dysphagia Treatment [x] Cognitive Treatment  [] Other:  Communication: [x]WFL  [] Aphasia  [] Dysarthria  [] Apraxia  [] Pragmatic Impairment [] Non-verbal  [] Hearing Loss  [] Other:   Cognition: [] WFL  [] Mild  [x] Moderate  [] Severe [] Unable to Assess  [] Other:  Memory: [] WFL  [] Mild  [x] Moderate  [] Severe [] Unable to Assess  [] Other:  Behavior: [x] Alert  [x] Cooperative  [x]  Pleasant  [] Confused  [] Agitated  [] Uncooperative  [] Distractible [] Motivated  [] Self-Limiting [] Anxious  [] Other:  Endurance:  [x] Adequate for participation in SLP sessions  [] Reduced overall  [] Lethargic  [] Other:  Safety: [x] No concerns at this time  [] Reduced insight into deficits  []  Reduced safety awareness [] Not following call light procedures  [] Unable to Assess  [] Other:    Current Diet Order:ADULT DIET; Regular  ADULT ORAL NUTRITION SUPPLEMENT; Breakfast, Dinner; Standard High Calorie/High Protein Oral Supplement  Swallowing Precautions:  Alternate solids and liquids;Eat/Feed slowly;Upright as possible for all oral intake;Remain upright for 30-45 minutes after meals;Small bites/sips        Date: 3/30/2022      Tx session 1  [id number] Tx session 2  All tx needs met in session 1    Total Timed Code Min 60    Total Treatment Minutes 60    Individual Treatment Minutes 60    Group Treatment Minutes 0 0   Co-Treat Minutes 0 0   Variance/Reason:  0    Pain Denies     Pain Intervention [] RN notified  [] Repositioned  [] Intervention offered and patient declined  [x] N/A  [] Other: [] RN notified  [] Repositioned  [] Intervention offered and patient declined  [] N/A  [] Other:   Subjective     Pt alert and oriented, cooperative and agreeable to participate in therapy. Pt seen sitting upright in bedside chair.      Objective:  Goals     Short-term Goals  Timeframe for Short-term Goals: 10 days (04/07/2022)    Goal 1: Pt will complete graded recall tasks using compensatory strategies with 80% acc given min cues    Functional recall/word progression:  -pt given a set of three words and asked to repeat them in order that they occur  -ex: Sept, May, Nov= May, Sept, Nov  -75% acc indep improving to 100% acc given min cues  -pt benefited from use of repetition strategy      Goal 2: Pt will complete executive function tasks (e.g. meds, time, money, etc) with 80% acc given min cues   Moderately complex time word problems:  -40% acc indep improving to 70% acc given mod cues     Goal 3: Pt will complete verbal and visual reasoning tasks with 80% acc given min cues   Odd one out picture card task:  -pt given a picture card with four items  -pt asked to determine which item didn't belong and why  -66% acc indep improving to 100% acc given mod cues       Goal 4: Pt will complete problem solving and thought organization tasks with 80% acc given min cues   4 step picture card sequencing task:  -90% acc indep improving to 100% acc given min cues     6 step picture card sequencing task:  -75% acc indep improving to 100% acc given mod cues       Other areas targeted: N/A    Education:   Edu provided re: compensatory memory, reasoning, thought organization strategies, rationale for tx tasks provided     Safety Devices: [x] Call light within reach  [x] Chair alarm activated  [] Bed alarm activated  [] Other: [] Call light within reach  [] Chair alarm activated  [] Bed alarm activated  [] Other:    Assessment: Pt pleasant and cooperative, agreeable to participate. Pt completed functional recall task with 75% acc indep,benefiting from use of repetition strategy. Pt demonstrated increased difficulty with time word problems completing with 40% acc, requiring problems to be broken down into simplified parts for improved accuracy. Pt also demonstrated difficulty with reasoning task determining which item didn't belong with 66% acc. Pt did better with four and six step sequencing cards, requiring occasional cues for thought organization strategies and attention to details. Pt motivated to improve. Continue goals above. Plan: Continue as per plan of care. Additional Information:     Barriers toward progress:None   Discharge recommendations:  [] Home independently  [] Home with assistance []  24 hour supervision  [] ECF [x] Other: defer to PT/OT recs  Continued Tx Upon Discharge: ? [] Yes [] No [x] TBD based on progress while on ARU [] Vital Stim indicated [] Other:   Estimated discharge date: 04/09/22    Interventions used this date:  [] Speech/Language Treatment  [] Instruction in HEP [] Group [] Dysphagia Treatment [x] Cognitive Treatment   [] Other: Total Time Breakdown / Charges    Time in Time out Total Time / units   Cognitive Tx 1330 1430 60 min/ 4 units    Speech Tx -- -- --   Dysphagia Tx -- -- --       Electronically Signed by     Kyle Armstrong. A CCC-SLP  Speech-Language Pathologist  [id number]

## 2022-03-30 NOTE — PLAN OF CARE
Problem: Falls - Risk of:  Goal: Will remain free from falls  Description: Will remain free from falls  3/29/2022 2207 by Vivienne Cohen RN  Outcome: Ongoing  3/29/2022 0829 by Nestor Berry RN  Outcome: Ongoing

## 2022-03-30 NOTE — PROGRESS NOTES
history of ARF (acute renal failure) ([facility] has a past surgical history that includes joint replacement; Tonsillectomy; Intracapsular cataract extraction (Right, 5/21/2019); and Intracapsular cataract extraction (Left, 7/2/2019). Restrictions  Restrictions/Precautions  Restrictions/Precautions: General Precautions,Fall Risk  Position Activity Restriction  Other position/activity restrictions: Up with assist, monitor for orthostatic BP  Subjective   General  Chart Reviewed: Yes  Additional Pertinent Hx: Acute fractures of the right 4th through 12th ribs  Response To Previous Treatment: Patient with no complaints from previous session. Family / Caregiver Present: Yes (daughter)  Referring Practitioner: Dr. Clifford Hester MD  Subjective  Subjective: pt agreeable to therapy  General Comment  Comments: Pt resting in bed on approach  Pain Screening  Patient Currently in Pain: Denies        Objective   Bed mobility  Supine to Sit: Stand by assistance (HOB elevated, use of rails)     Transfers  Sit to Stand: Minimal Assistance;Contact guard assistance (cues for safe hand placement standing to RW)  Stand to sit: Contact guard assistance  Bed to Chair: Minimal assistance (bed to w/c with RW)  Car Transfer: Minimal Assistance     Ambulation  Ambulation?: Yes  More Ambulation?: Yes  Ambulation 1  Surface: level tile;uneven  Device: Rolling Walker  Assistance: Minimal assistance  Quality of Gait: Cues for balance and to maintain ROBERT within RW. Forward flexed with downward gaze. Decreased step length and foot clearance BLE  Gait Deviations: Slow Bettie; Shuffles;Decreased step length;Decreased step height  Distance: 10 ft of level surface + 10 ft of turf surface  Comments: Distance limited d/t low BP with standing    Stairs/Curb  Stairs?: Yes  Stairs  # Steps : 4  Stairs Height: 6\"  Rails: Bilateral  Assistance: Minimal assistance  Comment: min A for balance. Step to pattern ascending and descending.  Increased time for descending       Balance  Comments: Min A for balance to pick object up from ground. RW for support                     Goals  Short term goals  Time Frame for Short term goals: 1 week (4/4)  Short term goal 1: Pt will be SBA with supine<>Sit. Short term goal 2: Pt will be SBA with transfers with RW or 4WW. Short term goal 3: Pt will ambulate 150 ft with SBA and RW or 4WW. Short term goal 4: Pt will negotiate 12 stairs with CGA. Long term goals  Time Frame for Long term goals : 2 weeks (4/11)  Long term goal 1: Pt will be indep with supine<>Sit. Long term goal 2: Pt will be mod I with transfers. Long term goal 3: Pt will ambulate 150 ft mod I.  Long term goal 4: Pt will negotiate 12 stairs with SBA. Long term goal 5: Pt will be indep with LE HEP to facilitate continued strengthening and activity tolerance at d/c. Patient Goals   Patient goals : \"to get back to normal and be able to do the things I did before\"    Plan    Plan  Times per week: 5 out of 7 days/wk  Times per day: Daily  Specific instructions for Next Treatment: progress mobility  Current Treatment Recommendations: Natalya Llamas Re-education,Patient/Caregiver Education & Training,Balance Training,Gait Training,Home Exercise Program,Safety Education & Training,Stair training,Functional Mobility Training,Equipment Evaluation, Education, & procurement  Safety Devices  Type of devices:  All fall risk precautions in place,Call light within reach,Chair alarm in place,Gait belt,Patient at risk for falls,Nurse notified,Left in chair     Therapy Time   Individual Concurrent Group Co-treatment   Time In 1000         Time Out 1100         Minutes 60         Timed Code Treatment Minutes: [address], PT, DPT

## 2022-03-30 NOTE — PROGRESS NOTES
Amber Green  3/30/2022  [id number]    Chief Complaint: Debility    Subjective:   No acute events overnight. Today Mallory Meneses is seen on her way back from therapy. She reports that she is feeling well and has not been light headed with therapy today. She denies acute complaints. ROS: denies f/c, n/v, cp, sob  Objective:  Patient Vitals for the past 24 hrs:   BP Temp Temp src Pulse Resp SpO2   03/30/22 0800 104/60 99.6 °F (37.6 °C) Oral 73 16 98 %   03/29/22 2045 (!) 173/82 97.8 °F (36.6 °C) Oral 72 16 96 %     Gen: No distress, pleasant. HEENT: Normocephalic, atraumatic. CV: extremities well perfused  Resp: No respiratory distress. Abd: Soft, nontender   Ext: No edema. Neuro: Alert, oriented, appropriately interactive. Wt Readings from Last 3 Encounters:   03/28/22 107 lb 2.3 oz (48.6 kg)   03/25/22 110 lb (49.9 kg)   01/09/20 113 lb 12.8 oz (51.6 kg)       Laboratory data:   Lab Results   Component Value Date    WBC 6.3 03/28/2022    HGB 9.6 (L) 03/28/2022    HCT 28.2 (L) 03/28/2022    MCV 97.5 03/28/2022     03/28/2022       Lab Results   Component Value Date     03/28/2022    K 3.5 03/28/2022     03/28/2022    CO2 23 03/28/2022    BUN 17 03/28/2022    CREATININE 0.8 03/28/2022    GLUCOSE 91 03/28/2022    CALCIUM 8.8 03/28/2022        Therapy progress:  PT  Position Activity Restriction  Other position/activity restrictions:  Up with assist, monitor for orthostatic BP  Objective     Sit to Stand: Minimal Assistance,Contact guard assistance (cues for safe hand placement standing to RW)  Stand to sit: Contact guard assistance  Bed to Chair: Minimal assistance (bed to w/c with RW)  Device: Rolling Walker  Assistance: Minimal assistance  Distance: 10 ft of level surface + 10 ft of turf surface  OT  PT Equipment Recommendations  Equipment Needed: No  Other: pt owns 2OX  Toilet - Technique: Stand pivot  Equipment Used: Standard bedside commode  Toilet Transfers Comments: stand pivot EOB <> BSC using RW. Use of BSC for safety due to low BP upon standing. Assessment        SLP  Current Diet : Regular  Current Liquid Diet : Thin  Diet Solids Recommendation: Regular  Liquid Consistency Recommendation: Thin    Body mass index is 17.83 kg/m². Assessment and Plan:  Marko Oliveros is an [de-identified]year old female with past medical history significant for urge incontinence, anemia, and memory loss who presented to [facility] with several days of progressive weakness and falls. She was admitted to [facility] on 3/28/22 due to functional deficits below her baseline.      Debility  - will falls at home  - PT, OT     Cognitive Impairment  - continue aricept  - ST     Orthostatic Hypotension  - s/p 1 L NS 3/29  - increase florinef to 0.2 mg daily  - KENN hose     Anemia  - no evidence of acute bleed during acute workup  - monitor and transfuse for Hb<7  - Venofer for 2 doses, then consider starting oral iron     ROSE, resolved  - improved after IV hydration  - encourage PO intake  - monitor renal function     Bowels: Schedule stool softener. Follow bowel movements. Enema or suppository if needed.      Bladder: Check PVR x 3. [facility] if PVR > 200ml or if any volume is > 500 ml.      Sleep: Trazodone provided prn.      PPx  DVT: lovenox  GI: not indicated     Follow up appointments: PCP    Services/DME: home PT, OT, ST; DME TBD  EDOD: 4/9/22    Interdisciplinary team conference was held today with entire rehab treatment team including PT, OT, SLP, Dietician, RN, and SW. Discussion focused on progress toward rehab goals and discharge planning. Barriers: orthostatic hypotension. Total treatment time >35 min with greater than 50% spent in care coordination. [facility] MD 3/30/2022, 3:24 PM

## 2022-03-31 LAB
ANION GAP SERPL CALCULATED.3IONS-SCNC: 11 MMOL/L (ref 3–16)
BASOPHILS ABSOLUTE: 0 K/UL (ref 0–0.2)
BASOPHILS RELATIVE PERCENT: 0.5 %
BUN BLDV-MCNC: 20 MG/DL (ref 7–20)
CALCIUM SERPL-MCNC: 8.5 MG/DL (ref 8.3–10.6)
CHLORIDE BLD-SCNC: 105 MMOL/L (ref 99–110)
CO2: 24 MMOL/L (ref 21–32)
CREAT SERPL-MCNC: 0.7 MG/DL (ref 0.6–1.2)
EOSINOPHILS ABSOLUTE: 0.2 K/UL (ref 0–0.6)
EOSINOPHILS RELATIVE PERCENT: 2.5 %
GFR AFRICAN AMERICAN: >60
GFR NON-AFRICAN AMERICAN: >60
GLUCOSE BLD-MCNC: 93 MG/DL (ref 70–99)
HCT VFR BLD CALC: 26.7 % (ref 36–48)
HEMOGLOBIN: 9.1 G/DL (ref 12–16)
LYMPHOCYTES ABSOLUTE: 1.3 K/UL (ref 1–5.1)
LYMPHOCYTES RELATIVE PERCENT: 19.2 %
MAGNESIUM: 1.4 MG/DL (ref 1.8–2.4)
MCH RBC QN AUTO: 33.3 PG (ref 26–34)
MCHC RBC AUTO-ENTMCNC: 34.1 G/DL (ref 31–36)
MCV RBC AUTO: 97.9 FL (ref 80–100)
MONOCYTES ABSOLUTE: 0.5 K/UL (ref 0–1.3)
MONOCYTES RELATIVE PERCENT: 7.6 %
NEUTROPHILS ABSOLUTE: 4.7 K/UL (ref 1.7–7.7)
NEUTROPHILS RELATIVE PERCENT: 70.2 %
PDW BLD-RTO: 15 % (ref 12.4–15.4)
PLATELET # BLD: 375 K/UL (ref 135–450)
PMV BLD AUTO: 6.1 FL (ref 5–10.5)
POTASSIUM REFLEX MAGNESIUM: 3.4 MMOL/L (ref 3.5–5.1)
RBC # BLD: 2.73 M/UL (ref 4–5.2)
SODIUM BLD-SCNC: 140 MMOL/L (ref 136–145)
WBC # BLD: 6.7 K/UL (ref 4–11)

## 2022-03-31 PROCEDURE — 97535 SELF CARE MNGMENT TRAINING: CPT

## 2022-03-31 PROCEDURE — 85025 COMPLETE CBC W/AUTO DIFF WBC: CPT

## 2022-03-31 PROCEDURE — 6370000000 HC RX 637 (ALT 250 FOR IP): Performed by: STUDENT IN AN ORGANIZED HEALTH CARE EDUCATION/TRAINING PROGRAM

## 2022-03-31 PROCEDURE — 80048 BASIC METABOLIC PNL TOTAL CA: CPT

## 2022-03-31 PROCEDURE — 83735 ASSAY OF MAGNESIUM: CPT

## 2022-03-31 PROCEDURE — 97530 THERAPEUTIC ACTIVITIES: CPT

## 2022-03-31 PROCEDURE — 36415 COLL VENOUS BLD VENIPUNCTURE: CPT

## 2022-03-31 PROCEDURE — 97110 THERAPEUTIC EXERCISES: CPT

## 2022-03-31 PROCEDURE — 97116 GAIT TRAINING THERAPY: CPT

## 2022-03-31 PROCEDURE — 6360000002 HC RX W HCPCS: Performed by: STUDENT IN AN ORGANIZED HEALTH CARE EDUCATION/TRAINING PROGRAM

## 2022-03-31 PROCEDURE — 97130 THER IVNTJ EA ADDL 15 MIN: CPT

## 2022-03-31 PROCEDURE — 97129 THER IVNTJ 1ST 15 MIN: CPT

## 2022-03-31 PROCEDURE — 1280000000 HC REHAB R&B

## 2022-03-31 RX ORDER — POTASSIUM CHLORIDE 20 MEQ/1
20 TABLET, EXTENDED RELEASE ORAL ONCE
Status: COMPLETED | OUTPATIENT
Start: 2022-03-31 | End: 2022-03-31

## 2022-03-31 RX ORDER — FERROUS SULFATE 325(65) MG
325 TABLET ORAL 2 TIMES DAILY WITH MEALS
Status: DISCONTINUED | OUTPATIENT
Start: 2022-03-31 | End: 2022-04-09 | Stop reason: HOSPADM

## 2022-03-31 RX ORDER — LANOLIN ALCOHOL/MO/W.PET/CERES
400 CREAM (GRAM) TOPICAL ONCE
Status: COMPLETED | OUTPATIENT
Start: 2022-03-31 | End: 2022-03-31

## 2022-03-31 RX ADMIN — POTASSIUM CHLORIDE 20 MEQ: 20 TABLET, EXTENDED RELEASE ORAL at 11:08

## 2022-03-31 RX ADMIN — FOLIC ACID 1 MG: 1 TABLET ORAL at 08:28

## 2022-03-31 RX ADMIN — FERROUS SULFATE TAB 325 MG (65 MG ELEMENTAL FE) 325 MG: 325 (65 FE) TAB at 11:08

## 2022-03-31 RX ADMIN — Medication 400 MG: at 11:08

## 2022-03-31 RX ADMIN — DONEPEZIL HYDROCHLORIDE 10 MG: 5 TABLET ORAL at 19:54

## 2022-03-31 RX ADMIN — FLUDROCORTISONE ACETATE 0.2 MG: 0.1 TABLET ORAL at 08:28

## 2022-03-31 RX ADMIN — Medication 1000 UNITS: at 08:28

## 2022-03-31 RX ADMIN — FERROUS SULFATE TAB 325 MG (65 MG ELEMENTAL FE) 325 MG: 325 (65 FE) TAB at 17:04

## 2022-03-31 RX ADMIN — ENOXAPARIN SODIUM 30 MG: 100 INJECTION SUBCUTANEOUS at 08:28

## 2022-03-31 ASSESSMENT — PAIN SCALES - GENERAL: PAINLEVEL_OUTOF10: 0

## 2022-03-31 NOTE — PROGRESS NOTES
Stefan Jolly  3/31/2022  [id number]    Chief Complaint: Debility    Subjective:   No acute events overnight. Today Rodolfo Pham is seen working with therapy on getting a shower. She reports feeling some light headedness when standing. She reports that this has been going on for 3 years. She denies other acute complaints at this time. ROS: denies f/c, n/v, cp, sob  Objective:  Patient Vitals for the past 24 hrs:   BP Temp Temp src Pulse Resp SpO2   03/31/22 0815 (!) 91/54 97.6 °F (36.4 °C) Oral 75 17 95 %   03/30/22 2030 (!) 168/80 98 °F (36.7 °C) Oral 71 16 95 %     Gen: No distress, pleasant. HEENT: Normocephalic, atraumatic. CV: extremities well perfused  Resp: No respiratory distress. Abd: nondistended  Ext: No edema. Neuro: Alert, oriented, appropriately interactive. Speech fluent without aphasia     Wt Readings from Last 3 Encounters:   03/28/22 107 lb 2.3 oz (48.6 kg)   03/25/22 110 lb (49.9 kg)   01/09/20 113 lb 12.8 oz (51.6 kg)       Laboratory data:   Lab Results   Component Value Date    WBC 6.7 03/31/2022    HGB 9.1 (L) 03/31/2022    HCT 26.7 (L) 03/31/2022    MCV 97.9 03/31/2022     03/31/2022       Lab Results   Component Value Date     03/31/2022    K 3.4 03/31/2022     03/31/2022    CO2 24 03/31/2022    BUN 20 03/31/2022    CREATININE 0.7 03/31/2022    GLUCOSE 93 03/31/2022    CALCIUM 8.5 03/31/2022        Therapy progress:  PT  Position Activity Restriction  Other position/activity restrictions:  Up with assist, monitor for orthostatic BP  Objective     Sit to Stand: Contact guard assistance  Stand to sit: Contact guard assistance  Bed to Chair: Minimal assistance (bed to w/c with RW)  Device: Rolling Walker  Assistance: Contact guard assistance  Distance: 10 ft x 2 reps  OT  PT Equipment Recommendations  Equipment Needed: Yes  Mobility Devices: Berneice [place]: Rolling  Other: pt owns 4WW  Toilet - Technique: Stand pivot  Equipment Used: Standard bedside commode  Toilet Transfers Comments: stand pivot EOB <> BSC using RW. Use of BSC for safety due to low BP upon standing. Assessment        SLP  Current Diet : Regular  Current Liquid Diet : Thin  Diet Solids Recommendation: Regular  Liquid Consistency Recommendation: Thin    Body mass index is 17.83 kg/m². Assessment and Plan:  Shayan Celis is an [de-identified]year old female with past medical history significant for urge incontinence, anemia, and memory loss who presented to [facility] with several days of progressive weakness and falls. She was admitted to [facility] on 3/28/22 due to functional deficits below her baseline.      Debility  - will falls at home  - PT, OT     Cognitive Impairment  - continue aricept  - ST     Orthostatic Hypotension  - s/p 1 L NS 3/29  - increase florinef to 0.2 mg daily  - KENN hose  - consider consulting Nephro     Anemia  - no evidence of acute bleed during acute workup  - completed course of venofer  - monitor and transfuse for Hb<7  - PO iron replacement    ROSE, resolved  - improved after IV hydration  - encourage PO intake  - monitor renal function     Bowels: Schedule stool softener. Follow bowel movements. Enema or suppository if needed.      Bladder: Check PVR x 3. [address] if PVR > 200ml or if any volume is > 500 ml.      Sleep: Trazodone provided prn.      PPx  DVT: lovenox  GI: not indicated     Follow up appointments: PCP    Services/DME: home PT, OT, ST; DME TBD  EDOD: 4/9/22    Kyra Davis.  Josselin Archuleta MD 3/31/2022, 4:40 PM

## 2022-03-31 NOTE — PLAN OF CARE
Problem: Falls - Risk of:  Goal: Will remain free from falls  Description: Will remain free from falls  3/30/2022 2117 by Walt Valles RN  Outcome: Ongoing  3/30/2022 0850 by Luther Cohen RN  Outcome: Ongoing

## 2022-03-31 NOTE — PROGRESS NOTES
Speech Language Pathology  [facility]: ACUTE REHAB UNIT  SPEECH-LANGUAGE PATHOLOGY      [x] Daily  [] Weekly Care Conference Note  [] Discharge    Patient:Yulissa Perkins      :1940  OUP:[id number]  Rehab Dx/Hx: Debility [R53.81]    Precautions: falls  Home situation: lives alone, manages own meds/finances, household tasks, has two friends who she considers \"daughter and son in law\" live close by and assist when needed, not an active    ST Dx: [] Aphasia  [] Dysarthria  [] Apraxia   [] Oropharyngeal dysphagia [x] Cognitive Impairment  [] Other:   Date of Admit: 3/28/2022  Room #: 0161/0161-01    Current functional status (updated daily):         Pt being seen for : [] Speech/Language Treatment  [] Dysphagia Treatment [x] Cognitive Treatment  [] Other:  Communication: [x]WFL  [] Aphasia  [] Dysarthria  [] Apraxia  [] Pragmatic Impairment [] Non-verbal  [] Hearing Loss  [] Other:   Cognition: [] WFL  [] Mild  [x] Moderate  [] Severe [] Unable to Assess  [] Other:  Memory: [] WFL  [] Mild  [x] Moderate  [] Severe [] Unable to Assess  [] Other:  Behavior: [x] Alert  [x] Cooperative  [x]  Pleasant  [] Confused  [] Agitated  [] Uncooperative  [] Distractible [] Motivated  [] Self-Limiting [] Anxious  [] Other:  Endurance:  [x] Adequate for participation in SLP sessions  [] Reduced overall  [] Lethargic  [] Other:  Safety: [x] No concerns at this time  [] Reduced insight into deficits  []  Reduced safety awareness [] Not following call light procedures  [] Unable to Assess  [] Other:    Current Diet Order:ADULT DIET; Regular  ADULT ORAL NUTRITION SUPPLEMENT; Breakfast, Dinner; Standard High Calorie/High Protein Oral Supplement  Swallowing Precautions:  Alternate solids and liquids;Eat/Feed slowly;Upright as possible for all oral intake;Remain upright for 30-45 minutes after meals;Small bites/sips        Date: 3/31/2022      Tx session 1  0101-2001 Tx session 2  All tx needs met in session 1    Total Timed Code Min 60    Total Treatment Minutes 60    Individual Treatment Minutes 60    Group Treatment Minutes 0 0   Co-Treat Minutes 0 0   Variance/Reason:  0    Pain Denies     Pain Intervention [] RN notified  [] Repositioned  [] Intervention offered and patient declined  [x] N/A  [] Other: [] RN notified  [] Repositioned  [] Intervention offered and patient declined  [] N/A  [] Other:   Subjective     Pt alert and oriented, cooperative and agreeable to participate in therapy. Pt seen sitting upright in bed.        Objective:  Goals     Short-term Goals  Timeframe for Short-term Goals: 10 days (04/07/2022)    Goal 1: Pt will complete graded recall tasks using compensatory strategies with 80% acc given min cues    Functional recall of 16 items:  -81% acc indep improving to 100% acc given min cues (13/16 recalled)     Short term recall of the same 16 items after a 5 min delay:   -93% acc indep improving to 100% acc given phonemic cue (15/16 recalled)        Goal 2: Pt will complete executive function tasks (e.g. meds, time, money, etc) with 80% acc given min cues   General money questions:  -ex: does 25 dimes equal the same as 10 quarters?  -94% acc indep improving to 100% acc given min cues       Goal 3: Pt will complete verbal and visual reasoning tasks with 80% acc given min cues   Pt given four mildly complex items (ex: linoleum, carpet, hardwood, tile)  -pt asked to name the category: 93% acc indep improving to 100% acc given min cues  -pt asked to add similar item to the category: 86% acc indep improving to 100% acc given min cues        Goal 4: Pt will complete problem solving and thought organization tasks with 80% acc given min cues   Written problem solving/thought organization task:  -daily tasks (ex: doing the laundry; what supplies are needed, where do people wash their clothes, and what are the steps to do laundry)  -100% acc indep       Other areas targeted: N/A    Education:   Edu provided re: compensatory memory strategies, rationale for tx tasks provided      Safety Devices: [x] Call light within reach  [] Chair alarm activated  [x] Bed alarm activated  [] Other: [] Call light within reach  [] Chair alarm activated  [] Bed alarm activated  [] Other:    Assessment: Pt pleasant and cooperative, agreeable to participate. Pt completed functional recall task of 16 items with 81% acc and short term recall task of the same 16 items after a 5 min delay with 93% acc indep. Pt demonstrated good carryover and implementation of repetition, association, visualization, and grouping compensatory strategies. Pt completed general money questions with 94% acc indep. Written problem solving/thought organization for daily tasks at home was completed with 100% acc. Pt remains motivated to improve and is making good progress towards goals. Continue goals above. Plan: Continue as per plan of care. Additional Information:     Barriers toward progress:None   Discharge recommendations:  [] Home independently  [] Home with assistance []  24 hour supervision  [] ECF [x] Other: defer to PT/OT recs  Continued Tx Upon Discharge: ? [] Yes [] No [x] TBD based on progress while on ARU [] Vital Stim indicated [] Other:   Estimated discharge date: 04/09/22    Interventions used this date:  [] Speech/Language Treatment  [] Instruction in HEP [] Group [] Dysphagia Treatment [x] Cognitive Treatment   [] Other: Total Time Breakdown / Charges    Time in Time out Total Time / units   Cognitive Tx 0830 0930 60 min/ 4 units    Speech Tx -- -- --   Dysphagia Tx -- -- --       Electronically Signed by     Helnea Wylie. A CCC-SLP  Speech-Language Pathologist  [id number]

## 2022-03-31 NOTE — PROGRESS NOTES
Physical Therapy  Facility/Department: [facility]  Daily Treatment Note  NAME: Tiffany Ruiz  : 1940  MRN: [id number]    Date of Service: 3/31/2022    Discharge Recommendations:  Home with Home health PT,24 hour supervision or assist   PT Equipment Recommendations  Equipment Needed: Yes  Mobility Devices: Judene [place]: Rolling    Assessment   Body structures, Functions, Activity limitations: Decreased functional mobility ; Decreased posture;Decreased ADL status; Decreased coordination;Decreased balance;Decreased strength; Increased pain;Decreased endurance  Assessment: pt pleasant and agreeable. BP more stable this date with activity and pt with no complaints of dizziness. grossly CGA for transfers/gait with Rw. utilize commode, completes own pericare/ clothign managemnet. rec home with  adn HHPT. DME: RW (pt owns 4ww however states she prefers the stability of RW)  Treatment Diagnosis: Decreased functional mobility  Prognosis: Good  Decision Making: Medium Complexity  PT Education: Goals;Transfer Training;Disease Specific Education;PT Role;Plan of Care;General Safety;Gait Training  Patient Education: Pt educated on importance and safe mobility, Role of PT, safe use of RW -- pt verbalized understanding  Barriers to Learning: none  REQUIRES PT FOLLOW UP: Yes  Activity Tolerance  Activity Tolerance: Patient Tolerated treatment well  Activity Tolerance: 150/67, 70 bpm, Spo2= 99% on room air     Patient Diagnosis(es): There were no encounter diagnoses. has a past medical history of ARF (acute renal failure) ([facility] [address] has a past surgical history that includes joint replacement; Tonsillectomy; Intracapsular cataract extraction (Right, 2019); and Intracapsular cataract extraction (Left, 2019). Restrictions  Restrictions/Precautions  Restrictions/Precautions: General Precautions,Fall Risk  Position Activity Restriction  Other position/activity restrictions:  Up with assist, monitor for orthostatic BP  Subjective   General  Chart Reviewed: Yes  Additional Pertinent Hx: Acute fractures of the right 4th through 12th ribs  Response To Previous Treatment: Patient with no complaints from previous session. Family / Caregiver Present: No  Referring Practitioner: Dr. Lamonte Lindsay MD  Subjective  Subjective: pt agreeable to therapy  General Comment  Comments: found in recliner, requesting to use commode  Pain Screening  Patient Currently in Pain: No  Vital Signs  Patient Currently in Pain: No       Orientation  Orientation  Overall Orientation Status: Within Functional Limits  Cognition      Objective      Transfers  Sit to Stand: Contact guard assistance  Stand to sit: Contact guard assistance  Comment: sit to stand recliner to RW, commode to RW and chair to Rw with CGA, at times multiple attempts  Ambulation  Ambulation?: Yes  More Ambulation?: Yes  Ambulation 1  Device: Rolling Walker  Assistance: Contact guard assistance  Quality of Gait: Cues for balance and to maintain ROBERT within RW. Forward flexed with downward gaze. Decreased step length and foot clearance BLE  Distance: 10 ft x 2 reps  Comments: to and from commode  Ambulation 2  Surface - 2: level tile  Device 2: Rolling Walker  Quality of Gait 2: Cues for balance and to maintain ROBERT within RW. Forward flexed with downward gaze. Decreased step length and foot clearance BLE  Distance: 150 ft x 2 reps  Comments: pt statign she prefers stability of RW vs the 4ww she owns at home  Neuromuscular Education  NDT Treatment: Standing  Neuromuscular Comments: pt completed 10 reps, 20 reps of BLE alternating foot taps onto 6\" Step with BUE support, completed to improve swing phase during gait. Balance  Sitting - Static: Good  Sitting - Dynamic: Good  Standing - Static: Fair  Standing - Dynamic: Fair  Comments: dyn stand activity while completing pericare/ clothing management and hand washing at sink with 1-0 UE support and CGA.  dyn balance activity: pt completed mini squat to retrieve cone from ground with RUE on ILside-> placing to target across midline 2x6 with LUE support at all times. Second Session:  Pt found in recliner, pleasant and agreeable, denies pain. Sit to stand recliner to Rw with CGA  Gait x 150 ft with RW, CGA demonstrating above stated gait deficits. Pt completed 8 min on SCIFIT level 1 with BUE and BLE for increased endurance/ LE strength, maintains rate of 70-80 steps/min. Pt ascended/descended 8 6\" Steps with BHR, reciprocal pattern and CGA. Stand pivot w/c <> commode with grab bar and CGA   Pt utilize commode, completed own pericare and clothing management. Gait x 10 ft to recliner from commode with RW  Pt in recliner with alarm donned and call light/needs within reach. Goals  Short term goals  Time Frame for Short term goals: 1 week (4/4)  Short term goal 1: Pt will be SBA with supine<>Sit. Short term goal 2: Pt will be SBA with transfers with RW or 4WW. Short term goal 3: Pt will ambulate 150 ft with SBA and RW or 4WW. Short term goal 4: Pt will negotiate 12 stairs with CGA. Long term goals  Time Frame for Long term goals : 2 weeks (4/11)  Long term goal 1: Pt will be indep with supine<>Sit. Long term goal 2: Pt will be mod I with transfers. Long term goal 3: Pt will ambulate 150 ft mod I.  Long term goal 4: Pt will negotiate 12 stairs with SBA. Long term goal 5: Pt will be indep with LE HEP to facilitate continued strengthening and activity tolerance at d/c.   Patient Goals   Patient goals : \"to get back to normal and be able to do the things I did before\"    Plan    Plan  Times per week: 5 out of 7 days/wk  Times per day: Daily  Specific instructions for Next Treatment: progress mobility  Current Treatment Recommendations: Moe Haider Re-education,Patient/Caregiver Education & Training,Balance Training,Gait Training,Home Exercise Program,Safety Education & Tommie Hole training,Functional Mobility Training,Equipment Evaluation, Education, & procurement  Safety Devices  Type of devices:  All fall risk precautions in place,Call light within reach,Chair alarm in place,Gait belt,Patient at risk for falls,Nurse notified,Left in chair     Therapy Time   Individual Concurrent Group Co-treatment   Time In 1245         Time Out 1330         Minutes 45         Timed Code Treatment Minutes: 39 Minutes     Second Session Therapy Time:   Individual Concurrent Group Co-treatment   Time In 1400         Time Out 1430         Minutes 30           Timed Code Treatment Minutes:  30    Total Treatment Minutes:  75    Phillip Coil, PT

## 2022-03-31 NOTE — PROGRESS NOTES
Occupational Therapy  Facility/Department: [facility] ARU  Daily Treatment Note  NAME: Nely Tsai  : 1940  MRN: [id number]    Date of Service: 3/31/2022    Discharge Recommendations:  24 hour supervision or assist,Home with Home health OT,Continue to assess pending progress  OT Equipment Recommendations  Mobility Devices: ADL Assistive Devices  ADL Assistive Devices: Hand-held Shower; Toilet Safety Frame;Long-handled Sponge; Shower Chair with back    Assessment   Performance deficits / Impairments: Decreased functional mobility ; Decreased ADL status; Decreased balance;Decreased safe awareness;Decreased strength;Decreased endurance;Decreased posture;Decreased cognition;Decreased high-level IADLs  Assessment: Pt able to tolerate short mobility into bathroom for shower. Pt required up to mod A for LB bathing and dressing. Pt continues to be limited by low BP. Anticipate pt would benefit from ongoing OT in order to increase functional status prior to returning home. Treatment Diagnosis: weakness  Prognosis: Good  OT Education: OT Role;Plan of Care;Precautions;Transfer Training;Energy Conservation; ADL Adaptive Strategies  Patient Education: home safety concerns, importance of monitoring vitals with positional changes, safety during bathing/dressing, BSC transfers, hand placement for transfers, ARU expectations  REQUIRES OT FOLLOW UP: Yes  Activity Tolerance  Activity Tolerance: Treatment limited secondary to medical complications (free text)  Activity Tolerance: Pt continues to be limited by low BP; pt BP supine 118/70, seated 102/56, after mobility to bathroom 74/46; increased to 93/45 once seated for shower  Safety Devices  Safety Devices in place: Yes  Type of devices: Nurse notified;Call light within reach; All fall risk precautions in place; Left in chair;Chair alarm in place         Patient Diagnosis(es):      has a past medical history of ARF (acute renal failure) ([facility] [address]    has a past surgical history that includes joint replacement; Tonsillectomy; Intracapsular cataract extraction (Right, 5/21/2019); and Intracapsular cataract extraction (Left, 7/2/2019). Restrictions  Restrictions/Precautions  Restrictions/Precautions: General Precautions,Fall Risk  Position Activity Restriction  Other position/activity restrictions: Up with assist, monitor for orthostatic BP  Subjective   General  Chart Reviewed: Yes  Patient assessed for rehabilitation services?: Yes  Additional Pertinent Hx: ARF, catarct surgery, bilateral MARGOTH, urge incontinence, anemia, memory loss  Response to previous treatment: Patient with no complaints from previous session  Family / Caregiver Present: No  Referring Practitioner: Lizett Back MD  Diagnosis: Pt admitted on 3/25/22 due to progressive weakness, frequent falls, ROSE, anemia. Pt transfered to ARU on 3/28/22  Subjective  Subjective: Pt in bed, reports \" feeling stiff\"  General Comment  Comments: RN Agreeable to OT session. Vital Signs  Patient Currently in Pain: Denies   Orientation  Orientation  Overall Orientation Status: Within Normal Limits  Objective    ADL  UE Bathing: Supervision;Stand by assistance  LE Bathing: Minimal assistance  UE Dressing: Minimal assistance  LE Dressing: Moderate assistance  Toileting: Contact guard assistance  Additional Comments: Pt able to tolerate mobility to shower whiel seated on shower chair        Balance  Sitting Balance: Supervision  Standing Balance: Contact guard assistance  Functional Mobility  Functional - Mobility Device: Rolling Walker  Activity: To/from bathroom  Assist Level: Minimal assistance  Functional Mobility Comments: continues to be limited this date due to low BP  Bed mobility  Supine to Sit: Contact guard assistance  Sit to Supine: Unable to assess  Scooting: Contact guard assistance  Transfers  Sit to stand: Minimal assistance  Stand to sit: Minimal assistance  Transfer Comments: to/from EOB using RW with cues for hand placement. Cognition  Overall Cognitive Status: Exceptions  Arousal/Alertness: Appropriate responses to stimuli  Following Commands: Follows one step commands consistently; Follows multistep commands with repitition  Attention Span: Attends with cues to redirect  Memory: Decreased recall of precautions;Decreased short term memory  Safety Judgement: Decreased awareness of need for safety  Problem Solving: Decreased awareness of errors  Insights: Decreased awareness of deficits  Initiation: Does not require cues  Sequencing: Requires cues for some                                         Plan   Plan  Times per week: 5-7 days/week  Plan weeks: 2 weeks  Current Treatment Recommendations: Strengthening,Functional Mobility Training,Endurance Training,Balance Training,Safety Education & Training,Self-Care / ADL,Patient/Caregiver Education & Training,Home Management Training,Equipment Evaluation, Education, & procurement,Cognitive/Perceptual Training,Positioning    Goals  Short term goals  Time Frame for Short term goals: 1 week (4/5 22)  Short term goal 1: CGA LB dressing with AE as needed  Short term goal 2: SBA functional transfers to/from EOB, chair, toilet, WIS  Short term goal 3: CGA grooming in stance at sink  Short term goal 4: CGA toileting  Short term goal 5: CGA total body bathing with AE  Long term goals  Time Frame for Long term goals : 2 weeks (4/12/22)  Long term goal 1: Mod I total body dressing  Long term goal 2: Mod I toileting  Long term goal 3: Mod I functional transfers to/from EOB, chair, toilet  Long term goal 4: Mod I grooming in stance at sink  Long term goal 5: Supervision total body bathing.   Patient Goals   Patient goals : \"to take care of myself without help from others\"       Therapy Time   Individual Concurrent Group Co-treatment   Time In 1000         Time Out 1100         Minutes 60         Timed Code Treatment Minutes: [address], OTR/L

## 2022-04-01 LAB
ANION GAP SERPL CALCULATED.3IONS-SCNC: 10 MMOL/L (ref 3–16)
BUN BLDV-MCNC: 17 MG/DL (ref 7–20)
CALCIUM SERPL-MCNC: 8.7 MG/DL (ref 8.3–10.6)
CHLORIDE BLD-SCNC: 102 MMOL/L (ref 99–110)
CO2: 27 MMOL/L (ref 21–32)
CREAT SERPL-MCNC: 0.7 MG/DL (ref 0.6–1.2)
GFR AFRICAN AMERICAN: >60
GFR NON-AFRICAN AMERICAN: >60
GLUCOSE BLD-MCNC: 106 MG/DL (ref 70–99)
MAGNESIUM: 1.6 MG/DL (ref 1.8–2.4)
POTASSIUM REFLEX MAGNESIUM: 3.3 MMOL/L (ref 3.5–5.1)
SODIUM BLD-SCNC: 139 MMOL/L (ref 136–145)

## 2022-04-01 PROCEDURE — 97530 THERAPEUTIC ACTIVITIES: CPT

## 2022-04-01 PROCEDURE — 97129 THER IVNTJ 1ST 15 MIN: CPT

## 2022-04-01 PROCEDURE — 80048 BASIC METABOLIC PNL TOTAL CA: CPT

## 2022-04-01 PROCEDURE — 6360000002 HC RX W HCPCS: Performed by: STUDENT IN AN ORGANIZED HEALTH CARE EDUCATION/TRAINING PROGRAM

## 2022-04-01 PROCEDURE — 1280000000 HC REHAB R&B

## 2022-04-01 PROCEDURE — 97116 GAIT TRAINING THERAPY: CPT

## 2022-04-01 PROCEDURE — 6370000000 HC RX 637 (ALT 250 FOR IP): Performed by: STUDENT IN AN ORGANIZED HEALTH CARE EDUCATION/TRAINING PROGRAM

## 2022-04-01 PROCEDURE — 97130 THER IVNTJ EA ADDL 15 MIN: CPT

## 2022-04-01 PROCEDURE — 97110 THERAPEUTIC EXERCISES: CPT

## 2022-04-01 PROCEDURE — 97535 SELF CARE MNGMENT TRAINING: CPT

## 2022-04-01 PROCEDURE — 36415 COLL VENOUS BLD VENIPUNCTURE: CPT

## 2022-04-01 PROCEDURE — 83735 ASSAY OF MAGNESIUM: CPT

## 2022-04-01 RX ORDER — POTASSIUM CHLORIDE 20 MEQ/1
40 TABLET, EXTENDED RELEASE ORAL DAILY
Status: DISCONTINUED | OUTPATIENT
Start: 2022-04-01 | End: 2022-04-09 | Stop reason: HOSPADM

## 2022-04-01 RX ORDER — LANOLIN ALCOHOL/MO/W.PET/CERES
400 CREAM (GRAM) TOPICAL DAILY
Status: DISCONTINUED | OUTPATIENT
Start: 2022-04-01 | End: 2022-04-09 | Stop reason: HOSPADM

## 2022-04-01 RX ORDER — SENNA AND DOCUSATE SODIUM 50; 8.6 MG/1; MG/1
2 TABLET, FILM COATED ORAL DAILY
Status: DISCONTINUED | OUTPATIENT
Start: 2022-04-01 | End: 2022-04-09 | Stop reason: HOSPADM

## 2022-04-01 RX ADMIN — FOLIC ACID 1 MG: 1 TABLET ORAL at 09:15

## 2022-04-01 RX ADMIN — DONEPEZIL HYDROCHLORIDE 10 MG: 5 TABLET ORAL at 20:04

## 2022-04-01 RX ADMIN — FERROUS SULFATE TAB 325 MG (65 MG ELEMENTAL FE) 325 MG: 325 (65 FE) TAB at 18:26

## 2022-04-01 RX ADMIN — DOCUSATE SODIUM 50 MG AND SENNOSIDES 8.6 MG 2 TABLET: 8.6; 5 TABLET, FILM COATED ORAL at 09:20

## 2022-04-01 RX ADMIN — Medication 400 MG: at 18:26

## 2022-04-01 RX ADMIN — Medication 1000 UNITS: at 09:15

## 2022-04-01 RX ADMIN — ENOXAPARIN SODIUM 30 MG: 100 INJECTION SUBCUTANEOUS at 09:16

## 2022-04-01 RX ADMIN — FERROUS SULFATE TAB 325 MG (65 MG ELEMENTAL FE) 325 MG: 325 (65 FE) TAB at 09:14

## 2022-04-01 RX ADMIN — POTASSIUM CHLORIDE 40 MEQ: 20 TABLET, EXTENDED RELEASE ORAL at 18:25

## 2022-04-01 RX ADMIN — FLUDROCORTISONE ACETATE 0.2 MG: 0.1 TABLET ORAL at 09:15

## 2022-04-01 ASSESSMENT — PAIN SCALES - GENERAL: PAINLEVEL_OUTOF10: 0

## 2022-04-01 NOTE — PROGRESS NOTES
Speech Language Pathology  [facility]: ACUTE REHAB UNIT  SPEECH-LANGUAGE PATHOLOGY      [x] Daily  [] Weekly Care Conference Note  [] Discharge    Patient:Lois Melita Barthel      :1940  AAU:[id number]  Rehab Dx/Hx: Debility [R53.81]    Precautions: falls  Home situation: lives alone, manages own meds/finances, household tasks, has two friends who she considers \"daughter and son in law\" live close by and assist when needed, not an active    ST Dx: [] Aphasia  [] Dysarthria  [] Apraxia   [] Oropharyngeal dysphagia [x] Cognitive Impairment  [] Other:   Date of Admit: 3/28/2022  Room #: 0161/0161-01    Current functional status (updated daily):         Pt being seen for : [] Speech/Language Treatment  [] Dysphagia Treatment [x] Cognitive Treatment  [] Other:  Communication: [x]WFL  [] Aphasia  [] Dysarthria  [] Apraxia  [] Pragmatic Impairment [] Non-verbal  [] Hearing Loss  [] Other:   Cognition: [] WFL  [] Mild  [x] Moderate  [] Severe [] Unable to Assess  [] Other:  Memory: [] WFL  [] Mild  [x] Moderate  [] Severe [] Unable to Assess  [] Other:  Behavior: [x] Alert  [x] Cooperative  [x]  Pleasant  [] Confused  [] Agitated  [] Uncooperative  [] Distractible [] Motivated  [] Self-Limiting [] Anxious  [] Other:  Endurance:  [x] Adequate for participation in SLP sessions  [] Reduced overall  [] Lethargic  [] Other:  Safety: [x] No concerns at this time  [] Reduced insight into deficits  []  Reduced safety awareness [] Not following call light procedures  [] Unable to Assess  [] Other:    Current Diet Order:ADULT DIET; Regular  ADULT ORAL NUTRITION SUPPLEMENT; Breakfast, Dinner; Standard High Calorie/High Protein Oral Supplement  Swallowing Precautions:  Alternate solids and liquids;Eat/Feed slowly;Upright as possible for all oral intake;Remain upright for 30-45 minutes after meals;Small bites/sips        Date: 2022      Tx session [address], [address] SLP  Tx session 2  [id number]  Shannan Hernandez Stephanie Oliver MA [facility] SLP   Total Timed Code Min 30 30   Total Treatment Minutes 30 30   Individual Treatment Minutes 30 30   Group Treatment Minutes 0 0   Co-Treat Minutes 0 0   Variance/Reason:  0 0   Pain Denies  None reported   Pain Intervention [] RN notified  [] Repositioned  [] Intervention offered and patient declined  [x] N/A  [] Other: [] RN notified  [] Repositioned  [] Intervention offered and patient declined  [x] N/A  [] Other:   Subjective     Pt alert and oriented, cooperative and agreeable to participate in therapy. Pt seen sitting upright in bed. Pt attentive, cooperative and agreeable to tx. Pt seen sitting upright in bedside chair. Objective:  Goals     Short-term Goals  Timeframe for Short-term Goals: 10 days (04/07/2022)    Goal 1: Pt will complete graded recall tasks using compensatory strategies with 80% acc given min cues    Functional recall of 5 words:  -60% acc indep improving to 100% acc given min cues    Short term recall of the same 5 words after a 10 min delay:  -60% acc indep improving to 100% acc given mod cues     Functional recall/word placement task:  Ex: hat, brow, tie, hair (what was the second and fourth words)  -60% acc indep improving to 100% acc given mod cues    Delayed recall of 5 words  -pt asked to recall 5 words from first session  -5/5 ind    Pt ind recalled home medication routine   Goal 2: Pt will complete executive function tasks (e.g. meds, time, money, etc) with 80% acc given min cues   Did not target.   Medication management  -pt shown pill bottle and asked information about label  -80% acc ind  -pt asked to place fake pills in medi-set based on label information  -100% acc ind    Pt ind reported medication routine  -pt states taking several medications in the morning, few in afternoon and one before bed  -pt repots not using a medi-set and having memorized which medications are taken when    Time word problems  -70% acc ind, inc to 100% acc w/ mod cues  -pt benefited from breakdown of questions     Goal 3: Pt will complete verbal and visual reasoning tasks with 80% acc given min cues   Word deduction task:  -pt given a set of three words and asked to name the item/object being described  -ex: time, hands, wristband= watch  -92% acc indep improving to 100% acc given min cues    Goal not directly targeted   Goal 4: Pt will complete problem solving and thought organization tasks with 80% acc given min cues   Did not target. Categories  -pt asked to name item in category starting w/ certain letter  -833% acc w/ min cues     Other areas targeted: N/A N/A   Education:    Pt educated re: recall strategies,    Safety Devices: [x] Call light within reach  [] Chair alarm activated  [x] Bed alarm activated  [] Other: [x] Call light within reach  [x] Chair alarm activated  [] Bed alarm activated  [] Other:    Assessment: Session 1: Pt pleasant and cooperative, agreeable to participate. Pt completed functional recall, short term recall, and functional recall/word placement task all with 60% acc. Pt benefited from use of repetition, visualization, and association compensatory strategies for improved accuracy. Pt completed verbal reasoning task with 92% acc. Pt is making good progress towards all goals and remains motivated to improve. Continue goals above. Session 2: Pt alert, cooperative and agreeable to tx. Pt did well w/ recalling words from previous session. Pt independently able to recall medication routine at home, stating she memorized when to take which medications. Pt showed some difficulty w/ time word problems, requiring mod cues to complete them and showing improvement when problems were broken down. Pt is motivated to improve. Continue goals as listed above. Plan: Continue as per plan of care.       Additional Information:     Barriers toward progress:None   Discharge recommendations:  [] Home independently  [] Home with assistance []  24 hour supervision  [] ECF [x] Other: defer to PT/OT recs  Continued Tx Upon Discharge: ? [] Yes [] No [x] TBD based on progress while on ARU [] Vital Stim indicated [] Other:   Estimated discharge date: 04/09/22    Interventions used this date:  [] Speech/Language Treatment  [] Instruction in HEP [] Group [] Dysphagia Treatment [x] Cognitive Treatment   [] Other: Total Time Breakdown / Charges    Time in Time out Total Time / units   Cognitive Tx 0900  1330 0930  1330 30 min/ 2 units  30 min/ 2 units    Speech Tx -- -- --   Dysphagia Tx -- -- --       Electronically Signed by   Session 1:  Nicol LOJA [facility]-SLP  Speech-Language Pathologist  [id number]    Session 2:    BRADLEY Sifuentes  Speech Language Pathologist

## 2022-04-01 NOTE — PROGRESS NOTES
toilet  Toilet Transfers Comments: Isauro sit <> Stand from toliet, cues for safety with turning to sit and positioning of self  Assessment        SLP  Current Diet : Regular  Current Liquid Diet : Thin  Diet Solids Recommendation: Regular  Liquid Consistency Recommendation: Thin    Body mass index is 17.83 kg/m². Assessment and Plan:  Ana Friend is an [de-identified]year old female with past medical history significant for urge incontinence, anemia, and memory loss who presented to [facility] on 3/25/22 with several days of progressive weakness and falls. She was admitted to [facility] on 3/28/22 due to functional deficits below her baseline.      Debility  - will falls at home  - PT, OT     Cognitive Impairment  - continue aricept  - ST     Orthostatic Hypotension  - s/p 1 L NS 3/29  - increase florinef to 0.2 mg daily  - KENN hose     Anemia  - no evidence of acute bleed during acute workup  - completed course of venofer  - monitor and transfuse for Hb<7  - PO iron replacement    Electrolyte abnormalities  - K and Mg replacement  - continue to monitor    ROSE, resolved  - improved after IV hydration  - encourage PO intake  - monitor renal function     Bowels: Schedule stool softener. Follow bowel movements. Enema or suppository if needed.      Bladder: Check PVR x 3. [facility] if PVR > 200ml or if any volume is > 500 ml.      Sleep: Trazodone provided prn.      PPx  DVT: lovenox  GI: not indicated     Follow up appointments: PCP    Services/DME: home PT, OT, ST; DME TBD  EDOD: 4/9/22    [facility].  Rosario Apley, MD 4/1/2022, 1:10 PM

## 2022-04-01 NOTE — PROGRESS NOTES
Occupational Therapy  Facility/Department: [facility] ARU  Daily Treatment Note  NAME: Trudi Armenta  : 1940  MRN: [id number]    Date of Service: 2022    Discharge Recommendations:  24 hour supervision or assist,Home with Home health OT,Continue to assess pending progress  OT Equipment Recommendations  Equipment Needed: Yes  Mobility Devices: ADL Assistive Devices  ADL Assistive Devices: Hand-held Shower; Toilet Safety Frame;Long-handled Sponge; Shower Chair with back  Other: Pt could potentially benefit from RW. Assessment   Performance deficits / Impairments: Decreased functional mobility ; Decreased ADL status; Decreased balance;Decreased safe awareness;Decreased strength;Decreased endurance;Decreased posture;Decreased cognition;Decreased high-level IADLs  Assessment: Patient remains pleasant and agreeable, able to complete multiple bouts of mobiltiy and transfers for ADLs with no c/o dizziness and BP maintaining stable throughout. Pt was able to complete grooming sinkside CGA x4 minutes this date, PRN cues for safety/sequencing with turning and manuevering RW in room/bathroom. Pt educated on energy  conservation and safety strategies for ADL management. Pt tolerated BUE ex with 2# free weight this date but needed consistent cues/demo for mechanics. Cont OT POC  Treatment Diagnosis: weakness  Prognosis: Good  OT Education: OT Role;Plan of Care;Precautions;Transfer Training;Energy Conservation; ADL Adaptive Strategies  Patient Education: home safety concerns, importance of monitoring vitals with positional changes, safety during bathing/dressing, BSC transfers, hand placement for transfers, ARU expectations  Barriers to Learning: memory  REQUIRES OT FOLLOW UP: Yes  Activity Tolerance  Activity Tolerance: Patient Tolerated treatment well;Patient limited by fatigue  Activity Tolerance: VITALS: Supine in bed: 171/81, sitting EOB after 5 min: 162/77, after ambulation, toileting, and standing sinkside: 103/55 (no c/o dizziness); after sitting BUE ex x15 minutes: 131/65, and then sitting in recliner after session with BLE elevated: 132/68; RN and PCA made aware to check on patients BP after 30 minutes. Safety Devices  Safety Devices in place: Yes  Type of devices: Nurse notified;Call light within reach; All fall risk precautions in place; Left in chair;Chair alarm in place; Patient at risk for falls;Gait belt  Restraints  Initially in place: No         Patient Diagnosis(es): There were no encounter diagnoses. has a past medical history of ARF (acute renal failure) ([facility] [address] has a past surgical history that includes joint replacement; Tonsillectomy; Intracapsular cataract extraction (Right, 5/21/2019); and Intracapsular cataract extraction (Left, 7/2/2019). Restrictions  Restrictions/Precautions  Restrictions/Precautions: General Precautions,Fall Risk  Position Activity Restriction  Other position/activity restrictions: Up with assist, monitor for orthostatic BP  Subjective   General  Chart Reviewed: Yes  Patient assessed for rehabilitation services?: Yes  Additional Pertinent Hx: ARF, catarct surgery, bilateral MARGOTH, urge incontinence, anemia, memory loss  Response to previous treatment: Patient with no complaints from previous session  Family / Caregiver Present: No  Referring Practitioner: Alba Quiroz MD  Diagnosis: Pt admitted on 3/25/22 due to progressive weakness, frequent falls, ROSE, anemia. Pt transfered to ARU on 3/28/22  Subjective  Subjective: Pt in bed, agreeable and pleasant. Requests to use the bathroom and complete BUE ex for strength. Pt did not c/o low BP s/s and BP maintained 130s/60s throughout only dropping to 102/65 after 4 minutes standing sinkside  General Comment  Comments: RN Agreeable to OT session. Vital Signs  Patient Currently in Pain: Denies   Orientation  Orientation  Overall Orientation Status: Within Normal Limits  Objective    ADL  Feeding: Setup; Beverage management (EOB and WC)  Grooming: Contact guard assistance;Verbal cueing; Increased time to complete (standing sinkside for grooming tasks)  UE Bathing: Supervision;Stand by assistance (sitting sinkside in WC)  UE Dressing: Minimal assistance (doffing shirt and donning gown seated)  Toileting: Contact guard assistance; Increased time to complete  Additional Comments: Pt was able to gather clothing with RW, bring to bathroom, complete toileting and then stnading sinkside with no c/o low BP s/s  Instrumental ADL's  Instrumental ADLs: Yes  Light Housekeeping  Light Housekeeping Level: [facility] Level of Assistance: Minimal assistance;Contact guard assistance  Light Housekeeping: Isauro for turning, CGA for reaching out of ROBERT for clothing and bringing to bathroom with RW- education on home safety, management, and IADL tasks     Balance  Sitting Balance: Supervision  Standing Balance: Contact guard assistance (RW vs grab bar vs sinkside)  Standing Balance  Time: x1 minute, 2x1 minute, x4 minutes, x2 minutes, 2x30 seconds  Activity: transfers, standing for ADLs, mobility in room/bathroom  Comment: CGA to Isauro wiht RW wiht mod cues for body mechanics and positioning, BP maintained stable throughout mobility  Functional Mobility  Functional - Mobility Device: Rolling Walker  Activity: To/from bathroom;Transport items; Retrieve items  Assist Level: Minimal assistance  Functional Mobility Comments: Isauro to CGA with min cues using RW to gather clothing and bring to bathroom; no c/o low BP and /65 after mobility to toilet, after standing at sinkside did drop to 102/65 but did not c/o dizizness  Toilet Transfers  Toilet - Technique: Ambulating; To left; To right (RW)  Equipment Used: Standard toilet  Toilet Transfer: Minimal assistance  Toilet Transfers Comments: Isauro sit <> Stand from Tile, cues for safety with turning to sit and positioning of self  Wheelchair Bed Transfers  Wheelchair/Bed - Technique: Stand step; Ambulating; To left;To right (RW)  Equipment Used: Bed;Wheelchair  Level of Asssistance: Contact guard assistance  Bed mobility  Bridging: Supervision  Rolling to Left: Supervision  Rolling to Right: Supervision  Supine to Sit: Supervision  Sit to Supine: Unable to assess  Scooting: Contact guard assistance  Transfers  Stand Step Transfers: Contact guard assistance;Minimal assistance  Sit to stand: Minimal assistance;Contact guard assistance  Stand to sit: Minimal assistance;Contact guard assistance  Transfer Comments: Isauro-CGA depending on fatigue and surface height, cues for hand placement and mechanics sit <> stand        Coordination  Gross Motor: fair balance/coordination with sit <> Stand and mobility but does need consistent cues for upright posture, sequencing, attn with RW management in/out of bathroom; pt was able to tolerate standing sinkside for goroming with increased balance reaching out of ROBERT but does need cues and time to complete  Fine Motor: fair FM coordination with opening all containers for grooming tasks; pt was able to fill out meal menu sitting EOB with good coordination in R hand              Cognition  Overall Cognitive Status: Exceptions  Arousal/Alertness: Appropriate responses to stimuli  Following Commands: Follows one step commands consistently; Follows multistep commands with repitition  Attention Span: Attends with cues to redirect  Memory: Decreased recall of precautions;Decreased short term memory  Safety Judgement: Decreased awareness of need for safety  Problem Solving: Decreased awareness of errors  Insights: Decreased awareness of deficits  Initiation: Does not require cues  Sequencing: Requires cues for some  Cognition Comment: slightly impulsive     Perception  Overall Perceptual Status: Impaired  Unilateral Attention: Cues to maintain midline in standing  Initiation: Cues to initiate tasks  Motor Planning: Cues to use objects appropriately  Perseveration: Not present              Type of ROM/Therapeutic Exercise  Type of ROM/Therapeutic Exercise: Free weights  Comment: 2# 2x10 1 UE at a time seated in WC: chest press, circles forwards/backwards, tricep press, elbow flexion/extension, internal/external rotation, shoulder flexion with elbow extension, wrist flexion/extension, supination/pronation; rest break PRN and cues for body mechanics during task but tolerated well and no c/o low BP s/s                    Plan   Plan  Times per week: 5-7 days/week  Times per day: Daily  Plan weeks: 2 weeks  Current Treatment Recommendations: Strengthening,Functional Mobility Training,Endurance Training,Balance Training,Safety Education & Training,Self-Care / ADL,Patient/Caregiver Education & Training,Home Management Training,Equipment Evaluation, Education, & procurement,Cognitive/Perceptual Training,Positioning    Goals  Short term goals  Time Frame for Short term goals: 1 week (4/5 22)  Short term goal 1: Pt will complete LB dressing using AE and LRAD PRN CGA  Short term goal 2: Pt will complete functional transfers from various surfaces with LRAD SBA  Short term goal 3: Pt will complete grooming standing sinkside >5 minutes using LRAD CGA  Short term goal 4: Pt will complete toileting with CGA using LRAD and DME PRN  Short term goal 5: Pt will complete total body bathing CGA sitting vs standing using AE/DME PRN  Long term goals  Time Frame for Long term goals : 2 weeks (4/12/22)  Long term goal 1: Pt will complete total body dressing including gathering items with LRAD Dev  Long term goal 2: Pt will complete toileting tasks Dev with LRAD and DME PRN  Long term goal 3: Pt will complete functional transfers Dev with LRAD and no cues for safety/sequencing  Long term goal 4: Pt will complete grooming standing vs sititng sinkside Dev  Long term goal 5: Pt will complete total body bathing SPV/setup with LRAD and AE PRN sitting vs standing  Patient Goals   Patient goals : \"to take care of myself without help from others\"       Therapy Time   Individual Concurrent Group Co-treatment   Time In 1000         Time Out 1100         Minutes 60         Timed Code Treatment Minutes: [address], OTR/L

## 2022-04-01 NOTE — PROGRESS NOTES
Physical Therapy  Facility/Department: [facility]  Daily Treatment Note  NAME: Wilhelmina Severs  : 1940  MRN: [id number]    Date of Service: 2022    Discharge Recommendations:  Home with Home health PT,24 hour supervision or assist   PT Equipment Recommendations  Equipment Needed: Yes  Walker: Rolling    Assessment   Body structures, Functions, Activity limitations: Decreased functional mobility ; Decreased posture;Decreased ADL status; Decreased coordination;Decreased balance;Decreased strength; Increased pain;Decreased endurance  Assessment: pt found in bed, requesting to use commode. grossly SBA for transfers and CGA for gait wiht RW. session limited by pt's symptomatic orthostaic hypotension, resolved when returned to supine. conitnue to rec home with  adn HHPT. DME RW  Treatment Diagnosis: Decreased functional mobility  Prognosis: Good  Decision Making: Medium Complexity  PT Education: Goals;Transfer Training;Disease Specific Education;PT Role;Plan of Care;General Safety;Gait Training  Patient Education: Pt educated on importance and safe mobility, Role of PT, safe use of RW -- pt verbalized understanding  Barriers to Learning: none  REQUIRES PT FOLLOW UP: Yes  Activity Tolerance  Activity Tolerance: Other  Activity Tolerance: limited by orthostatic hypotension. iniital BP in recliner after commode use= 98/58, 77 bpm, SPo2= 99% on room air. see note for details     Patient Diagnosis(es): There were no encounter diagnoses. has a past medical history of ARF (acute renal failure) ([facility] [address] has a past surgical history that includes joint replacement; Tonsillectomy; Intracapsular cataract extraction (Right, 2019); and Intracapsular cataract extraction (Left, 2019). Restrictions  Restrictions/Precautions  Restrictions/Precautions: General Precautions,Fall Risk  Position Activity Restriction  Other position/activity restrictions:  Up with assist, monitor for orthostatic BP  Subjective General  Chart Reviewed: Yes  Additional Pertinent Hx: Acute fractures of the right 4th through 12th ribs  Response To Previous Treatment: Patient with no complaints from previous session. Family / Caregiver Present: No  Subjective  Subjective: pt agreeable to therapy  General Comment  Comments: found in bed, requesting to use commode  Pain Screening  Patient Currently in Pain: No  Vital Signs  Patient Currently in Pain: No       Orientation  Orientation  Overall Orientation Status: Within Functional Limits  Cognition      Objective   Bed mobility  Supine to Sit: Supervision  Sit to Supine: Supervision  Transfers  Sit to Stand: Stand by assistance  Stand to sit: Stand by assistance  Comment: sit to stadn EOB to RW, commode to RW and chair to RW with SBA  Ambulation  Ambulation?: Yes  More Ambulation?: Yes  Ambulation 1  Surface: level tile  Device: Rolling Walker  Assistance: Contact guard assistance  Quality of Gait: Cues for balance and to maintain ROBERT within RW. Forward flexed with downward gaze. Decreased step length and foot clearance BLE  Gait Deviations: Slow Bettie; Shuffles;Decreased step length;Decreased step height  Distance: 10 ft x 2 reps  Comments: to and from commode  Ambulation 2  Surface - 2: level tile  Device 2: Rolling Walker  Quality of Gait 2: Cues for balance and to maintain ROBERT within RW. Forward flexed with downward gaze. Decreased step length and foot clearance BLE  Distance: 120 ft, 80 ft  Comments: limited by fatigue, uses seated rest break between bouts. Balance  Sitting - Static: Good  Sitting - Dynamic: Good  Standing - Static: Fair  Standing - Dynamic: Fair  Comments: dyn stand activity while completing pericare/ clothing management and hand washing at sink with 1-0 UE support and CGA.  dyn balance activity:forward walking x 6 ft -> RLE stagger stance onto 6\" step with reach RUE on IL side in various planes for beanbag- toss beanbag-> ambulate backwards x 6 ft. repeat with altnerate LEs stepping, tolerated 1 min 45 sec on 1st bout, 2 min on 2nd bout. pt stating dizziness following 2nd bout, Bp meausres 75/42, pulse= 75 bpm. pt assisted back to room / supine position. Exercises  Straight Leg Raise: x20 BLE  Heelslides: x20 BLE  Hip Abduction: x20 BLE  Ankle Pumps: x20 BLE  Comments: completed supine. Bp following ther ex = 130/70, pulse= 68 bpm                  Goals  Short term goals  Time Frame for Short term goals: 1 week (4/4)  Short term goal 1: Pt will be SBA with supine<>Sit. Short term goal 2: Pt will be SBA with transfers with RW or 4WW. Short term goal 3: Pt will ambulate 150 ft with SBA and RW or 4WW. Short term goal 4: Pt will negotiate 12 stairs with CGA. Long term goals  Time Frame for Long term goals : 2 weeks (4/11)  Long term goal 1: Pt will be indep with supine<>Sit. Long term goal 2: Pt will be mod I with transfers. Long term goal 3: Pt will ambulate 150 ft mod I.  Long term goal 4: Pt will negotiate 12 stairs with SBA. Long term goal 5: Pt will be indep with LE HEP to facilitate continued strengthening and activity tolerance at d/c. Patient Goals   Patient goals : \"to get back to normal and be able to do the things I did before\"    Plan    Plan  Times per week: 5 out of 7 days/wk  Times per day: Daily  Specific instructions for Next Treatment: progress mobility  Current Treatment Recommendations: Bhupendra Pryor Re-education,Patient/Caregiver Education & Training,Balance Training,Gait Training,Home Exercise Program,Safety Education & Training,Stair training,Functional Mobility Training,Equipment Evaluation, Education, & procurement  Safety Devices  Type of devices:  All fall risk precautions in place,Call light within reach,Chair alarm in place,Gait belt,Patient at risk for falls,Nurse notified,Left in chair     Therapy Time   Individual Concurrent Group Co-treatment   Time In 0800         Time Out 0900 Minutes 60         Timed Code Treatment Minutes: 60 Minutes       Deann Cisneros, PT

## 2022-04-02 PROCEDURE — 6360000002 HC RX W HCPCS: Performed by: STUDENT IN AN ORGANIZED HEALTH CARE EDUCATION/TRAINING PROGRAM

## 2022-04-02 PROCEDURE — 97110 THERAPEUTIC EXERCISES: CPT

## 2022-04-02 PROCEDURE — 97535 SELF CARE MNGMENT TRAINING: CPT

## 2022-04-02 PROCEDURE — 6370000000 HC RX 637 (ALT 250 FOR IP): Performed by: STUDENT IN AN ORGANIZED HEALTH CARE EDUCATION/TRAINING PROGRAM

## 2022-04-02 PROCEDURE — 97130 THER IVNTJ EA ADDL 15 MIN: CPT

## 2022-04-02 PROCEDURE — 1280000000 HC REHAB R&B

## 2022-04-02 PROCEDURE — 97129 THER IVNTJ 1ST 15 MIN: CPT

## 2022-04-02 PROCEDURE — 97116 GAIT TRAINING THERAPY: CPT

## 2022-04-02 RX ADMIN — Medication 1000 UNITS: at 10:06

## 2022-04-02 RX ADMIN — FOLIC ACID 1 MG: 1 TABLET ORAL at 10:06

## 2022-04-02 RX ADMIN — Medication 400 MG: at 10:06

## 2022-04-02 RX ADMIN — POTASSIUM CHLORIDE 40 MEQ: 20 TABLET, EXTENDED RELEASE ORAL at 10:05

## 2022-04-02 RX ADMIN — FERROUS SULFATE TAB 325 MG (65 MG ELEMENTAL FE) 325 MG: 325 (65 FE) TAB at 10:13

## 2022-04-02 RX ADMIN — DONEPEZIL HYDROCHLORIDE 10 MG: 5 TABLET ORAL at 20:22

## 2022-04-02 RX ADMIN — ENOXAPARIN SODIUM 30 MG: 100 INJECTION SUBCUTANEOUS at 10:05

## 2022-04-02 RX ADMIN — FERROUS SULFATE TAB 325 MG (65 MG ELEMENTAL FE) 325 MG: 325 (65 FE) TAB at 16:18

## 2022-04-02 ASSESSMENT — PAIN SCALES - GENERAL: PAINLEVEL_OUTOF10: 0

## 2022-04-02 NOTE — PROGRESS NOTES
Occupational Therapy  Facility/Department: [facility]  Daily Treatment Note  NAME: Kike Newton  : 1940  MRN: [id number]    Date of Service: 2022    Discharge Recommendations:  24 hour supervision or assist,Home with Home health OT,Continue to assess pending progress  OT Equipment Recommendations  ADL Assistive Devices: Hand-held Shower; Toilet Safety Frame;Long-handled Sponge; Shower Chair with back  Other: Pt could potentially benefit from RW and a reacher    Assessment   Performance deficits / Impairments: Decreased functional mobility ; Decreased ADL status; Decreased balance;Decreased safe awareness;Decreased strength;Decreased endurance;Decreased posture;Decreased cognition;Decreased high-level IADLs  Assessment: Pt contiues to be functioning below baseline due to deficits listed above. Pt required up to min A for LB ADLs and CGA for mobility. Improved tolerate for standing and OOB tasks. Prognosis: Good  OT Education: OT Role;Plan of Care;Precautions;Transfer Training;Energy Conservation; ADL Adaptive Strategies  Patient Education: home safety concerns, importance of monitoring vitals with positional changes, safety during bathing/dressing, BSC transfers, hand placement for transfers, ARU expectations; long discussion with family/pt on the importance of not using a purewick for toileting  REQUIRES OT FOLLOW UP: Yes  Activity Tolerance  Activity Tolerance: Patient Tolerated treatment well;Patient limited by fatigue  Activity Tolerance: Pt with no c/o dizziness this date; BP stable throughout 114/62 at start; 123/76 at end of session  Safety Devices  Safety Devices in place: Yes  Type of devices: Nurse notified;Call light within reach; All fall risk precautions in place; Left in chair;Chair alarm in place; Patient at risk for falls;Gait belt         Patient Diagnosis(es):      has a past medical history of ARF (acute renal failure) ([facility] [address]    has a past surgical history that includes joint replacement; Tonsillectomy; Intracapsular cataract extraction (Right, 5/21/2019); and Intracapsular cataract extraction (Left, 7/2/2019). Restrictions  Restrictions/Precautions  Restrictions/Precautions: General Precautions,Fall Risk  Position Activity Restriction  Other position/activity restrictions: Up with assist, monitor for orthostatic BP  Subjective   General  Chart Reviewed: Yes  Patient assessed for rehabilitation services?: Yes  Additional Pertinent Hx: ARF, catarct surgery, bilateral MARGOTH, urge incontinence, anemia, memory loss  Response to previous treatment: Patient with no complaints from previous session  Family / Caregiver Present: No  Referring Practitioner: Lamont Coats MD  Diagnosis: Pt admitted on 3/25/22 due to progressive weakness, frequent falls, ROSE, anemia. Pt transfered to ARU on 3/28/22  Subjective  Subjective: Pt in chair, requesting to shower this date. BP stable throughout therapy session. General Comment  Comments: RN Agreeable to OT session.   Vital Signs  Patient Currently in Pain: Denies   Orientation  Orientation  Overall Orientation Status: Within Functional Limits  Objective    ADL  Feeding: Independent  Grooming: Stand by assistance (oral hygiene in stance for 5 mins at sink)  UE Bathing: Supervision  LE Bathing: Contact guard assistance  UE Dressing: Supervision  LE Dressing: Minimal assistance  Toileting: Stand by assistance  Additional Comments: Pt continues to require increase assistance for Lb dressing due difficulty reaching feet        Balance  Sitting Balance: Supervision  Standing Balance: Stand by assistance  Standing Balance  Activity: transfers, standing for ADLs, mobility in room/bathroom  Functional Mobility  Functional - Mobility Device: Rolling Walker  Activity: Retrieve items  Assist Level: Contact guard assistance     Transfers  Sit to stand: Contact guard assistance  Stand to sit: Contact guard assistance  Transfer Comments: cuesfor hand placement and mechanics sit <> stand                       Cognition  Overall Cognitive Status: Exceptions  Arousal/Alertness: Appropriate responses to stimuli  Following Commands: Follows one step commands consistently; Follows multistep commands with repitition  Attention Span: Attends with cues to redirect  Memory: Decreased recall of precautions;Decreased short term memory  Safety Judgement: Decreased awareness of need for safety  Problem Solving: Decreased awareness of errors  Insights: Decreased awareness of deficits  Initiation: Does not require cues  Sequencing: Requires cues for some  Cognition Comment: slightly impulsive                                         Plan   Plan  Times per week: 5-7 days/week  Times per day: Daily  Plan weeks: 2 weeks  Current Treatment Recommendations: Strengthening,Functional Mobility Training,Endurance Training,Balance Training,Safety Education & Training,Self-Care / ADL,Patient/Caregiver Education & Training,Home Management Training,Equipment Evaluation, Education, & procurement,Cognitive/Perceptual Training,Positioning    Goals  Short term goals  Time Frame for Short term goals: 1 week (4/5 22)  Short term goal 1: Pt will complete LB dressing using AE and LRAD PRN CGA  Short term goal 2: Pt will complete functional transfers from various surfaces with LRAD SBA  Short term goal 3: Pt will complete grooming standing sinkside >5 minutes using LRAD CGA  Short term goal 4: Pt will complete toileting with CGA using LRAD and DME PRN  Short term goal 5: Pt will complete total body bathing CGA sitting vs standing using AE/DME PRN  Long term goals  Time Frame for Long term goals : 2 weeks (4/12/22)  Long term goal 1: Pt will complete total body dressing including gathering items with LRAD Dev  Long term goal 2: Pt will complete toileting tasks Dev with LRAD and DME PRN  Long term goal 3: Pt will complete functional transfers Dev with LRAD and no cues for safety/sequencing  Long term goal 4: Pt will complete grooming standing vs sititng sinkside Dev  Long term goal 5: Pt will complete total body bathing SPV/setup with LRAD and AE PRN sitting vs standing  Patient Goals   Patient goals : \"to take care of myself without help from others\"       Therapy Time   Individual Concurrent Group Co-treatment   Time In 1330         Time Out 1430         Minutes 60         Timed Code Treatment Minutes: [address], OTR/L

## 2022-04-02 NOTE — PROGRESS NOTES
Speech Language Pathology  [facility]: ACUTE REHAB UNIT  SPEECH-LANGUAGE PATHOLOGY      [x] Daily  [] Weekly Care Conference Note  [] Discharge    Patient:uYlissa Arias      :1940  WXP:[id number]  Rehab Dx/Hx: Debility [R53.81]    Precautions: falls  Home situation: lives alone, manages own meds/finances, household tasks, has two friends who she considers \"daughter and son in law\" live close by and assist when needed, not an active    ST Dx: [] Aphasia  [] Dysarthria  [] Apraxia   [] Oropharyngeal dysphagia [x] Cognitive Impairment  [] Other:   Date of Admit: 3/28/2022  Room #: 0161/0161-01    Current functional status (updated daily):         Pt being seen for : [] Speech/Language Treatment  [] Dysphagia Treatment [x] Cognitive Treatment  [] Other:  Communication: [x]WFL  [] Aphasia  [] Dysarthria  [] Apraxia  [] Pragmatic Impairment [] Non-verbal  [] Hearing Loss  [] Other:   Cognition: [] WFL  [] Mild  [x] Moderate  [] Severe [] Unable to Assess  [] Other:  Memory: [] WFL  [] Mild  [x] Moderate  [] Severe [] Unable to Assess  [] Other:  Behavior: [x] Alert  [x] Cooperative  [x]  Pleasant  [] Confused  [] Agitated  [] Uncooperative  [] Distractible [] Motivated  [] Self-Limiting [] Anxious  [] Other:  Endurance:  [x] Adequate for participation in SLP sessions  [] Reduced overall  [] Lethargic  [] Other:  Safety: [x] No concerns at this time  [] Reduced insight into deficits  []  Reduced safety awareness [] Not following call light procedures  [] Unable to Assess  [] Other:    Current Diet Order:ADULT DIET; Regular  ADULT ORAL NUTRITION SUPPLEMENT; Breakfast, Dinner; Standard High Calorie/High Protein Oral Supplement  Swallowing Precautions:  Alternate solids and liquids;Eat/Feed slowly;Upright as possible for all oral intake;Remain upright for 30-45 minutes after meals;Small bites/sips        Date: 2022      Tx session 1  [id number]    Total Timed Code Min 60    Total Treatment Minutes 60 Individual Treatment Minutes 60    Group Treatment Minutes 0 0   Co-Treat Minutes 0 0   Variance/Reason:  0 0   Pain Denies  None reported   Pain Intervention [] RN notified  [] Repositioned  [] Intervention offered and patient declined  [x] N/A  [] Other: [] RN notified  [] Repositioned  [] Intervention offered and patient declined  [] N/A  [] Other:   Subjective     Pt alert and oriented, cooperative and agreeable to participate in therapy. Pt seen sitting upright in bed. Objective:  Goals     Short-term Goals  Timeframe for Short-term Goals: 10 days (04/07/2022)    Goal 1: Pt will complete graded recall tasks using compensatory strategies with 80% acc given min cues      See note-taking used for recall within organization/problem solving task below    Goal 2: Pt will complete executive function tasks (e.g. meds, time, money, etc) with 80% acc given min cues   Money amounts comparisons (simple) 90% without cues, to 100% given a second attempt      Goal 3: Pt will complete verbal and visual reasoning tasks with 80% acc given min cues   Select category members for given category given F:12- 90% acc given mild extra time for reasoning      Goal 4: Pt will complete problem solving and thought organization tasks with 80% acc given min cues   Pt explained keeping a list of her BP (once daily), comparing it to a \"target\" provided by her MD. Provided min assist to organize what to track to make this useful in her problem solving re medications to take. Other areas targeted: N/A N/A   Education:   Strategies to maximize recall, list making to track BP  ,    Safety Devices: [x] Call light within reach  [] Chair alarm activated  [x] Bed alarm activated  [] Other: [x] Call light within reach  [x] Chair alarm activated  [] Bed alarm activated  [] Other:    Assessment: Session 1: Pt pleasant and cooperative, agreeable to participate.  Pt pleased at her ability to use repetition as a strategy to recall a word list after a delay. Pt denied concerns about her memory at this time. Encouraged continuing to keep a written log of her BP (as this is related to her reason for admission) including times, treatments and trends. Pt with good understanding of this, familiar from tracking it at home. She was unable to state target BP numbers today (needed to determine tx to provide as pt reports she has meds for both hypo and hypertension)- asked RN to provide this for pt's comparison/decision-making. Pt is making good progress towards all goals and remains motivated to improve. Continue goals above. Plan: Continue as per plan of care. Additional Information:     Barriers toward progress:None   Discharge recommendations:  [] Home independently  [] Home with assistance []  24 hour supervision  [] ECF [x] Other: defer to PT/OT recs  Continued Tx Upon Discharge: ? [] Yes [] No [x] TBD based on progress while on ARU [] Vital Stim indicated [] Other:   Estimated discharge date: 04/09/22    Interventions used this date:  [] Speech/Language Treatment  [] Instruction in HEP [] Group [] Dysphagia Treatment [x] Cognitive Treatment   [] Other: Total Time Breakdown / Charges    Time in Time out Total Time / units   Cognitive Tx 0730 0830 60 mins   Speech Tx -- -- --   Dysphagia Tx -- -- --       Electronically Signed by   Melinda Elder MS CCC-SLP  Speech Language Pathologist

## 2022-04-02 NOTE — PROGRESS NOTES
Physical Therapy  Facility/Department: [facility]  Daily Treatment Note  NAME: Edel Terry  : 1940  MRN: [id number]    Date of Service: 2022    Discharge Recommendations:  Home with Home health PT,24 hour supervision or assist   PT Equipment Recommendations  Equipment Needed: Yes  Mobility Devices: Colleen Se: Rolling  Other: pt owns 4WW    Assessment   Body structures, Functions, Activity limitations: Decreased functional mobility ; Decreased posture;Decreased ADL status; Decreased coordination;Decreased balance;Decreased strength; Increased pain;Decreased endurance  Assessment: Pt presents w/ similar level of function compared to previous session. Pt grossly SBA for t/f and CGA for dynamic gait activites. Pt does not present w/ orthostatic hypotension this session. Pt did req rest breaks frequently d/t fatigue. Cont to rec home w/  and HHPT. DME RW  Treatment Diagnosis: Decreased functional mobility  Specific instructions for Next Treatment: progress mobility  Prognosis: Good  Decision Making: Medium Complexity  PT Education: Goals;Transfer Training;Disease Specific Education;PT Role;Plan of Care;General Safety;Gait Training  Patient Education: Pt educated on importance of OOB mobility for continued improvements in current deficits. Pt demos understanding  Barriers to Learning: none  REQUIRES PT FOLLOW UP: Yes  Activity Tolerance  Activity Tolerance: Patient Tolerated treatment well     Patient Diagnosis(es): There were no encounter diagnoses. has a past medical history of ARF (acute renal failure) ([facility] [address] has a past surgical history that includes joint replacement; Tonsillectomy; Intracapsular cataract extraction (Right, 2019); and Intracapsular cataract extraction (Left, 2019). Restrictions  Restrictions/Precautions  Restrictions/Precautions: General Precautions,Fall Risk  Position Activity Restriction  Other position/activity restrictions:  Up with assist, monitor for orthostatic BP  Subjective   General  Chart Reviewed: Yes  Additional Pertinent Hx: Acute fractures of the right 4th through 12th ribs  Response To Previous Treatment: Patient with no complaints from previous session. Family / Caregiver Present: No  Referring Practitioner: Dr. Nelson Salas MD  Subjective  Subjective: pt agreeable to therapy  General Comment  Comments: found in bed, requesting to use bathroom  Pain Screening  Patient Currently in Pain: Denies  Vital Signs  Pulse: 73  BP: 133/69 (standin/66)  BP Location: Left upper arm  Patient Position: Sitting  Patient Currently in Pain: Denies  Oxygen Therapy  SpO2: 99 %  O2 Device: None (Room air)       Orientation  Orientation  Overall Orientation Status: Within Functional Limits  Cognition   Cognition  Overall Cognitive Status: Exceptions  Arousal/Alertness: Appropriate responses to stimuli  Following Commands: Follows one step commands consistently; Follows multistep commands with repitition  Attention Span: Attends with cues to redirect  Memory: Decreased recall of precautions;Decreased short term memory  Safety Judgement: Decreased awareness of need for safety  Problem Solving: Decreased awareness of errors  Insights: Decreased awareness of deficits  Initiation: Does not require cues  Sequencing: Requires cues for some  Cognition Comment: slightly impulsive  Objective   Bed mobility  Rolling to Right: Supervision  Supine to Sit: Supervision  Transfers  Sit to Stand: Stand by assistance  Stand to sit: Stand by assistance  Bed to Chair: Stand by assistance  Comment: Sit to stand EOB w/ RW, t/f from bed to chair w/ RW and SBA  Ambulation  Ambulation?: Yes  More Ambulation?: Yes  Ambulation 1  Surface: level tile  Device: Rolling Walker  Assistance: Contact guard assistance  Quality of Gait: Pt req cues for upright posture and responds well to \"keep walker closer to you\".  she presents w/ forward posture and downward gaze, req freq cues to correct  Gait Deviations: Slow Bettie; Shuffles;Decreased step length;Decreased step height  Distance: 150 ft  Comments: From room to main windows, req seated rest break of 5 mins  Ambulation 2  Surface - 2: level tile  Device 2: Rolling Walker  Quality of Gait 2: Pt cont to req cues for proper posture w/ RW>  Distance: 75 ft + 100 ft  Comments: limited by fatigue, uses seated rest break between bouts. Stairs/Curb  Stairs?: Yes  Stairs  # Steps : 4  Curbs: 6\"  Device: Rolling walker  Assistance: Minimal assistance  Comment: Pt req cues for proper sequencing and set up to improve safety     Balance  Posture: Fair  Sitting - Static: Good  Sitting - Dynamic: Good  Standing - Dynamic: Fair  Comments: Pt able to perform dynamic standing activites that involes staggered stance w/ 1 foot on 6 \" step, pt encouraged to lean forward on LE and given disk to toss at various targets. Pt req CGA for stability, no overt LOB  Exercises  Straight Leg Raise: 2x10 bilat  Heelslides: 2x10 bilat  Hip Flexion: Seated marches 2x10 bilat  BLE  Ankle Pumps: x20 BLE  Comments: Completed supine and seated, cues for sequencing and technique     Goals  Short term goals  Time Frame for Short term goals: 1 week (4/4)  Short term goal 1: Pt will be SBA with supine<>Sit. Short term goal 2: Pt will be SBA with transfers with RW or 4WW. Short term goal 3: Pt will ambulate 150 ft with SBA and RW or 4WW. Short term goal 4: Pt will negotiate 12 stairs with CGA. Long term goals  Time Frame for Long term goals : 2 weeks (4/11)  Long term goal 1: Pt will be indep with supine<>Sit. Long term goal 2: Pt will be mod I with transfers. Long term goal 3: Pt will ambulate 150 ft mod I.  Long term goal 4: Pt will negotiate 12 stairs with SBA. Long term goal 5: Pt will be indep with LE HEP to facilitate continued strengthening and activity tolerance at d/c.   Patient Goals   Patient goals : Jhony Messina get back to normal and be able to do the things I did before\"    Plan

## 2022-04-03 PROCEDURE — 6370000000 HC RX 637 (ALT 250 FOR IP): Performed by: STUDENT IN AN ORGANIZED HEALTH CARE EDUCATION/TRAINING PROGRAM

## 2022-04-03 PROCEDURE — 1280000000 HC REHAB R&B

## 2022-04-03 PROCEDURE — 6360000002 HC RX W HCPCS: Performed by: STUDENT IN AN ORGANIZED HEALTH CARE EDUCATION/TRAINING PROGRAM

## 2022-04-03 RX ADMIN — FERROUS SULFATE TAB 325 MG (65 MG ELEMENTAL FE) 325 MG: 325 (65 FE) TAB at 16:20

## 2022-04-03 RX ADMIN — FERROUS SULFATE TAB 325 MG (65 MG ELEMENTAL FE) 325 MG: 325 (65 FE) TAB at 10:16

## 2022-04-03 RX ADMIN — DONEPEZIL HYDROCHLORIDE 10 MG: 5 TABLET ORAL at 20:21

## 2022-04-03 RX ADMIN — ENOXAPARIN SODIUM 30 MG: 100 INJECTION SUBCUTANEOUS at 10:16

## 2022-04-03 RX ADMIN — Medication 400 MG: at 10:16

## 2022-04-03 RX ADMIN — FOLIC ACID 1 MG: 1 TABLET ORAL at 10:16

## 2022-04-03 RX ADMIN — Medication 1000 UNITS: at 10:15

## 2022-04-03 RX ADMIN — POTASSIUM CHLORIDE 40 MEQ: 20 TABLET, EXTENDED RELEASE ORAL at 10:16

## 2022-04-03 ASSESSMENT — PAIN SCALES - GENERAL: PAINLEVEL_OUTOF10: 0

## 2022-04-03 NOTE — PROGRESS NOTES
Ernestine Notice  4/3/2022  [id number]    Chief Complaint: Debility    Subjective:   No acute events overnight. No current complaints. States she is doing well. ROS: denies f/c, n/v, cp, sob  Objective:  Patient Vitals for the past 24 hrs:   BP Temp Temp src Pulse Resp SpO2   04/03/22 1123 (!) 143/71 -- -- 67 -- --   04/03/22 0750 118/67 98.1 °F (36.7 °C) Oral 67 16 96 %   04/02/22 2017 (!) 159/75 97.3 °F (36.3 °C) Oral 71 18 96 %   04/02/22 1711 (!) 164/78 -- -- 80 -- --     Gen: No distress, pleasant. Resting in bed  HEENT: Normocephalic, atraumatic. CV: RRR  Resp: No respiratory distress. Lungs CTAB  Abd: nondistended  Ext: No edema. Neuro: Alert, oriented, appropriately interactive. Speech fluent without aphasia     Wt Readings from Last 3 Encounters:   03/28/22 107 lb 2.3 oz (48.6 kg)   03/25/22 110 lb (49.9 kg)   01/09/20 113 lb 12.8 oz (51.6 kg)       Laboratory data:   Lab Results   Component Value Date    WBC 6.7 03/31/2022    HGB 9.1 (L) 03/31/2022    HCT 26.7 (L) 03/31/2022    MCV 97.9 03/31/2022     03/31/2022       Lab Results   Component Value Date     04/01/2022    K 3.3 04/01/2022     04/01/2022    CO2 27 04/01/2022    BUN 17 04/01/2022    CREATININE 0.7 04/01/2022    GLUCOSE 106 04/01/2022    CALCIUM 8.7 04/01/2022        Therapy progress:  PT  Position Activity Restriction  Other position/activity restrictions:  Up with assist, monitor for orthostatic BP  Objective     Sit to Stand: Stand by assistance  Stand to sit: Stand by assistance  Bed to Chair: Stand by assistance  Device: Rolling Walker  Assistance: Contact guard assistance  Distance: 150 ft  OT  PT Equipment Recommendations  Equipment Needed: Yes  Mobility Devices: Desi Yolette: Rolling  Other: pt owns 4WW  Toilet - Technique: Ambulating,To left,To right (RW)  Equipment Used: Standard toilet  Toilet Transfers Comments: Isauro sit <> Stand from [employer], cues for safety with turning to sit and positioning of self  Assessment        SLP  Current Diet : Regular  Current Liquid Diet : Thin  Diet Solids Recommendation: Regular  Liquid Consistency Recommendation: Thin    Body mass index is 17.83 kg/m². Assessment and Plan:  Elizabeth Jones is an [de-identified]year old female with past medical history significant for urge incontinence, anemia, and memory loss who presented to [facility] with several days of progressive weakness and falls. She was admitted to [facility] on 3/28/22 due to functional deficits below her baseline.      Debility  - will falls at home  - PT, OT     Cognitive Impairment  - continue aricept  - ST     Orthostatic Hypotension  - s/p 1 L NS 3/29  - increased florinef to 0.2 mg daily  - KENN hose     Anemia  - no evidence of acute bleed during acute workup  - completed course of venofer  - monitor and transfuse for Hb<7  - PO iron replacement    Electrolyte abnormalities  - K and Mg replacement  - continue to monitor    ROSE, resolved  - improved after IV hydration  - encourage PO intake  - monitor renal function     Bowels: Schedule stool softener. Follow bowel movements. Enema or suppository if needed.      Bladder: Check PVR x 3.   [address] if PVR > 200ml or if any volume is > 500 ml.      Sleep: Trazodone provided prn.      PPx  DVT: lovenox  GI: not indicated     Follow up appointments: PCP    Services/DME: home PT, OT, ST; DME TBD  EDOD: 4/9/22    Electronically signed by Dilma Chua MD on 4/3/2022 at 12:44 PM

## 2022-04-03 NOTE — PROGRESS NOTES
Pt's right arm near ac red, warm with hard raised area. Dr. Juli Amato notified. Orders to apply warm compresses and have Tammie Rizo evaluate tomorrow.

## 2022-04-04 ENCOUNTER — APPOINTMENT (OUTPATIENT)
Dept: ULTRASOUND IMAGING | Age: 82
DRG: 948 | End: 2022-04-04
Attending: STUDENT IN AN ORGANIZED HEALTH CARE EDUCATION/TRAINING PROGRAM
Payer: MEDICARE

## 2022-04-04 LAB
ANION GAP SERPL CALCULATED.3IONS-SCNC: 8 MMOL/L (ref 3–16)
BUN BLDV-MCNC: 19 MG/DL (ref 7–20)
CALCIUM SERPL-MCNC: 8.9 MG/DL (ref 8.3–10.6)
CHLORIDE BLD-SCNC: 104 MMOL/L (ref 99–110)
CO2: 24 MMOL/L (ref 21–32)
CREAT SERPL-MCNC: 0.7 MG/DL (ref 0.6–1.2)
GFR AFRICAN AMERICAN: >60
GFR NON-AFRICAN AMERICAN: >60
GLUCOSE BLD-MCNC: 97 MG/DL (ref 70–99)
POTASSIUM REFLEX MAGNESIUM: 4.4 MMOL/L (ref 3.5–5.1)
SODIUM BLD-SCNC: 136 MMOL/L (ref 136–145)

## 2022-04-04 PROCEDURE — 97530 THERAPEUTIC ACTIVITIES: CPT

## 2022-04-04 PROCEDURE — 6360000002 HC RX W HCPCS: Performed by: STUDENT IN AN ORGANIZED HEALTH CARE EDUCATION/TRAINING PROGRAM

## 2022-04-04 PROCEDURE — 97116 GAIT TRAINING THERAPY: CPT

## 2022-04-04 PROCEDURE — 97130 THER IVNTJ EA ADDL 15 MIN: CPT

## 2022-04-04 PROCEDURE — 97535 SELF CARE MNGMENT TRAINING: CPT

## 2022-04-04 PROCEDURE — 76999 ECHO EXAMINATION PROCEDURE: CPT

## 2022-04-04 PROCEDURE — 1280000000 HC REHAB R&B

## 2022-04-04 PROCEDURE — 80048 BASIC METABOLIC PNL TOTAL CA: CPT

## 2022-04-04 PROCEDURE — 97110 THERAPEUTIC EXERCISES: CPT

## 2022-04-04 PROCEDURE — 97129 THER IVNTJ 1ST 15 MIN: CPT

## 2022-04-04 PROCEDURE — 6370000000 HC RX 637 (ALT 250 FOR IP): Performed by: STUDENT IN AN ORGANIZED HEALTH CARE EDUCATION/TRAINING PROGRAM

## 2022-04-04 PROCEDURE — 36415 COLL VENOUS BLD VENIPUNCTURE: CPT

## 2022-04-04 RX ORDER — FLUDROCORTISONE ACETATE 0.1 MG/1
0.1 TABLET ORAL DAILY
Status: DISCONTINUED | OUTPATIENT
Start: 2022-04-05 | End: 2022-04-09 | Stop reason: HOSPADM

## 2022-04-04 RX ORDER — SULFAMETHOXAZOLE AND TRIMETHOPRIM 800; 160 MG/1; MG/1
1 TABLET ORAL 2 TIMES DAILY
Status: DISCONTINUED | OUTPATIENT
Start: 2022-04-04 | End: 2022-04-04

## 2022-04-04 RX ADMIN — FERROUS SULFATE TAB 325 MG (65 MG ELEMENTAL FE) 325 MG: 325 (65 FE) TAB at 17:23

## 2022-04-04 RX ADMIN — FOLIC ACID 1 MG: 1 TABLET ORAL at 08:27

## 2022-04-04 RX ADMIN — POTASSIUM CHLORIDE 40 MEQ: 20 TABLET, EXTENDED RELEASE ORAL at 08:27

## 2022-04-04 RX ADMIN — ENOXAPARIN SODIUM 30 MG: 100 INJECTION SUBCUTANEOUS at 08:26

## 2022-04-04 RX ADMIN — FERROUS SULFATE TAB 325 MG (65 MG ELEMENTAL FE) 325 MG: 325 (65 FE) TAB at 08:27

## 2022-04-04 RX ADMIN — Medication 400 MG: at 08:27

## 2022-04-04 RX ADMIN — Medication 1000 UNITS: at 08:27

## 2022-04-04 RX ADMIN — DONEPEZIL HYDROCHLORIDE 10 MG: 5 TABLET ORAL at 20:07

## 2022-04-04 RX ADMIN — DOCUSATE SODIUM 50 MG AND SENNOSIDES 8.6 MG 2 TABLET: 8.6; 5 TABLET, FILM COATED ORAL at 08:27

## 2022-04-04 RX ADMIN — SULFAMETHOXAZOLE AND TRIMETHOPRIM 1 TABLET: 800; 160 TABLET ORAL at 15:30

## 2022-04-04 ASSESSMENT — PAIN SCALES - GENERAL
PAINLEVEL_OUTOF10: 0
PAINLEVEL_OUTOF10: 0

## 2022-04-04 NOTE — PROGRESS NOTES
Vangie Grumbling  4/4/2022  [id number]    Chief Complaint: Debility    Subjective:   No acute events overnight. Today Graciela Casas is seen in her room. She reports redness with mass on her right forearm. She reports that it does not bother her at rest, but is tender to push on. She denies fevers or chills. She did not have an IV in the area. She reports that it started yesterday. ROS: denies f/c, n/v, cp, sob  Objective:  Patient Vitals for the past 24 hrs:   BP Temp Temp src Pulse Resp SpO2   04/04/22 0824 138/86 98 °F (36.7 °C) Oral 72 16 96 %   04/03/22 2015 (!) 135/58 98.5 °F (36.9 °C) Oral 68 16 95 %     Gen: No distress, pleasant. Resting in bed  HEENT: Normocephalic, atraumatic. CV: extremities well perfused  Resp: No respiratory distress. Lungs CTAB  Abd: nondistended  Ext: No edema. Neuro: Alert, oriented, appropriately interactive. Speech fluent without aphasia   Skin: erythema with palpable cord right forearm. Wt Readings from Last 3 Encounters:   03/28/22 107 lb 2.3 oz (48.6 kg)   03/25/22 110 lb (49.9 kg)   01/09/20 113 lb 12.8 oz (51.6 kg)       Laboratory data:   Lab Results   Component Value Date    WBC 6.7 03/31/2022    HGB 9.1 (L) 03/31/2022    HCT 26.7 (L) 03/31/2022    MCV 97.9 03/31/2022     03/31/2022       Lab Results   Component Value Date     04/04/2022    K 4.4 04/04/2022     04/04/2022    CO2 24 04/04/2022    BUN 19 04/04/2022    CREATININE 0.7 04/04/2022    GLUCOSE 97 04/04/2022    CALCIUM 8.9 04/04/2022        Therapy progress:  PT  Position Activity Restriction  Other position/activity restrictions:  Up with assist, monitor for orthostatic BP  Objective     Sit to Stand: Stand by assistance  Stand to sit: Stand by assistance  Bed to Chair: Stand by assistance  Device: [employer]  Assistance: Stand by assistance  Distance: 10 ft x 2 reps  OT  PT Equipment Recommendations  Equipment Needed: Yes  Mobility Devices: Regi Serene: Rolling  Other: pt owns 4WW  Toilet - Technique: Ambulating,To left,To right (RW)  Equipment Used: Standard toilet  Toilet Transfers Comments: Isauro sit <> Stand from [email or web address], cues for safety with turning to sit and positioning of self  Assessment        SLP  Current Diet : Regular  Current Liquid Diet : Thin  Diet Solids Recommendation: Regular  Liquid Consistency Recommendation: Thin    Body mass index is 17.83 kg/m². Assessment and Plan:  Nely Tsai is an [de-identified]year old female with past medical history significant for urge incontinence, anemia, and memory loss who presented to [facility] on 3/25/22 with several days of progressive weakness and falls. She was admitted to [facility] on 3/28/22 due to functional deficits below her baseline.      Debility  - will falls at home  - PT, OT     Cognitive Impairment  - continue aricept  - ST    Right Forearm Lesion  - etiology unclear  - obtain ultrasound  - start bactrim     Orthostatic Hypotension  - s/p 1 L NS 3/29  - patient has not received florinef in 3 days due to normotension. Will decrease dose and continue hold parameters  - florinef to 0.1 mg daily  - KENN hose     Anemia  - no evidence of acute bleed during acute workup  - completed course of venofer  - monitor and transfuse for Hb<7  - PO iron replacement    Electrolyte abnormalities  - K and Mg replacement  - continue to monitor    ROSE, resolved  - improved after IV hydration  - encourage PO intake  - monitor renal function     Bowels: Schedule stool softener. Follow bowel movements. Enema or suppository if needed.      Bladder: Check PVR x 3.   [facility] if PVR > 200ml or if any volume is > 500 ml.      Sleep: Trazodone provided prn.      PPx  DVT: lovenox  GI: not indicated     Follow up appointments: PCP    Services/DME: home PT, OT, ST; DME TBD  EDOD: 4/9/22    Electronically signed by Eleanor Brittle, MD on 4/4/2022 at 2:14 PM

## 2022-04-04 NOTE — CARE COORDINATION
[employer] on [facility] referral made by [employer]. We received your referral for Caitlin Beltran on 04/04/2022. Your reference number is [id number]    Staff in our [address] [place], [place] [address]jas and Amina [address] will respond directly to Pop De La Vega (or another contact you designated on the form) within 1-2 business days. If you requested to receive an update regarding this referral, a [facility] staff member will contact you within 5 business days.  Ana Mcdonnell RN

## 2022-04-04 NOTE — PROGRESS NOTES
Physical Therapy  Facility/Department: [facility]  Daily Treatment Note  NAME: Cheri King  : 1940  MRN: [id number]    Date of Service: 2022    Discharge Recommendations:  Home with Home health PT,24 hour supervision or assist   PT Equipment Recommendations  Equipment Needed: Yes  Walker: Rolling    Assessment   Body structures, Functions, Activity limitations: Decreased functional mobility ; Decreased posture;Decreased ADL status; Decreased coordination;Decreased balance;Decreased strength; Increased pain;Decreased endurance  Assessment: pt found in room, denies pain but requesting to use commode. grossly SBA for transfers and gait with RW wiht cues for upright posture. denies dizziness throughout session but limited by decreased activity tolerance. continue to rec home with initial / and HHPT. DME : RW  Treatment Diagnosis: Decreased functional mobility  Prognosis: Good  Decision Making: Medium Complexity  PT Education: Goals;Transfer Training;Disease Specific Education;PT Role;Plan of Care;General Safety;Gait Training  Patient Education: Pt educated on importance of OOB mobility for continued improvements in current deficits. Pt demos understanding  Barriers to Learning: none  REQUIRES PT FOLLOW UP: Yes  Activity Tolerance  Activity Tolerance: Patient limited by fatigue  Activity Tolerance: 131/60, 77 bpm, Spo2=97% on room air     Patient Diagnosis(es): There were no encounter diagnoses. has a past medical history of ARF (acute renal failure) ([facility] [address] has a past surgical history that includes joint replacement; Tonsillectomy; Intracapsular cataract extraction (Right, 2019); and Intracapsular cataract extraction (Left, 2019). Restrictions  Restrictions/Precautions  Restrictions/Precautions: General Precautions,Fall Risk  Position Activity Restriction  Other position/activity restrictions: Up with assist, monitor for orthostatic BP  Subjective   General  Chart Reviewed:  Yes  Additional Pertinent Hx: Acute fractures of the right 4th through 12th ribs  Response To Previous Treatment: Patient with no complaints from previous session. Family / Caregiver Present: No  Referring Practitioner: Dr. Josselin Archuleta MD  Subjective  Subjective: pt agreeable to therapy  General Comment  Comments: found in recliner, requests to use commode  Pain Screening  Patient Currently in Pain: No  Vital Signs  Patient Currently in Pain: No       Orientation  Orientation  Overall Orientation Status: Within Functional Limits  Cognition      Objective   Bed mobility  Supine to Sit: Modified independent  Sit to Supine: Modified independent  Transfers  Sit to Stand: Stand by assistance  Stand to sit: Stand by assistance  Comment: sit to stadn EOB to RW, commode to Rw and chair to RW x multiple reps with SBA  Ambulation  Ambulation?: Yes  More Ambulation?: Yes  Ambulation 1  Surface: level tile  Device: Rolling Walker  Assistance: Stand by assistance  Quality of Gait: cues for upright posture, decreased step legnth/height, decreased velocity  Distance: 10 ft x 2 reps  Comments: to and from commode  Ambulation 2  Surface - 2: level tile  Device 2: Rolling Walker  Quality of Gait 2: cues for upright posture, decreased step legnth/height, decreased velocity  Distance: 150 ft  Distance: 140 ft      Balance  Sitting - Static: Good  Sitting - Dynamic: Good  Standing - Static: Fair  Standing - Dynamic: Fair  Comments: dyn stand actvity while completing pericare/clothing management with cGA-min A with 1-2 UE support. dyn stand activity: mini squat to retrieve cone from ground with RUE on IL side-> place cone to target out of ROBERT across midline. completed 6 reps with RUE/ 6 reps LUE with CGA-SBA, 1 UE support at all times , requires seated rest between bouts due to SOB.  Spo2 remains > 90% on room air and HR remains 70-80 bpm  Exercises  Hip Flexion: 2x10 BLE  Hip Abduction: 2x10 BLE with TKE  Knee Long Arc Quad: 2x10 BLE  Ankle Pumps: 2x10 BLE  Comments: completed seated with 2# weights donned BLE  Other exercises  Other exercises?: Yes  Other exercises 1: pt completed 2 x15 RLE and LLE step ups onto 6\" step with BUE support on RW and CGA      Goals  Short term goals  Time Frame for Short term goals: 1 week (4/4)  Short term goal 1: Pt will be SBA with supine<>Sit. - GOAL MET, completes with mod ind  Short term goal 2: Pt will be SBA with transfers with RW or 4WW.- GOAL MET, completes with SBA  Short term goal 3: Pt will ambulate 150 ft with SBA and RW or 4WW.- GOAL MET, completes with RW and SBA  Short term goal 4: Pt will negotiate 12 stairs with CGA.- GOAL MET, completes with CGA and BHR  Long term goals  Time Frame for Long term goals : 2 weeks (4/11)  Long term goal 1: Pt will be indep with supine<>Sit. Long term goal 2: Pt will be mod I with transfers. Long term goal 3: Pt will ambulate 150 ft mod I.  Long term goal 4: Pt will negotiate 12 stairs with SBA. Long term goal 5: Pt will be indep with LE HEP to facilitate continued strengthening and activity tolerance at d/c. Patient Goals   Patient goals : \"to get back to normal and be able to do the things I did before\"    Plan    Plan  Times per week: 5 out of 7 days/wk  Times per day: Daily  Specific instructions for Next Treatment: progress mobility  Current Treatment Recommendations: Scooter Wade Re-education,Patient/Caregiver Education & Training,Balance Training,Gait Training,Home Exercise Program,Safety Education & Training,Stair training,Functional Mobility Training,Equipment Evaluation, Education, & procurement  Safety Devices  Type of devices:  All fall risk precautions in place,Call light within reach,Chair alarm in place,Gait belt,Patient at risk for falls,Nurse notified,Left in chair     Therapy Time   Individual Concurrent Group Co-treatment   Time In 1030         Time Out 1130         Minutes 60         Timed Code Treatment Minutes: 61 Minutes       Clifton Dumas, PT

## 2022-04-04 NOTE — PROGRESS NOTES
Coordination of Nutrition Care:  Continue to monitor while inpatient    Goals:  Consume 50% or greater of 3 meals per day and ONS during this admission.        Nutrition Monitoring and Evaluation:   Behavioral-Environmental Outcomes:  None Identified   Food/Nutrient Intake Outcomes:  Food and Nutrient Intake,Supplement Intake  Physical Signs/Symptoms Outcomes:  Constipation,Weight     Discharge Planning:    Continue current diet,Continue Oral Nutrition Supplement     Electronically signed by Jacquie Restrepo MS, RD, LD on 4/4/22 at 2:03 PM EDT    Contact: [phone]

## 2022-04-04 NOTE — CARE COORDINATION
CM spoke with Virgilio Montemayor in a private setting on the unit about discharge planning. Jorge Clemente has questions concerning home care. CM explained the process of obtaining and referral request. Jorge Clemente had a preference of [facility] as well as questions about any medical equipment. CM explained the process in length. CM discussed [employer] on Aging for additional resources for patient and if she would like writer to send the referral. Jorge Clemente stated that yes she would like that. Eliza Ahn, RN     [id number] CM spoke with Intake for [facility] with referral>accepted for services PT/OT and nursing.  Eliza Ahn, RN

## 2022-04-04 NOTE — PROGRESS NOTES
Speech Language Pathology  [facility]: ACUTE REHAB UNIT  SPEECH-LANGUAGE PATHOLOGY      [x] Daily  [] Weekly Care Conference Note  [] Discharge    Patient:Yulissa Arias      :1940  NCE:[id number]  Rehab Dx/Hx: Debility [R53.81]    Precautions: falls  Home situation: lives alone, manages own meds/finances, household tasks, has two friends who she considers \"daughter and son in law\" live close by and assist when needed, not an active    ST Dx: [] Aphasia  [] Dysarthria  [] Apraxia   [] Oropharyngeal dysphagia [x] Cognitive Impairment  [] Other:   Date of Admit: 3/28/2022  Room #: 0161/0161-01    Current functional status (updated daily):         Pt being seen for : [] Speech/Language Treatment  [] Dysphagia Treatment [x] Cognitive Treatment  [] Other:  Communication: [x]WFL  [] Aphasia  [] Dysarthria  [] Apraxia  [] Pragmatic Impairment [] Non-verbal  [] Hearing Loss  [] Other:   Cognition: [] WFL  [x] Mild  [x] Moderate  [] Severe [] Unable to Assess  [] Other:  Memory: [] WFL  [x] Mild  [] Moderate  [] Severe [] Unable to Assess  [] Other:  Behavior: [x] Alert  [x] Cooperative  [x]  Pleasant  [] Confused  [] Agitated  [] Uncooperative  [] Distractible [] Motivated  [] Self-Limiting [] Anxious  [] Other:  Endurance:  [x] Adequate for participation in SLP sessions  [] Reduced overall  [] Lethargic  [] Other:  Safety: [x] No concerns at this time  [] Reduced insight into deficits  []  Reduced safety awareness [] Not following call light procedures  [] Unable to Assess  [] Other:    Current Diet Order:ADULT DIET; Regular  ADULT ORAL NUTRITION SUPPLEMENT; Breakfast, Dinner; Standard High Calorie/High Protein Oral Supplement  Swallowing Precautions:  Alternate solids and liquids;Eat/Feed slowly;Upright as possible for all oral intake;Remain upright for 30-45 minutes after meals;Small bites/sips        Date: 2022      Tx session 1  [id number] Tx session 2  All tx needs met in session 1    Total Timed Code Min 60    Total Treatment Minutes 60    Individual Treatment Minutes 60    Group Treatment Minutes 0    Co-Treat Minutes 0    Variance/Reason:  0    Pain Denies     Pain Intervention [] RN notified  [] Repositioned  [] Intervention offered and patient declined  [x] N/A  [] Other: [] RN notified  [] Repositioned  [] Intervention offered and patient declined  [] N/A  [] Other:   Subjective     Pt alert and oriented, cooperative and agreeable to participate in therapy. Pt seen sitting upright in bed.      Objective:  Goals     Short-term Goals  Timeframe for Short-term Goals: 10 days (04/07/2022)    Goal 1: Pt will complete graded recall tasks using compensatory strategies with 80% acc given min cues    Higher level functional recall task:  -recall of four categories associated with each suit in a deck of cards  -ex: diamonds= drinks, spades= states, etc  -50% acc with recalling categories, given mod cues  -75% acc with recalling already previously named items      Goal 2: Pt will complete executive function tasks (e.g. meds, time, money, etc) with 80% acc given min cues   Writing checks:  -100% acc indep       Goal 3: Pt will complete verbal and visual reasoning tasks with 80% acc given min cues   7 step written sequencing task:  -100% acc given min cues     Goal 4: Pt will complete problem solving and thought organization tasks with 80% acc given min cues   Thought organization/higher level alternating attention task:  -see goal 1 above related to card task  -alternating between categories as cards were presented at random order  -82% acc indep improving to 100% acc given min cues         Other areas targeted: N/A    Education:   Edu provided re: rationale for tx tasks provided, compensatory memory and thought organization strategies   ,    Safety Devices: [x] Call light within reach  [] Chair alarm activated  [x] Bed alarm activated  [] Other: [] Call light within reach  [] Chair alarm activated  [] Bed alarm activated  [] Other:    Assessment: Pt pleasant and cooperative, agreeable to participate. Pt completed writing checks and seven step written sequencing task with 100% acc. Pt completed functional recall of four categories presented at random order (higher level alternating attention/thought organization task) with 50% acc, improving with mod cues for use of compensatory memory strategies. Discussed ongoing use of compensatory strategies within functional environment at home. Pt remains motivated to improve and is making good progress towards goals. Continue goals above. Plan: Continue as per plan of care. Additional Information:     Barriers toward progress:None   Discharge recommendations:  [] Home independently  [] Home with assistance []  24 hour supervision  [] ECF [x] Other: defer to PT/OT recs  Continued Tx Upon Discharge: ? [] Yes [] No [x] TBD based on progress while on ARU [] Vital Stim indicated [] Other:   Estimated discharge date: 04/09/22    Interventions used this date:  [] Speech/Language Treatment  [] Instruction in HEP [] Group [] Dysphagia Treatment [x] Cognitive Treatment   [] Other: Total Time Breakdown / Charges    Time in Time out Total Time / units   Cognitive Tx 0900 1000 60 min/ 4 units    Speech Tx -- -- --   Dysphagia Tx -- -- --       Electronically Signed by   Salima Vance. A CCC-SLP  Speech-Language Pathologist  [id number]

## 2022-04-04 NOTE — DISCHARGE INSTR - COC
Continuity of Care Form    Patient Name: Vicky Montgomery   :  1940  MRN:  [id number]    Shyam Patel date:  3/28/2022  Anticipating Discharge date:  2022    Code Status Order: Full Code   Advance Directives:      Admitting Physician:  Marizol Walls MD  PCP: Joan Molina MD    Discharging Nurse: [facility] Unit/Room#: 5094/5659-99  Discharging Unit Phone Number: [phone]    Emergency Contact:   Extended Emergency Contact Information  Primary Emergency Contact: [facility] Phone: [phone]  Mobile Phone: [phone]  Relation: Child  Secondary Emergency Contact: Rochel Eisenmenger  [place] Phone: [phone]  Mobile Phone: [phone]  Relation: Other    Past Surgical History:  Past Surgical History:   Procedure Laterality Date    INTRACAPSULAR CATARACT EXTRACTION Right 2019    PHACOEMULSIFICATION OF CATARACT RIGHT EYE WITH INTRAOCULAR LENS IMPLANT performed by Harpreet Blood MD at  [address] Left 2019    PHACOEMULSIFICATION OF CATARACT LEFT EYE WITH INTRAOCULAR LENS IMPLANT [facility]      both hips    TONSILLECTOMY         Immunization History: There is no immunization history on file for this patient. Active Problems:  Patient Active Problem List   Diagnosis Code    Left knee pain M25.562    Left patella fracture S82.002A    Contusion of left knee S80. 02XA    Orthostatic hypotension I95.1    ARF (acute renal failure) ([facility]) N17.9    Anemia D64.9    Debility R53.81       Isolation/Infection:   Isolation            No Isolation          Patient Infection Status       None to display            Nurse Assessment:  Last Vital Signs: /86   Pulse 72   Temp 98 °F (36.7 °C) (Oral)   Resp 16   Ht 5' 5\" (1.651 m)   Wt 107 lb 2.3 oz (48.6 kg)   SpO2 96%   BMI 17.83 kg/m²     Last documented pain score (0-10 scale): Pain Level: 0  Last Weight:    Wt Readings from Last 1 Encounters:   03/28/22 107 lb 2.3 oz (48.6 kg)     Mental Status:  oriented and alert    IV Access:  - None    Nursing Mobility/ADLs:  Walking   Assisted  Transfer  Assisted  Bathing  Assisted  Dressing  Assisted  Toileting  Assisted  Feeding  Independent  Med Admin  Independent  Med Delivery   whole    Wound Care Documentation and Therapy:  Wound 01/03/20 Pretibial Right (Active)   Number of days: 821        Elimination:  Continence: Bowel: yes  Bladder: incont. At times  Urinary Catheter: None   Colostomy/Ileostomy/Ileal Conduit: No       Date of Last BM: 4/7/22    Intake/Output Summary (Last 24 hours) at 4/4/2022 1121  Last data filed at 4/4/2022 0824  Gross per 24 hour   Intake 720 ml   Output --   Net 720 ml     I/O last 3 completed shifts: In: 600 [P.O.:600]  Out: 875 [Urine:875]    Safety Concerns: At Risk for Falls    Impairments/Disabilities:      Vision    Nutrition Therapy:  Current Nutrition Therapy:   - Oral Diet:  General    Routes of Feeding: Oral  Liquids: No Restrictions  Daily Fluid Restriction: no  Last Modified Barium Swallow with Video (Video Swallowing Test): not done    Treatments at the Time of Hospital Discharge:   Respiratory Treatments: n/a  Oxygen Therapy:  is not on home oxygen therapy.   Ventilator:    - No ventilator support    Rehab Therapies: Physical Therapy and Occupational Therapy  Weight Bearing Status/Restrictions: No weight bearing restrictions  Other Medical Equipment (for information only, NOT a DME order):  walker  Other Treatments: n/a    Patient's personal belongings (please select all that are sent with patient):  Glasses, clothing, cell phone    RN SIGNATURE:  Electronically signed by Darrell Hall RN on 4/9/22 at 11:39 AM EDT    CASE MANAGEMENT/SOCIAL WORK SECTION    Inpatient Status Date: 03/28/2022    Readmission Risk Assessment Score:  Readmission Risk              Risk of Unplanned Readmission:  13       Discharging to Facility/ Agency [address] [facility] [address] [facility] on [address]  [address] [place], [address] [facility]  [phone]    Durable medical equipment    [facility]>Rolling walker delivered to room 04/07  [address], [place] [address]  [phone]        / signature: Electronically signed by Koki Kohler RN on 4/8/22 at 10:24 AM EDT    PHYSICIAN SECTION    Prognosis: Good    Condition at Discharge: Stable    Rehab Potential (if transferring to Rehab): Good    Recommended Labs or Other Treatments After Discharge: follow up with providers    Physician Certification: I certify the above information and transfer of Shavon Serrano  is necessary for the continuing treatment of the diagnosis listed and that she requires Home Care for greater 30 days.      Update Admission H&P: No change in H&P    PHYSICIAN SIGNATURE:  Electronically signed by Rosalie Lentz MD on 4/6/22 at 3:00 PM EDT

## 2022-04-04 NOTE — PLAN OF CARE
Problem: Nutrition  Goal: Optimal nutrition therapy  Outcome: Ongoing  Note: Nutrition Problem #1: Increased nutrient needs  Intervention: Food and/or Nutrient Delivery: Continue Current Diet,Continue Oral Nutrition Supplement  Nutritional Goals: Consume 50% or greater of 3 meals per day and ONS during this admission.

## 2022-04-04 NOTE — PROGRESS NOTES
Occupational Therapy  Facility/Department: [facility] ARU  Daily Treatment Note  NAME: Shayne Guerrero  : 1940  MRN: [id number]    Date of Service: 2022    Discharge Recommendations:  24 hour supervision or assist,Home with Home health OT,Continue to assess pending progress       Assessment   Performance deficits / Impairments: Decreased functional mobility ; Decreased ADL status; Decreased balance;Decreased safe awareness;Decreased strength;Decreased endurance;Decreased posture;Decreased cognition;Decreased high-level IADLs  Assessment: Pt completed ADLs while standing at sink with SBA. Pt able to manage beyond house hold distance to/from therapy gym with RW with CGA. Pt complete dynamic standing balance activity, ring toss, retrieving rings from various planes and tossing at target, pt demos no LOB. Pt demos low BP following activities. COnt OT POC. Activity Tolerance  Activity Tolerance: Patient Tolerated treatment well;Patient limited by fatigue  Activity Tolerance: 116/58 while seated, pt 90s/50s when standing not symptomatic         Patient Diagnosis(es): There were no encounter diagnoses. has a past medical history of ARF (acute renal failure) ([facility] [address] has a past surgical history that includes joint replacement; Tonsillectomy; Intracapsular cataract extraction (Right, 2019); and Intracapsular cataract extraction (Left, 2019). Restrictions  Restrictions/Precautions  Restrictions/Precautions: General Precautions,Fall Risk  Position Activity Restriction  Other position/activity restrictions:  Up with assist, monitor for orthostatic BP  Subjective   General  Chart Reviewed: Yes  Patient assessed for rehabilitation services?: Yes  Additional Pertinent Hx: ARF, catarct surgery, bilateral MARGOTH, urge incontinence, anemia, memory loss  Response to previous treatment: Patient with no complaints from previous session  Family / Caregiver Present: No  Referring Practitioner: Neville Higgins MD  Diagnosis: Pt admitted on 3/25/22 due to progressive weakness, frequent falls, ROSE, anemia. Pt transfered to ARU on 3/28/22  Subjective  Subjective: Pt in chair, requesting to shower this date. BP stable throughout therapy session. General Comment  Comments: RN Agreeable to OT session. Orientation     Objective    ADL  Grooming: Stand by assistance        Balance  Sitting Balance: Supervision  Standing Balance: Contact guard assistance  Standing Balance  Time: 3-4 minutes, 1-2 minutes  Activity: standing at sink with RW for ADLs, dynamic balance activity  Comment: with RW  Functional Mobility  Functional - Mobility Device: Rolling Walker  Activity: To/from bathroom; To/From therapy gym  Assist Level: Contact guard assistance     Transfers  Sit to stand: Contact guard assistance  Stand to sit: Contact guard assistance                       Cognition  Overall Cognitive Status: Exceptions  Arousal/Alertness: Appropriate responses to stimuli  Following Commands: Follows one step commands consistently; Follows multistep commands with repitition  Attention Span: Attends with cues to redirect  Memory: Decreased recall of precautions;Decreased short term memory  Safety Judgement: Decreased awareness of need for safety  Problem Solving: Decreased awareness of errors  Initiation: Does not require cues  Sequencing: Requires cues for some  Cognition Comment: slightly impulsive                                         Plan   Plan  Times per week: 5-7 days/week  Times per day: Daily  Plan weeks: 2 weeks  Current Treatment Recommendations: Strengthening,Functional Mobility Training,Endurance Training,Balance Training,Safety Education & Training,Self-Care / ADL,Patient/Caregiver Education & Training,Home Management Training,Equipment Evaluation, Education, & procurement,Cognitive/Perceptual Training,Positioning       Goals  Short term goals  Time Frame for Short term goals: 1 week (4/5 22)  Short term goal 1: Pt will complete LB dressing using AE and LRAD PRN CGA  Short term goal 2: Pt will complete functional transfers from various surfaces with LRAD SBA  Short term goal 3: Pt will complete grooming standing sinkside >5 minutes using LRAD CGA  Short term goal 4: Pt will complete toileting with CGA using LRAD and DME PRN  Short term goal 5: Pt will complete total body bathing CGA sitting vs standing using AE/DME PRN  Long term goals  Time Frame for Long term goals : 2 weeks (4/12/22)  Long term goal 1: Pt will complete total body dressing including gathering items with LRAD Dev  Long term goal 2: Pt will complete toileting tasks Dev with LRAD and DME PRN  Long term goal 3: Pt will complete functional transfers Dev with LRAD and no cues for safety/sequencing  Long term goal 4: Pt will complete grooming standing vs sititng sinkside Dev  Long term goal 5: Pt will complete total body bathing SPV/setup with LRAD and AE PRN sitting vs standing  Patient Goals   Patient goals : \"to take care of myself without help from others\"       Therapy Time   Individual Concurrent Group Co-treatment   Time In 1230         Time Out 1330         Minutes 60         Timed Code Treatment Minutes: Rom 34, OT

## 2022-04-05 PROCEDURE — 97129 THER IVNTJ 1ST 15 MIN: CPT

## 2022-04-05 PROCEDURE — 97535 SELF CARE MNGMENT TRAINING: CPT

## 2022-04-05 PROCEDURE — 97530 THERAPEUTIC ACTIVITIES: CPT

## 2022-04-05 PROCEDURE — 6370000000 HC RX 637 (ALT 250 FOR IP): Performed by: STUDENT IN AN ORGANIZED HEALTH CARE EDUCATION/TRAINING PROGRAM

## 2022-04-05 PROCEDURE — 97110 THERAPEUTIC EXERCISES: CPT

## 2022-04-05 PROCEDURE — 93971 EXTREMITY STUDY: CPT

## 2022-04-05 PROCEDURE — 1280000000 HC REHAB R&B

## 2022-04-05 PROCEDURE — 97116 GAIT TRAINING THERAPY: CPT

## 2022-04-05 PROCEDURE — 6360000002 HC RX W HCPCS: Performed by: STUDENT IN AN ORGANIZED HEALTH CARE EDUCATION/TRAINING PROGRAM

## 2022-04-05 PROCEDURE — 97130 THER IVNTJ EA ADDL 15 MIN: CPT

## 2022-04-05 RX ADMIN — FLUDROCORTISONE ACETATE 0.1 MG: 0.1 TABLET ORAL at 08:11

## 2022-04-05 RX ADMIN — FERROUS SULFATE TAB 325 MG (65 MG ELEMENTAL FE) 325 MG: 325 (65 FE) TAB at 08:10

## 2022-04-05 RX ADMIN — DOCUSATE SODIUM 50 MG AND SENNOSIDES 8.6 MG 2 TABLET: 8.6; 5 TABLET, FILM COATED ORAL at 08:10

## 2022-04-05 RX ADMIN — POTASSIUM CHLORIDE 40 MEQ: 20 TABLET, EXTENDED RELEASE ORAL at 08:10

## 2022-04-05 RX ADMIN — DONEPEZIL HYDROCHLORIDE 10 MG: 5 TABLET ORAL at 20:30

## 2022-04-05 RX ADMIN — Medication 400 MG: at 08:10

## 2022-04-05 RX ADMIN — Medication 1000 UNITS: at 08:10

## 2022-04-05 RX ADMIN — FERROUS SULFATE TAB 325 MG (65 MG ELEMENTAL FE) 325 MG: 325 (65 FE) TAB at 15:14

## 2022-04-05 RX ADMIN — ENOXAPARIN SODIUM 30 MG: 100 INJECTION SUBCUTANEOUS at 08:10

## 2022-04-05 RX ADMIN — FOLIC ACID 1 MG: 1 TABLET ORAL at 08:10

## 2022-04-05 ASSESSMENT — PAIN SCALES - GENERAL
PAINLEVEL_OUTOF10: 0

## 2022-04-05 NOTE — PROGRESS NOTES
Terence Torres  4/5/2022  [id number]    Chief Complaint: Debility    Subjective:   No acute events overnight. Anshul Corona is seen in her room, resting in bed. She reports that her arm is not painful and feels less hard to her today. She has been keeping heat on it. She denies feeling light headed or dizzy with therapies today. ROS: denies f/c, n/v, cp, sob  Objective:  Patient Vitals for the past 24 hrs:   BP Temp Temp src Pulse Resp SpO2   04/05/22 0800 (!) 93/9 98.4 °F (36.9 °C) Oral 79 16 95 %   04/04/22 1955 108/66 98.2 °F (36.8 °C) Oral 68 16 96 %     Gen: No distress, pleasant. Resting in bed  HEENT: Normocephalic, atraumatic. CV: extremities well perfused  Resp: No respiratory distress. Lungs CTAB  Abd: nondistended  Ext: No edema. Neuro: Alert, oriented, appropriately interactive. Speech fluent without aphasia   Skin: erythema with palpable cord right forearm, erythema improved from yesterday    Wt Readings from Last 3 Encounters:   03/28/22 107 lb 2.3 oz (48.6 kg)   03/25/22 110 lb (49.9 kg)   01/09/20 113 lb 12.8 oz (51.6 kg)       Laboratory data:   Lab Results   Component Value Date    WBC 6.7 03/31/2022    HGB 9.1 (L) 03/31/2022    HCT 26.7 (L) 03/31/2022    MCV 97.9 03/31/2022     03/31/2022       Lab Results   Component Value Date     04/04/2022    K 4.4 04/04/2022     04/04/2022    CO2 24 04/04/2022    BUN 19 04/04/2022    CREATININE 0.7 04/04/2022    GLUCOSE 97 04/04/2022    CALCIUM 8.9 04/04/2022        Therapy progress:  PT  Position Activity Restriction  Other position/activity restrictions:  Up with assist, monitor for orthostatic BP  Objective     Sit to Stand: Stand by assistance  Stand to sit: Stand by assistance  Bed to Chair: Stand by assistance  Device: [employer] Evette  Assistance: Stand by assistance  Distance: 10 ft x 2 reps  OT  PT Equipment Recommendations  Equipment Needed: Yes  Mobility Devices: Moe Wilson: Rolling  Other: pt owns 4WW  Toilet - Technique: Ambulating,To left,To right (RW)  Equipment Used: Standard toilet  Toilet Transfers Comments: Isauro sit <> Stand from [employer], cues for safety with turning to sit and positioning of self  Assessment        SLP  Current Diet : Regular  Current Liquid Diet : Thin  Diet Solids Recommendation: Regular  Liquid Consistency Recommendation: Thin    Body mass index is 17.83 kg/m². Assessment and Plan:  Mati Amato is an [de-identified]year old female with past medical history significant for urge incontinence, anemia, and memory loss who presented to [address] [place] [address] on 3/25/22 with several days of progressive weakness and falls. She was admitted to [facility] on 3/28/22 due to functional deficits below her baseline.      Debility  - will falls at home  - PT, OT     Cognitive Impairment  - continue aricept  - ST    Superficial Thrombophlebitis  - right ventral forearm  - scan showing clot is only superficial  - no indication for [facility] at this time  - discontinued bactrim  - apply head     Orthostatic Hypotension  - s/p 1 L NS 3/29  - florinef 0.1 mg daily with hold parameters  - KENN hose     Anemia  - no evidence of acute bleed during acute workup  - completed course of venofer  - monitor and transfuse for Hb<7  - PO iron replacement    Electrolyte abnormalities  - K and Mg replacement  - continue to monitor    ROSE, resolved  - improved after IV hydration  - encourage PO intake  - monitor renal function     Bowels: Schedule stool softener. Follow bowel movements. Enema or suppository if needed.      Bladder: Check PVR x 3.   [address] if PVR > 200ml or if any volume is > 500 ml.      Sleep: Trazodone provided prn.      PPx  DVT: lovenox  GI: not indicated     Follow up appointments: PCP    Services/DME: home PT, OT, ST; DME TBD  EDOD: 4/9/22    Electronically signed by Anjali Stacy MD on 4/5/2022 at 1:26 PM

## 2022-04-05 NOTE — PROGRESS NOTES
Min 60    Total Treatment Minutes 60    Individual Treatment Minutes 60    Group Treatment Minutes 0    Co-Treat Minutes 0    Variance/Reason:  0    Pain Denies     Pain Intervention [] RN notified  [] Repositioned  [] Intervention offered and patient declined  [x] N/A  [] Other: [] RN notified  [] Repositioned  [] Intervention offered and patient declined  [] N/A  [] Other:   Subjective     Pt alert and oriented, cooperative and agreeable to participate in therapy. Pt seen sitting upright in bed. Objective:  Goals     Short-term Goals  Timeframe for Short-term Goals: 10 days (04/07/2022)    Goal 1: Pt will complete graded recall tasks using compensatory strategies with 80% acc given min cues    Functional recall of 12 items sorted into four categories:  -75% acc indep improving to 100% acc given min cues     Short term recall of the same 12 items after a 10 min delay:  -75% acc indep improving to 100% acc given mod cues       Goal 2: Pt will complete executive function tasks (e.g. meds, time, money, etc) with 80% acc given min cues   Did not target.        Goal 3: Pt will complete verbal and visual reasoning tasks with 80% acc given min cues   Visual/written reasoning task:  -if/then scenarios  -ex: if a match burns, [place] the hottest item (provided with 6 words to choose from), but if it doesn't burn, [place] the coldest item  -75% acc indep improving to 100% acc given min cues for attention to detail      Goal 4: Pt will complete problem solving and thought organization tasks with 80% acc given min cues   Thought organization/attention and concentration task:  -ex: seeking out words; locating words within a puzzle by adjoining letters up and down, right to left, or diagonally  -70% acc indep improving to 100% acc given mod cues         Other areas targeted: N/A    Education:   Edu provided re: thought organization/attention/memory strategies   ,    Safety Devices: [x] Call light within reach  [] Chair alarm activated  [x] Bed alarm activated  [] Other: [] Call light within reach  [] Chair alarm activated  [] Bed alarm activated  [] Other:    Assessment: Pt pleasant and cooperative, agreeable to participate. Pt continues to make consistent gains towards all goals and is making good progress. Pt completed functional and short term recall task with 75% acc indep, improving to 100% acc given min-mod cues for use of compensatory memory strategies. Pt completed visual reasoning and thought organization/attention task with 70-75% acc indep improving to 100% acc given cues for attention to detail. Pt remains motivated to improve. Continue goals above. Plan: Continue as per plan of care. Additional Information:     Barriers toward progress:None   Discharge recommendations:  [] Home independently  [] Home with assistance []  24 hour supervision  [] ECF [x] Other: defer to PT/OT recs  Continued Tx Upon Discharge: ? [] Yes [] No [x] TBD based on progress while on ARU [] Vital Stim indicated [] Other:   Estimated discharge date: 04/09/22    Interventions used this date:  [] Speech/Language Treatment  [] Instruction in HEP [] Group [] Dysphagia Treatment [x] Cognitive Treatment   [] Other: Total Time Breakdown / Charges    Time in Time out Total Time / units   Cognitive Tx 0900 1000 60 min/ 4 units    Speech Tx -- -- --   Dysphagia Tx -- -- --       Electronically Signed by   Nicole Giron. A CCC-SLP  Speech-Language Pathologist  [phone]

## 2022-04-05 NOTE — PROGRESS NOTES
Occupational Therapy  Facility/Department: [facility]  Daily Treatment Note  NAME: Markos Gupta  : 1940  MRN: [id number]    Date of Service: 2022    Discharge Recommendations:  24 hour supervision or assist,Home with Home health OT,S Level 3,Home with nursing aide  OT Equipment Recommendations  ADL Assistive Devices: Hand-held Shower; Toilet Safety Frame; Shower Chair with back  Other: Pt could potentially benefit from RW and a reacher    Assessment   Performance deficits / Impairments: Decreased functional mobility ; Decreased ADL status; Decreased balance;Decreased safe awareness;Decreased strength;Decreased endurance;Decreased posture;Decreased cognition;Decreased high-level IADLs  Assessment: Pt progressing with IND during LB ADLs. Pt able to don socks, pants, and brief with no more than SBA. Pt progressing to SBA with toilet and shower transfers. Pt continues to be limited by some decreased insight and cognitive deficits. Pt with decreased insight to urinary incontinence and hygiene. Anticipate safe d/c home with 24hr supervision and HHOT. Prognosis: Good  OT Education: OT Role;Plan of Care;Precautions;Transfer Training;Energy Conservation; ADL Adaptive Strategies  Patient Education: home safety concerns, importance of monitoring vitals with positional changes, safety during bathing/dressing, BSC transfers, hand placement for transfers, ARU expectations; long discussion with family/pt on the importance of not using a purewick for toileting  REQUIRES OT FOLLOW UP: Yes  Activity Tolerance  Activity Tolerance: Patient Tolerated treatment well  Activity Tolerance: BP stable throughout tx session; /76, HR 72, SpO2 95%  Safety Devices  Safety Devices in place: Yes  Type of devices: Nurse notified;Call light within reach; All fall risk precautions in place; Left in chair;Chair alarm in place; Patient at risk for falls;Gait belt         Patient Diagnosis(es): There were no encounter diagnoses.       has a past medical history of ARF (acute renal failure) ([facility] [address] has a past surgical history that includes joint replacement; Tonsillectomy; Intracapsular cataract extraction (Right, 5/21/2019); and Intracapsular cataract extraction (Left, 7/2/2019). Restrictions  Restrictions/Precautions  Restrictions/Precautions: General Precautions,Fall Risk  Position Activity Restriction  Other position/activity restrictions: Up with assist, monitor for orthostatic BP  Subjective   General  Chart Reviewed: Yes  Patient assessed for rehabilitation services?: Yes  Additional Pertinent Hx: ARF, catarct surgery, bilateral MARGOTH, urge incontinence, anemia, memory loss  Response to previous treatment: Patient with no complaints from previous session  Family / Caregiver Present: No  Referring Practitioner: Anuradha Walsh MD  Diagnosis: Pt admitted on 3/25/22 due to progressive weakness, frequent falls, ROSE, anemia. Pt transfered to ARU on 3/28/22  Subjective  Subjective: Pt in chair, agreeable to shower. BP continues to be stable throughout therapy session. General Comment  Comments: RN Agreeable to OT session. Vital Signs  Patient Currently in Pain: No   Orientation  Orientation  Overall Orientation Status: Within Functional Limits  Objective    ADL  Feeding: Independent  UE Bathing: Setup  LE Bathing: Stand by assistance  UE Dressing: Setup  LE Dressing: Stand by assistance  Toileting: Stand by assistance  Additional Comments: Pt completed LB dressing simulated at side of bed with bringing foot up to bed; increased independence noted.         Balance  Sitting Balance: Independent  Standing Balance: Stand by assistance  Functional Mobility  Functional - Mobility Device: Rolling Walker  Activity: To/from bathroom  Assist Level: Stand by assistance  Toilet Transfers  Toilet - Technique: Ambulating  Equipment Used: Standard toilet  Toilet Transfer: Stand by assistance     Transfers  Sit to stand: Stand by assistance  Stand to sit: Stand by with LRAD and no cues for safety/sequencing  Long term goal 4: Pt will complete grooming standing vs sititng sinkside Dev  Long term goal 5: Pt will complete total body bathing SPV/setup with LRAD and AE PRN sitting vs standing  Patient Goals   Patient goals : \"to take care of myself without help from others\"       Therapy Time   Individual Concurrent Group Co-treatment   Time In 1330         Time Out 1500         Minutes 90         Timed Code Treatment Minutes: [address], OTR/L

## 2022-04-05 NOTE — PATIENT CARE CONFERENCE
[facility]  Inpatient Rehabilitation  Weekly Team Conference Note    Date: 2022  Patient Name: Laney Joy        MRN: [id number]    : 1940  ([de-identified] y.o.)  Gender: female   Referring Practitioner: Dr. Clifford Hester MD  Diagnosis: Frequent falls, Debility      Interventions to be utilized toward barriers to discharge, per discipline:  [address] observed barriers to dc: Decreased endurance  Nursing interventions:Assist with ADLs', toileting and medication management  Family Education: no  Fall Risk:  Yes      Physical therapy observed barriers to dc:      Baseline: mod I with 1DK              Current level:mod ind bed mobility, SBA transfers/gait with RW, CGA stairs               Barriers to DC: orthostatic hypotension              Needs in order to achieve dc home/next level of care: mod I with gait, transfers, [address] home, laundry in basement. DME: RW, pt prefers RW to her owned 4ww   Physical therapy interventions:   Current Treatment Recommendations: Strengthening,Transfer Teretha Kill Re-education,Patient/Caregiver Education & Training,Balance Training,Gait Training,Home Exercise Program,Safety Education & Training,Stair training,Functional Mobility Training,Equipment Evaluation, Education, & procurement      PHYSICAL THERAPY  PT Equipment Recommendations  Equipment Needed: Yes  Mobility Devices: Luberta Piter: Rolling  Other: pt owns 1JO    Assessment: pt found in room, denies pain but requesting to use commode. grossly SBA for transfers and gait with RW wiht cues for upright posture. denies dizziness throughout session but limited by decreased activity tolerance. continue to rec home with initial 24/7 and HHPT.  DME : RW    Occupational therapy observed barriers to dc:    Baseline: mod I with 4WW; IND with ADLs and IADLs, owns cat, assist with grocery shpping              Current level: SBA transfers/gait with RW, SBA for ADLs, increased time due to difficulty reaching LB feet; constant cues for sequencing ADls              Barriers to DC: orthostatic hypotension, cognition, safety impairments, urinary incontinence               Needs in order to achieve dc home/next level of care: IND with ADLs due to family unable to provide 24hr assist; Able to have assist with IADLs from family        Occupational Therapy interventions:  Current Treatment Recommendations: Strengthening,Functional Mobility Training,Endurance Training,Balance Training,Safety Education & Training,Self-Care / ADL,Patient/Caregiver Education & Training,Home Management Training,Equipment Evaluation, Education, & procurement,Cognitive/Perceptual Training,Positioning      OCCUPATIONAL THERAPY  Pt progressing with IND during LB ADLs. Pt able to don socks, pants, and brief with no more than SBA. Pt progressing to SBA with toilet and shower transfers. Pt continues to be limited by some decreased insight and cognitive deficits. Pt with decreased insight to urinary incontinence and hygiene. Anticipate safe d/c home with 24hr supervision and HHOT. Speech therapy observed barriers to dc:               Baseline: Lives alone. Independent with meds and finances. Not an active . Current level: mild cognitive linguistic deficit               Barriers to DC: Hx of memory loss, reduced insight into deficits at times               Needs in order to achieve dc home/next level of care: carryover of compensatory strategies, ability to manage meds and finances, improved insight and safety awareness       SPEECH THERAPY:  Pt pleasant and cooperative, agreeable to participate. Pt continues to make consistent gains towards all goals and is making good progress. Pt completed functional and short term recall task with 75% acc indep, improving to 100% acc given min-mod cues for use of compensatory memory strategies.  Pt completed visual reasoning and thought organization/attention task with 70-75% acc indep improving to 100% acc given cues for attention to detail. Pt remains motivated to improve. NUTRITION  Weight: 107 lb 2.3 oz (48.6 kg) / Body mass index is 17.83 kg/m². Diet Order: ADULT DIET; Regular  ADULT ORAL NUTRITION SUPPLEMENT; Breakfast, Dinner; Standard High Calorie/High Protein Oral Supplement  PO Meals Eaten (%): 76 - 100%  Education: Not indicated       CASE MANAGEMENT  Assessment: [de-identified] yr old female. DX:Debility. Therapy recommendations are home with Home health PT/OT,24 hour supervision or assist. DME rolling walker. [facility]>following and [facility] referral made per family request. CM will continue to support for discharge needs. Interdisciplinary Goals:   1.) Pt will ambulate to and from gym with RW and mod ind  2.) Pt will implement use of compensatory memory strategies across all disciplines given min cues   3.) Pt will ambulate to bathroom or BSC for night time toileting   4.) Pt will be continent of bowel and bladder       Discharge Plan   Estimated discharge date: 4/9/2022  Destination: Home health  Pass:No  Services at Discharge: [address], Occupational Therapy, Speech Therapy and Nursing. Equipment at Discharge: Rolling walker, shower chair with back, toilet safety frame. Team Members Present at Conference:  : Mckenzie Curran    Occupational Therapist: Tha Phillip OT  Physical Therapist: Dipak Dunbar PT, DPT   Speech Therapist: Erendira Johnson, [facility] SLP   Nurse: Abrahan Rodriguez RN  Dietician: Leslie Pond RDN, LD  : Omar Cohen OTR/L  Psychiatry: N/A    Family members present at conference: No      I led this team conference and I approve the established interdisciplinary plan of care as documented within the medical record of [facility].     MD: Radha Suarez MD     4/6/2022  11:04AM

## 2022-04-05 NOTE — PROGRESS NOTES
Physical Therapy  Facility/Department: [facility]  Daily Treatment Note  NAME: Joao Dwyer  : 1940  MRN: [id number]    Date of Service: 2022    Discharge Recommendations:  Home with Home health PT,24 hour supervision or assist   PT Equipment Recommendations  Equipment Needed: Yes  Walker: Rolling    Assessment   Body structures, Functions, Activity limitations: Decreased functional mobility ; Decreased posture;Decreased ADL status; Decreased coordination;Decreased balance;Decreased strength; Increased pain;Decreased endurance  Assessment: pt found in bed with clothes/linens soaked due to incontinence. therapists assisted pt with changing soiled linens, requiring increased time. grossly SBA for functional mobility with RW. utilized seated rests as needed. continue to rec home with initial / and HHPT. DME: RW  Treatment Diagnosis: Decreased functional mobility  Specific instructions for Next Treatment: progress mobility  Prognosis: Good  Decision Making: Medium Complexity  PT Education: Goals;Transfer Training;Disease Specific Education;PT Role;Plan of Care;General Safety;Gait Training  Patient Education: Pt educated on importance of OOB mobility for continued improvements in current deficits. Pt demos understanding  Barriers to Learning: none  REQUIRES PT FOLLOW UP: Yes  Activity Tolerance  Activity Tolerance: Patient limited by fatigue;Patient limited by endurance  Activity Tolerance: 116/75, 67 bpm, SPo2= 96% on room air     Patient Diagnosis(es): There were no encounter diagnoses. has a past medical history of ARF (acute renal failure) ([address] has a past surgical history that includes joint replacement; Tonsillectomy; Intracapsular cataract extraction (Right, 2019); and Intracapsular cataract extraction (Left, 2019).     Restrictions  Restrictions/Precautions  Restrictions/Precautions: General Precautions,Fall Risk  Position Activity Restriction  Other position/activity restrictions: Up with assist, monitor for orthostatic BP  Subjective   General  Chart Reviewed: Yes  Additional Pertinent Hx: Acute fractures of the right 4th through 12th ribs  Response To Previous Treatment: Patient with no complaints from previous session. Family / Caregiver Present: No  Subjective  Subjective: pt agreeable to therapy  General Comment  Comments: found in bed, grossly incontinent of urine  Pain Screening  Patient Currently in Pain: No  Vital Signs  Patient Currently in Pain: No       Orientation  Orientation  Overall Orientation Status: Within Functional Limits  Cognition      Objective   Bed mobility  Supine to Sit: Modified independent  Scooting: Modified independent  Transfers  Sit to Stand: Stand by assistance  Stand to sit: Stand by assistance  Comment: sit to stand EOB to RW, commode to RW and chair to RW x multiple reps with SBA  Ambulation  Ambulation?: Yes  More Ambulation?: Yes  Ambulation 1  Surface: level tile  Device: Rolling Walker  Assistance: Stand by assistance  Quality of Gait: cues for upright posture, decreased step legnth/height, decreased velocity  Gait Deviations: Slow Bettie; Shuffles;Decreased step length;Decreased step height  Distance: 10 ft x 2 reps  Comments: to and from commode  Ambulation 2  Surface - 2: level tile  Device 2: Rolling Walker  Quality of Gait 2: cues for upright posture, decreased step legnth/height, decreased velocity  Distance: 140 ft, 50 ft  Ambulation 3  Surface - 3: level tile  Device 3: Rolling Walker  Assistance 3: Stand by assistance  Quality of Gait 3: cues for upright posture, decreased step legnth/height, decreased velocity  Distance: 160 ft  Comments: 1 standing rest break utilized. Stairs/Curb  Stairs?: Yes  Stairs  # Steps : 8  Stairs Height: 6\"  Rails: Bilateral  Assistance: Contact guard assistance  Comment: pt ascended/descended 8 6\" steps with BHR, reciprocal pattern and CGA.  limited by endurance     Balance  Sitting - Static: Good  Sitting - Dynamic: Good  Standing - Static: Fair  Standing - Dynamic: Fair  Comments: pt completed dyn sitting actiivty ADL, as pt was found incontinent of urine and soaked linens/ clothes. pt completed pericare/ clothing management seated/standing with 1 UE support and CGA. dyn stand activity: ambulate forward with RW x 7 ft -> squat to retrieve beanbag from 6\" step with 1 UE support on RW-> throw beanabg to target and ambualte backwards to start-> reach for beanbag in various planes. pt tolerated 3 min on 1st trial, 2 min 30 sec on 2nd trial , limited by c/o SOB however vitals remain WNL (SPo2 > 90% and HR 70-80 bpm) with activity. Other exercises  Other exercises 1: pt completed 6 min on SCIFIT level 1 with BUE and BLE for increased cardiorespiratory endurance and LE strength, maintains rate of 75-80 steps/min. Goals  Short term goals  Time Frame for Short term goals: 1 week (4/4)  Short term goal 1: Pt will be SBA with supine<>Sit. - GOAL MET, completes with mod ind  Short term goal 2: Pt will be SBA with transfers with RW or 4WW.- GOAL MET, completes with SBA  Short term goal 3: Pt will ambulate 150 ft with SBA and RW or 4WW.- GOAL MET, completes with RW and SBA  Short term goal 4: Pt will negotiate 12 stairs with CGA.- GOAL MET, completes with CGA and BHR  Long term goals  Time Frame for Long term goals : 2 weeks (4/11)  Long term goal 1: Pt will be indep with supine<>Sit. Long term goal 2: Pt will be mod I with transfers. Long term goal 3: Pt will ambulate 150 ft mod I.  Long term goal 4: Pt will negotiate 12 stairs with SBA. Long term goal 5: Pt will be indep with LE HEP to facilitate continued strengthening and activity tolerance at d/c.   Patient Goals   Patient goals : \"to get back to normal and be able to do the things I did before\"    Plan    Plan  Times per week: 5 out of 7 days/wk  Times per day: Daily  Specific instructions for Next Treatment: progress mobility  Current Treatment Recommendations: Strengthening,Transfer Gee Schneider Re-education,Patient/Caregiver Education & Training,Balance Training,Gait Training,Home Exercise Program,Safety Education & Training,Stair training,Functional Mobility Training,Equipment Evaluation, Education, & procurement  Safety Devices  Type of devices:  All fall risk precautions in place,Call light within reach,Chair alarm in place,Gait belt,Patient at risk for falls,Nurse notified,Left in chair     Therapy Time   Individual Concurrent Group Co-treatment   Time In 1030         Time Out 1130         Minutes 60         Timed Code Treatment Minutes: 60 Minutes       Sarita Fowler, PT

## 2022-04-06 PROCEDURE — 6360000002 HC RX W HCPCS: Performed by: STUDENT IN AN ORGANIZED HEALTH CARE EDUCATION/TRAINING PROGRAM

## 2022-04-06 PROCEDURE — 6370000000 HC RX 637 (ALT 250 FOR IP): Performed by: STUDENT IN AN ORGANIZED HEALTH CARE EDUCATION/TRAINING PROGRAM

## 2022-04-06 PROCEDURE — 97110 THERAPEUTIC EXERCISES: CPT

## 2022-04-06 PROCEDURE — 97530 THERAPEUTIC ACTIVITIES: CPT

## 2022-04-06 PROCEDURE — 97535 SELF CARE MNGMENT TRAINING: CPT

## 2022-04-06 PROCEDURE — 97116 GAIT TRAINING THERAPY: CPT

## 2022-04-06 PROCEDURE — 97129 THER IVNTJ 1ST 15 MIN: CPT

## 2022-04-06 PROCEDURE — 97130 THER IVNTJ EA ADDL 15 MIN: CPT

## 2022-04-06 PROCEDURE — 1280000000 HC REHAB R&B

## 2022-04-06 RX ORDER — FERROUS SULFATE 325(65) MG
325 TABLET ORAL 2 TIMES DAILY WITH MEALS
Qty: 30 TABLET | Refills: 3 | Status: ON HOLD | OUTPATIENT
Start: 2022-04-06 | End: 2022-10-07 | Stop reason: SDUPTHER

## 2022-04-06 RX ORDER — FLUDROCORTISONE ACETATE 0.1 MG/1
0.1 TABLET ORAL DAILY
Qty: 30 TABLET | Refills: 3 | Status: SHIPPED | OUTPATIENT
Start: 2022-04-06

## 2022-04-06 RX ORDER — SENNA AND DOCUSATE SODIUM 50; 8.6 MG/1; MG/1
2 TABLET, FILM COATED ORAL DAILY
Qty: 60 TABLET | Refills: 1 | COMMUNITY
Start: 2022-04-07

## 2022-04-06 RX ORDER — POTASSIUM CHLORIDE 20 MEQ/1
40 TABLET, EXTENDED RELEASE ORAL DAILY
Qty: 60 TABLET | Refills: 3 | Status: ON HOLD | OUTPATIENT
Start: 2022-04-07 | End: 2022-10-07 | Stop reason: SDUPTHER

## 2022-04-06 RX ORDER — LANOLIN ALCOHOL/MO/W.PET/CERES
400 CREAM (GRAM) TOPICAL DAILY
Qty: 30 TABLET | Refills: 1 | Status: ON HOLD | OUTPATIENT
Start: 2022-04-07 | End: 2022-10-07 | Stop reason: HOSPADM

## 2022-04-06 RX ADMIN — FERROUS SULFATE TAB 325 MG (65 MG ELEMENTAL FE) 325 MG: 325 (65 FE) TAB at 15:16

## 2022-04-06 RX ADMIN — FERROUS SULFATE TAB 325 MG (65 MG ELEMENTAL FE) 325 MG: 325 (65 FE) TAB at 08:02

## 2022-04-06 RX ADMIN — DOCUSATE SODIUM 50 MG AND SENNOSIDES 8.6 MG 2 TABLET: 8.6; 5 TABLET, FILM COATED ORAL at 08:02

## 2022-04-06 RX ADMIN — FOLIC ACID 1 MG: 1 TABLET ORAL at 08:02

## 2022-04-06 RX ADMIN — Medication 400 MG: at 08:02

## 2022-04-06 RX ADMIN — Medication 1000 UNITS: at 08:02

## 2022-04-06 RX ADMIN — ENOXAPARIN SODIUM 30 MG: 100 INJECTION SUBCUTANEOUS at 08:02

## 2022-04-06 RX ADMIN — POTASSIUM CHLORIDE 40 MEQ: 20 TABLET, EXTENDED RELEASE ORAL at 08:02

## 2022-04-06 RX ADMIN — DONEPEZIL HYDROCHLORIDE 10 MG: 5 TABLET ORAL at 19:45

## 2022-04-06 RX ADMIN — FLUDROCORTISONE ACETATE 0.1 MG: 0.1 TABLET ORAL at 08:06

## 2022-04-06 ASSESSMENT — PAIN SCALES - GENERAL
PAINLEVEL_OUTOF10: 0
PAINLEVEL_OUTOF10: 0

## 2022-04-06 NOTE — PLAN OF CARE
Problem: Skin Integrity:  Goal: Will show no infection signs and symptoms  Description: Will show no infection signs and symptoms  Outcome: Met This Shift  Goal: Absence of new skin breakdown  Description: Absence of new skin breakdown  Outcome: Met This Shift     Problem: Falls - Risk of:  Goal: Will remain free from falls  Description: Will remain free from falls  Outcome: Met This Shift  Goal: Absence of physical injury  Description: Absence of physical injury  Outcome: Met This Shift     Problem: Nutrition  Goal: Optimal nutrition therapy  Outcome: Ongoing

## 2022-04-06 NOTE — PROGRESS NOTES
Romayne McLean  4/6/2022  [id number]    Chief Complaint: Debility    Subjective:   No acute events overnight. Today Young Geiger is seen in her room with her daughter present. The redness and swelling in her arm has resolved. She denies feeling light headed or dizzy. ROS: denies f/c, n/v, cp, sob  Objective:  Patient Vitals for the past 24 hrs:   BP Temp Temp src Pulse Resp SpO2   04/06/22 0800 129/74 98 °F (36.7 °C) Oral 73 16 96 %   04/05/22 2030 120/63 97 °F (36.1 °C) Oral 72 16 94 %   04/05/22 1515 (!) 146/79 97.7 °F (36.5 °C) Oral 64 16 97 %     Gen: No distress, pleasant. Resting in chair  HEENT: Normocephalic, atraumatic. CV: extremities well perfused  Resp: No respiratory distress. Lungs CTAB  Abd: nondistended  Ext: No edema. Neuro: Alert, oriented, appropriately interactive. Speech fluent without aphasia   Skin: erythema with palpable cord right forearm, resolved    Wt Readings from Last 3 Encounters:   03/28/22 107 lb 2.3 oz (48.6 kg)   03/25/22 110 lb (49.9 kg)   01/09/20 113 lb 12.8 oz (51.6 kg)       Laboratory data:   Lab Results   Component Value Date    WBC 6.7 03/31/2022    HGB 9.1 (L) 03/31/2022    HCT 26.7 (L) 03/31/2022    MCV 97.9 03/31/2022     03/31/2022       Lab Results   Component Value Date     04/04/2022    K 4.4 04/04/2022     04/04/2022    CO2 24 04/04/2022    BUN 19 04/04/2022    CREATININE 0.7 04/04/2022    GLUCOSE 97 04/04/2022    CALCIUM 8.9 04/04/2022        Therapy progress:  PT  Position Activity Restriction  Other position/activity restrictions:  Up with assist, monitor for orthostatic BP  Objective     Sit to Stand: Stand by assistance  Stand to sit: Stand by assistance  Bed to Chair: Stand by assistance  Device: [facility]  Assistance: Stand by assistance  Distance: 10 ft x 2 reps  OT  PT Equipment Recommendations  Equipment Needed: Yes  Mobility Devices: Kandace January: Rolling  Other: pt owns 4WW  Toilet - Technique: Ambulating  Equipment Used: Standard toilet  Toilet Transfers Comments: Isauro sit <> Stand from toliet, cues for safety with turning to sit and positioning of self  Assessment        SLP  Current Diet : Regular  Current Liquid Diet : Thin  Diet Solids Recommendation: Regular  Liquid Consistency Recommendation: Thin    Body mass index is 17.83 kg/m². Assessment and Plan:  Tami Harman is an [de-identified]year old female with past medical history significant for urge incontinence, anemia, and memory loss who presented to [facility] on [facility] with several days of progressive weakness and falls. She was admitted to [facility] on 3/28/22 due to functional deficits below her baseline.      Debility  - will falls at home  - PT, OT     Cognitive Impairment  - continue aricept  - ST    Superficial Thrombophlebitis, improved  - right ventral forearm  - scan showing clot is only superficial  - no indication for [facility] at this time  - discontinued bactrim  - apply head     Orthostatic Hypotension  - s/p 1 L NS 3/29  - florinef 0.1 mg daily with hold parameters  - KENN hose     Anemia  - no evidence of acute bleed during acute workup  - completed course of venofer  - monitor and transfuse for Hb<7  - PO iron replacement    Electrolyte abnormalities  - K and Mg replacement  - continue to monitor    ROSE, resolved  - improved after IV hydration  - encourage PO intake  - monitor renal function     Bowels: Schedule stool softener. Follow bowel movements. Enema or suppository if needed.      Bladder: Check PVR x 3. [address] if PVR > 200ml or if any volume is > 500 ml.      Sleep: Trazodone provided prn.      PPx  DVT: lovenox  GI: not indicated     Follow up appointments: PCP    Services/DME: home PT, OT ST  EDOD: 4/9/22    Interdisciplinary team conference was held today with entire rehab treatment team including PT, OT, SLP, Dietician, RN, and SW. Discussion focused on progress toward rehab goals and discharge planning.  Separate conference then held with patient/family (if available), questions answered and concerns addressed. Total treatment time >35 min with greater than 50% spent in care coordination.       Electronically signed by Marizol Walls MD on 4/6/2022 at 2:57 PM

## 2022-04-06 NOTE — PROGRESS NOTES
Physical Therapy  Facility/Department: [facility]  Daily Treatment Note  NAME: Tessa Richards  : 1940  MRN: [id number]    Date of Service: 2022    Discharge Recommendations:  Home with Home health PT,24 hour supervision or assist   PT Equipment Recommendations  Equipment Needed: Yes  Walker: Rolling    Assessment   Body structures, Functions, Activity limitations: Decreased functional mobility ; Decreased posture;Decreased ADL status; Decreased coordination;Decreased balance;Decreased strength; Increased pain;Decreased endurance  Assessment: pt found in recliner. pleasant and agreeable. grossly supv for transfers and gait with RW. occasionally requires cues for safety when turning/ ambulating to remain within RW ROBERT. rec initialy 24/ and HHPT. DME: RW  Treatment Diagnosis: Decreased functional mobility  Prognosis: Good  Decision Making: Medium Complexity  PT Education: Goals;Transfer Training;Disease Specific Education;PT Role;Plan of Care;General Safety;Gait Training  Patient Education: Pt educated on importance of OOB mobility for continued improvements in current deficits. Pt demos understanding  Barriers to Learning: none  REQUIRES PT FOLLOW UP: Yes  Activity Tolerance  Activity Tolerance: Patient limited by fatigue;Patient limited by endurance  Activity Tolerance: 118/70, 69 bpm, Spo2= 98% on room air     Patient Diagnosis(es): There were no encounter diagnoses. has a past medical history of ARF (acute renal failure) ([facility] [address] has a past surgical history that includes joint replacement; Tonsillectomy; Intracapsular cataract extraction (Right, 2019); and Intracapsular cataract extraction (Left, 2019). Restrictions  Restrictions/Precautions  Restrictions/Precautions: General Precautions,Fall Risk  Position Activity Restriction  Other position/activity restrictions: Up with assist, monitor for orthostatic BP  Subjective   General  Chart Reviewed:  Yes  Additional Pertinent Hx: Acute fractures of the right 4th through 12th ribs  Response To Previous Treatment: Patient with no complaints from previous session. Family / Caregiver Present: No  Referring Practitioner: Dr. Keke Paniagua MD  Subjective  Subjective: denies pain  General Comment  Comments: found in recliner  Pain Screening  Patient Currently in Pain: No  Vital Signs  Patient Currently in Pain: No       Orientation  Orientation  Overall Orientation Status: Within Functional Limits  Cognition      Objective      Transfers  Sit to Stand: Supervision  Stand to sit: Supervision  Comment: sit to stand recliner to RW, chair to Rw with supv  Ambulation  Ambulation?: Yes  More Ambulation?: Yes  Ambulation 1  Surface: level tile  Device: Rolling Walker  Assistance: Supervision  Quality of Gait: cues for upright posture, decreased step legnth/height, decreased velocity  Distance: 170 ft  Ambulation 2  Surface - 2: level tile  Device 2: Rolling Walker  Assistance 2: Stand by assistance;Supervision  Quality of Gait 2: cues for upright posture, decreased step legnth/height, decreased velocity  Distance: 150 ft  Ambulation 3  Surface - 3: level tile  Assistance 3: Stand by assistance  Quality of Gait 3: cues for upright posture, decreased step legnth/height, decreased velocity  Distance: 150 ft     Balance  Sitting - Static: Good  Sitting - Dynamic: Good  Standing - Static: Fair  Standing - Dynamic: Fair  Comments: pt completed 6 min of dyn stand activity with RW for support:  beanbag from chiar-> ambulate to same colored dot on ground-> throw beanbag to central target and repeat x 12 reps. cues for safe RW management with turns.  following seated rest, pt completed item  with reacher x 2 min and cues for safety while reaching  Exercises  Hip Flexion: x15 BLE  Hip Abduction: x15 BLE wtih TKE  Knee Long Arc Quad: x15 BLE  Ankle Pumps: x15 BLE  Comments: completed seated with 2# weights donned BLE  Other exercises  Other exercises 1: pt completed 8 min on SCIFIT level 1 with BUE and BLE for increased cardiorespiratory endurance and LE strength, maintains rate of 75-80 steps/min. Goals  Short term goals  Time Frame for Short term goals: 1 week (4/4)  Short term goal 1: Pt will be SBA with supine<>Sit. - GOAL MET, completes with mod ind  Short term goal 2: Pt will be SBA with transfers with RW or 4WW.- GOAL MET, completes with SBA  Short term goal 3: Pt will ambulate 150 ft with SBA and RW or 4WW.- GOAL MET, completes with RW and SBA  Short term goal 4: Pt will negotiate 12 stairs with CGA.- GOAL MET, completes with CGA and BHR  Long term goals  Time Frame for Long term goals : 2 weeks (4/11)  Long term goal 1: Pt will be indep with supine<>Sit. Long term goal 2: Pt will be mod I with transfers. Long term goal 3: Pt will ambulate 150 ft mod I.  Long term goal 4: Pt will negotiate 12 stairs with SBA. Long term goal 5: Pt will be indep with LE HEP to facilitate continued strengthening and activity tolerance at d/c. Patient Goals   Patient goals : \"to get back to normal and be able to do the things I did before\"    Plan    Plan  Times per week: 5 out of 7 days/wk  Times per day: Daily  Specific instructions for Next Treatment: progress mobility  Current Treatment Recommendations: Shanna Situ Re-education,Patient/Caregiver Education & Training,Balance Training,Gait Training,Home Exercise Program,Safety Education & Training,Stair training,Functional Mobility Training,Equipment Evaluation, Education, & procurement  Safety Devices  Type of devices:  All fall risk precautions in place,Call light within reach,Chair alarm in place,Gait belt,Patient at risk for falls,Nurse notified,Left in chair     Therapy Time   Individual Concurrent Group Co-treatment   Time In 1330         Time Out 1430         Minutes 60         Timed Code Treatment Minutes: 60 Minutes       Osei Rojas, PT

## 2022-04-06 NOTE — PROGRESS NOTES
Speech Language Pathology  [facility]: ACUTE REHAB UNIT  SPEECH-LANGUAGE PATHOLOGY      [x] Daily  [] Weekly Care Conference Note  [] Discharge    Patient:Yulissa Whitmore      :1940  ATW:[id number]  Rehab Dx/Hx: Debility [R53.81]    Precautions: falls  Home situation: lives alone, manages own meds/finances, household tasks, has two friends who she considers \"daughter and son in law\" live close by and assist when needed, not an active    ST Dx: [] Aphasia  [] Dysarthria  [] Apraxia   [] Oropharyngeal dysphagia [x] Cognitive Impairment  [] Other:   Date of Admit: 3/28/2022  Room #: 0161/0161-01    Current functional status (updated daily):         Pt being seen for : [] Speech/Language Treatment  [] Dysphagia Treatment [x] Cognitive Treatment  [] Other:  Communication: [x]WFL  [] Aphasia  [] Dysarthria  [] Apraxia  [] Pragmatic Impairment [] Non-verbal  [] Hearing Loss  [] Other:   Cognition: [] WFL  [x] Mild  [x] Moderate  [] Severe [] Unable to Assess  [] Other:  Memory: [] WFL  [x] Mild  [] Moderate  [] Severe [] Unable to Assess  [] Other:  Behavior: [x] Alert  [x] Cooperative  [x]  Pleasant  [] Confused  [] Agitated  [] Uncooperative  [] Distractible [] Motivated  [] Self-Limiting [] Anxious  [] Other:  Endurance:  [x] Adequate for participation in SLP sessions  [] Reduced overall  [] Lethargic  [] Other:  Safety: [x] No concerns at this time  [] Reduced insight into deficits  []  Reduced safety awareness [] Not following call light procedures  [] Unable to Assess  [] Other:    Current Diet Order:ADULT DIET; Regular  ADULT ORAL NUTRITION SUPPLEMENT; Breakfast, Dinner; Standard High Calorie/High Protein Oral Supplement  Swallowing Precautions:  Alternate solids and liquids;Eat/Feed slowly;Upright as possible for all oral intake;Remain upright for 30-45 minutes after meals;Small bites/sips        Date: 2022      Tx session 1  [id number] Tx session 2  All tx needs met in session 1    Total Timed Code Min 60    Total Treatment Minutes 60    Individual Treatment Minutes 60    Group Treatment Minutes 0    Co-Treat Minutes 0    Variance/Reason:  0    Pain Denies     Pain Intervention [] RN notified  [] Repositioned  [] Intervention offered and patient declined  [x] N/A  [] Other: [] RN notified  [] Repositioned  [] Intervention offered and patient declined  [] N/A  [] Other:   Subjective     Pt alert and oriented, cooperative and agreeable to participate in therapy. Pt seen sitting upright in recliner, reading a magazine on arrival.     Objective:  Goals     Short-term Goals  Timeframe for Short-term Goals: 10 days (04/07/2022)    Goal 1: Pt will complete graded recall tasks using compensatory strategies with 80% acc given min cues      Addressed within Goal 4, using written log as a memory and organization strategy    Goal 2: Pt will complete executive function tasks (e.g. meds, time, money, etc) with 80% acc given min cues   Figuring amounts of time within functional scenarios: 60% acc given min cues, to 100% given mod cues to break down and organize steps (1-2 per item)        Goal 3: Pt will complete verbal and visual reasoning tasks with 80% acc given min cues     Pt followed written instructions with moderately complex language 70% acc independently on first attempt. Pt demonstrated self-editing of errors when cued to review her work to achieve 100%      Goal 4: Pt will complete problem solving and thought organization tasks with 80% acc given min cues   Pt has had staff write her BP once per day, but stopped on 4/3.  She was not aware of target BP ranges (needed to determine which medication to take)          Other areas targeted: n/a    Education:   Gave pt an \"assignment\" to ask her MD and/or her RN for target BP ranges and write them down on her log.    ,    Safety Devices: [x] Call light within reach  [x] Chair alarm activated  [] Bed alarm activated  [] Other: [] Call light within reach  [] Chair alarm activated  [] Bed alarm activated  [] Other:    Assessment: Pt pleasant and cooperative, agreeable to participate. If pt intends to continue to manage her BP at home, including determining which medication to take to treat it, continue to rec she keep a log here and demonstrate this ability. Pt did not demonstrate ability to recognize \"low\" vs \"high\" BP on the log that staff have written on earlier this week. Pt figured amounts of time with mod cues to correct errors. Pt self-corrected errors in following written instructions when cued to review her work. Pt remains motivated to improve. Continue goals above. Plan: Continue as per plan of care. Additional Information:   Barriers toward progress:None   Discharge recommendations:  [] Home independently  [x] Home with assistance []  24 hour supervision  [] ECF [] Other: defer to PT/OT recs  Continued Tx Upon Discharge: ? [] Yes [] No [x] TBD based on progress while on ARU [] Vital Stim indicated [] Other:   Estimated discharge date: 04/09/22    Interventions used this date:  [] Speech/Language Treatment  [] Instruction in HEP [] Group [] Dysphagia Treatment [x] Cognitive Treatment   [] Other: Total Time Breakdown / Charges    Time in Time out Total Time / units   Cognitive Tx 1530 1630 60 min/ 4 units    Speech Tx -- -- --   Dysphagia Tx -- -- --       Electronically Signed by   Ruth Elder MS CCC-SLP  Speech Language Pathologist

## 2022-04-06 NOTE — PLAN OF CARE
Problem: Falls - Risk of:  Goal: Will remain free from falls  Description: Will remain free from falls  4/6/2022 0832 by Veto Montoya RN  Outcome: Ongoing     Problem: Falls - Risk of:  Goal: Absence of physical injury  Description: Absence of physical injury  4/6/2022 0832 by Veto Montoya RN  Outcome: Ongoing

## 2022-04-06 NOTE — CARE COORDINATION
ARU Team Conference       Planned Discharge Date: 04/09/2022  Durable medical equipment needed:Rolling walker>[employer]    Discharge Plan: CM spoke with patient chair side in room with team conference DCP. [facility] and referral to [facility] made. Patient agrees with DCP.  Felipe Johns RN

## 2022-04-06 NOTE — PROGRESS NOTES
Occupational Therapy  Facility/Department: [facility] ARU  Daily Treatment Note  NAME: Marko Oliveros  : 1940  MRN: [id number]    Date of Service: 2022    Discharge Recommendations:  24 hour supervision or assist,Home with Home health OT,S Level 3,Home with nursing aide  OT Equipment Recommendations  Equipment Needed: Yes  Mobility Devices: ADL Assistive Devices; Brittni Freshwater: Rolling  ADL Assistive Devices: Hand-held Shower; Toilet Safety Frame; Shower Chair with back    Assessment   Performance deficits / Impairments: Decreased functional mobility ; Decreased ADL status; Decreased balance;Decreased safe awareness;Decreased strength;Decreased endurance;Decreased posture;Decreased cognition;Decreased high-level IADLs    Assessment: Pt progressing towards goals. Pt SBA for all mobility and grooming the day. Pt demo good tolerance of 5 minute standing tasks. Pt demo good safety throughout session with walker and ambulation. Pt demo good tolerance of BUE therex. Cont per POC    Treatment Diagnosis: weakness  Prognosis: Good  OT Education: OT Role;Plan of Care;Precautions;Transfer Training;Energy Conservation; ADL Adaptive Strategies  Patient Education: home safety concerns, importance of monitoring vitals with positional changes, safety during bathing/dressing, BSC transfers, hand placement for transfers, ARU expectations; long discussion with family/pt on the importance of not using a purewick for toileting  Barriers to Learning: memory  REQUIRES OT FOLLOW UP: Yes  Activity Tolerance  Activity Tolerance: Patient Tolerated treatment well  Activity Tolerance: BP stable throughout tx session; /78, HR 68, O2: 99%  Safety Devices  Safety Devices in place: Yes  Type of devices: Nurse notified;Call light within reach; All fall risk precautions in place; Left in chair;Chair alarm in place; Patient at risk for falls;Gait belt         Patient Diagnosis(es): There were no encounter diagnoses.       has a past medical history of ARF (acute renal failure) ([facility] [address] has a past surgical history that includes joint replacement; Tonsillectomy; Intracapsular cataract extraction (Right, 5/21/2019); and Intracapsular cataract extraction (Left, 7/2/2019). Restrictions  Restrictions/Precautions  Restrictions/Precautions: General Precautions,Fall Risk  Position Activity Restriction  Other position/activity restrictions: Up with assist, monitor for orthostatic BP  Subjective   General  Chart Reviewed: Yes  Patient assessed for rehabilitation services?: Yes  Additional Pertinent Hx: ARF, catarct surgery, bilateral MARGOTH, urge incontinence, anemia, memory loss  Response to previous treatment: Patient with no complaints from previous session  Family / Caregiver Present: No  Referring Practitioner: Neha Levine MD  Diagnosis: Pt admitted on 3/25/22 due to progressive weakness, frequent falls, ROSE, anemia. Pt transfered to ARU on 3/28/22  Subjective  Subjective: Pt in chair, agreeable to tx, requesting to groom at sink. General Comment  Comments: RN Agreeable to OT session. Vital Signs  Patient Currently in Pain: Denies   Orientation  Orientation  Overall Orientation Status: Within Functional Limits  Objective    ADL  Feeding: Independent  Grooming: Stand by assistance        Balance  Sitting Balance: Independent  Standing Balance: Stand by assistance  Standing Balance  Time: 2 minutes x2, 4 minutes, 5 minutes, 4.5 minutes  Activity: to/from gym, grooming at sink, standing for crossword activity x2  Comment: with RW  Functional Mobility  Functional - Mobility Device: Rolling Walker  Activity: To/from bathroom; To/From therapy gym  Assist Level: Stand by assistance  Bed mobility  Supine to Sit: Modified independent  Sit to Supine: Unable to assess  Transfers  Sit to stand: Supervision  Stand to sit: Supervision        Coordination  Fine Motor: [facility] for opening containers while grooming and [facility] when writing              Cognition  Overall Cognitive Status: Exceptions  Arousal/Alertness: Appropriate responses to stimuli  Following Commands: Follows one step commands consistently; Follows multistep commands with repitition  Attention Span: Attends with cues to redirect  Memory: Decreased recall of precautions;Decreased short term memory  Safety Judgement: Decreased awareness of need for safety  Problem Solving: Decreased awareness of errors  Insights: Decreased awareness of deficits  Initiation: Does not require cues  Sequencing: Requires cues for some                    Type of ROM/Therapeutic Exercise  Type of ROM/Therapeutic Exercise: AROM; Resistive Bands  Comment: Red theraband, BUE seated in chair  Exercises  Shoulder Depression: x15  Shoulder Elevation: x15  Shoulder Flexion: x15  Shoulder Extension: x15  Shoulder ABduction: x15  Shoulder ADduction: x15  Horizontal ABduction: x15  Horizontal ADduction: x15  Elbow Flexion: x15  Elbow Extension: x15                    Plan   Plan  Times per week: 5-7 days/week  Times per day: Daily  Plan weeks: 2 weeks  Current Treatment Recommendations: Strengthening,Functional Mobility Training,Endurance Training,Balance Training,Safety Education & Training,Self-Care / ADL,Patient/Caregiver Education & Training,Home Management Training,Equipment Evaluation, Education, & procurement,Cognitive/Perceptual Training,Positioning    Goals  Short term goals  Time Frame for Short term goals: 1 week (4/5 22)  Short term goal 1: Pt will complete LB dressing using AE and LRAD PRN CGA- GOAL MET  Short term goal 2: Pt will complete functional transfers from various surfaces with LRAD SBA- GOAL MET  Short term goal 3: Pt will complete grooming standing sinkside >5 minutes using LRAD CGA- GOAL MET  Short term goal 4: Pt will complete toileting with CGA using LRAD and DME PRN- GOAL MET  Short term goal 5: Pt will complete total body bathing CGA sitting vs standing using AE/DME PRN- GOAL MET  Long term goals  Time Frame for Long term goals : 2 weeks (4/12/22)  Long term goal 1: Pt will complete total body dressing including gathering items with LRAD Dev  Long term goal 2: Pt will complete toileting tasks Dev with LRAD and DME PRN  Long term goal 3: Pt will complete functional transfers Dev with LRAD and no cues for safety/sequencing  Long term goal 4: Pt will complete grooming standing vs sititng sinkside Dev  Long term goal 5: Pt will complete total body bathing SPV/setup with LRAD and AE PRN sitting vs standing  Patient Goals   Patient goals : \"to take care of myself without help from others\"       Therapy Time   Individual Concurrent Group Co-treatment   Time In 0925         Time Out 1025         Minutes 60         Timed Code Treatment Minutes: [address], OT

## 2022-04-07 PROCEDURE — 97116 GAIT TRAINING THERAPY: CPT

## 2022-04-07 PROCEDURE — 97110 THERAPEUTIC EXERCISES: CPT

## 2022-04-07 PROCEDURE — 97130 THER IVNTJ EA ADDL 15 MIN: CPT

## 2022-04-07 PROCEDURE — 97530 THERAPEUTIC ACTIVITIES: CPT

## 2022-04-07 PROCEDURE — 1280000000 HC REHAB R&B

## 2022-04-07 PROCEDURE — 6360000002 HC RX W HCPCS: Performed by: STUDENT IN AN ORGANIZED HEALTH CARE EDUCATION/TRAINING PROGRAM

## 2022-04-07 PROCEDURE — 6370000000 HC RX 637 (ALT 250 FOR IP): Performed by: STUDENT IN AN ORGANIZED HEALTH CARE EDUCATION/TRAINING PROGRAM

## 2022-04-07 PROCEDURE — 97535 SELF CARE MNGMENT TRAINING: CPT

## 2022-04-07 PROCEDURE — 97129 THER IVNTJ 1ST 15 MIN: CPT

## 2022-04-07 RX ADMIN — ENOXAPARIN SODIUM 30 MG: 100 INJECTION SUBCUTANEOUS at 07:46

## 2022-04-07 RX ADMIN — FERROUS SULFATE TAB 325 MG (65 MG ELEMENTAL FE) 325 MG: 325 (65 FE) TAB at 07:46

## 2022-04-07 RX ADMIN — Medication 1000 UNITS: at 07:46

## 2022-04-07 RX ADMIN — FLUDROCORTISONE ACETATE 0.1 MG: 0.1 TABLET ORAL at 14:33

## 2022-04-07 RX ADMIN — POTASSIUM CHLORIDE 40 MEQ: 20 TABLET, EXTENDED RELEASE ORAL at 07:46

## 2022-04-07 RX ADMIN — FERROUS SULFATE TAB 325 MG (65 MG ELEMENTAL FE) 325 MG: 325 (65 FE) TAB at 16:34

## 2022-04-07 RX ADMIN — FOLIC ACID 1 MG: 1 TABLET ORAL at 07:46

## 2022-04-07 RX ADMIN — DONEPEZIL HYDROCHLORIDE 10 MG: 5 TABLET ORAL at 20:13

## 2022-04-07 RX ADMIN — DOCUSATE SODIUM 50 MG AND SENNOSIDES 8.6 MG 2 TABLET: 8.6; 5 TABLET, FILM COATED ORAL at 07:46

## 2022-04-07 RX ADMIN — Medication 400 MG: at 07:46

## 2022-04-07 ASSESSMENT — PAIN SCALES - GENERAL: PAINLEVEL_OUTOF10: 0

## 2022-04-07 NOTE — PROGRESS NOTES
Occupational Therapy  Facility/Department: [address] [place] [address]  Daily Treatment Note  NAME: Tami Harman  : 1940  MRN: [id number]    Date of Service: 2022    Discharge Recommendations:  24 hour supervision or assist,Home with Home health OT,S Level 3,Home with nursing aide  OT Equipment Recommendations  Equipment Needed: Yes  Mobility Devices: ADL Assistive Devices; Mark Anthony Diego: Rolling  ADL Assistive Devices: Hand-held Shower; Toilet Safety Frame; Shower Chair with back  Other: Pt could potentially benefit from RW and a reacher    Assessment   Performance deficits / Impairments: Decreased functional mobility ; Decreased ADL status; Decreased balance;Decreased safe awareness;Decreased strength;Decreased endurance;Decreased posture;Decreased cognition;Decreased high-level IADLs    Assessment: Tami Harman is progressing in therapy well and is currently limited by low BP, and general fatigue associated. Demo'd fair safety awareness throughout session, fewer reminders for safe hand placement during session. o Functional Mobility: Required CGA for ambulation to/from bathroom and sit <> stand d/t safety concern with low BP with Rolling Walker  o ADL/TA: Mod A for LB dressing while seated on toilet, pt completed joey care after BM   Activity was tolerated fairly well, pt required rest breaks after sets of there ex. Continue OT POC to address performance deficits in ADLs and functional mobility. Recommend Home with 24/7 Assist/Supervision  and HHOT upon d/c. Treatment Diagnosis: weakness  Prognosis: Good  OT Education: OT Role;Plan of Care;Precautions;Transfer Training;Energy Conservation; ADL Adaptive Strategies  Patient Education: Disease specific education: orthostatic hypotension safety, safety with ADLs/trasnfers, purpose of BUE exercises with weighted wand, hand placement for trasnfers.  Pt demo'd good understanding but will benefit from cont education  Barriers to Learning: memory  REQUIRES OT FOLLOW UP: Yes  Activity Tolerance  Activity Tolerance: Patient limited by fatigue  Activity Tolerance: BP was as follows: RN Kalpana Canales aware and perfect served MD  · Sitting at beginning of session with feet on floor- 87/48 LUE, 88/48 RUE  · Standing at edge of chair- LUE 70/32, pt reported feeling a little off  · Sitting with feet up in recliner- LUE 87/51, HR 78  · After ambulation to toilet- LUE 97/55  · After toileting, return to recliner- LUE 99/50, HR 75  · In recliner,  Feet on floor at end of session: /58, HR 71  Safety Devices  Safety Devices in place: Yes  Type of devices: Nurse notified;Call light within reach; All fall risk precautions in place; Left in chair;Chair alarm in place; Patient at risk for falls;Gait belt         Patient Diagnosis(es): There were no encounter diagnoses. has a past medical history of ARF (acute renal failure) ([facility] [address] has a past surgical history that includes joint replacement; Tonsillectomy; Intracapsular cataract extraction (Right, 5/21/2019); and Intracapsular cataract extraction (Left, 7/2/2019). Restrictions  Restrictions/Precautions  Restrictions/Precautions: General Precautions,Fall Risk  Position Activity Restriction  Other position/activity restrictions: Up with assist, monitor for orthostatic BP  Subjective   General  Chart Reviewed: Yes  Patient assessed for rehabilitation services?: Yes  Additional Pertinent Hx: ARF, catarct surgery, bilateral MARGOTH, urge incontinence, anemia, memory loss  Response to previous treatment: Patient with no complaints from previous session  Family / Caregiver Present: No  Referring Practitioner: Sushil Huynh MD  Diagnosis: Pt admitted on 3/25/22 due to progressive weakness, frequent falls, ROSE, anemia. Pt transfered to ARU on 3/28/22  Subjective  Subjective: Pt in chair, agreeable to tx  General Comment  Comments: RN Agreeable to OT session.   Vital Signs  Pulse: 75  Heart Rate Source: Monitor  BP: (!) 99/50  BP Location: Left upper arm  MAP (mmHg): 72  Patient Position: Sitting;Up in chair  Orthostatic B/P and Pulse?: Yes  Blood Pressure Sittin/48  Blood Pressure Standin/32  Level of Consciousness: Alert (0)  Patient Currently in Pain: Denies  Orthostatic B/P and Pulse?: Yes  Oxygen Therapy  SpO2: 97 %  Pulse Oximeter Device Mode: Intermittent  Pulse Oximeter Device Location: Finger     Orientation  Orientation  Overall Orientation Status: Within Functional Limits  Objective    ADL  Grooming: Stand by assistance (handwashing in stance at sink)  LE Dressing: Moderate assistance (thread brief and pants and pull up in back)  Toileting: Contact guard assistance (d/t low BP)  Instrumental ADL's  Instrumental ADLs: No     Balance  Sitting Balance: Independent  Standing Balance: Contact guard assistance  Standing Balance  Time: x2 minutes x2  Activity: to/from bathroom, grooming at sink  Comment: with RW  Functional Mobility  Functional - Mobility Device: Rolling Walker  Activity: To/from bathroom  Assist Level: Contact guard assistance  Functional Mobility Comments: CGA progressing to SBA d/t low BP at start of session  Toilet Transfers  Toilet - Technique: Ambulating  Equipment Used: Standard toilet  Toilet Transfer: Contact guard assistance  Bed mobility  Comment: DE- pt in chair at beginning and end of session  Transfers  Sit to stand: Contact guard assistance  Stand to sit: Contact guard assistance  Transfer Comments: good safety awareness, pt knew reasoning for hand placement on GB vs RW                       Cognition  Overall Cognitive Status: Exceptions  Arousal/Alertness: Appropriate responses to stimuli  Following Commands: Follows one step commands consistently; Follows multistep commands with repitition  Attention Span: Attends with cues to redirect  Memory: Decreased recall of precautions;Decreased short term memory  Safety Judgement: Decreased awareness of need for safety  Problem Solving: Decreased awareness of errors  Insights: Decreased awareness of deficits  Initiation: Does not require cues  Sequencing: Requires cues for some  Cognition Comment: less impulsion this date, waited for cue to stand                    Type of ROM/Therapeutic Exercise  Type of ROM/Therapeutic Exercise: Cane/Daniele  Comment: BUE seated in chair, breaks PRN between sets  Exercises  Shoulder Flexion: 2 sets x15 reps with 2.5#  Elbow Flexion: 2 sets x15 reps with 2.5#  Supination/Pronation: 1 set x15 reps with BW  Other: Chest press: 2 sets x15 reps with 2.5#    LUE AROM (degrees)  LUE AROM : WFL  Left Hand AROM (degrees)  Left Hand AROM: WFL  RUE AROM (degrees)  RUE AROM : WFL  Right Hand AROM (degrees)  Right Hand AROM: WFL                 Plan   Plan  Times per week: 5-7 days/week  Times per day: Daily  Plan weeks: 2 weeks  Current Treatment Recommendations: Strengthening,Functional Mobility Training,Endurance Training,Balance Training,Safety Education & Training,Self-Care / ADL,Patient/Caregiver Education & Training,Home Management Training,Equipment Evaluation, Education, & procurement,Cognitive/Perceptual Training,Positioning      Goals  Short term goals  Time Frame for Short term goals: 1 week (4/5 22)  Short term goal 1: Pt will complete LB dressing using AE and LRAD PRN CGA- GOAL MET  Short term goal 2: Pt will complete functional transfers from various surfaces with LRAD SBA- GOAL MET  Short term goal 3: Pt will complete grooming standing sinkside >5 minutes using LRAD CGA- GOAL MET  Short term goal 4: Pt will complete toileting with CGA using LRAD and DME PRN- GOAL MET  Short term goal 5: Pt will complete total body bathing CGA sitting vs standing using AE/DME PRN- GOAL MET  Long term goals  Time Frame for Long term goals : 2 weeks (4/12/22)  Long term goal 1: Pt will complete total body dressing including gathering items with LRAD Dev  Long term goal 2: Pt will complete toileting tasks Dev with LRAD and DME PRN  Long term goal 3: Pt will complete functional transfers Dev with LRAD and no cues for safety/sequencing  Long term goal 4: Pt will complete grooming standing vs sititng sinkside Dev  Long term goal 5: Pt will complete total body bathing SPV/setup with LRAD and AE PRN sitting vs standing  Patient Goals   Patient goals : \"to take care of myself without help from others\"       Therapy Time   Individual Concurrent Group Co-treatment   Time In 1330         Time Out 1430         Minutes 60         Timed Code Treatment Minutes: [address] [place], [place]

## 2022-04-07 NOTE — PROGRESS NOTES
Pt sitting bp left arm 87/48, p-73,  Right arm bp 88/48, p-73. Standing for 1 min with therapy  bp 70/32. Brianda Bishop Pt ambulated to toilet and had bm bp- 97/55. Message sent to Dr. Tressa John.

## 2022-04-07 NOTE — PROGRESS NOTES
Physical Therapy  Facility/Department: [facility] ARU  Daily Treatment Note  NAME: Romayne Sacramento  : 1940  MRN: [id number]    Date of Service: 2022    Discharge Recommendations:  Home with Home health PT,24 hour supervision or assist   PT Equipment Recommendations  Equipment Needed: Yes  Walker: Rolling    Assessment   Body structures, Functions, Activity limitations: Decreased functional mobility ; Decreased posture;Decreased ADL status; Decreased coordination;Decreased balance;Decreased strength; Increased pain;Decreased endurance  Assessment: pt found in bed, incontinent of urine. therapist asissted pt with to bathroom where pt completed clothing change and cleaned sandra f wtih SBA. grossly supv-mod ind trnasfers, supv for giat with RW. requries cues for safety when ambulating backwards with occasional min a to prevent LOB. rec initial  and HHPT. DNE: RW  Treatment Diagnosis: Decreased functional mobility  Prognosis: Good  Decision Making: Medium Complexity  PT Education: Goals;Transfer Training;Disease Specific Education;PT Role;Plan of Care;General Safety;Gait Training  Patient Education: Pt educated on importance of OOB mobility for continued improvements in current deficits. Pt demos understanding  Barriers to Learning: none  REQUIRES PT FOLLOW UP: Yes  Activity Tolerance  Activity Tolerance: Patient limited by fatigue;Patient limited by endurance  Activity Tolerance: 112/76, 72 bpm, Spo2= 94% on room air     Patient Diagnosis(es): There were no encounter diagnoses. has a past medical history of ARF (acute renal failure) ([facility] [address] has a past surgical history that includes joint replacement; Tonsillectomy; Intracapsular cataract extraction (Right, 2019); and Intracapsular cataract extraction (Left, 2019). Restrictions  Restrictions/Precautions  Restrictions/Precautions: General Precautions,Fall Risk  Position Activity Restriction  Other position/activity restrictions:  Up with assist, monitor for orthostatic BP  Subjective   General  Chart Reviewed: Yes  Additional Pertinent Hx: Acute fractures of the right 4th through 12th ribs  Response To Previous Treatment: Patient with no complaints from previous session. Family / Caregiver Present: No  Referring Practitioner: Dr. Katelynn Walsh MD  Subjective  Subjective: denies pain  General Comment  Comments: found incontinent in bed  Pain Screening  Patient Currently in Pain: No  Vital Signs  Patient Currently in Pain: No       Orientation  Orientation  Overall Orientation Status: Within Functional Limits  Cognition      Objective   Bed mobility  Supine to Sit: Modified independent  Transfers  Sit to Stand: Supervision;Modified independent  Stand to sit: Supervision;Modified independent  Comment: sit to stand EOB to RW, commode to RW and w/c to RW x multiple reps with supv-mod ind  Ambulation  Ambulation?: Yes  More Ambulation?: Yes  Ambulation 1  Surface: level tile  Device: Rolling Walker  Assistance: Supervision  Quality of Gait: cues for upright posture, decreased step legnth/height, decreased velocity  Distance: 10 ft x 2 reps  Comments: to and from commode  Ambulation 2  Surface - 2: level tile  Device 2: Rolling Walker  Assistance 2: Supervision  Quality of Gait 2: cues for upright posture, decreased step legnth/height, decreased velocity  Distance: 170 ft  Distance 3: 180 ft (10 ft turf)  Stairs/Curb  Stairs?: Yes  Stairs  # Steps : 12  Stairs Height: 6\"  Rails: Bilateral  Assistance: Contact guard assistance;Stand by assistance  Comment: pt ascended/descended 12 6\" steps with BHR, reciprocal pattern and CGA-SBA.   Neuromuscular Education  NDT Treatment: Gait   Neuromuscular Comments: obstacle course: weaving between 3 foam discs on ground-> 15 BLE standing alternating foot taps onto 6\" stp with BUE support-> step onto / off of foam discs with RW for support. grossly close SBA, CGA when turning 180* wiht cues for managemnet, completes x 2 reps with seated rest between sets. Balance  Sitting - Static: Good  Sitting - Dynamic: Good  Standing - Static: Fair;+  Standing - Dynamic: Fair  Comments: dyn sitting / standing while completing ADL to clean self following incontinence, SBA while completing clothing management with 1 LOB posteriorly however pt catches self with grab bar. hand washing at sink with no UEsupport and SBA  Other exercises  Other exercises 1: dyn stand activity: ambulate forward x 6 ft -> squat to  beanbag from 6\" step with RUE on Il side-> walk backwards x 6 ft-> reach superior for beanbag totoss to target-> repeat. pt requires supv when ambulating forward, CGA-min A with cues for safety when ambulating backwards, requres seated rest between 2 botus (lasting aprox 3.5 min, 2 min)         Goals  Short term goals  Time Frame for Short term goals: 1 week (4/4)  Short term goal 1: Pt will be SBA with supine<>Sit. - GOAL MET, completes with mod ind  Short term goal 2: Pt will be SBA with transfers with RW or 4WW.- GOAL MET, completes with SBA  Short term goal 3: Pt will ambulate 150 ft with SBA and RW or 4WW.- GOAL MET, completes with RW and SBA  Short term goal 4: Pt will negotiate 12 stairs with CGA.- GOAL MET, completes with CGA and BHR  Long term goals  Time Frame for Long term goals : 2 weeks (4/11)  Long term goal 1: Pt will be indep with supine<>Sit. Long term goal 2: Pt will be mod I with transfers. Long term goal 3: Pt will ambulate 150 ft mod I.  Long term goal 4: Pt will negotiate 12 stairs with SBA. Long term goal 5: Pt will be indep with LE HEP to facilitate continued strengthening and activity tolerance at d/c.   Patient Goals   Patient goals : \"to get back to normal and be able to do the things I did before\"    Plan    Plan  Times per week: 5 out of 7 days/wk  Times per day: Daily  Specific instructions for Next Treatment: progress mobility  Current Treatment Recommendations: Nia Langford Training,Neuromuscular Re-education,Patient/Caregiver Education & Training,Balance Training,Gait Training,Home Exercise Program,Safety Education & Training,Stair training,Functional Mobility Training,Equipment Evaluation, Education, & procurement  Safety Devices  Type of devices:  All fall risk precautions in place,Call light within reach,Chair alarm in place,Gait belt,Patient at risk for falls,Nurse notified,Left in chair     Therapy Time   Individual Concurrent Group Co-treatment   Time In 0800         Time Out 0900         Minutes 60         Timed Code Treatment Minutes: 60 Minutes       Jennifer Camilo, PT

## 2022-04-07 NOTE — PROGRESS NOTES
Comprehensive Nutrition Assessment    Type and Reason for Visit:  Reassess    Nutrition Recommendations/Plan:   1. Continue general diet   2. Decrease Ensure to daily   3. Encourage PO intakes   4. Monitor nutrition adequacy, pertinent labs, bowel habits, wt changes, and clinical progress    Nutrition Assessment:  Follow up: Pt remains nutritionally at risk AEB fair appetite. Pt reports eating about the same this ARU stay and at home. Continues to c/o large portions. PO intakes of % per EMR. Favorable to Ensure. Multiple ONS on bedside table, will decrease to daily. Encouraged PO intakes. Denies any nutrition questions. Will monitor. Malnutrition Assessment:  Malnutrition Status:  No malnutrition      Estimated Daily Nutrient Needs:  Energy (kcal):  3363-3301 kcals/day; Weight Used for Energy Requirements:  Ideal (57 kg)     Protein (g):  57-68 g/day; Weight Used for Protein Requirements:  Ideal (1-1.2 g/kg)        Fluid (ml/day):   ; Method Used for Fluid Requirements:  1 ml/kcal      Nutrition Related Findings:  Active BS. BM x1 on 4/7. +1 LLE edema. Wounds:  Skin Tears       Current Nutrition Therapies:    ADULT DIET;  Regular  ADULT ORAL NUTRITION SUPPLEMENT; Breakfast, Dinner; Standard High Calorie/High Protein Oral Supplement    Anthropometric Measures:  · Height: 5' 5\" (165.1 cm)  · Current Body Weight: 107 lb (48.5 kg)   · Usual Body Weight: 110 lb (49.9 kg) (per pt)     · Ideal Body Weight: 125 lbs; % Ideal Body Weight 85.6 %   · BMI: 17.8  · BMI Categories: Underweight (BMI less than 22) age over 72       Nutrition Diagnosis:   · Increased nutrient needs related to increase demand for energy/nutrients as evidenced by other (comment) (Increased therapy regimen)      Nutrition Interventions:   Food and/or Nutrient Delivery:  Continue Current Diet,Modify Oral Nutrition Supplement  Nutrition Education/Counseling:  Education not indicated   Coordination of Nutrition Care:  Continue to monitor while inpatient    Goals:  Consume 50% or greater of 3 meals per day and ONS during this admission.        Nutrition Monitoring and Evaluation:   Behavioral-Environmental Outcomes:  None Identified   Food/Nutrient Intake Outcomes:  Food and Nutrient Intake,Supplement Intake  Physical Signs/Symptoms Outcomes:  Constipation,Weight     Discharge Planning:    Continue current diet,Continue Oral Nutrition Supplement     Electronically signed by Jailene Riggs MS, RD, LD on 4/7/22 at 12:23 PM EDT    Contact: [phone]

## 2022-04-07 NOTE — PROGRESS NOTES
Tessa Richards  4/7/2022  [id number]    Chief Complaint: Debility    Subjective:   No acute events overnight. Today Miriam Givens is seen in her room. She reports she is feeling well and denies any acute complaints. ROS: denies f/c, n/v, cp, sob  Objective:  Patient Vitals for the past 24 hrs:   BP Temp Temp src Pulse Resp SpO2   04/07/22 0745 (!) 164/79 97.5 °F (36.4 °C) Oral 69 20 97 %   04/06/22 1945 (!) 156/77 97.5 °F (36.4 °C) Oral 76 16 96 %     Gen: No distress, pleasant. Resting in chair  HEENT: Normocephalic, atraumatic. CV: RRR  Resp: No respiratory distress. Lungs CTAB  Abd: nondistended  Ext: No edema. Neuro: Alert, oriented, appropriately interactive. Speech fluent without aphasia   Skin: no rashes    Wt Readings from Last 3 Encounters:   03/28/22 107 lb 2.3 oz (48.6 kg)   03/25/22 110 lb (49.9 kg)   01/09/20 113 lb 12.8 oz (51.6 kg)       Laboratory data:   Lab Results   Component Value Date    WBC 6.7 03/31/2022    HGB 9.1 (L) 03/31/2022    HCT 26.7 (L) 03/31/2022    MCV 97.9 03/31/2022     03/31/2022       Lab Results   Component Value Date     04/04/2022    K 4.4 04/04/2022     04/04/2022    CO2 24 04/04/2022    BUN 19 04/04/2022    CREATININE 0.7 04/04/2022    GLUCOSE 97 04/04/2022    CALCIUM 8.9 04/04/2022        Therapy progress:  PT  Position Activity Restriction  Other position/activity restrictions:  Up with assist, monitor for orthostatic BP  Objective     Sit to Stand: Supervision,Modified independent  Stand to sit: Supervision,Modified independent  Bed to Chair: Stand by assistance  Device: Rolling Walker  Assistance: Supervision  Distance: 10 ft x 2 reps  OT  PT Equipment Recommendations  Equipment Needed: Yes  Mobility Devices: Patti New: Rolling  Other: pt owns 4WW  Toilet - Technique: Ambulating  Equipment Used: Standard toilet  Toilet Transfers Comments: Isauro sit <> Stand from [place], cues for safety with turning to sit and positioning of self  Assessment SLP  Current Diet : Regular  Current Liquid Diet : Thin  Diet Solids Recommendation: Regular  Liquid Consistency Recommendation: Thin    Body mass index is 17.83 kg/m². Assessment and Plan:  Shayne Guerrero is an [de-identified]year old female with past medical history significant for urge incontinence, anemia, and memory loss who presented to [facility] on 3/25/22 with several days of progressive weakness and falls. She was admitted to [facility] on 3/28/22 due to functional deficits below her baseline.      Debility  - will falls at home  - PT, OT     Cognitive Impairment  - continue aricept  - ST    Superficial Thrombophlebitis, resolved  - right ventral forearm  - scan showing clot is only superficial  - no indication for [facility] at this time  - discontinued bactrim  - apply heat PRN     Orthostatic Hypotension  - s/p 1 L NS 3/29  - florinef 0.1 mg daily   - KENN hose     Anemia  - no evidence of acute bleed during acute workup  - completed course of venofer  - monitor and transfuse for Hb<7  - PO iron replacement    Electrolyte abnormalities  - K and Mg replacement  - continue to monitor    ROSE, resolved  - improved after IV hydration  - encourage PO intake  - monitor renal function     Bowels: Schedule stool softener. Follow bowel movements. Enema or suppository if needed.      Bladder: Check PVR x 3.   [address] if PVR > 200ml or if any volume is > 500 ml.      Sleep: Trazodone provided prn.      PPx  DVT: lovenox  GI: not indicated     Follow up appointments: PCP    Services/DME: home PT, OTST  EDOD: 4/9/22      Electronically signed by Katherine Arias MD on 4/7/2022 at 11:58 AM

## 2022-04-07 NOTE — PROGRESS NOTES
Speech Language Pathology  [facility]: ACUTE REHAB UNIT  SPEECH-LANGUAGE PATHOLOGY      [x] Daily  [] Weekly Care Conference Note  [] Discharge    Patient:Lois Melita Barthel      :1940  DRR:[id number]  Rehab Dx/Hx: Debility [R53.81]    Precautions: falls  Home situation: lives alone, manages own meds/finances, household tasks, has two friends who she considers \"daughter and son in law\" live close by and assist when needed, not an active    ST Dx: [] Aphasia  [] Dysarthria  [] Apraxia   [] Oropharyngeal dysphagia [x] Cognitive Impairment  [] Other:   Date of Admit: 3/28/2022  Room #: 0161/0161-01    Current functional status (updated daily):         Pt being seen for : [] Speech/Language Treatment  [] Dysphagia Treatment [x] Cognitive Treatment  [] Other:  Communication: [x]WFL  [] Aphasia  [] Dysarthria  [] Apraxia  [] Pragmatic Impairment [] Non-verbal  [] Hearing Loss  [] Other:   Cognition: [] WFL  [x] Mild  [x] Moderate  [] Severe [] Unable to Assess  [] Other:  Memory: [] WFL  [x] Mild  [] Moderate  [] Severe [] Unable to Assess  [] Other:  Behavior: [x] Alert  [x] Cooperative  [x]  Pleasant  [] Confused  [] Agitated  [] Uncooperative  [] Distractible [] Motivated  [] Self-Limiting [] Anxious  [] Other:  Endurance:  [x] Adequate for participation in SLP sessions  [] Reduced overall  [] Lethargic  [] Other:  Safety: [x] No concerns at this time  [] Reduced insight into deficits  []  Reduced safety awareness [] Not following call light procedures  [] Unable to Assess  [] Other:    Current Diet Order:ADULT DIET; Regular  ADULT ORAL NUTRITION SUPPLEMENT; Breakfast, Dinner; Standard High Calorie/High Protein Oral Supplement  Swallowing Precautions:  Alternate solids and liquids;Eat/Feed slowly;Upright as possible for all oral intake;Remain upright for 30-45 minutes after meals;Small bites/sips        Date: 2022      Tx session 1  [id number] Tx session 2  All tx needs met in session 1    Total Timed Code Min 60    Total Treatment Minutes 60    Individual Treatment Minutes 60    Group Treatment Minutes 0    Co-Treat Minutes 0    Variance/Reason:  0    Pain Denies     Pain Intervention [] RN notified  [] Repositioned  [] Intervention offered and patient declined  [x] N/A  [] Other: [] RN notified  [] Repositioned  [] Intervention offered and patient declined  [] N/A  [] Other:   Subjective     Pt pleasant and cooperative, seen upright in bedside chair. Objective:  Goals     Short-term Goals  Timeframe for Short-term Goals: 10 days (04/07/2022)    Goal 1: Pt will complete graded recall tasks using compensatory strategies with 80% acc given min cues    Functional recall task of 5-item grocery list:      -20-minute delay:  5/5, no cues      Goal 2: Pt will complete executive function tasks (e.g. meds, time, money, etc) with 80% acc given min cues   Did not directly target      Goal 3: Pt will complete verbal and visual reasoning tasks with 80% acc given min cues   Problem-solving questions/providing possible solutions: 5/5, no cues        Goal 4: Pt will complete problem solving and thought organization tasks with 80% acc given min cues   Indirectly targeted during goal 3 above    Bananagrams: benefited from mod cues to assist with thought organization. Other areas targeted: n/a    Education:   Educated re: recall strategies ,    Safety Devices: [x] Call light within reach  [x] Chair alarm activated  [] Bed alarm activated  [] Other: [] Call light within reach  [] Chair alarm activated  [] Bed alarm activated  [] Other:    Assessment: The pt was seen upright in bedside chair. She was pleasant and cooperative throughout the session. She didn't require any cues for functional recall or verbal reasoning on this date. Pt had difficulty with mental flexibility with Bananagrams activity, and increased cues required to complete. Overall, she had a very successful session.            Plan: Continue as per plan of care.      Additional Information:   Barriers toward progress:None   Discharge recommendations:  [] Home independently  [x] Home with assistance []  24 hour supervision  [] ECF [] Other: defer to PT/OT recs  Continued Tx Upon Discharge: ? [] Yes [] No [x] TBD based on progress while on ARU [] Vital Stim indicated [] Other:   Estimated discharge date: 04/09/22    Interventions used this date:  [] Speech/Language Treatment  [] Instruction in HEP [] Group [] Dysphagia Treatment [x] Cognitive Treatment   [] Other:       Total Time Breakdown / Charges    Time in Time out Total Time / units   Cognitive Tx 0930 1030 60 min/ 4 units    Speech Tx -- -- --   Dysphagia Tx -- -- --       Electronically Signed by   Deirdre Cooper   Speech Therapy      Sarmad Hudson M.S. [id number] [facility]  Speech-language pathologist  [phone]

## 2022-04-07 NOTE — PLAN OF CARE
Problem: Falls - Risk of:  Goal: Will remain free from falls  Description: Will remain free from falls  Outcome: Ongoing  Note: Instructed pt to use call light as needed with ambulation. Bed locked and in lowest position. Non-skid socks in place. Call light in reach.       Problem: Skin Integrity:  Goal: Absence of new skin breakdown  Description: Absence of new skin breakdown  Outcome: Ongoing

## 2022-04-07 NOTE — PLAN OF CARE
Problem: Nutrition  Goal: Optimal nutrition therapy  Outcome: Ongoing  Note: Nutrition Problem #1: Increased nutrient needs  Intervention: Food and/or Nutrient Delivery: Continue Current Diet,Modify Oral Nutrition Supplement  Nutritional Goals: Consume 50% or greater of 3 meals per day and ONS during this admission.

## 2022-04-08 PROCEDURE — 6370000000 HC RX 637 (ALT 250 FOR IP): Performed by: STUDENT IN AN ORGANIZED HEALTH CARE EDUCATION/TRAINING PROGRAM

## 2022-04-08 PROCEDURE — 97530 THERAPEUTIC ACTIVITIES: CPT

## 2022-04-08 PROCEDURE — 97110 THERAPEUTIC EXERCISES: CPT

## 2022-04-08 PROCEDURE — 97116 GAIT TRAINING THERAPY: CPT

## 2022-04-08 PROCEDURE — 6360000002 HC RX W HCPCS: Performed by: STUDENT IN AN ORGANIZED HEALTH CARE EDUCATION/TRAINING PROGRAM

## 2022-04-08 PROCEDURE — 97130 THER IVNTJ EA ADDL 15 MIN: CPT

## 2022-04-08 PROCEDURE — 97535 SELF CARE MNGMENT TRAINING: CPT

## 2022-04-08 PROCEDURE — 97129 THER IVNTJ 1ST 15 MIN: CPT

## 2022-04-08 PROCEDURE — 1280000000 HC REHAB R&B

## 2022-04-08 RX ADMIN — FLUDROCORTISONE ACETATE 0.1 MG: 0.1 TABLET ORAL at 08:44

## 2022-04-08 RX ADMIN — Medication 1000 UNITS: at 08:44

## 2022-04-08 RX ADMIN — TRAZODONE HYDROCHLORIDE 50 MG: 50 TABLET ORAL at 20:43

## 2022-04-08 RX ADMIN — Medication 400 MG: at 08:45

## 2022-04-08 RX ADMIN — ENOXAPARIN SODIUM 30 MG: 100 INJECTION SUBCUTANEOUS at 08:45

## 2022-04-08 RX ADMIN — FERROUS SULFATE TAB 325 MG (65 MG ELEMENTAL FE) 325 MG: 325 (65 FE) TAB at 08:44

## 2022-04-08 RX ADMIN — FERROUS SULFATE TAB 325 MG (65 MG ELEMENTAL FE) 325 MG: 325 (65 FE) TAB at 16:48

## 2022-04-08 RX ADMIN — POTASSIUM CHLORIDE 40 MEQ: 20 TABLET, EXTENDED RELEASE ORAL at 08:45

## 2022-04-08 RX ADMIN — DONEPEZIL HYDROCHLORIDE 10 MG: 5 TABLET ORAL at 20:43

## 2022-04-08 RX ADMIN — FOLIC ACID 1 MG: 1 TABLET ORAL at 08:45

## 2022-04-08 ASSESSMENT — PAIN SCALES - GENERAL: PAINLEVEL_OUTOF10: 0

## 2022-04-08 NOTE — PROGRESS NOTES
Occupational Therapy  Facility/Department: [facility] ARU  Daily Treatment Note  NAME: Kassy Liao  : 1940  MRN: [id number]    Date of Service: 2022    Discharge Recommendations:  24 hour supervision or assist,Home with Home health OT,S Level 3,Home with nursing aide  OT Equipment Recommendations  Equipment Needed: Yes  Mobility Devices: ADL Assistive Devices; Dee Flatter: Rolling  ADL Assistive Devices: Hand-held Shower; Toilet Safety Frame; Shower Chair with back    Assessment   Performance deficits / Impairments: Decreased functional mobility ; Decreased ADL status; Decreased balance;Decreased safe awareness;Decreased strength;Decreased endurance;Decreased posture;Decreased cognition;Decreased high-level IADLs    Assessment: Kassy Liao is progressing in therapy well and is currently limited by activity endurance, kyphotic posturing affecting safety during tasks at times, and leaning proper RW techniques. Demo'd fair safety awareness throughout session, cueing required to maintain upright posture and when carrying items with RW.    Functional Mobility: Required SBA for functional mobility in room and to/from bathoom with Rolling Walker   ADL/TA: Mod I for dressing (pt did not gather items) while on TTB and in stance to pull pants over hips. CGA d/t decreased safety awareness   Activity was tolerated well, pt required one longer rest break after 60 minutes of showering/IADLs. Continue OT POC to address performance deficits in ADLs and functional mobility. Recommend Home with 24/7 Assist/Supervision  and HHOT upon d/c. Treatment Diagnosis: weakness  Prognosis: Good  OT Education: OT Role;Plan of Care;Precautions;Transfer Training;Energy Conservation; ADL Adaptive Strategies  Patient Education: Disease specific education: orthostatic hypotension safety, safety with ADLs/trasnfers, Red theraband exercises, hand placement for trasnfers.  Pt demo'd good understanding but will benefit from cont education  Barriers to Learning: memory  REQUIRES OT FOLLOW UP: No  Activity Tolerance  Activity Tolerance: Patient Tolerated treatment well  Activity Tolerance: Vital at beginning of session: /72, HR 66, SPO2 97% RA; after shower and IADL: /64, HR 65, SPO2 99 RA  Safety Devices  Safety Devices in place: Yes  Type of devices: Nurse notified;Call light within reach; All fall risk precautions in place; Left in chair;Chair alarm in place; Patient at risk for falls;Gait belt         Patient Diagnosis(es): There were no encounter diagnoses. has a past medical history of ARF (acute renal failure) ([address] has a past surgical history that includes joint replacement; Tonsillectomy; Intracapsular cataract extraction (Right, 5/21/2019); and Intracapsular cataract extraction (Left, 7/2/2019). Restrictions  Restrictions/Precautions  Restrictions/Precautions: General Precautions,Fall Risk  Position Activity Restriction  Other position/activity restrictions: Up with assist, monitor for orthostatic BP    Subjective   General  Chart Reviewed: Yes  Patient assessed for rehabilitation services?: Yes  Additional Pertinent Hx: ARF, catarct surgery, bilateral MARGOTH, urge incontinence, anemia, memory loss  Response to previous treatment: Patient with no complaints from previous session  Family / Caregiver Present: No  Referring Practitioner: Devante Bolton MD  Diagnosis: Pt admitted on 3/25/22 due to progressive weakness, frequent falls, ROSE, anemia. Pt transfered to ARU on 3/28/22  Subjective  Subjective: Pt in chair, agreeable to tx  General Comment  Comments: RN Agreeable to OT session.   Vital Signs  Patient Currently in Pain: Denies     Orientation  Orientation  Overall Orientation Status: Within Functional Limits    Objective    ADL  Grooming: Supervision (oral care at sink)  UE Bathing: Supervision (set up)  LE Bathing: Supervision  UE Dressing: Independent  LE Dressing: Independent (increased time)  Instrumental ADL's  Instrumental ADLs: Yes  Light Housekeeping  Light Housekeeping Level: [facility] Housekeeping Level of Assistance: Contact guard assistance  Light Housekeeping: in stance for 7 minutes; collecting personal belongs around room and placing into bags, required reaching and turning during task, cues for safety and to not hold onto RW with one hand while carrying heavy bag     Balance  Sitting Balance: Independent  Standing Balance: Stand by assistance  Standing Balance  Time: x2 minutes, x2 minutes, x7 minutes  Activity: to/from bathroom, grooming at sink, IADl task  Comment: with RW  Functional Mobility  Functional - Mobility Device: Rolling Walker  Activity: To/from bathroom;Transport items; Retrieve items  Assist Level: Stand by assistance  Toilet Transfers  Toilet - Technique: Ambulating  Equipment Used: Standard toilet  Toilet Transfer: Stand by assistance  Shower Transfers  Shower - Transfer From: Alva Herrera - Transfer Type: To and From  Shower - Transfer To: Transfer tub bench  Shower - Technique: Ambulating  Shower Transfers: Stand by assistance  Bed mobility  Comment: DE pt in chair at beginning and end of session  Transfers  Sit to stand: Stand by assistance  Stand to sit: Stand by assistance  Transfer Comments: good safety awareness, pt knew reasoning for hand placement on GB vs RW        Coordination  Gross Motor: need intermittent cues for upright posture and RW positioning at sink  Fine Motor: Good FMC for opening containers while grooming and manipulating button on shirt              Cognition  Overall Cognitive Status: Exceptions  Following Commands: Follows one step commands consistently; Follows multistep commands with repitition  Attention Span: Attends with cues to redirect  Memory: Decreased recall of precautions;Decreased short term memory  Safety Judgement: Decreased awareness of need for safety  Problem Solving: Decreased awareness of errors  Insights: Decreased awareness of deficits  Initiation: Does not require cues  Sequencing: Requires cues for some                 Upper Extremity Function  UE Strengthing: Theraband exercises and free weight exercises completed during time in ARU to increase endurance and activity toelrance and strengthen BUE for increased safe use of RW and to promote improved posturing in stance and during functional ambulation  Type of ROM/Therapeutic Exercise  Type of ROM/Therapeutic Exercise: AROM; Resistive Bands  Comment: BUE seated in chair, less rest breaks today, red theraband  Exercises  Shoulder Depression: x15  Shoulder Elevation: x15  Shoulder Flexion: 2 sets x15 reps holding resistance on band  Shoulder Extension: 1 sets x15 reps \"theraband pull downs\"  Shoulder ABduction: 2 sets x15 reps holding resistance on band  Elbow Flexion: x15 with body weight  Elbow Extension: x15 with body weight  Other: Chest press: 2 sets x15 reps holding resistance on theraband  LUE AROM (degrees)  LUE AROM : WFL  Left Hand AROM (degrees)  Left Hand AROM: WFL  RUE AROM (degrees)  RUE AROM : WFL  Right Hand AROM (degrees)  Right Hand AROM: WFL                 Plan   Plan  Times per week: 5-7 days/week  Times per day: Daily  Plan weeks: 2 weeks  Current Treatment Recommendations: Strengthening,Functional Mobility Training,Endurance Training,Balance Training,Safety Education & Training,Self-Care / ADL,Patient/Caregiver Education & Training,Home Management Training,Equipment Evaluation, Education, & procurement,Cognitive/Perceptual Training,Positioning      Goals  Short term goals  Time Frame for Short term goals: 1 week (4/5 22)  Short term goal 1: Pt will complete LB dressing using AE and LRAD PRN CGA- GOAL MET  Short term goal 2: Pt will complete functional transfers from various surfaces with LRAD SBA- GOAL MET  Short term goal 3: Pt will complete grooming standing sinkside >5 minutes using LRAD CGA- GOAL MET  Short term goal 4: Pt will complete toileting with CGA using LRAD and DME PRN- GOAL MET  Short term goal 5: Pt will complete total body bathing CGA sitting vs standing using AE/DME PRN- GOAL MET  Long term goals  Time Frame for Long term goals : 2 weeks (4/12/22)  Long term goal 1: Pt will complete total body dressing including gathering items with LRAD Dev- goal not met  Long term goal 2: Pt will complete toileting tasks Dev with LRAD and DME PRN- goal not met  Long term goal 3: Pt will complete functional transfers Dev with LRAD and no cues for safety/sequencing- goal not met, SBA d/t Hx of soft BP  Long term goal 4: Pt will complete grooming standing vs sititng sinkside Dev- goal not met, cues for safety and SBA/Supervision for safety  Long term goal 5: Pt will complete total body bathing SPV/setup with LRAD and AE PRN sitting vs standing- GOAL MET 04/08  Patient Goals   Patient goals : \"to take care of myself without help from others\"       Therapy Time   Individual Concurrent Group Co-treatment   Time In 1000         Time Out 1130         Minutes 90         Timed Code Treatment Minutes: [address] [facility]

## 2022-04-08 NOTE — PROGRESS NOTES
Mati Lm  4/8/2022  [id number]    Chief Complaint: Debility    Subjective:   No acute events overnight. Today Hector Hillman is seen in her room. She denies any acute complaints and reports feeling ready for discharge home tomorrow. ROS: denies f/c, n/v, cp, sob  Objective:  Patient Vitals for the past 24 hrs:   BP Temp Temp src Pulse Resp SpO2   04/08/22 0843 (!) 98/56 97.4 °F (36.3 °C) Oral 73 16 99 %   04/07/22 2000 (!) 170/83 97.9 °F (36.6 °C) Oral 78 18 97 %     Gen: No distress, pleasant. Resting in chair  HEENT: Normocephalic, atraumatic. CV: RRR  Resp: No respiratory distress. Lungs CTAB  Abd: nondistended  Ext: No edema. Neuro: Alert, oriented, appropriately interactive. Speech fluent without aphasia   Skin: no rashes, thrombophlebitis resolved    Wt Readings from Last 3 Encounters:   03/28/22 107 lb 2.3 oz (48.6 kg)   03/25/22 110 lb (49.9 kg)   01/09/20 113 lb 12.8 oz (51.6 kg)       Laboratory data:   Lab Results   Component Value Date    WBC 6.7 03/31/2022    HGB 9.1 (L) 03/31/2022    HCT 26.7 (L) 03/31/2022    MCV 97.9 03/31/2022     03/31/2022       Lab Results   Component Value Date     04/04/2022    K 4.4 04/04/2022     04/04/2022    CO2 24 04/04/2022    BUN 19 04/04/2022    CREATININE 0.7 04/04/2022    GLUCOSE 97 04/04/2022    CALCIUM 8.9 04/04/2022        Therapy progress:  PT  Position Activity Restriction  Other position/activity restrictions:  Up with assist, monitor for orthostatic BP  Objective     Sit to Stand: Modified independent  Stand to sit: Modified independent  Bed to Chair: Modified independent (with RW)  Device: Rolling Walker  Assistance: Modified Independent  Distance: 10 ft x 2 reps + 175 ft + 180 ft  OT  PT Equipment Recommendations  Equipment Needed: No  Mobility Devices: Nobie Guilford: Rolling  Other: pt owns 4WW  Toilet - Technique: Ambulating  Equipment Used: Standard toilet  Toilet Transfers Comments: Isauro sit <> Stand from [employer], cues for safety with turning to sit and positioning of self  Assessment        SLP  Current Diet : Regular  Current Liquid Diet : Thin  Diet Solids Recommendation: Regular  Liquid Consistency Recommendation: Thin    Body mass index is 17.83 kg/m². Assessment and Plan:  Laney Joy is an [de-identified]year old female with past medical history significant for urge incontinence, anemia, and memory loss who presented to [facility] on 3/[facility] with several days of progressive weakness and falls. She was admitted to [facility] on 3/28/22 due to functional deficits below her baseline.      Debility  - will falls at home  - PT, OT     Cognitive Impairment  - continue aricept  - ST    Superficial Thrombophlebitis, resolved  - right ventral forearm  - scan showing clot is only superficial  - no indication for [employer] at this time  - discontinued bactrim  - apply heat PRN     Orthostatic Hypotension  - s/p 1 L NS 3/29  - florinef 0.1 mg daily   - KENN hose     Anemia  - no evidence of acute bleed during acute workup  - completed course of venofer  - monitor and transfuse for Hb<7  - PO iron replacement    Electrolyte abnormalities  - K and Mg replacement  - continue to monitor    ROSE, resolved  - improved after IV hydration  - encourage PO intake  - monitor renal function     Bowels: Schedule stool softener. Follow bowel movements. Enema or suppository if needed.      Bladder: Check PVR x 3.   [facility] if PVR > 200ml or if any volume is > 500 ml.      Sleep: Trazodone provided prn.      PPx  DVT: lovenox  GI: not indicated     Follow up appointments: PCP    Services/DME: home PT OTST  EDOD: 4/9/22      Electronically signed by Lisa Johnson MD on 4/8/2022 at 6:55 PM

## 2022-04-08 NOTE — PROGRESS NOTES
Speech Language Pathology  [facility]: ACUTE REHAB UNIT  SPEECH-LANGUAGE PATHOLOGY/DISCHARGE SUMMARY      [x] Daily  [] Weekly Care Conference Note  [x] Discharge    Patient:Yulissa Perkins      :1940  BVZ:[id number]  Rehab Dx/Hx: Debility [R53.81]    Precautions: falls  Home situation: lives alone, manages own meds/finances, household tasks, has two friends who she considers \"daughter and son in law\" live close by and assist when needed, not an active    ST Dx: [] Aphasia  [] Dysarthria  [] Apraxia   [] Oropharyngeal dysphagia [x] Cognitive Impairment  [] Other:   Date of Admit: 3/28/2022  Room #: 0161/0161-01    Current functional status (updated daily):         Pt being seen for : [] Speech/Language Treatment  [] Dysphagia Treatment [x] Cognitive Treatment  [] Other:  Communication: [x]WFL  [] Aphasia  [] Dysarthria  [] Apraxia  [] Pragmatic Impairment [] Non-verbal  [] Hearing Loss  [] Other:   Cognition: [] WFL  [x] Mild  [x] Moderate  [] Severe [] Unable to Assess  [] Other:  Memory: [] WFL  [x] Mild  [] Moderate  [] Severe [] Unable to Assess  [] Other:  Behavior: [x] Alert  [x] Cooperative  [x]  Pleasant  [] Confused  [] Agitated  [] Uncooperative  [] Distractible [] Motivated  [] Self-Limiting [] Anxious  [] Other:  Endurance:  [x] Adequate for participation in SLP sessions  [] Reduced overall  [] Lethargic  [] Other:  Safety: [x] No concerns at this time  [] Reduced insight into deficits  []  Reduced safety awareness [] Not following call light procedures  [] Unable to Assess  [] Other:    Current Diet Order:ADULT DIET; Regular  ADULT ORAL NUTRITION SUPPLEMENT; Breakfast; Standard High Calorie/High Protein Oral Supplement  Swallowing Precautions:  Alternate solids and liquids;Eat/Feed slowly;Upright as possible for all oral intake;Remain upright for 30-45 minutes after meals;Small bites/sips        Date: 2022      Tx session 1  [id number] DISCHARGE SUMMARY     Total Timed Code Min 60 Total Treatment Minutes 60    Individual Treatment Minutes 60    Group Treatment Minutes 0    Co-Treat Minutes 0    Variance/Reason:  0    Pain Denies     Pain Intervention [] RN notified  [] Repositioned  [] Intervention offered and patient declined  [x] N/A  [] Other: [] RN notified  [] Repositioned  [] Intervention offered and patient declined  [] N/A  [] Other:   Subjective     Pt alert and oriented, cooperative and agreeable to participate in therapy. Pt seen sitting upright in bed. Objective:  Goals     Short-term Goals  Timeframe for Short-term Goals: 10 days (2022)    Goal 1: Pt will complete graded recall tasks using compensatory strategies with 80% acc given min cues    MOCA:  -functional recall: 100% acc  -short term recall: 0% acc indep improving to 60% acc given category cue and 100% acc given multiple choice cues    Object to object association:  -pt given five pairs of objects and asked to create an association between the two  -functional recall: 100% acc indep  -short term recall after a 10 min delay: 100% acc indep    Goal largely met. Pt will continue to benefit from use of compensatory strategies at d/c. Discussed ongoing internal and external compensatory strategies within functional environment at home. Goal 2: Pt will complete executive function tasks (e.g. meds, time, money, etc) with 80% acc given min cues   MOCA:  -completing a trail: inaccurate  -copy of a cube: accurate  -clock drawing: accurate  -serial 7 subtractions: 66% acc indep improving to 100% acc given mod cues    Goal met. Pt will benefit from supervision with higher level executive function tasks, pt reporting her daughter is able to assist if/when needed. Goal 3: Pt will complete verbal and visual reasoning tasks with 80% acc given min cues   MOCA:   -abstract reasonin% acc indep  Goal met.     Goal 4: Pt will complete problem solving and thought organization tasks with 80% acc given min cues Answering questions about a prescription label:  -100% acc indep  Goal met. Other areas targeted: MOCA:  -pt completed a repeat MOCA today scoring a 22/30 compared to initial evaluation score of 18/30       Education:   Edu provided re: results of [address] [place] [address], progress towards goals, ongoing ST services    ,    Safety Devices: [x] Call light within reach  [] Chair alarm activated  [x] Bed alarm activated  [] Other: [] Call light within reach  [] Chair alarm activated  [] Bed alarm activated  [] Other:    Assessment: Tx session: pt pleasant and cooperative, agreeable to participate. Pt completed repeat MOCA scoring a 22/30 compared to initial evaluation score of 18/30. Pt demonstrated improvement with executive functioning, attention, and thought organization. Pt demonstrated difficulty with short term recall on the [address] [place] [address], however during structured tx tasks and when able to apply use of compensatory memory strategies throughout tx sessions, overall improvement noted with both functional and short term recall. Pt answered questions regarding a prescription label with 100% acc. Discussed progress towards goals, ongoing use of compensatory strategies, and ST services at d/c, pt in agreement. Discharge Summary: pt always pleasant and cooperative, agreeable to participate. Pt tolerating IDDSI Level 7 regular solids with thin liquids, meds whole with PO as LRD. Pt has largely met all cognitive goals within POC, achieving 80% acc indep. However, recommending ongoing ST services to continue improving recall and higher level executive function tasks to improve past 80% acc. Pt may occasionally benefit from supervision with finance management and medication management if things become too complex. Pt's dtr able to assist if/when needed. Ongoing [address] services recommended. Plan: Continue as per plan of care.       Additional Information:   Barriers toward progress:None   Discharge recommendations:  [] Home independently  [x] Home with assistance []  24 hour supervision  [] ECF [] Other: defer to PT/OT recs  Continued Tx Upon Discharge: ? [] Yes [] No [x] TBD based on progress while on ARU [] Vital Stim indicated [] Other:   Estimated discharge date: 04/09/22    Interventions used this date:  [] Speech/Language Treatment  [] Instruction in HEP [] Group [] Dysphagia Treatment [x] Cognitive Treatment   [] Other: Total Time Breakdown / Charges    Time in Time out Total Time / units   Cognitive Tx 0830 0930 60 min/ 4 units    Speech Tx -- -- --   Dysphagia Tx -- -- --       Electronically Signed by   Hilary Amaya. A [facility]-SLP  Speech-Language Pathologist  [phone]

## 2022-04-08 NOTE — PROGRESS NOTES
Physical Therapy  Facility/Department: [facility]  Daily Treatment Note  NAME: Markos Gupta  : 1940  MRN: [id number]    Date of Service: 2022    Discharge Recommendations:  Home with Home health PT,24 hour supervision or assist   PT Equipment Recommendations  Equipment Needed: No  Mobility Devices: Fayne Oka: Rolling    Assessment   Body structures, Functions, Activity limitations: Decreased functional mobility ; Decreased posture;Decreased ADL status; Decreased coordination;Decreased balance;Decreased strength; Increased pain;Decreased endurance  Assessment: Pt has progressed well with therapy during IP rehab stay. Mod I with mobility with RW and SBA to negotiate flight of stairs with bilateral rails. Handout of BLE HEP given this date and pt verbalized understanding. Plan for safe d/c home with home PT. Treatment Diagnosis: Decreased functional mobility  Specific instructions for Next Treatment: progress mobility  Prognosis: Good  Decision Making: Medium Complexity  PT Education: Goals;Transfer Training;Disease Specific Education;PT Role;Plan of Care;General Safety;Gait Training  Patient Education: Safe d/c home, safe use of RW, HEP -- pt verbalized understanding  Barriers to Learning: none  REQUIRES PT FOLLOW UP: Yes  Activity Tolerance  Activity Tolerance: Patient limited by fatigue;Patient limited by endurance  Activity Tolerance: /76, HR 69; SpO2 99% on RA     Patient Diagnosis(es): There were no encounter diagnoses. has a past medical history of ARF (acute renal failure) ([facility] [address] has a past surgical history that includes joint replacement; Tonsillectomy; Intracapsular cataract extraction (Right, 2019); and Intracapsular cataract extraction (Left, 2019). Restrictions  Restrictions/Precautions  Restrictions/Precautions: General Precautions,Fall Risk  Position Activity Restriction  Other position/activity restrictions:  Up with assist, monitor for orthostatic BP  Subjective General  Chart Reviewed: Yes  Additional Pertinent Hx: Acute fractures of the right 4th through 12th ribs  Response To Previous Treatment: Patient with no complaints from previous session. Family / Caregiver Present: No  Referring Practitioner: Dr. Tressa John MD  Subjective  Subjective: denies pain  General Comment  Comments: up in chair on approach  Pain Screening  Patient Currently in Pain: Denies       Objective   Bed mobility  Rolling to Left: Independent  Rolling to Right: Independent  Supine to Sit: Independent  Sit to Supine: Independent     Transfers  Sit to Stand: Modified independent  Stand to sit: Modified independent  Bed to Chair: Modified independent (with RW)  Car Transfer: Modified independent     Ambulation  Ambulation?: Yes  Ambulation 1  Surface: level tile  Device: Rolling Walker  Assistance: Modified Independent  Quality of Gait: Steady gait, no LOB  Distance: 10 ft x 2 reps + 175 ft + 180 ft  Ambulation 2  Surface - 2: uneven  Device 2: Rolling Walker  Assistance 2: Modified Independent  Quality of Gait 2: Steady gait with no LOB. Good transitions  Distance: 10 ft of turf  Stairs/Curb  Stairs?: Yes  Stairs  # Steps : 12  Stairs Height: 6\"  Rails: Bilateral  Assistance: Stand by assistance  Comment: pt ascended/descended 12 6\" steps with BHR, reciprocal pattern and SBA           Exercises  Hamstring Sets: x 15 BLE with green theraband  Hip Flexion: x15 BLE  Hip Abduction: x 15 BLE with green theraband; x 15 hip adduction isometric  Knee Long Arc Quad: x15 BLE  Ankle Pumps: x15 BLE  Comments: Handout of HEP given. Instructed to perform 2 x 10-15 reps as tolerated, 2x/day          Balance: Mod I picking object up from ground with RW. Goals  Short term goals  Time Frame for Short term goals: 1 week (4/4)  Short term goal 1: Pt will be SBA with supine<>Sit. - GOAL MET, completes with mod ind  Short term goal 2: Pt will be SBA with transfers with RW or 4WW.- GOAL MET, completes with SBA  Short term goal 3: Pt will ambulate 150 ft with SBA and RW or 4WW.- GOAL MET, completes with RW and SBA  Short term goal 4: Pt will negotiate 12 stairs with CGA.- GOAL MET, completes with CGA and BHR  Long term goals  Time Frame for Long term goals : 2 weeks (4/11)  Long term goal 1: Pt will be indep with supine<>Sit. -- GOAL MET 4/8  Long term goal 2: Pt will be mod I with transfers. -- GOAL MET 4/8  Long term goal 3: Pt will ambulate 150 ft mod I. -- GOAL MET 4/8  Long term goal 4: Pt will negotiate 12 stairs with SBA. -- GOAL MET 4/8  Long term goal 5: Pt will be indep with LE HEP to facilitate continued strengthening and activity tolerance at d/c. -- GOAL MET 4/8  Patient Goals   Patient goals : \"to get back to normal and be able to do the things I did before\"    Plan    Plan  Times per week: 5 out of 7 days/wk  Times per day: Daily  Specific instructions for Next Treatment: progress mobility  Current Treatment Recommendations: Bhupendra Pryor Re-education,Patient/Caregiver Education & Training,Balance Training,Gait Training,Home Exercise Program,Safety Education & Training,Stair training,Functional Mobility Training,Equipment Evaluation, Education, & procurement  Safety Devices  Type of devices:  All fall risk precautions in place,Call light within reach,Chair alarm in place,Gait belt,Patient at risk for falls,Nurse notified,Left in chair     Therapy Time   Individual Concurrent Group Co-treatment   Time In 1330         Time Out 1430         Minutes 60         Timed Code Treatment Minutes: [address] Dillon Majano PT, DPT

## 2022-04-08 NOTE — PROGRESS NOTES
Physical Therapy  Discharge Summary    Name:Yulissa Ballesteros  GAQ:[id number]  :1940  Treatment Diagnosis: Decreased functional mobility  Diagnosis: Frequent falls, Debility    Restrictions/Precautions  Restrictions/Precautions: General Precautions,Fall Risk           Position Activity Restriction  Other position/activity restrictions: Up with assist, monitor for orthostatic BP     Goals:                  Short term goals  Time Frame for Short term goals: 1 week ()  Short term goal 1: Pt will be SBA with supine<>Sit. - GOAL MET, completes with mod ind  Short term goal 2: Pt will be SBA with transfers with RW or 4WW.- GOAL MET, completes with SBA  Short term goal 3: Pt will ambulate 150 ft with SBA and RW or 4WW.- GOAL MET, completes with RW and SBA  Short term goal 4: Pt will negotiate 12 stairs with CGA.- GOAL MET, completes with CGA and BHR            Long term goals  Time Frame for Long term goals : 2 weeks ()  Long term goal 1: Pt will be indep with supine<>Sit. -- GOAL MET   Long term goal 2: Pt will be mod I with transfers. -- GOAL MET   Long term goal 3: Pt will ambulate 150 ft mod I. -- GOAL MET   Long term goal 4: Pt will negotiate 12 stairs with SBA. -- GOAL MET   Long term goal 5: Pt will be indep with LE HEP to facilitate continued strengthening and activity tolerance at d/c. -- GOAL MET     Pt. Met 4/4 short term goals and 5/5 long term goals. Discharge PT IRF:    CARE Score: 6                                   Pt. Currently ambulates 170 feet with RW and mod I  Up/down 12 steps with bilateral rails and SBA  Sit to/from stand with mod I with RW  Bed mobility indep. Recommend RW in order to safety and indepedence  Pt. Safe to return home with assistance from family.    Provided pt. with BLE HEP handout and green theraband  Electronically signed by Suzy Srivastava, PT on 2022 at 3:12 PM

## 2022-04-08 NOTE — PROGRESS NOTES
Occupational Therapy  Discharge Summary     Name:Yulissa Diallo  SUX:[id number]  :1940  Treatment Diagnosis: Decreased functional mobility  Diagnosis: Frequent falls, Debility    Restrictions/Precautions  Restrictions/Precautions: General Precautions,Fall Risk           Position Activity Restriction  Other position/activity restrictions: Up with assist, monitor for orthostatic BP     Goals:   Short term goals  Time Frame for Short term goals: 1 week (22)  Short term goal 1: Pt will complete LB dressing using AE and LRAD PRN CGA- GOAL MET  Short term goal 2: Pt will complete functional transfers from various surfaces with LRAD SBA- GOAL MET  Short term goal 3: Pt will complete grooming standing sinkside >5 minutes using LRAD CGA- GOAL MET  Short term goal 4: Pt will complete toileting with CGA using LRAD and DME PRN- GOAL MET  Short term goal 5: Pt will complete total body bathing CGA sitting vs standing using AE/DME PRN- GOAL MET   Long term goals  Time Frame for Long term goals : 2 weeks (22)  Long term goal 1: Pt will complete total body dressing including gathering items with LRAD Dev- goal not met  Long term goal 2: Pt will complete toileting tasks Dev with LRAD and DME PRN- goal not met  Long term goal 3: Pt will complete functional transfers Dev with LRAD and no cues for safety/sequencing- goal not met, SBA d/t Hx of soft BP  Long term goal 4: Pt will complete grooming standing vs sititng sinkside Dev- goal not met, cues for safety and SBA/Supervision for safety  Long term goal 5: Pt will complete total body bathing SPV/setup with LRAD and AE PRN sitting vs standing- GOAL MET     Pt. Met 5/5 short term goals and 1/5 long term goals. Unment goals progressed during therapy treatment, pt still largely requiring SBA with RW for safety.     Current Functional Status:   ADL  Feeding: Independent  Grooming: Supervision (oral care at sink)  UE Bathing: Supervision (set up)  LE Bathing: Supervision  UE Dressing: Independent  LE Dressing: Independent (increased time)  Toileting: Contact guard assistance (d/t low BP)  Additional Comments: Pt completed LB dressing simulated at side of bed with bringing foot up to bed; increased independence noted. Bed mobility  Bridging: Supervision  Rolling to Left: Supervision  Rolling to Right: Supervision  Supine to Sit: Modified independent  Sit to Supine: Unable to assess  Scooting: Modified independent  Comment: DE pt in chair at beginning and end of session    Functional Transfers: Toilet Transfers  Toilet - Technique: Ambulating  Equipment Used: Standard toilet  Toilet Transfer: Stand by assistance  Toilet Transfers Comments: Isauro sit <> Stand from arGEN-X, cues for safety with turning to sit and positioning of self         Shower Transfers  Shower - Transfer From: [place] - Transfer Type: To and From  Shower - Transfer To:  Transfer tub bench  Shower - Technique: Ambulating  Shower Transfers: Stand by assistance           Functional Mobility  Functional - Mobility Device: Rolling Walker  Activity: To/from bathroom,Transport items,Retrieve items  Assist Level: Stand by assistance  Functional Mobility Comments: CGA progressing to SBA d/t low BP at start of session    Instrumental ADL's  Instrumental ADLs: Yes     Light Housekeeping  Light Housekeeping Level: [place]  Light Housekeeping Level of Assistance: Contact guard assistance  Light Housekeeping: in stance for 7 minutes; collecting personal belongs around room and placing into bags, required reaching and turning during task, cues for safety and to not hold onto RW with one hand while carrying heavy bag                Perception  Overall Perceptual Status: Impaired  Unilateral Attention: Cues to maintain midline in standing  Initiation: Cues to initiate tasks  Motor Planning: Cues to use objects appropriately  Perseveration: Not present         UE Function: WFL                  Assessment: Assessment:   On Evaluation: At bed level d/t orthostatic hypotension: Max A to Mod A with LB ADLs and Patrica to Set up with seated UB ADLs. Pt requiring Min A with simple transfers this date. Pt able to tolerate up to 20 seconds of consecutive standing before requiring seated rest due to low BP and c/o ligheadedness/dizziness. On discharge: Pt has improved activity tolerance now requiring SBA for trasnfers and functional mobility and tolerates up to 7 minutes in stance with RW. Independent for ADLs. Pt is d/c from OT near baseline and is recommended to have 24/7 assist at home and Level 3 HHOT for safety and to continue to increase pt's independence to baseline. Prognosis: Good  Barriers to Learning: memory  REQUIRES OT FOLLOW UP: No  Discharge Recommendations: 24 hour supervision or assist,Home with Home health OT,S Level 3,Home with nursing aide    Equipment Recommendations: Mobility Devices: ADL Assistive Devices; Damien Thomas: Rolling  ADL Assistive Devices: Hand-held Shower; Toilet Safety Frame; Shower Chair with back         Home Exercise Program  Provided Pt with red theraband and education on exercises, but no formal program.     Electronically signed by Janna Merlos OT on 4/8/2022 at 11:44 AM

## 2022-04-08 NOTE — CARE COORDINATION
CASE MANAGEMENT DISCHARGE SUMMARY      Discharge to: Anticipating discharge on 04/09: Home with home care and COA referral    Precertification completed: [address] Exemption Notification (HENS) completed: N/A    IMM given: 04/08/2022    New Durable Medical Equipment ordered/agency:     [employer]>Rolling walker delivered to room 04/07  [address], [place] [address]  [phone]    Transportation:    Family/car: Family     Confirmed discharge plan with:     Patient: yes     Family:  yes    Name: Bety Willoughby (Child)   Contact number:[phone]      Facility/Agency, name:     [address]   [address] [facility] [address]   [phone]: MINERVA/AVS faxed    [facility] [address]  [address] [place] [address], [address] [place] [address]  [phone]         RN, name: Dillon White RN    Note: Discharging nurse to complete MINERVA, reconcile AVS, and place final copy with patient's discharge packet. RN to ensure that written prescriptions for  Level II medications are sent with patient to the facility as per protocol.

## 2022-04-09 VITALS
DIASTOLIC BLOOD PRESSURE: 74 MMHG | BODY MASS INDEX: 17.85 KG/M2 | RESPIRATION RATE: 16 BRPM | WEIGHT: 107.14 LBS | HEART RATE: 73 BPM | HEIGHT: 65 IN | SYSTOLIC BLOOD PRESSURE: 128 MMHG | TEMPERATURE: 97.5 F | OXYGEN SATURATION: 98 %

## 2022-04-09 PROCEDURE — 6360000002 HC RX W HCPCS: Performed by: STUDENT IN AN ORGANIZED HEALTH CARE EDUCATION/TRAINING PROGRAM

## 2022-04-09 PROCEDURE — 6370000000 HC RX 637 (ALT 250 FOR IP): Performed by: STUDENT IN AN ORGANIZED HEALTH CARE EDUCATION/TRAINING PROGRAM

## 2022-04-09 RX ADMIN — Medication 1000 UNITS: at 08:26

## 2022-04-09 RX ADMIN — POTASSIUM CHLORIDE 40 MEQ: 20 TABLET, EXTENDED RELEASE ORAL at 08:27

## 2022-04-09 RX ADMIN — FLUDROCORTISONE ACETATE 0.1 MG: 0.1 TABLET ORAL at 08:27

## 2022-04-09 RX ADMIN — ENOXAPARIN SODIUM 30 MG: 100 INJECTION SUBCUTANEOUS at 08:28

## 2022-04-09 RX ADMIN — Medication 400 MG: at 08:27

## 2022-04-09 RX ADMIN — FOLIC ACID 1 MG: 1 TABLET ORAL at 08:27

## 2022-04-09 RX ADMIN — FERROUS SULFATE TAB 325 MG (65 MG ELEMENTAL FE) 325 MG: 325 (65 FE) TAB at 08:27

## 2022-04-09 NOTE — PROGRESS NOTES
Per pt's daughter, [facility] the [facility] is pt's preferred pharmacy.  [employer] in [place] called, prescriptions will be transferred per pt request.

## 2022-04-09 NOTE — PROGRESS NOTES
Pt axo. Assessment completed as charted. Pt denies pain or needs. Wound care performed to skin tears/abrasions on LLE. Will continue to monitor. Call light in reach.

## 2022-04-09 NOTE — DISCHARGE SUMMARY
Physical Medicine & Rehabilitation  Discharge Summary     Patient Identification:  Amber Green  : 1940  Admit date: 3/28/2022  Discharge date:  22  Attending provider: Champ Tolbert MD        Primary care provider: Charleen Man MD     Discharge Diagnoses:   Patient Active Problem List   Diagnosis    Left knee pain    Left patella fracture    Contusion of left knee    Orthostatic hypotension    ARF [facility]acute renal failure) ([facility] [address]    Anemia    Debility       History of Present Illness/Acute Hospital Course:  Amber Green is an [de-identified]year old female with past medical history significant for urge incontinence, anemia, and memory loss who presented to [facility] on 3/25/22 with several days of progressive weakness and falls. She was found to have ROSE which resolved with IVF. Her hospital course was complicated by progressive anemia. CT abdomen and pelvis was negative for hematoma. She was admitted to [facility] on 3/28/22 due to functional deficits below her baseline.      Inpatient Rehabilitation Course:   Amber Green is a [de-identified] y.o. female admitted to inpatient rehabilitation on 3/28/2022 with Debility. The patient participated in an aggressive multidisciplinary inpatient rehabilitation program involving 3 hours of therapy per day, at least 5 days per week. She made good progress with regard to ADLs, IADLs, transfers, ambulation. Now overall modified independent. Discharging home with family. Home care services and DME ordered as below. Patient will follow-up with PCP.      Impairments: impaired mobility and ADLs, impaired gait and balance    Medical Management:    Debility  - will falls at home  - home PT, OT, ST     Cognitive Impairment  - continue aricept  - ST     Superficial Thrombophlebitis, resolved  - right ventral forearm  - scan showing clot is only superficial  - no indication for AC at this time  - resolved with heat PRN     Orthostatic Hypotension  - s/p 1 L NS 3/29  - florinef 0.1 mg daily, advised patient to take regardless of blood pressure. She was previously taking a PRN medication for high blood pressure in the evenings. She has not be receiving that while inpatient. Advised her to follow up with her PCP next week to further discuss. I have concerns about high blood pressure but more concerns about low BP as this is what was causing her to have so many falls at home. - KENN hose     Anemia  - no evidence of acute bleed during acute workup  - completed course of venofer  - PO iron replacement     Electrolyte abnormalities  - K and Mg replacement     ROSE, resolved  - improved after IV hydration  - encourage PO intake  - monitor renal function    Discharge Exam:  Constitutional: Alert, WDWN, Pleasant, no distress  Head: Normocephalic, atruamatic, MMM  Eyes: Conjunctiva noninjected, no icterus, no drainage  Pulm: CTA bilat. Respirations non-labored. CV: No murmurs noted. RRR. Abd: Soft, nontender.  NABS+  Ext: No edema, no varicosities  Neuro: Alert, fully oriented, appropriate   MSK: No joint abnormalities noted       Discharge Functional Status:    Physical therapy:  Bed Mobility: Scooting: Modified independent  Transfers: Sit to Stand: Modified independent  Stand to sit: Modified independent  Bed to Chair: Modified independent (with RW), Ambulation 1  Surface: level tile  Device: Rolling Walker  Assistance: Modified Independent  Quality of Gait: Steady gait, no LOB  Gait Deviations: Slow Bettie,Shuffles,Decreased step length,Decreased step height  Distance: 10 ft x 2 reps + 175 ft + 180 ft  Comments: to and from commode, Stairs  # Steps : 12  Stairs Height: 6\"  Rails: Bilateral  Curbs: 6\"  Device: Rolling walker  Assistance: Stand by assistance  Comment: pt ascended/descended 12 6\" steps with BHR, reciprocal pattern and SBA  Mobility:  , PT Equipment Recommendations  Equipment Needed: No  Mobility Devices: Marizol Mix: Rolling  Other: pt owns 4WW, Assessment: Pt has progressed well with therapy during IP rehab stay. Mod I with mobility with RW and SBA to negotiate flight of stairs with bilateral rails. Handout of BLE HEP given this date and pt verbalized understanding. Plan for safe d/c home with home PT.     Please refer to OT and ST dc notes    Significant Diagnostics:   Lab Results   Component Value Date    CREATININE 0.7 04/04/2022    BUN 19 04/04/2022     04/04/2022    K 4.4 04/04/2022     04/04/2022    CO2 24 04/04/2022       Lab Results   Component Value Date    WBC 6.7 03/31/2022    HGB 9.1 (L) 03/31/2022    HCT 26.7 (L) 03/31/2022    MCV 97.9 03/31/2022     03/31/2022       Disposition:  home    Services: PT, OT, ST  DME: [place]    Discharge Condition: Stable    Follow-up:  See after visit summary from hospitalization    Discharge Medications:     Medication List      START taking these medications    magnesium oxide 400 (240 Mg) MG tablet  Commonly known as: MAG-OX  Take 1 tablet by mouth daily     potassium chloride 20 MEQ extended release tablet  Commonly known as: KLOR-CON M  Take 2 tablets by mouth daily     sennosides-docusate sodium 8.6-50 MG tablet  Commonly known as: SENOKOT-S  Take 2 tablets by mouth daily        CHANGE how you take these medications    ferrous sulfate 325 (65 Fe) MG tablet  Commonly known as: IRON 325  Take 1 tablet by mouth 2 times daily (with meals)  What changed: when to take this        CONTINUE taking these medications    calcium carbonate 600 MG Tabs tablet     donepezil 10 MG tablet  Commonly known as: ARICEPT     fludrocortisone 0.1 MG tablet  Commonly known as: FLORINEF  Take 1 tablet by mouth daily     folic acid 1 MG tablet  Commonly known as: FOLVITE  Take 1 tablet by mouth daily     MULTIVITAMIN & MINERAL PO     Myrbetriq 50 MG Tb24  Generic drug: mirabegron     VESIcare 10 MG tablet  Generic drug: solifenacin     vitamin B-12 1000 MCG tablet  Commonly known as: CYANOCOBALAMIN     VITAMIN D PO        STOP taking these medications    cloNIDine 0.1 MG tablet  Commonly known as: CATAPRES     diphenhydrAMINE 25 MG tablet  Commonly known as: BENADRYL     midodrine 5 MG tablet  Commonly known as: PROAMATINE     vitamin D 1.25 MG (18713 UT) Caps capsule  Commonly known as: ERGOCALCIFEROL           Where to Get Your Medications      These medications were sent to [facility] [id number] [facility] [address] [facility] [phone] [address]    Phone: [phone]   · ferrous sulfate 325 (65 Fe) MG tablet  · fludrocortisone 0.1 MG tablet  · magnesium oxide 400 (240 Mg) MG tablet  · potassium chloride 20 MEQ extended release tablet     You can get these medications from any pharmacy    You don't need a prescription for these medications  · sennosides-docusate sodium 8.6-50 MG tablet           I spent over 35 minutes on this discharge encounter between counseling, coordination of care, and medication reconciliation. To comply with [address] [facility] bylaw R.II.4.1:   Discharge order placed in advance to facilitate patients discharge needs.       Kashmir Parks MD

## 2022-04-09 NOTE — PROGRESS NOTES
Pt discharged via private vehicle in stable condition. All d/c instructions, medication instructions and f/u instructions given. Pt and family member state understanding.

## 2022-04-09 NOTE — PLAN OF CARE
Problem: Skin Integrity:  Goal: Will show no infection signs and symptoms  Description: Will show no infection signs and symptoms  4/9/2022 0313 by Kae Gross RN  Outcome: Met This Shift  -afebrile, no s/s of infection   4/8/2022 1350 by Lara Chairez RN  Outcome: Ongoing  Goal: Absence of new skin breakdown  Description: Absence of new skin breakdown  4/9/2022 0313 by Kae Gross RN  Outcome: Met This Shift    4/8/2022 1350 by Lara Chairez RN  Outcome: Ongoing  -no new skin breakdown     Problem: Falls - Risk of:  Goal: Will remain free from falls  Description: Will remain free from falls  4/9/2022 0313 by Kae Gross RN  Outcome: Met This Shift  -calls appropriately  4/8/2022 1350 by Lara Chairez RN  Outcome: Ongoing  Note: Instructed pt to use call light as needed with ambulation. Bed locked and in lowest position. Non-skid socks in place. Call light in reach.    Goal: Absence of physical injury  Description: Absence of physical injury  4/9/2022 0313 by Kae Gross RN  Outcome: Met This Shift  4/8/2022 1350 by Lara Chairez RN  Outcome: Ongoing     Problem: Nutrition  Goal: Optimal nutrition therapy  4/9/2022 0313 by Kae Gross RN  Outcome: Met This Shift  4/8/2022 1350 by Lara Chairez RN  Outcome: Ongoing

## 2022-04-11 ENCOUNTER — CARE COORDINATION (OUTPATIENT)
Dept: CASE MANAGEMENT | Age: 82
End: 2022-04-11

## 2022-04-11 NOTE — CARE COORDINATION
[facility] 45 Transitions Initial Follow Up Call    Call within 2 business days of discharge: Yes    Patient: Pool Garcia Patient : 1940   MRN: [id number]  Reason for Admission: debility  Discharge Date: 22 RARS: Readmission Risk Score: 11.8 ( )      Last Discharge [address]       Complaint Diagnosis Description Type Department Provider    3/28/22   Admission (Discharged) Nya Lange MD           Spoke with: [id number] [facility]: [facility]  Non-face-to-face services provided:  Obtained and reviewed discharge summary and/or continuity of care documents  Communication with home health agencies or other community services the patient is currently using-[facility]        Challenges to be reviewed by the provider   Additional needs identified to be addressed with provider No  none       Method of communication with provider : none    Advance Care Planning:   Does patient have an Advance Directive:  reviewed and current. Was this a readmission? No  Patient stated reason for admission: acute renal failure  Patients top risk factors for readmission:   medical condition-debility    Care Transition Nurse (CTN) contacted the patient by telephone to perform post hospital discharge assessment. Verified name and  with patient as identifiers. Provided introduction to self, and explanation of the CTN role. CTN reviewed discharge instructions, medical action plan and red flags with patient who verbalized understanding. Patient given an opportunity to ask questions and does not have any further questions or concerns at this time. Were discharge instructions available to patient? Yes. Reviewed appropriate site of care based on symptoms and resources available to patient including: PCP. The patient agrees to contact the PCP office for questions related to their healthcare.      Medication reconciliation was performed with patient, who verbalizes understanding of administration of home medications. COVID Risk Education    COVID-19 3/28 negative    Educated patient about risk for severe COVID-19 due to risk factors according to CDC guidelines. Discussed COVID vaccination status Yes. Education provided on COVID-19 vaccination as appropriate. Discussed exposure protocols and quarantine with CDC Guidelines. Patient was given an opportunity to verbalize any questions and concerns and agrees to contact CTN or health care provider for questions related to their healthcare. Was patient discharged with a pulse oximeter? No Discussed and confirmed pulse oximeter discharge instructions and when to notify provider or seek emergency care. Spoke to home care- [facility] and [facility] visit scheduled today. Patient aware of scheduled visit. Patient answered call and verified . Patient pleasant and agreeable to transition call. Patient getting stronger and was able to clean out tulio litter this morning. Post d/c summary was available and reviewed medication list. Patient confirmed that she had medications and taking as directed. Patient was aware of stopped medications as well. Patient stated that her daughter is a support for her and scheduled follow up visit with PCP. Denied any acute needs at present time. No further transition support needed  Educated on the use of urgent care or physicians 24 hr access line if assistance is needed after hours. Episode ended. CTN provided contact information for future needs. No further follow-up call identified based on severity of symptoms and risk factors.         Care Transitions 24 Hour Call    Do you have any ongoing symptoms?: No  Do you have a copy of your discharge instructions?: Yes  Do you have all of your prescriptions and are they filled?: Yes  Have you been contacted by a [address]?: No  Have you scheduled your follow up appointment?: Yes  How are you going to get to your appointment?: Car - family or friend to transport  Were you discharged with any Home Care or Post Acute Services: Yes  Post Acute Services: Home Health  Do you feel like you have everything you need to keep you well at home?: Yes  Care Transitions Interventions         Follow Up  No future appointments.     Deandre Jaramillo RN

## 2022-09-13 PROBLEM — I63.9 ACUTE CVA (CEREBROVASCULAR ACCIDENT) (HCC): Status: ACTIVE | Noted: 2022-09-13

## 2022-09-13 PROCEDURE — 1280000000 HC REHAB R&B

## 2022-09-13 RX ORDER — ATORVASTATIN CALCIUM 80 MG/1
80 TABLET, FILM COATED ORAL NIGHTLY
Status: DISCONTINUED | OUTPATIENT
Start: 2022-09-13 | End: 2022-10-08 | Stop reason: HOSPADM

## 2022-09-13 RX ORDER — SENNA AND DOCUSATE SODIUM 50; 8.6 MG/1; MG/1
2 TABLET, FILM COATED ORAL DAILY PRN
Status: DISCONTINUED | OUTPATIENT
Start: 2022-09-13 | End: 2022-09-22

## 2022-09-13 RX ORDER — DONEPEZIL HYDROCHLORIDE 5 MG/1
10 TABLET, FILM COATED ORAL NIGHTLY
Status: DISCONTINUED | OUTPATIENT
Start: 2022-09-13 | End: 2022-10-08 | Stop reason: HOSPADM

## 2022-09-13 RX ORDER — MECOBALAMIN 5000 MCG
5 TABLET,DISINTEGRATING ORAL NIGHTLY PRN
Status: DISCONTINUED | OUTPATIENT
Start: 2022-09-13 | End: 2022-10-08 | Stop reason: HOSPADM

## 2022-09-13 RX ORDER — CLOPIDOGREL BISULFATE 75 MG/1
75 TABLET ORAL DAILY
Status: DISCONTINUED | OUTPATIENT
Start: 2022-09-14 | End: 2022-10-08 | Stop reason: HOSPADM

## 2022-09-13 RX ORDER — TROSPIUM CHLORIDE 20 MG/1
20 TABLET, FILM COATED ORAL NIGHTLY
Status: DISCONTINUED | OUTPATIENT
Start: 2022-09-14 | End: 2022-10-08 | Stop reason: HOSPADM

## 2022-09-13 RX ORDER — ONDANSETRON 4 MG/1
4 TABLET, ORALLY DISINTEGRATING ORAL EVERY 8 HOURS PRN
Status: DISCONTINUED | OUTPATIENT
Start: 2022-09-13 | End: 2022-10-08 | Stop reason: HOSPADM

## 2022-09-13 RX ORDER — ASPIRIN 81 MG/1
81 TABLET, CHEWABLE ORAL DAILY
Status: DISCONTINUED | OUTPATIENT
Start: 2022-09-14 | End: 2022-10-08 | Stop reason: HOSPADM

## 2022-09-13 RX ORDER — POLYETHYLENE GLYCOL 3350 17 G/17G
17 POWDER, FOR SOLUTION ORAL DAILY PRN
Status: DISCONTINUED | OUTPATIENT
Start: 2022-09-13 | End: 2022-09-22

## 2022-09-13 RX ORDER — VITAMIN B COMPLEX
5000 TABLET ORAL DAILY
Status: DISCONTINUED | OUTPATIENT
Start: 2022-09-14 | End: 2022-10-08 | Stop reason: HOSPADM

## 2022-09-13 RX ORDER — CHOLECALCIFEROL (VITAMIN D3) 125 MCG
1000 CAPSULE ORAL DAILY
Status: DISCONTINUED | OUTPATIENT
Start: 2022-09-14 | End: 2022-10-08 | Stop reason: HOSPADM

## 2022-09-13 RX ORDER — CETIRIZINE HYDROCHLORIDE 5 MG/1
5 TABLET ORAL DAILY
Status: DISCONTINUED | OUTPATIENT
Start: 2022-09-14 | End: 2022-10-08 | Stop reason: HOSPADM

## 2022-09-13 RX ORDER — FOLIC ACID 1 MG/1
1 TABLET ORAL DAILY
Status: DISCONTINUED | OUTPATIENT
Start: 2022-09-14 | End: 2022-10-08 | Stop reason: HOSPADM

## 2022-09-13 RX ORDER — FLUDROCORTISONE ACETATE 0.1 MG/1
0.1 TABLET ORAL DAILY
Status: DISCONTINUED | OUTPATIENT
Start: 2022-09-14 | End: 2022-09-16

## 2022-09-13 RX ORDER — FERROUS SULFATE 325(65) MG
325 TABLET ORAL
Status: DISCONTINUED | OUTPATIENT
Start: 2022-09-14 | End: 2022-10-08 | Stop reason: HOSPADM

## 2022-09-13 RX ORDER — CALCIUM CARBONATE 200(500)MG
1250 TABLET,CHEWABLE ORAL DAILY
Status: DISCONTINUED | OUTPATIENT
Start: 2022-09-14 | End: 2022-09-15

## 2022-09-13 RX ORDER — TRAMADOL HYDROCHLORIDE 50 MG/1
50 TABLET ORAL EVERY 6 HOURS PRN
Status: DISCONTINUED | OUTPATIENT
Start: 2022-09-13 | End: 2022-10-08 | Stop reason: HOSPADM

## 2022-09-13 RX ORDER — HEPARIN SODIUM 5000 [USP'U]/ML
5000 INJECTION, SOLUTION INTRAVENOUS; SUBCUTANEOUS EVERY 8 HOURS SCHEDULED
Status: DISCONTINUED | OUTPATIENT
Start: 2022-09-13 | End: 2022-10-08 | Stop reason: HOSPADM

## 2022-09-13 RX ORDER — BISACODYL 10 MG
10 SUPPOSITORY, RECTAL RECTAL DAILY PRN
Status: DISCONTINUED | OUTPATIENT
Start: 2022-09-13 | End: 2022-10-08 | Stop reason: HOSPADM

## 2022-09-13 RX ORDER — ACETAMINOPHEN 325 MG/1
650 TABLET ORAL EVERY 4 HOURS PRN
Status: DISCONTINUED | OUTPATIENT
Start: 2022-09-13 | End: 2022-10-08 | Stop reason: HOSPADM

## 2022-09-14 PROCEDURE — 1280000000 HC REHAB R&B

## 2022-09-15 ENCOUNTER — HOSPITAL ENCOUNTER (INPATIENT)
Age: 82
LOS: 23 days | Discharge: NURSING, REHAB OR LONG-TERM CARE | DRG: 057 | End: 2022-10-08
Attending: STUDENT IN AN ORGANIZED HEALTH CARE EDUCATION/TRAINING PROGRAM | Admitting: STUDENT IN AN ORGANIZED HEALTH CARE EDUCATION/TRAINING PROGRAM
Payer: MEDICARE

## 2022-09-15 PROCEDURE — 6370000000 HC RX 637 (ALT 250 FOR IP): Performed by: STUDENT IN AN ORGANIZED HEALTH CARE EDUCATION/TRAINING PROGRAM

## 2022-09-15 PROCEDURE — 6360000002 HC RX W HCPCS: Performed by: STUDENT IN AN ORGANIZED HEALTH CARE EDUCATION/TRAINING PROGRAM

## 2022-09-15 PROCEDURE — 1280000000 HC REHAB R&B

## 2022-09-15 RX ORDER — CALCIUM CARBONATE 200(500)MG
500 TABLET,CHEWABLE ORAL DAILY
Status: DISCONTINUED | OUTPATIENT
Start: 2022-09-16 | End: 2022-10-08 | Stop reason: HOSPADM

## 2022-09-15 RX ORDER — HYDRALAZINE HYDROCHLORIDE 10 MG/1
10 TABLET, FILM COATED ORAL
Status: DISCONTINUED | OUTPATIENT
Start: 2022-09-15 | End: 2022-10-08 | Stop reason: HOSPADM

## 2022-09-15 RX ADMIN — DONEPEZIL HYDROCHLORIDE 10 MG: 5 TABLET, FILM COATED ORAL at 20:25

## 2022-09-15 RX ADMIN — HEPARIN SODIUM 5000 UNITS: 5000 INJECTION INTRAVENOUS; SUBCUTANEOUS at 20:25

## 2022-09-15 RX ADMIN — HYDRALAZINE HYDROCHLORIDE 10 MG: 10 TABLET, FILM COATED ORAL at 20:24

## 2022-09-15 RX ADMIN — ATORVASTATIN CALCIUM 80 MG: 80 TABLET, FILM COATED ORAL at 20:25

## 2022-09-15 RX ADMIN — TROSPIUM CHLORIDE 20 MG: 20 TABLET, FILM COATED ORAL at 20:24

## 2022-09-15 NOTE — LETTER
FAX           10/6/2022                                                                                                                                     [address] [facility], [address] [place] [id number]        [phone]      To: ATTN:Rosangela: Tylor Lim   Phone:   UDT:[phone]       From: INOCENCIO Humphreys     Phone: [phone]  Fax:                         Discharge date 10/08> Requesting a private room            Initial referral date               CONFIDENTIALITY NOTICE    This message is intended only for the use of the individual or entity to which it is addressed and may contain information that is privileged, confidential, and exempt from disclosure under applicable law. If the reader of the notice is not the intended recipient or the employee/agent responsible for delivering the message to the intended recipient, you are hereby notified that any dissemination, distribution or copying of this communication is strictly prohibited. Please contact the sender for further instructions on handling the information.

## 2022-09-16 ENCOUNTER — APPOINTMENT (OUTPATIENT)
Dept: CT IMAGING | Age: 82
DRG: 057 | End: 2022-09-16
Attending: STUDENT IN AN ORGANIZED HEALTH CARE EDUCATION/TRAINING PROGRAM
Payer: MEDICARE

## 2022-09-16 ENCOUNTER — APPOINTMENT (OUTPATIENT)
Dept: CT IMAGING | Age: 82
DRG: 057 | End: 2022-09-16
Payer: MEDICARE

## 2022-09-16 LAB
ANION GAP SERPL CALCULATED.3IONS-SCNC: 9 MMOL/L (ref 3–16)
BASOPHILS ABSOLUTE: 0 K/UL (ref 0–0.2)
BASOPHILS RELATIVE PERCENT: 0.5 %
BUN BLDV-MCNC: 22 MG/DL (ref 7–20)
CALCIUM SERPL-MCNC: 8.8 MG/DL (ref 8.3–10.6)
CHLORIDE BLD-SCNC: 106 MMOL/L (ref 99–110)
CO2: 26 MMOL/L (ref 21–32)
CREAT SERPL-MCNC: 0.7 MG/DL (ref 0.6–1.2)
EOSINOPHILS ABSOLUTE: 0.1 K/UL (ref 0–0.6)
EOSINOPHILS RELATIVE PERCENT: 2.7 %
GFR AFRICAN AMERICAN: >60
GFR NON-AFRICAN AMERICAN: >60
GLUCOSE BLD-MCNC: 122 MG/DL (ref 70–99)
GLUCOSE BLD-MCNC: 86 MG/DL (ref 70–99)
HCT VFR BLD CALC: 36.4 % (ref 36–48)
HEMOGLOBIN: 12.2 G/DL (ref 12–16)
LYMPHOCYTES ABSOLUTE: 1.6 K/UL (ref 1–5.1)
LYMPHOCYTES RELATIVE PERCENT: 29.4 %
MAGNESIUM: 1.9 MG/DL (ref 1.8–2.4)
MCH RBC QN AUTO: 32.5 PG (ref 26–34)
MCHC RBC AUTO-ENTMCNC: 33.6 G/DL (ref 31–36)
MCV RBC AUTO: 96.7 FL (ref 80–100)
MONOCYTES ABSOLUTE: 0.5 K/UL (ref 0–1.3)
MONOCYTES RELATIVE PERCENT: 8.6 %
NEUTROPHILS ABSOLUTE: 3.2 K/UL (ref 1.7–7.7)
NEUTROPHILS RELATIVE PERCENT: 58.8 %
PDW BLD-RTO: 16 % (ref 12.4–15.4)
PERFORMED ON: ABNORMAL
PLATELET # BLD: 222 K/UL (ref 135–450)
PMV BLD AUTO: 6.9 FL (ref 5–10.5)
POTASSIUM REFLEX MAGNESIUM: 3.4 MMOL/L (ref 3.5–5.1)
RBC # BLD: 3.76 M/UL (ref 4–5.2)
SODIUM BLD-SCNC: 141 MMOL/L (ref 136–145)
WBC # BLD: 5.5 K/UL (ref 4–11)

## 2022-09-16 PROCEDURE — 85025 COMPLETE CBC W/AUTO DIFF WBC: CPT

## 2022-09-16 PROCEDURE — 97112 NEUROMUSCULAR REEDUCATION: CPT

## 2022-09-16 PROCEDURE — 92526 ORAL FUNCTION THERAPY: CPT

## 2022-09-16 PROCEDURE — 92523 SPEECH SOUND LANG COMPREHEN: CPT

## 2022-09-16 PROCEDURE — 80048 BASIC METABOLIC PNL TOTAL CA: CPT

## 2022-09-16 PROCEDURE — 83735 ASSAY OF MAGNESIUM: CPT

## 2022-09-16 PROCEDURE — 99222 1ST HOSP IP/OBS MODERATE 55: CPT | Performed by: PSYCHIATRY & NEUROLOGY

## 2022-09-16 PROCEDURE — 97163 PT EVAL HIGH COMPLEX 45 MIN: CPT

## 2022-09-16 PROCEDURE — 36415 COLL VENOUS BLD VENIPUNCTURE: CPT

## 2022-09-16 PROCEDURE — 6370000000 HC RX 637 (ALT 250 FOR IP): Performed by: STUDENT IN AN ORGANIZED HEALTH CARE EDUCATION/TRAINING PROGRAM

## 2022-09-16 PROCEDURE — 97530 THERAPEUTIC ACTIVITIES: CPT

## 2022-09-16 PROCEDURE — 1280000000 HC REHAB R&B

## 2022-09-16 PROCEDURE — 92610 EVALUATE SWALLOWING FUNCTION: CPT

## 2022-09-16 PROCEDURE — 97167 OT EVAL HIGH COMPLEX 60 MIN: CPT

## 2022-09-16 PROCEDURE — 97535 SELF CARE MNGMENT TRAINING: CPT

## 2022-09-16 PROCEDURE — 6360000002 HC RX W HCPCS: Performed by: STUDENT IN AN ORGANIZED HEALTH CARE EDUCATION/TRAINING PROGRAM

## 2022-09-16 PROCEDURE — 70496 CT ANGIOGRAPHY HEAD: CPT

## 2022-09-16 PROCEDURE — 70450 CT HEAD/BRAIN W/O DYE: CPT

## 2022-09-16 PROCEDURE — 97110 THERAPEUTIC EXERCISES: CPT

## 2022-09-16 PROCEDURE — 6360000004 HC RX CONTRAST MEDICATION: Performed by: INTERNAL MEDICINE

## 2022-09-16 PROCEDURE — 4A03X5D MEASUREMENT OF ARTERIAL FLOW, INTRACRANIAL, EXTERNAL APPROACH: ICD-10-PCS | Performed by: RADIOLOGY

## 2022-09-16 RX ORDER — FLUDROCORTISONE ACETATE 0.1 MG/1
0.05 TABLET ORAL DAILY
Status: DISCONTINUED | OUTPATIENT
Start: 2022-09-17 | End: 2022-09-20

## 2022-09-16 RX ORDER — POTASSIUM CHLORIDE 750 MG/1
10 TABLET, EXTENDED RELEASE ORAL DAILY
Status: DISCONTINUED | OUTPATIENT
Start: 2022-09-16 | End: 2022-10-08 | Stop reason: HOSPADM

## 2022-09-16 RX ORDER — TRAZODONE HYDROCHLORIDE 50 MG/1
50 TABLET ORAL NIGHTLY PRN
Status: DISCONTINUED | OUTPATIENT
Start: 2022-09-16 | End: 2022-10-08 | Stop reason: HOSPADM

## 2022-09-16 RX ORDER — CETIRIZINE HYDROCHLORIDE 10 MG/1
10 TABLET ORAL DAILY
Status: ON HOLD | COMMUNITY
End: 2022-10-07 | Stop reason: SDUPTHER

## 2022-09-16 RX ORDER — 0.9 % SODIUM CHLORIDE 0.9 %
500 INTRAVENOUS SOLUTION INTRAVENOUS ONCE
Status: DISCONTINUED | OUTPATIENT
Start: 2022-09-16 | End: 2022-10-08 | Stop reason: HOSPADM

## 2022-09-16 RX ADMIN — CYANOCOBALAMIN TAB 500 MCG 1000 MCG: 500 TAB at 10:00

## 2022-09-16 RX ADMIN — TROSPIUM CHLORIDE 20 MG: 20 TABLET, FILM COATED ORAL at 21:44

## 2022-09-16 RX ADMIN — DONEPEZIL HYDROCHLORIDE 10 MG: 5 TABLET, FILM COATED ORAL at 21:44

## 2022-09-16 RX ADMIN — ASPIRIN 81 MG 81 MG: 81 TABLET ORAL at 10:01

## 2022-09-16 RX ADMIN — CETIRIZINE HYDROCHLORIDE 5 MG: 5 TABLET, FILM COATED ORAL at 09:59

## 2022-09-16 RX ADMIN — CLOPIDOGREL BISULFATE 75 MG: 75 TABLET ORAL at 10:01

## 2022-09-16 RX ADMIN — Medication 5000 UNITS: at 09:59

## 2022-09-16 RX ADMIN — FLUDROCORTISONE ACETATE 0.1 MG: 0.1 TABLET ORAL at 09:59

## 2022-09-16 RX ADMIN — HEPARIN SODIUM 5000 UNITS: 5000 INJECTION INTRAVENOUS; SUBCUTANEOUS at 21:44

## 2022-09-16 RX ADMIN — Medication 5 MG: at 21:44

## 2022-09-16 RX ADMIN — IOPAMIDOL 75 ML: 755 INJECTION, SOLUTION INTRAVENOUS at 09:50

## 2022-09-16 RX ADMIN — ANTACID TABLETS 500 MG: 500 TABLET, CHEWABLE ORAL at 09:59

## 2022-09-16 RX ADMIN — FOLIC ACID 1 MG: 1 TABLET ORAL at 10:01

## 2022-09-16 RX ADMIN — POTASSIUM CHLORIDE 10 MEQ: 750 TABLET, EXTENDED RELEASE ORAL at 18:15

## 2022-09-16 RX ADMIN — HEPARIN SODIUM 5000 UNITS: 5000 INJECTION INTRAVENOUS; SUBCUTANEOUS at 06:30

## 2022-09-16 RX ADMIN — HEPARIN SODIUM 5000 UNITS: 5000 INJECTION INTRAVENOUS; SUBCUTANEOUS at 14:54

## 2022-09-16 RX ADMIN — ATORVASTATIN CALCIUM 80 MG: 80 TABLET, FILM COATED ORAL at 21:44

## 2022-09-16 RX ADMIN — FERROUS SULFATE TAB 325 MG (65 MG ELEMENTAL FE) 325 MG: 325 (65 FE) TAB at 10:01

## 2022-09-16 NOTE — PROGRESS NOTES
Called for code stroke. Noted symptoms consistent with signs of a previous stroke worsened by hypotension. Check the chart from [place]. Patient has autonomic dysfunction, orthostatic hypotension, and similar episodes noted at [facility] associated with hypotension. Discussed with stroke team.  CT head, CTA head and neck are all unremarkable. Neurology already consulted because of recent stroke and persistent symptoms      Cardiology also consulted because of autonomic dysfunction and fluctuation blood pressure which does not allow for appropriate control.       Electronically signed by Christie Weems MD on 9/16/2022 at 11:34 AM

## 2022-09-16 NOTE — PROGRESS NOTES
Speech Language Pathology    Speech Language Pathology  Clinical Bedside Swallow Assessment  Facility/Department: [facility]        Recommendations:  Diet recommendation: IDDSI 7 Regular Solids; IDDSI 0 Thin Liquids - straws OK; Meds whole with thin liquids  Instrumentation: MBSS completed on 09/15/22  Risk management: upright for all intake, stay upright for at least 30 mins after intake, small bites/sips, assist feed, oral care 2-3x/day to reduce adverse affects in the event of aspiration, increase physical mobility as able, alternate bites/sips, slow rate of intake, general GERD precautions, and general aspiration precautions      NAME:Yulissa Doyle  : 1940 ([de-identified] y.o.)   MRN: [id number]  ROOM: [address] [place] [address]  ADMISSION DATE: 9/15/2022  PATIENT DIAGNOSIS(ES): Acute CVA (cerebrovascular accident) ([facility] [I63.9]  No chief complaint on file.     Patient Active Problem List    Diagnosis Date Noted    Acute CVA (cerebrovascular accident) ([facility] 2022    Debility 2022    ARF (acute renal failure) ([facility] 2022    Anemia 2022    Orthostatic hypotension 2019    Contusion of left knee 06/10/2016    Left patella fracture 12/15/2014    Left knee pain 2014     Past Medical History:   Diagnosis Date    ARF (acute renal failure) ([facility] 3/25/2022     Past Surgical History:   Procedure Laterality Date    INTRACAPSULAR CATARACT EXTRACTION Right 2019    PHACOEMULSIFICATION OF CATARACT RIGHT EYE WITH INTRAOCULAR LENS IMPLANT performed by Mala Rg MD at [address] Left 2019    PHACOEMULSIFICATION OF CATARACT LEFT EYE WITH INTRAOCULAR LENS IMPLANT [facility]      [facility]    TONSILLECTOMY       Allergies   Allergen Reactions    Penicillins        DATE ONSET: 22    Date of Evaluation: 2022   Evaluating Therapist: SANYA Shepherd    Chart Reviewed: : [x] Yes [] No    Current Diet: ADULT ORAL NUTRITION SUPPLEMENT; Breakfast, Dinner; Standard High Calorie/High Protein Oral Supplement  ADULT DIET; Regular    Recent Chest Radiography: [x] Chest XR   [] CT of Chest  Date:09/11/22  Impressions  Impression        No significant abnormality    Pain: Denies    Reason for Referral  Cody Martines was referred for a bedside swallow evaluation to assess the efficiency of their swallow function, identify signs and symptoms of aspiration and make recommendations regarding safe dietary consistencies, effective compensatory strategies, and safe eating environment. Assessment    Medical record review/interview: Per MD H&P: \"Yulissa Multani is an [de-identified]year old female with a past medical history significant for orthostatic hypotension, anemia, and memory loss who presented to [facility] on 9/11[facility]22 with recurrent falls and right sided weakness. CT head was negative for acute process. MRI brain revealed infarct in the left basal ganglia and corona radiata. Course was complicated by worsening right sided weakness. Repeat CT head was negative for acute process. Symptoms were suspected to be associated with drop in blood pressure. She was admitted to [facility] on 9/15/22 due to functional deficits below her baseline. This morning patient had an episode of increased right sided weakness. She reports that she had diffuse numbness and tingling in her body at the time. Code stroke was called. Stat CT head was negative for acute process. Her blood pressure was noted to be low at the time. She reports resolution of these symptoms and that she is back to her baseline right sided weakness. \"        Predisposing dysphagia risk factors: N/A  Clinical signs of possible chronic dysphagia: N/A  Precipitating dysphagia risk factors: acute neurological event    Patient Complaints:  Odynophagia: [] Yes [] No  Globus Sensation: [] Yes [] No  SOB with PO intake: [] Yes [] No  Increased WOB with PO intake: [] Yes [] No  Reflux Sx's: [] Yes [] No  Weight loss: [] Yes [] No  Coughing/Choking with PO intake: [] Yes [] No  Reduced Appetite: [] Yes [] No    Additional Reported Symptoms/Complaints/Hospital Course:   MBSS on 09/15/22:  \"Patient presents with a mild oral phase dysphagia characterized by oral loss-right with liquids and reduced bolus control with liquids resulting in premature spillage to her pharynx. Patient has a mild oral delay with mastication, bolus formation and collection with solids. Trace oral residue noted -right sulci which cleared with an extra swallow. Patient presents with a mild pharyngeal dysphagia characterized by a mild delay with swallow initiation and reduced pharyngeal peristalsis resulting in intermittent pyriform residue of liquids. Post swallow residue clears with an extra swallow. Patient swallowed thin liquids (cup sip and by straw), slightly thick, mildly thick, honey thick, puree, and a soft solid with no subglottic aspiration or entry into her laryngeal vestibule. Results of the MBS reviewed with patient as well as swallow precautions and swallow compensatory measures.  \"    Pt's goals: \"to get back to normal\"    Vitals/labs:   SpO2: 98%  RR: 24  BP: 125/66  HR: 71  O2 device: RA     CBC:   Recent Labs     09/16/22  0654   WBC 5.5   HGB 12.2         BMP:  Recent Labs     09/16/22  0654      K 3.4*      CO2 26   BUN 22*   CREATININE 0.7   GLUCOSE 86          Cranial nerve exam:   CN V (trigeminal): ophthalmic, maxillary, and mandibular facial sensation- WFL  CN VII (facial): WFL  CN IX/X (glossopharyngeal/vagus): MPT: WFL; pitch range: WFL; vocal quality: WFL; cough: Strong-perceptually  CN XII (hypoglossal): WFL    Laryngeal function exam:   Secretions:   Vocal quality: See CN exam above  MPT: See CN exam above  S/Z ratio:   Pitch range: See CN exam above  Cough: See CN exam above    Oral Care Status:    [] Oral Care [facility]  [] Poor oral care status  [] Edentulous  [] Upper Referrals:  N/A    Goals:  Short Term Goals:  Timeframe for Short Term Goals: (5 days 09/20/22)  Goal 1: The patient will tolerate recommended diet with no clinical s/s of aspiration 5/5   Goal 2: The patient/caregiver will demonstrate understanding of compensatory swallow strategies, for improved swallow safety      Long Term Goals:   Timeframe for Long Term Goals: (7 days 09/22/22)  Goal 1: The patient will tolerate least restrictive diet with no clinical s/s of aspiration or worsening respiratory/pulmonary status    Treatment:  Skilled instruction completed with patient re: evidenced based practice regarding recommendations and POC, importance of oral care to reduce adverse affects in the event of aspiration, and instruction of recommended compensatory strategies developed based upon clinical exam. Pt able to recall/demonstrate compensatory strategies with (min, mod, max) cues. Pt Education: SLP educated the patient re: Role of SLP, rationale for completion of assessment, results of assessment, and recommendations  Pt Education Response: verbalized understanding    Duration/Frequency of Tx: 30 mins/ 5x/week during LOS    Individuals Consulted:   [x]  Patient     []  NP         [x]  RN   []  RD                   []  MD      [x]  Family Member                        []  PA    []  Other:      Safety Devices / Report:  [x]  All fall risk precautions in place [x]  Safety handoff completed with RN  [x]  Bed alarm in place  [x]  Left in bed     []  Chair alarm in place  []  Left in chair   [x]  Call light in reach   [x]  Other: family members at bedside                           Total Treatment Time / Charges       Time in Time out Total Time / units   Swallow Eval/Tx Time  1400 1430 30 min/ 1 DE/ 1 DT     Signature:  Margoth LOAJ CCC-SLP  Speech-Language Pathologist  [id number]

## 2022-09-16 NOTE — FLOWSHEET NOTE
09/16/22 1014 09/16/22 1015   Treatment Team Notification   Reason for Communication Abnormal vitals  --    Team Member Name Dr Tiera Greco  --    Treatment Team Role Consulting Provider  --    Method of Communication Secure Message Call   Response Waiting for response See orders  (hold IV fluid bolus)   Notification Time [phone]     BP- 202/85

## 2022-09-16 NOTE — FLOWSHEET NOTE
09/16/22 1535 09/16/22 1540 09/16/22 1541   Vital Signs   Blood Pressure Lying 156/66  --   --    Pulse Lying 64 PER MINUTE  --   --    Blood Pressure Sitting  --  138/62  --    Pulse Sitting  --  80 PER MINUTE  --    Blood Pressure Standing  --   --  134/66   Pulse Standing  --   --  81 PER MINUTE

## 2022-09-16 NOTE — FLOWSHEET NOTE
09/16/22 1022 09/16/22 1028   Treatment Team Notification   Reason for Communication Shift event  (Code Stroke)  --    Shift Event Type Other (comment)  (Code Stroke)  --    Team Member Name Dr Ava Maddox  --    Treatment Team Role Attending Provider  --    Method of Communication Secure Message  --    Response Waiting for response No new orders   Notification Time [phone]

## 2022-09-16 NOTE — FLOWSHEET NOTE
09/16/22 1435 09/16/22 1440 09/16/22 1445   Vital Signs   Blood Pressure Lying  --   --  160/75   Pulse Lying  --   --  75 PER MINUTE   Blood Pressure Sitting 152/70  --   --    Pulse Sitting 77 PER MINUTE  --   --    Blood Pressure Standing  --  108/66  --    Pulse Standing  --  86 PER MINUTE  --

## 2022-09-16 NOTE — PROGRESS NOTES
Responded to code stroke, no family present, pt busy with medical team, offered silent prayer. Spiritual care available to follow prn.

## 2022-09-16 NOTE — PLAN OF CARE
ARU PATIENT TREATMENT PLAN  [address] [place] [address]   [phone]    Galindo Bey    : 1940  Acct #: [[id number]]  MRN: [id number]   PHYSICIAN:  Preet Levine MD  Primary Problem    Patient Active Problem List   Diagnosis    Left knee pain    Left patella fracture    Contusion of left knee    Orthostatic hypotension    ARF (acute renal failure) ([facility]    Anemia    Debility    Acute CVA (cerebrovascular accident) [facility]       Rehabilitation Diagnosis:     [facility] [I63.9]       ADMIT DATE:9/15/2022    Patient Goals: \"To be able to get up without being told\"    Admitting Impairments: PT. Admitted s/p CVA resulting in Decreased functional mobility ; Decreased endurance;Decreased coordination;Decreased posture;Decreased sensation;Decreased ADL status; Decreased ROM; Decreased balance;Decreased strength;Decreased safe awareness;Decreased high-level IADLs;Decreased cognition;Decreased fine motor control;Decreased vision/visual deficit  Barriers: availability of support, endurance, variable blood pressure, comorbidities  Participation: Good     CARE PLAN     NURSING:  Galindo Bey while on this unit will:     [] Be continent of bowel and bladder     [x] Have an adequate number of bowel movements  [] Urinate with no urinary retention >300ml in bladder  [] Complete bladder protocol with wong removal  [] Maintain O2 SATs at ___%  [x] Have pain managed while on ARU       [] Be pain free by discharge   [x] Have no skin breakdown while on ARU  [] Have improved skin integrity via wound measurements  [] Have no signs/symptoms of infection at the wound site  [x] Be free from injury during hospitalization   [x] Complete education with patient/family with understanding demonstrated for: stroke  [] Adjustment   [] Other:   Nursing interventions may include bowel/bladder training, education for medical assistive devices, medication education, O2 saturation management, energy conservation, stress management techniques, fall prevention, alarms protocol, seating and positioning, skin/wound care, pressure relief instruction,dressing changes,  infection protection, DVT prophylaxis, and/or assistance with in room safety with transfers to bed, toilet, wheelchair, shower as well as bathroom activities and hygiene. Patient/caregiver education for:   [x] Disease/sustained injury/management      [x] Medication Use   [] Surgical intervention   [x] Safety   [x] Body mechanics and or joint protection   [x] Health maintenance         PHYSICAL THERAPY:  Goals:                  Short Term Goals  Time Frame for Short term goals: 9/23/2022  Short term goal 1: Patient demonstrates  sitting  midline 15 minutes wtihout posterior lob  Short term goal 2: Patient demonstrates bed<>chair with LRAD with mod assist of 1  Short term goal 3: Patient demonstrates walking 10 feet or greater wtih mod assist of 2 LRAD. Short term goal 4: Patient demonstrates indep in rolling L<>R and mod assist sup to sit  Short term goal 5: Patient demonstrates min assist in [facility] propulsion 150 feet. Long Term Goals  Time Frame for Long term goals : 10/7/2022  Long term goal 1: Patient demonstrates  Modified indep in rolling and sup<>Sit. Long term goal 2: Patient demonstrates   transfers sit<>stand with FWW and min assist.  Long term goal 3: Patient demonstrates bed<>chair MONIK. Long term goal 4: Patient demonstrates gait wtih transfers  and short distances 50 feet or greater with min assist  Long term goal 5: Patient demonstrates up and down 2 steps or greater. with mod assist of 1  Additional Goals?: Yes  Long term goal 6: Patient demonstrates indep WC propulsion iwth L/R turns indep   150 feet.   Long term goal 7: Patient demonstrates stoop to recover object from floor with reacher  with mod assist.  Long term goal 8: Patients family demonstrates ability to  assist patient in and out of car wtih mod assist.  These goals were reviewed with this patient at the time of assessment and Benjamin Course is in agreement.      Plan of Care: Pt to be seen 5 out of 7 days per week, 90  mins (exact) per day for 21 days (exact)                   Current Treatment Recommendations: Strengthening, ROM, Balance training, Functional mobility training, Transfer training, Neuromuscular re-education, Stair training, Gait training, Wheelchair mobility training, Endurance training, Therapeutic activities, Positioning, Equipment evaluation, education, & procurement, Home exercise program, Safety education & training, Patient/Caregiver education & training      OCCUPATIONAL THERAPY:  Goals:             Short Term Goals  Time Frame for Short term goals: 9/28/22 (14 days)  Short Term Goal 1: Pt will complete UB dressing with mod A  Short Term Goal 2: Pt will complete simple grooming task supported in w/c with set-up A  Short Term Goal 3: Pt will complete functional transfers with mod A x1 using LRAD  Short Term Goal 4: Pt will complete toileting with max A :  Long Term Goals  Time Frame for Long term goals : 10/05/22 (21 days)  Long Term Goal 1: Pt will complete toilet transfer with min A x1  Long Term Goal 2: Pt will complete LB dressing with mod A x1  Long Term Goal 3: Pt will complete bathing with mod A x1  Long Term Goal 4: Pt will incorporate RUE into ADLs 75% of trials with minimal VC in order to increase functional independence with ADLs :    These goals were reviewed with this patient at the time of assessment and Benjamin Course is in agreement    Plan of Care:  Pt to be seen 5 out of 7 days per week, 60   mins (exact) per day for 21 days (exact)  Current Treatment Recommendations: Strengthening, Balance training, Functional mobility training, Self-Care / ADL, Endurance training, Neuromuscular re-education, Positioning, Modalities, Equipment evaluation, education, & procurement, Patient/Caregiver education & training, Safety education & training, Home management training, Coordination training, Sensory integraion      SPEECH THERAPY:   Goals:             Short-term Goals  Timeframe for Short-term Goals: 5 days (09/20/22)              Dysphagia Goals: The patient will tolerate recommended diet without observed clinical signs of aspiration, The patient/caregiver will demonstrate understanding of compensatory strategies for improved swallowing safety. Long Term Goals:   Timeframe for Long Term Goals: (7 days 09/22/22)  Goal 1: The patient will tolerate least restrictive diet with no clinical s/s of aspiration or worsening respiratory/pulmonary status                         Short-term Goals  Timeframe for Short-term Goals: 18 days (10/03/22)  Goal 1: Pt will complete recall tasks with 90% acc given min cues. Goal 2: Pt will complete higher level executive function tasks (meds, math, money, time, etc) with 95% acc given min cues. Timeframe for Short-term Goals: 18 days (10/03/22)  Long-term Goals  Timeframe for Long-term Goals: 21 days (10/06/22)  Goal 1: Pt will improve overall cognitive linguistic skills to increase safety and independence to return to OF)  These goals were reviewed with this patient at the time of assessment and Anjali Du is in agreement    Plan of Care: Pt to be seen 5 out of 7 days per week, 30  mins (exact) per day for 21 days (exact)             Dysphagia: Therapeutic Interventions: Diet tolerance monitoring, Patient/Family education           Speech/Language/Cognition: Compensatory strategy training and carryover, recall/STM, problem solving, reasoning, exec function, thought organization, attention. CASE MANAGEMENT:  Goals:   Assist patient/family with discharge planning, patient/family counseling,   and coordination with insurance during ARU stay.     QIM / IRF MIKE SCORES:  ITEM CURRENT SCORE GOAL   Eating CARE Score: 5 Discharge Goal: Independent   Oral Hygiene CARE Score: 88 Discharge Goal: Independent   Toileting Hygiene CARE Score: 88 Discharge Goal: Partial/moderate assistance   Shower/Bathe Self CARE Score: 88 Discharge Goal: Partial/moderate assistance   Upper Body Dressing CARE Score: 88 Discharge Goal: Set-up or clean-up assistance   Lower Body Dressing CARE Score: 88 Discharge Goal: Partial/moderate assistance   On/Off Footwear CARE Score: 88 Discharge Goal: Independent   Roll Left & Right CARE Score: 3 Discharge Goal: Independent   Sit to Lying  CARE Score: 2 Discharge Goal: Independent   Lying to Sitting EOB CARE Score: 2 Discharge Goal: Independent   Sit to Stand CARE Score: 1 Discharge Goal: Partial/moderate assistance   Chair/Bed to Chair Transfer CARE Score: 6     Toilet Transfer CARE Score: 88 Discharge Goal: Supervision or touching assistance   Car Transfer CARE Score: 88 Discharge Goal: Partial/moderate assistance   Walk 10 Feet CARE Score: 88 Discharge Goal: Partial/moderate assistance   Walk 50 Feet, 2 Turns CARE Score: 88     Walk 150 Feet CARE Score: 88 Discharge Goal: Partial/moderate assistance   Walk 10 Feet, Uneven Surface CARE Score: 88 Discharge Goal: Partial/moderate assistance   1 Step (Curb) CARE Score: 88 Discharge Goal: Partial/moderate assistance   4 Steps CARE Score: 88 Discharge Goal: Partial/moderate assistance   12 Steps CARE Score: 88 Discharge Goal: Partial/moderate assistance    Object CARE Score: 88 Discharge Goal: Partial/moderate assistance   Wheel 50 feet, 2 turns CARE Score: 3 Discharge Goal: Independent   Wheel 150 Feet CARE Score: 3 Discharge Goal: Independent          Pedrito Bias will be seen a minimum of 3 hours of therapy per day, a minimum of 5 out of 7 days per week. [] In this rare instance due to the nature of this patient's medical involvement, this patient will be seen 15 hours per week (900 minutes within a 7 day period).     Treatments may include therapeutic exercises, gait training, neuromuscular re-ed, transfer training, community reintegration, bed mobility, w/c mobility and training, self care, home mgmt, cognitive training, energy conservation,dysphagia tx, speech/language/communication therapy, group therapy, and patient/family education. In addition, dietician/nutritionist may monitor calorie count as well as intake and collaboratively work with SLP on dietary upgrades. Neuropsychology/Psychology may evaluate and provide necessary support. Medical issues being managed closely and that require 24 hour availability of a physician:   [] Swallowing Precautions  [x] Bowel/Bladder Fx  [] Weight bearing precautions   [] Wound Care    [] Pain Mgmt   [x] Infection Protection   [x] DVT Prophylaxis   [x] Fall Precautions  [x] Fluid/Electrolyte/Nutrition Balance   [] Voice Protection   [] Respiratory  [] Other:    Medical Prognosis: [x] Good  [] Fair    [] Guarded   Total expected IRF days 21  Anticipated discharge destination:    [] Home Independently   [] Home Modified Independent  [x] Home with supervision    []SNF     [] Other                                           Physician anticipated functional outcomes:  Home w/ supervision and home health services. IPOC brief synthesis: Jennifer Berry is an [de-identified]year old female with a past medical history significant for orthostatic hypotension, anemia, and memory loss who presented to [facility] on 9/11/22 with recurrent falls and right sided weakness, found to have acute left CVA. She was admitted to [facility] on 9/15/22 due to functional deficits below her baseline. This plan has been reviewed with Jennifer Berry on 9/16 in a language the patient understands. Jennifer Berry has had the opportunity to include input with the therapy team.      I have reviewed this initial plan of care and agree with its contents:    Title   Name    Date    Time    Physician: Fred Hernandez.  Gil Dover MD    Case Mgmt: Josiane Willingham RN    OT: Electronically signed by Belkis Ortiz OTR/NORBERT on 9/16/2022 at 12:31 PM      PT:Cindi Stafford. [id number]      RN: Demi Senior RN    ST: Gemma Orantes MA [facility] SLP     : Rayshawn Carl OTR/L    Other:

## 2022-09-16 NOTE — CODE DOCUMENTATION
Pt has prior deficits from a recent CVA; new symptoms were speech and drooling and excessive right sided weakness (this is a prior deficit).  Pt was orthostatic on arrival.

## 2022-09-16 NOTE — H&P
Patient: Felipe Craven  [id number]  Date: 9/16/2022      Chief Complaint: right sided weakness    History of Present Illness/Hospital Course:  Felipe Craven is an [de-identified]year old female with a past medical history significant for orthostatic hypotension, anemia, and memory loss who presented to [facility] on 9/11/22 with recurrent falls and right sided weakness. CT head was negative for acute process. MRI brain revealed infarct in the left basal ganglia and corona radiata. Course was complicated by worsening right sided weakness. Repeat CT head was negative for acute process. Symptoms were suspected to be associated with drop in blood pressure. She was admitted to [facility] on 9/15/22 due to functional deficits below her baseline. This morning patient had an episode of increased right sided weakness. She reports that she had diffuse numbness and tingling in her body at the time. Code stroke was called. Stat CT head was negative for acute process. Her blood pressure was noted to be low at the time. She reports resolution of these symptoms and that she is back to her baseline right sided weakness. Prior Level of Function:  Independent in self care, indoor mobility, stairs, functional cognition     Current Level of Function:  Setup for eating, oral hygiene  Mod assist for sit to lying, lying to sitting on side of bed  Dependent for toileting hygiene, sit to stand     has a past medical history of ARF (acute renal failure) ([address] has a past surgical history that includes joint replacement; Tonsillectomy; Intracapsular cataract extraction (Right, 5/21/2019); and Intracapsular cataract extraction (Left, 7/2/2019). reports that she has never smoked. She has never used smokeless tobacco. She reports current alcohol use. She reports that she does not use drugs. family history includes Cancer in her mother.     Current Facility-Administered Medications   Medication Dose Route Frequency Provider Last Rate Last Admin 0.9 % sodium chloride bolus  500 mL IntraVENous Once Gerard Lam MD        calcium carbonate (TUMS) chewable tablet 500 mg  500 mg Oral Daily Fleeta Cure, MD   500 mg at 09/16/22 0959    hydrALAZINE (APRESOLINE) tablet 10 mg  10 mg Oral Q2H PRN North Phillips, MD   10 mg at 09/15/22 2024    acetaminophen (TYLENOL) tablet 650 mg  650 mg Oral Q4H PRN North Phillips MD        aspirin chewable tablet 81 mg  81 mg Oral Daily North Cure, MD   81 mg at 09/16/22 1001    atorvastatin (LIPITOR) tablet 80 mg  80 mg Oral Nightly Avaeta Cure, MD   80 mg at 09/15/22 2025    Vitamin D (CHOLECALCIFEROL) tablet 5,000 Units  5,000 Units Oral Daily Fleeta Cure, MD   5,000 Units at 09/16/22 0959    clopidogrel (PLAVIX) tablet 75 mg  75 mg Oral Daily Avaeta Cure, MD   75 mg at 09/16/22 1001    donepezil (ARICEPT) tablet 10 mg  10 mg Oral Nightly Avaeta Cure, MD   10 mg at 09/15/22 2025    ferrous sulfate (IRON 325) tablet 325 mg  325 mg Oral Daily with breakfast North Phillips MD   325 mg at 09/16/22 1001    fludrocortisone (FLORINEF) tablet 0.1 mg  0.1 mg Oral Daily Fleeta Cure, MD   0.1 mg at 14/01/84 0741    folic acid (FOLVITE) tablet 1 mg  1 mg Oral Daily North Cure, MD   1 mg at 09/16/22 1001    heparin (porcine) injection 5,000 Units  5,000 Units SubCUTAneous 3 times per day North Phillips, MD   5,000 Units at 09/16/22 0630    cetirizine (ZYRTEC) tablet 5 mg  5 mg Oral Daily Fleeta Cure, MD   5 mg at 09/16/22 0959    trospium (SANCTURA) tablet 20 mg  20 mg Oral Nightly Avaeta Cure, MD   20 mg at 09/15/22 2024    vitamin B-12 (CYANOCOBALAMIN) tablet 1,000 mcg  1,000 mcg Oral Daily Avaeta Cure, MD   1,000 mcg at 09/16/22 1000    melatonin disintegrating tablet 5 mg  5 mg Oral Nightly PRN Fleeta Cure, MD        ondansetron (ZOFRAN-ODT) disintegrating tablet 4 mg  4 mg Oral Q8H PRN North Phillips MD polyethylene glycol (GLYCOLAX) packet 17 g  17 g Oral Daily PRN Geoffery Payment, MD        sennosides-docusate sodium (SENOKOT-S) 8.6-50 MG tablet 2 tablet  2 tablet Oral Daily PRN Geoffery Payment, MD        bisacodyl (DULCOLAX) suppository 10 mg  10 mg Rectal Daily PRN Geoffery Payment, MD        traMADol Deitra Saupe) tablet 50 mg  50 mg Oral Q6H PRN Geoffery Payment, MD           Allergies   Allergen Reactions    Penicillins        Current Facility-Administered Medications   Medication Dose Route Frequency Provider Last Rate Last Admin    0.9 % sodium chloride bolus  500 mL IntraVENous Once Shan Blizzard, MD        calcium carbonate (TUMS) chewable tablet 500 mg  500 mg Oral Daily Geoffery Payment, MD   500 mg at 09/16/22 0959    hydrALAZINE (APRESOLINE) tablet 10 mg  10 mg Oral Q2H PRN Geoffery Payment, MD   10 mg at 09/15/22 2024    acetaminophen (TYLENOL) tablet 650 mg  650 mg Oral Q4H PRN Geoffery Payment, MD        aspirin chewable tablet 81 mg  81 mg Oral Daily Geoffery Payment, MD   81 mg at 09/16/22 1001    atorvastatin (LIPITOR) tablet 80 mg  80 mg Oral Nightly Geoffery Payment, MD   80 mg at 09/15/22 2025    Vitamin D (CHOLECALCIFEROL) tablet 5,000 Units  5,000 Units Oral Daily Geoffery Payment, MD   5,000 Units at 09/16/22 0959    clopidogrel (PLAVIX) tablet 75 mg  75 mg Oral Daily Geoffery Payment, MD   75 mg at 09/16/22 1001    donepezil (ARICEPT) tablet 10 mg  10 mg Oral Nightly Geoffery Payment, MD   10 mg at 09/15/22 2025    ferrous sulfate (IRON 325) tablet 325 mg  325 mg Oral Daily with breakfast Geoffery Payment, MD   325 mg at 09/16/22 1001    fludrocortisone (FLORINEF) tablet 0.1 mg  0.1 mg Oral Daily Geoffery Payment, MD   0.1 mg at 40/20/21 3554    folic acid (FOLVITE) tablet 1 mg  1 mg Oral Daily Geoffery Payment, MD   1 mg at 09/16/22 1001    heparin (porcine) injection 5,000 Units  5,000 Units SubCUTAneous 3 times per day Makayla Chris Leandro Bennett MD   5,000 Units at 09/16/22 0630    cetirizine (ZYRTEC) tablet 5 mg  5 mg Oral Daily Rose Raza MD   5 mg at 09/16/22 0959    trospium (SANCTURA) tablet 20 mg  20 mg Oral Nightly Rose Raza MD   20 mg at 09/15/22 2024    vitamin B-12 (CYANOCOBALAMIN) tablet 1,000 mcg  1,000 mcg Oral Daily Rose Raza MD   1,000 mcg at 09/16/22 1000    melatonin disintegrating tablet 5 mg  5 mg Oral Nightly PRN Rose Raza MD        ondansetron (ZOFRAN-ODT) disintegrating tablet 4 mg  4 mg Oral Q8H PRN Rose Raza MD        polyethylene glycol [facility]) packet 17 g  17 g Oral Daily PRN Rose Raza MD        sennosides-docusate sodium (SENOKOT-S) 8.6-50 MG tablet 2 tablet  2 tablet Oral Daily PRN Rose Raza MD        bisacodyl (DULCOLAX) suppository 10 mg  10 mg Rectal Daily PRN Rose Raza MD        traMADol Bobetta Bitter) tablet 50 mg  50 mg Oral Q6H PRN Rose Raza MD             REVIEW OF SYSTEMS:   CONSTITUTIONAL: negative for fevers, chills, diaphoresis, activity change, appetite change, fatigue, night sweats and unexpected weight change. EYES: negative for blurred vision, eye discharge, visual disturbance and icterus. HEENT: negative for hearing loss, tinnitus, ear drainage, sinus pressure, nasal congestion, epistaxis and snoring. RESPIRATORY: Negative for hemoptysis, cough, sputum production. CARDIOVASCULAR: negative for chest pain, palpitations, exertional chest pressure/discomfort, edema, syncope. GASTROINTESTINAL: negative for nausea, vomiting, diarrhea, constipation, blood in stool and abdominal pain. GENITOURINARY: negative for frequency, dysuria, urinary incontinence, decreased urine volume, and hematuria. HEMATOLOGIC/LYMPHATIC: negative for easy bruising, bleeding and lymphadenopathy. ALLERGIC/IMMUNOLOGIC: negative for recurrent infections, angioedema, anaphylaxis and drug reactions.    ENDOCRINE: negative for weight changes and diabetic symptoms including polyuria, polydipsia and polyphagia. MUSCULOSKELETAL: negative for pain, joint swelling, decreased range of motion and muscle weakness. NEUROLOGICAL: positive for right sided weakness; negative for headaches, slurred speech  PSYCHIATRIC/BEHAVIORAL: negative for hallucinations, behavioral problems, confusion and agitation. All pertinent positives are noted in the HPI. Physical Examination:  Vitals: Patient Vitals for the past 24 hrs:   BP Temp Temp src Pulse Resp SpO2 Height Weight   09/16/22 1000 (!) 202/85 97.7 °F (36.5 °C) Oral 70 16 99 % -- --   09/16/22 0924 -- -- -- -- -- 99 % -- --   09/16/22 0924 (!) 108/55 -- -- 82 -- -- -- --   09/16/22 0919 96/61 -- -- 80 -- 98 % -- --   09/16/22 0840 132/68 -- -- 78 -- 96 % -- --   09/15/22 2150 134/78 -- -- 65 -- -- -- --   09/15/22 2024 (!) 190/72 -- -- -- -- -- -- --   09/15/22 1945 (!) 190/76 97.9 °F (36.6 °C) Oral 69 16 96 % 5' 5\" (1.651 m) 108 lb (49 kg)     Psych: Stable mood, normal judgement, normal affect   Const: No distress  Eyes: Conjunctiva noninjected, no icterus noted; pupils equal, round, and reactive to light. HENT: Atraumatic, normocephalic; Oral mucosa moist  Neck: Trachea midline, neck supple. No thyromegaly noted. CV: Regular rate and rhythm, no murmur rub or gallop noted  Resp: Lungs clear to auscultation bilaterally, no rales wheezes or ronchi, no retractions. Respirations unlabored. GI: Soft, nontender, nondistended. Normal bowel sounds. No palpable masses. Neuro: Alert, oriented, appropriate. No cranial nerve deficits appreciated. Sensation intact to light touch. Motor examination reveals strength intact on left sided, 4/5 strength on right sided with increased tone of right calf muscles, able to stretch to neutral.   Skin: Normal temperature and turgor  MSK: No joint abnormalities noted. Ext: No significant edema appreciated. No varicosities.     Lab Results   Component Value Date    WBC 5.5 09/16/2022    HGB 12.2 09/16/2022    HCT 36.4 09/16/2022    MCV 96.7 09/16/2022     09/16/2022     No results found for: INR, PROTIME  Lab Results   Component Value Date    CREATININE 0.7 09/16/2022    BUN 22 (H) 09/16/2022     09/16/2022    K 3.4 (L) 09/16/2022     09/16/2022    CO2 26 09/16/2022     Lab Results   Component Value Date    ALT 15 03/25/2022    AST 29 03/25/2022    ALKPHOS 89 03/25/2022    BILITOT 1.3 (H) 03/25/2022       Most recent EKG revealed Sinus rhythm. IMAGING    CT head WO contrast 9/11/22  BRAIN PARENCHYMA: Unremarkable. No intraparenchymal hemorrhage or mass   VENTRICLES/SULCI: Unremarkable. No hydrocephalus or midline shift   EXTRA-AXIAL SPACES:  Unremarkable   PARANASAL SINUSES/MASTOIDS: Unremarkable   BONES:  Unremarkable   OTHER: None    MRI Angio head WO con 9/11/22  DISTAL RIGHT INTERNAL CAROTID ARTERY:  Unremarkable   DISTAL LEFT INTERNAL CAROTID ARTERY:  Unremarkable   RIGHT MIDDLE CEREBRAL ARTERY:  Mild narrowing involving the distal M1 segment of the right middle cerebral artery suggesting atherosclerotic change. LEFT MIDDLE CEREBRAL ARTERY:  Mild narrowing involving the distal M1 segment of the left middle cerebral artery suggesting atherosclerotic change. There is also mild narrowing of the more distal branches.    RIGHT ANTERIOR CEREBRAL ARTERY:  Unremarkable   LEFT ANTERIOR CEREBRAL ARTERY:  Unremarkable   ANTERIOR COMMUNICATING ARTERY:  Unremarkable     DISTAL RIGHT VERTEBRAL ARTERY:  Unremarkable   DISTAL LEFT VERTEBRAL ARTERY:  Unremarkable   BASILAR ARTERY:  Unremarkable   RIGHT POSTERIOR CEREBRAL ARTERY:  Unremarkable   LEFT POSTERIOR CEREBRAL ARTERY:  Unremarkable   RIGHT POSTERIOR COMMUNICATING ARTERY:  Unremarkable   LEFT POSTERIOR COMMUNICATING ARTERY:  Unremarkable     MRI Brain WO Contrast 9/11/22  BRAIN PARENCHYMA: Mild scattered areas of increased T2 signal in the white matter and urvashi, consistent with small vessel ischemic change. There is mild diffusion restriction within the posterior aspect of the left basal ganglia and corona radiata   consistent with an acute to subacute lacunar infarct. No acute cortical infarct. No intra-axial mass. No hemorrhage. VENTRICLES/SULCI: Mild diffuse proportionate dilation of ventricles and sulci, consistent with age-related volume loss. No hydrocephalus   EXTRA-AXIAL SPACES:  Unremarkable   FLOW VOIDS: Unremarkable. Normal signal flow voids in the larger intracranial vessels   PITUITARY: Unremarkable     PARANASAL SINUSES/MASTOIDS: Unremarkable   BONES:  Unremarkable   OTHER:  None     The above laboratory data have been reviewed. The above imaging data have been reviewed. The above medical testing have been reviewed. Body mass index is 17.97 kg/m². Barriers to Discharge: availability of support, endurance, variable blood pressure, comorbidities    POST ADMISSION PHYSICIAN EVALUATION  The patient has agreed to being admitted to our comprehensive inpatient rehabilitation facility consisting of at least 180 minutes of therapy a day, 5 out of 7 days a week. The patient/family has a good understanding of our discharge process. The patient has potential to make improvement and is in need of at least two of the following multidisciplinary therapies including but not limited to physical, occupational, respiratory, and speech, nutritional services, wound care, and prosthetics and orthotics. Given the patients complex condition and risk of further medical complications, rehabilitation services cannot be safely provided at a lower level of care such as a skilled nursing facility. I have compared the patients medical and functional status at the time of the preadmission screening and the same on this date, and there are no significant changes.      By signing this document, I acknowledge that I have personally performed a full physical examination on this patient within 24 hours of admission to this inpatient rehabilitation facility and have determined the patient to be able to tolerate the above course of treatment at an intensive level for a reasonable period of time. I will be completing a detailed individualized Plan of Care for this patient by day four of the patients stay based upon the Preadmission Screen, this Post-Admission Evaluation, and the therapy evaluations. Rehabilitation Diagnosis:  Stroke, 1.2, Right Body (L Brain)    Assessment and Plan:  Bowen Marcus is an [de-identified]year old female with a past medical history significant for orthostatic hypotension, anemia, and memory loss who presented to [facility] on 9/11/22 with recurrent falls and right sided weakness, found to have acute left CVA. She was admitted to [facility] on 9/15/22 due to functional deficits below her baseline. 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 Follow bowel movements. Enema or suppository if needed. Bladder: Check PVR x 3. [facility] if PVR > 200ml or if any volume is > 500 ml. Sleep: Trazodone provided prn. PPX  DVT: heparin  GI: not indicated    Follow up appointments: PCP    Yazan Acosta.  Zaid Espino MD 9/16/2022, 11:08 AM

## 2022-09-16 NOTE — CONSULTS
Comprehensive Nutrition Assessment    Type and Reason for Visit:  Initial, Consult    Nutrition Recommendations/Plan:   Continue soft and bite sized diet and encourage PO intake   RD to add ensure BID- vanilla  RD to order new updated weight   Monitor nutrition adequacy, pertinent labs, bowel habits, wt changes, and clinical progress     Malnutrition Assessment:  Malnutrition Status: At risk for malnutrition (Comment) (09/16/22 0306)    Context:  Acute Illness     Findings of the 6 clinical characteristics of malnutrition:  Energy Intake:  No significant decrease in energy intake    Nutrition Assessment:    ONS consult: New ARU pt admitted w/ CVA. Code stroke this morning, CT head, CTA head and neck are all unremarkable, per MD note. On soft and bite sized diet. Pt reports good appetite PTA and since admission, usually eats 3 meals per day at home. Reports stable wt between 109-110 lb, RD to order updated weight to better assess. Pt drinks 1-2 ensure at home, RD to add here. Pt likes vanilla. Continue to encourage PO intake, will continue to monitor. Nutrition Related Findings:    + BM yesterday per pt. K+ 3.4. Wound Type: Skin Tears       Current Nutrition Intake & Therapies:    Average Meal Intake: % (1 PO)  Average Supplements Intake: None Ordered  ADULT DIET; Dysphagia - Soft and Bite Sized    Anthropometric Measures:  Height: 5' 5\" (165.1 cm)  Ideal Body Weight (IBW): 125 lbs (57 kg)       Current Body Weight: 108 lb (49 kg), 86.4 % IBW.  Weight Source: Stated  Current BMI (kg/m2): 18  Usual Body Weight: 110 lb (49.9 kg)  % Weight Change (Calculated): -1.8               BMI Categories: Underweight (BMI less than 22) age over 72    Estimated Daily Nutrient Needs:  Energy Requirements Based On: Kcal/kg (25-30 kcals/kg)  Weight Used for Energy Requirements: Ideal (57 kg)  Energy (kcal/day): 5348-4699  Weight Used for Protein Requirements: Ideal (1-1.2 g/kg)  Protein (g/day): 57-68 g  Method Used for Fluid Requirements: 1 ml/kcal    Nutrition Diagnosis:   Increased nutrient needs related to increase demand for energy/nutrients as evidenced by BMI, other (comment) (Increase therapy regimen.)    Nutrition Interventions:   Food and/or Nutrient Delivery: Continue Current Diet, Start Oral Nutrition Supplement  Nutrition Education/Counseling: Education not indicated  Coordination of Nutrition Care: Continue to monitor while inpatient       Goals:     Goals: PO intake 50% or greater, prior to discharge       Nutrition Monitoring and Evaluation:   Behavioral-Environmental Outcomes: None Identified  Food/Nutrient Intake Outcomes: Food and Nutrient Intake, Supplement Intake  Physical Signs/Symptoms Outcomes: Biochemical Data, Chewing or Swallowing, Weight, Nutrition Focused Physical Findings    Discharge Planning:    Continue current diet, Continue Oral Nutrition Supplement     Omar Pimentel MS, [address] [place] [address],   Contact: Office: [phone]; [address]: [phone]

## 2022-09-16 NOTE — PROGRESS NOTES
This RN called to bedside by Occupational therapist to assist with transfer from bed to wheelchair. Upon entering room patient sitting on side of bed with OT assist. Assisted with transfer to wheelchair. Patient observed to be not responding to OT questions. Upon assessment patient had worsening stroke symptoms of increased right side weakness, right facial droop, slurred speech/drooling, and not making eye contact. 3174- Code stroke called to rapid response line  3559- Assisted patient back to bed by 3 staff members. 0920- BP 96/61, HR 80. FSBS- 122. Rapid response team at bedside with Dr Nila Mix  0925- /55, HR 82. Order placed for STAT CT scan of head. 0930- Patient taken via bed to CT scan by 2 RN's. Symptoms improved significantly while in CT scan. 1000- Patient returned to room 160 via bed. BP- 202/85, HR- 70.  1014- Order for IV fluid bolus 500 ml to be given. Notified Dr Nila Mix of current BP- 202/85, HR-70.  1015- Dr Nila Mix ordered to hold IV fluid bolus. Consult for cardiology to be placed for BP management.

## 2022-09-16 NOTE — PROGRESS NOTES
Physical Therapy  Facility/Department: [facility]  Rehabilitation Physical Therapy Initial Assessment    NAME: Jose Almonte  : 1940 ([de-identified] y.o.)  MRN: [id number]  CODE STATUS: Full Code    Date of Service: 22      Past Medical History:   Diagnosis Date    ARF (acute renal failure) ([address] 3/25/2022     Past Surgical History:   Procedure Laterality Date    INTRACAPSULAR CATARACT EXTRACTION Right 2019    PHACOEMULSIFICATION OF CATARACT RIGHT EYE WITH INTRAOCULAR LENS IMPLANT performed by Ryan Fierro MD at [address] Left 2019    PHACOEMULSIFICATION OF CATARACT LEFT EYE WITH INTRAOCULAR LENS IMPLANT performed by Ryan Fierro MD at [address]      both hips    TONSILLECTOMY         Chart Reviewed: Yes  Patient assessed for rehabilitation services?: Yes  Referring Practitioner: Dr. Lashawn Villegas  Referral Date : 22  Diagnosis: Acute CVA    Restrictions:  Restrictions/Precautions: Up as Tolerated; Fall Risk  Position Activity Restriction  Other position/activity restrictions: Dysphagia - Soft and Bite Sized;up as tolerated; Notify physician for pulse less than 50 or greater than 120, respiratory rate less than 12 or greater than 25, oral temperature greater than 101.3 F (62.9 C), systolic BP less than 90 or greater than 184, diastolic BP less than 50 or greater than 100. SUBJECTIVE  Subjective: Patient reports weakness without numbness RUE and RLE. Pain: Denies pain.        Post Treatment Pain Screening denies pain        Prior Level of Function:  Social/Functional History  Lives With: Alone  Type of Home: House  Home Layout: One level  Home Access: Stairs to enter without rails  Entrance Stairs - Number of Steps: 2 FELIX  Bathroom Shower/Tub: Tub/Shower unit, Walk-in shower, Shower chair with back  Bathroom Toilet: Standard  Bathroom Equipment: Grab bars in shower, Shower chair  Home Equipment: Amador Baird, [address], Walker, rolling, [place]  Homemaking Responsibilities: Yes  Meal Prep Responsibility: Primary  Laundry Responsibility: Primary  Ambulation Assistance: Independent (with RW)  Transfer Assistance: Independent  Active : No  Patient's  Info: pt's \"daughter or son-in-law\" will drive her  Occupation: Retired  Type of Occupation: retired   Additional Comments: Pt reports limited 24hr supervision at d/c; limited support. Reporst 4-5 falls in past 6 months with abrasions and echymosis and in past year total of 10 falls. Pt notes she falls to the right. OBJECTIVE  Vision  Vision: Impaired  Vision Exceptions:  (wears glasses , impaired peripheral awareness on right  Simultaneous filing. User may not have seen previous data.)  Tracking:  (H test tracking symmetrical and intact. )Vitals with mobility:  Seated  EOB initial 152/70 HR 77bpm  Seated  EOB5 minutes   134/60 HR 80bpm  Seaetd  EOB after 10 minutes  with /56 HR 82bpm  After WC propulsion  BP seated 117/65 HR 86 bpm  After stand  /66 HR 86 bpm  Return to supine /75 HR 75    Cognition  Overall Cognitive Status: Exceptions  Following Commands: Follows one step commands with increased time; Follows one step commands with repetition  Attention Span: Appears intact  Problem Solving: Assistance required to generate solutions;Assistance required to implement solutions;Assistance required to identify errors made;Assistance required to correct errors made    ROM  AROM RLE (degrees)  RLE AROM: Exceptions  RLE General AROM: patient with  estensor tone in RLE with need for AAROM for all RLE AROM  flexion movement  AROM LLE (degrees)  LLE AROM : WFL    Strength  Strength RLE  Strength RLE: Exception  Comment: patient wtih extensor tone on RLE  R Hip Flexion: 1/5  R Hip Extension: 1/5  R Hip ABduction: 0/5  R Knee Flexion: 1/5  R Knee Extension: 1/5  R Ankle Dorsiflexion: 0/5  R Ankle Plantar flexion: 0/5  Strength LLE  Strength LLE: Exception  L Hip Flexion: 3+/5;4-/5  L Hip Extension: 3+/5;4-/5  L Hip ABduction: 3+/5;4-/5  L Knee Flexion: 3+/5;4-/5  L Knee Extension: 3+/5;4-/5  L Ankle Dorsiflexion: 3+/5;4-/5  L Ankle Plantar Flexion: 3+/5;4-/5    Quality of Movement  Tone RLE  RLE Tone: Hypertonic;Hypotonic (low tone to adalberto tonic extension in trunk and RLE)  Tone LLE  LLE Tone: Normotonic  Motor Control  Gross Motor?: Exceptions (requires mostly passive placing to AAROm RLE for all mobility wtih need to place foot with pressure to keep from extensor tone pushing backwards on RLE and trunk requires AAROM for RUE.)  Comments: impaired AAROM or ability to maintain placed WB RLE due to extensor tone. Coordination  Rapid Alternating Movements: Dysdiadokinesia (poor control needs assist for RLE movement, slowed and uncoordinated apraxic movement)  Finger to Nose: Dysmetric (RUE)  Heel to Shin:  (unable RRLE)  Great toe proprioception : Normal (BLE)    Sensation  Overall Sensation Status: Impaired  Light Touch: Partial deficits in the RLE;Partial deficits in the RUE (worse in LE vs UE)    Functional Mobility  with assist throughout session for neurofacilitation mildine as pt pushing back in sit and later in stand psot and to right needs constant assist for static unsupported sitting   Bed mobility  Bridging: Maximum assistance  Rolling to Left: Moderate assistance  Rolling to Right: Moderate assistance  Supine to Sit: Maximum assistance  Sit to Supine: Maximum assistance  Scooting: Maximal assistance  Bed Mobility Comments: strong posterior lean and right, actively pushes.   Transfers  Sit to Stand: Dependent/Total;2 Person Assistance  Stand to sit: 2 Person Assistance;Dependent/Total  Bed to Chair: Maximum assistance  Squat Pivot Transfers: Maximum Assistance  Car Transfer: Unable to assess (unsafe to attempt due to strong pushing and need for assist of 2 sit<>stand)  Balance  Posture: Poor  Sitting - Static: Poor  Sitting - Dynamic: Poor  Standing - Static: Poor  Standing - Dynamic: Poor  Comments: pushes backa dn to rihgt in sit and stnad wtih FWW with need for trunk assist constant max and assist to maintain WB and prevent trunk and hip extension. Environmental Mobility  Ambulation  WB Status: unable to ambulated greater than 1 step forward and back at EOB with assist of 2 as pushing back and feet sliding forward with assist of 2. Ambulation  Surface: level tile  Device: Rolling Walker  Assistance: 2 Person assistance;Maximum assistance;Dependent/Total  Quality of Gait: unable to maintain RLE WB without downard pressure at right thigh, needs assist to find and grasp walker and constant trunk assit in standing to prevent pushing to right and back, pless pushing back with decreased UE WB with therapist assist and head turns. Gait Deviations:  (unable to step greater than 1 step forward and back at eob with patient pushing back towards bed.)  More Ambulation?: No  Stairs/Curb  Stairs?: No (unsafe to attempt)  Wheelchair Activities  Wheelchair Size: 20 inch  Wheelchair Type: Standard  Wheelchair Cushion: Standard  Level of Assistance for pressure relief activities: Moderate assistance;Maximum assistance  Wheelchair Parts Management: Yes  All Wheelchair Parts Management: cues for LUE and LLE  Left Brakes Level of Assistance: Dependent/Total  Right Brakes Level of Assistance: Dependent/Total  Propulsion: Yes  Propulsion 1  Level: Level Tile  Method: LLE;LUE  Level of Assistance: Moderate assistance  Description/ Details: needs redirection and encouragement and max assist with  turns  Distance: 150 feet. PT Exercises  Exercise Treatment: P-AAROM x5 heel slides and ankle DF/PF and 3 reps abd/add, total assist to change brief due to incontinence with nursing notified.     ASSESSMENT  Vitals  Heart Rate: 83  BP: (!) 98/56  BP Location: Left upper arm  BP Method: Automatic  Patient Position: Semi fowlers  MAP (Calculated): 70  SpO2: 93 %    Activity Tolerance  Activity Tolerance: Patient tolerated evaluation without incident  Disposition SLP with patient with patient in bed and call light in reach bed alarm engaged. Assessment  Assessment: Pt is an [de-identified] old female admitted to [facility] for CVA with new onset of R sided weakness. PT noted to have hypotensive episode this morning with code stroke called. CT of head with and w/out contrast 9/16/2022 noted. Spoke with Dr. Rikki Vazquez  At rest semirecumbent denies pain BP 98/56 HR 83 bpm spO2 93% on RA, kevin Johnson  with read back 9/16/2022 12:30pm continue PT. Patient seen for mobility evaluation. Pt noted with salient right sided weakness and impaired sensation iwth pushing posterior and to right with extensor tone in Right LE, apraxia of RUE and RLE with right LE greater weakness than UE. Pt required max assist for pivot transfer and assist of 2 for sit<>stand with patient unable to walk away from EOB due to pushing posterior and right and poor standing balance with assist of 2. Patient noted to have improved BP initially in sitting to 152/70 HR 77 bpm with  decrease to 108/66 and HR 86 after standing with return to supine 160/75 HR 66 bpm.  Patient demonstrates decreased indep with mobility  and reposrt she has had many falls (10 ) in the past year mostly to the right side. Expect pt wtill need 24 hour assist upon DC due to frequent falls PTA with injury. Treatment Diagnosis: difficulty walking right hemiparesis.   Therapy Prognosis: Good  Decision Making: High Complexity  Treatment Initiated : AAROM RLE to PROM and assist with mobility progression  No Skilled PT: Independent with functional mobility   Discharge Recommendations: Patient would benefit from continued therapy after discharge;24 hour supervision or assist  PT D/C Equipment  Wheelchair: Standard  PT Equipment Recommendations  Equipment Needed: Yes  Mobility Devices: Wheelchair  Wheelchair: Standard    CLINICAL IMPRESSION   Pt is an [de-identified] old female admitted to [facility] for CVA with new onset of R sided weakness. PT noted to have hypotensive episode this morning with code stroke called. CT of head with and w/out contrast 9/16/2022 noted. Spoke with Dr. David Garduno  At rest semirecumbent denies pain BP 98/56 HR 83 bpm spO2 93% on RA, ictoria Johnson  with read back 9/16/2022 12:30pm continue PT. Patient seen for mobility evaluation. Pt noted with salient right sided weakness and impaired sensation iwth pushing posterior and to right with extensor tone in Right LE, apraxia of RUE and RLE with right LE greater weakness than UE. Pt required max assist for pivot transfer and assist of 2 for sit<>stand with patient unable to walk away from EOB due to pushing posterior and right and poor standing balance with assist of 2. Patient noted to have improved BP initially in sitting to 152/70 HR 77 bpm with  decrease to 108/66 and HR 86 after standing with return to supine 160/75 HR 66 bpm.  Patient demonstrates decreased indep with mobility  and reposrt she has had many falls (10 ) in the past year mostly to the right side. Expect pt wtill need 24 hour assist upon DC due to frequent falls PTA with injury. GOALS  Patient Goals   Patient goals : Patient states, \"To be able to get up without being told\" (helped as pt pushes backwards)  Short Term Goals  Time Frame for Short term goals: 9/23/2022  Short term goal 1: Patient demonstrates  sitting  midline 15 minutes wtihout posterior lob  Short term goal 2: Patient demonstrates bed<>chair with LRAD with mod assist of 1  Short term goal 3: Patient demonstrates walking 10 feet or greater wtih mod assist of 2 LRAD. Short term goal 4: Patient demonstrates indep in rolling L<>R and mod assist sup to sit  Short term goal 5: Patient demonstrates min assist in [facility] propulsion 150 feet.   Long Term Goals  Time Frame for Long term goals : 10/7/2022  Long term goal 1: Patient demonstrates  Modified indep in rolling and sup<>Sit. Long term goal 2: Patient demonstrates   transfers sit<>stand with FWW and min assist.  Long term goal 3: Patient demonstrates bed<>chair MONIK. Long term goal 4: Patient demonstrates gait wtih transfers  and short distances 50 feet or greater with min assist  Long term goal 5: Patient demonstrates up and down 2 steps or greater. with mod assist of 1  Additional Goals?: Yes  Long term goal 6: Patient demonstrates indep WC propulsion iwth L/R turns indep   150 feet. Long term goal 7: Patient demonstrates stoop to recover object from floor with reacher  with mod assist.  Long term goal 8: Patients family demonstrates ability to  assist patient in and out of car wtih mod assist.    PLAN OF CARE  Frequency: 1-2 treatment sessions per day, 5-7 days per week  Plan  Plan: 5-7 times per week  Plan weeks: 21 days  Current Treatment Recommendations: Strengthening;ROM;Balance training;Functional mobility training;Transfer training;Neuromuscular re-education;Stair training;Gait training; Wheelchair mobility training; Endurance training; Therapeutic activities; Positioning;Equipment evaluation, education, & procurement;Home exercise program;Safety education & training;Patient/Caregiver education & training  Safety Devices  Type of Devices: Bed alarm in place; All fall risk precautions in place;Call light within reach; Left in bed;Nurse notified  Restraints  Restraints Initially in Place: No    EDUCATION  Education  Education Given To: Patient  Education Provided: Role of Therapy;Plan of Care;Transfer Training; Fall Prevention Strategies;Precautions; Safety;Equipment;Visual Perceptual Function  Education Provided Comments: stroke education, R sided use, midline orientation with all mobility  Education Method: Verbal;Demonstration  Barriers to Learning: Other (Comment) (decrased awareness of midline posture)  Education Outcome: Verbalized understanding;Continued education needed    ELOS:   Plan weeks: 21 days      Therapy Time   Individual Concurrent Group Co-treatment   Time In 1230         Time Out 1330         Minutes 60           Timed Code Treatment Minutes: 45 Minutes (+15 eval)       Gonzalez Spencer, PT, 09/16/22 at 2:49 PM

## 2022-09-16 NOTE — PLAN OF CARE
Please seee individual Physicial Therapy Plan of care and shared plan of care dated 9/16/2022 with overall goal of Increase function towards patient's  baseline PLOF.

## 2022-09-16 NOTE — PROGRESS NOTES
Perceptual Status: Impaired  Unilateral Attention: Cues to maintain midline in sitting;Cues to attend to right side of body   ROM     Fine Motor Skills  Coordination  Movements Are Fluid And Coordinated: No  Coordination and Movement Description: Right UE;Gross motor impairments; Fine motor impairments;Decreased accuracy; Decreased speed   Comment: tremors noted in LUE   Hand Assessment     Functional Mobility  Balance  Sitting Balance: Dependent/Total (Pt with significant R lateral lean when sitting EOB; Pt required grossly MAX A for sitting)  Standing Balance: Unable to assess(comment) (Attempted x1 stand at side of bed; Pt unable to WB on RLE; unable to stand fully upright despite total A from OT. Able to tolerate ~10 sec standing in STEDY, but pt became unresponsive)  Transfers  Sit to stand: Dependent/Total (attempted to stand at side of bed; but pt unable to stand fully upright; MAX A x2 to  STEDY)  Stand to sit: Dependent/Total  Toilet Transfers  Toilet Transfers Comments: NT due to safety concerns; Wheelchair Bed Transfers  Wheelchair/Bed - Technique:  (STEDY)  Equipment Used: Wheelchair (STEDY)  Level of Asssistance: Dependent/Total  Wheelchair Transfers Comments: dependent transfer back to bed  Social/Functional History  Lives With: Alone  Type of Home: House  Home Layout: One level  Home Access: Stairs to enter without rails  Entrance Stairs - Number of Steps: 2 FELIX  Bathroom Shower/Tub: Tub/Shower unit; Walk-in shower; Shower chair with back  [employer]: Standard  Bathroom Equipment: Grab bars in shower; Shower chair (5 grab bars in walk-in shower)  Home Equipment: Cane;Crutches;Walker, rolling; Wheelchair-manual (Pt reports \"very\" old w/c)  Has the patient had two or more falls in the past year or any fall with injury in the past year?: Yes (6 falls in the last year)  Receives Help From: Home health (Home Therapy 1x a week (OT/PT))  ADL Assistance: [address] [place] [address]: Needs assistance (Paid assistance with yard work; Pt completes cooking and meals on wheels; pt states \" not much cleaning happens\". Pt completes laundry. ROLANDO completes grocery shopping)  Homemaking Responsibilities: Yes  Meal Prep Responsibility: Primary  Laundry Responsibility: Primary  Ambulation Assistance: Independent (with RW)  Transfer Assistance: Independent  Active : No  Patient's  Info: pt's \"daughter or son-in-law\" will drive her  Additional Comments: Pt reports limited 24hr supervision at d/c; limited support  ADL  Feeding: Minimal assistance  Feeding Skilled Clinical Factors: Min A at times when using RUE; pt using LUE to complete most of self-feeding  Grooming Skilled Clinical Factors: unable to assess due to medical status and code stroke  UE Bathing Skilled Clinical Factors: unable to assess due to medical status and code stroke  LE Bathing Skilled Clinical Factors: unable to assess due to medical status and code stroke  UE Dressing Skilled Clinical Factors: unable to assess due to medical status and code stroke  LE Dressing Skilled Clinical Factors: unable to assess due to medical status and code stroke  Toileting Skilled Clinical Factors: unable to assess due to medical status and code stroke  Additional Comments: Pt talkative and agreeable to ADL when supine. Upon sitting EOB, pt noted to have an increase in RUE weakness, facial droop, speech, and fixed gaze. Pt unable to verbalize needs and participate in ADLs. Pt's RN called to the room and code stroke called. 2nd Session: Pt seen for 2nd session due to no acute findings on CT. Pt required total A for supine to sit. MD requesting orthostatic vitals: Pt required max A x2 to  STEDY. Unable to tolerate standing for longer than ~10 sec despite total A x2. Pt returned to supine with total A x1. Pt completed oral care semi-upright in bed with min A. Pt required total A don/doff socks. Pt left in room with bed alarm on.      Orthostatic Vitals:  Orthostatic Vitals 9/16/2022   Orthostatic B/P and Pulse? -   Blood Pressure Lying -156/66   Pulse Lying -64   Blood Pressure Sitting -138/62   Pulse Sitting -80   Blood Pressure Standing 134/66   Pulse Standing 81         Goals  Short Term Goals  Time Frame for Short term goals: 9/28/22 (14 days)  Short Term Goal 1: Pt will complete UB dressing with mod A  Short Term Goal 2: Pt will complete simple grooming task supported in w/c with set-up A  Short Term Goal 3: Pt will complete functional transfers with mod A x1 using LRAD  Short Term Goal 4: Pt will complete toileting with max A  Long Term Goals  Time Frame for Long term goals : 10/05/22 (21 days)  Long Term Goal 1: Pt will complete toilet transfer with  MIN A   Long Term Goal 2: Pt will complete LB dressing with MOD A   Long Term Goal 3: Pt will complete bathing with  MOD A   Long Term Goal 4: Pt will incorporate RUE into ADLs 75% of trials with minimal VC in order to increase functional independence with ADLs    Assessment  Performance deficits / Impairments: Decreased functional mobility ; Decreased endurance;Decreased coordination;Decreased posture;Decreased sensation;Decreased ADL status; Decreased ROM; Decreased balance;Decreased strength;Decreased safe awareness;Decreased high-level IADLs;Decreased cognition;Decreased fine motor control;Decreased vision/visual deficit  Assessment: Pt is an [de-identified] old female admitted to [facility] for CVA with new onset of R sided weakness. Pt reports baseline independence at home with ADL and able to complete simple IADLs (cooking and taking care of cat). Today, pt required max/total A to sit EOB. Upon sitting EOB pt required MAX A for sitting balance. OT attempted x1 stand, but pt unable to stand despite total A from OT. Pt required the use of the STEDY and MAX A x2 to  STEDY. ADLs limited this date due to pt's medical status/code stroke.  Anticipate pt would benefit from onogoing OT in order to maximize return to baseline function. Treatment Diagnosis: functional deficits in ADLs due to CVA  Prognosis: Fair  Decision Making: High Complexity  Discharge Recommendations: Continue to assess pending progress; Patient would benefit from continued therapy after discharge  Plan  Times per Week: 5-7x/wk  Specific Instructions for Next Treatment: ADLs  Current Treatment Recommendations: Strengthening;Balance training;Functional mobility training;Self-Care / ADL;Endurance training;Neuromuscular re-education;Positioning;Modalities; Equipment evaluation, education, & procurement;Patient/Caregiver education & training; Safety education & training;Home management training;Coordination training;Sensory integraion       Therapy Time   Individual Concurrent Group Co-treatment   Time In 0830         Time Out 0925         Minutes 55         Timed Code Treatment Minutes: 54 Minutes     Second Session Therapy Time:   Individual Concurrent Group Co-treatment   Time In 7920         Time Out 1556         Minutes 24           Timed Code Treatment Minutes:  24 Minutes    Total Treatment Minutes:  79 minutes     Chano Parker OTR/L

## 2022-09-16 NOTE — CONSULTS
used   Substance Use Topics    Alcohol use: Yes     Comment: wine with dinner    Drug use: Never     Allergies   Allergen Reactions    Penicillins      Current Facility-Administered Medications   Medication Dose Route Frequency Provider Last Rate Last Admin    0.9 % sodium chloride bolus  500 mL IntraVENous Once Shan Blizzard, MD   Held at 09/16/22 1025    traZODone (DESYREL) tablet 50 mg  50 mg Oral Nightly PRN Geoffery Payment, MD        potassium chloride (KLOR-CON M) extended release tablet 10 mEq  10 mEq Oral Daily Geoffery Payment, MD        calcium carbonate (TUMS) chewable tablet 500 mg  500 mg Oral Daily Geoffery Payment, MD   500 mg at 09/16/22 0959    hydrALAZINE (APRESOLINE) tablet 10 mg  10 mg Oral Q2H PRN Geoffery Payment, MD   10 mg at 09/15/22 2024    acetaminophen (TYLENOL) tablet 650 mg  650 mg Oral Q4H PRN Geoffery Payment, MD        aspirin chewable tablet 81 mg  81 mg Oral Daily Geoffery Payment, MD   81 mg at 09/16/22 1001    atorvastatin (LIPITOR) tablet 80 mg  80 mg Oral Nightly Geoffery Payment, MD   80 mg at 09/15/22 2025    Vitamin D (CHOLECALCIFEROL) tablet 5,000 Units  5,000 Units Oral Daily Geoffery Payment, MD   5,000 Units at 09/16/22 0959    clopidogrel (PLAVIX) tablet 75 mg  75 mg Oral Daily Geoffery Payment, MD   75 mg at 09/16/22 1001    donepezil (ARICEPT) tablet 10 mg  10 mg Oral Nightly Geoffery Payment, MD   10 mg at 09/15/22 2025    ferrous sulfate (IRON 325) tablet 325 mg  325 mg Oral Daily with breakfast Geoffery Payment, MD   325 mg at 09/16/22 1001    fludrocortisone (FLORINEF) tablet 0.1 mg  0.1 mg Oral Daily Geoffery Payment, MD   0.1 mg at 86/67/10 8135    folic acid (FOLVITE) tablet 1 mg  1 mg Oral Daily Geoffery Payment, MD   1 mg at 09/16/22 1001    heparin (porcine) injection 5,000 Units  5,000 Units SubCUTAneous 3 times per day Geoffery Payment, MD   5,000 Units at 09/16/22 1454    cetirizine (ZYRTEC) tablet 5 mg  5 mg Oral Daily Rose Raza MD   5 mg at 09/16/22 0959    trospium (SANCTURA) tablet 20 mg  20 mg Oral Nightly Rose Raza MD   20 mg at 09/15/22 2024    vitamin B-12 (CYANOCOBALAMIN) tablet 1,000 mcg  1,000 mcg Oral Daily Rose Raza MD   1,000 mcg at 09/16/22 1000    melatonin disintegrating tablet 5 mg  5 mg Oral Nightly PRN Rose Raza MD        ondansetron (ZOFRAN-ODT) disintegrating tablet 4 mg  4 mg Oral Q8H PRN Rose Raza MD        polyethylene glycol [facility]) packet 17 g  17 g Oral Daily PRN Rose Raza MD        sennosides-docusate sodium (SENOKOT-S) 8.6-50 MG tablet 2 tablet  2 tablet Oral Daily PRN Rose Raza MD        bisacodyl (DULCOLAX) suppository 10 mg  10 mg Rectal Daily PRN Rose Raza MD        traMADol Bobetta Bitter) tablet 50 mg  50 mg Oral Q6H PRN Rose Raza MD           ROS : A 10-14 system review of constitutional, cardiovascular, respiratory, eyes, musculoskeletal, endocrine, GI, ENT, skin, hematological, genitourinary, psychiatric and neurologic systems was obtained and updated today and is unremarkable except as mentioned in my HPI      Exam:     Constitutional:   Vitals:    09/16/22 1535 09/16/22 1540 09/16/22 1541 09/16/22 1545   BP: (!) 156/66 138/62 134/66    Pulse: 64 80 81    Resp:       Temp:       TempSrc:       SpO2:       Weight:    111 lb 15.9 oz (50.8 kg)   Height:    5' 5\" (1.651 m)       General appearance and observation: Normal development and appear in no acute distress. Eye:  Fundus: No blurring of optic disc. Neck: supple  Cardiovascular: No lower leg edema with good pulsation. Mental Status:   Oriented to person, place, problem, and time. Memory: Good immediate recall. Intact remote memory  Normal attention span and concentration. Language: intact naming, repeating and fluency   Good fund of Knowledge. Aware of current events and vocabulary   Cranial Nerves:   II: Visual fields: Full. Pupils: equal, round, reactive to light  III,IV,VI: Extra Ocular Movements are intact. No nystagmus  V: Facial sensation is intact  VII: Facial strength and movements: intact and symmetric  VIII: Hearing: Intact  IX: Palate elevation is symmetric  XI: Shoulder shrug is intact  XII: Tongue movements are normal  Musculoskeletal: No focal weakness today, slightly weak on the right side. Tone: Normal tone. Reflexes: Symmetric 2+ in the arms and  in the legs   Planters: flexor bilaterally. Coordination: no pronator drift, no dysmetria with FNF in upper extremities. Normal REM. Postural and intentional tremors. No cogwheeling or resting tremors  Sensation: normal to all modalities in both arms and legs. Gait/Posture: Unsteady n. Data:  LABS:   Lab Results   Component Value Date/Time     09/16/2022 06:54 AM    K 3.4 09/16/2022 06:54 AM     09/16/2022 06:54 AM    CO2 26 09/16/2022 06:54 AM    BUN 22 09/16/2022 06:54 AM    CREATININE 0.7 09/16/2022 06:54 AM    GFRAA >60 09/16/2022 06:54 AM    LABGLOM >60 09/16/2022 06:54 AM    GLUCOSE 86 09/16/2022 06:54 AM    PHOS 2.8 03/26/2022 05:21 AM    MG 1.90 09/16/2022 06:54 AM    CALCIUM 8.8 09/16/2022 06:54 AM     Lab Results   Component Value Date/Time    WBC 5.5 09/16/2022 06:54 AM    RBC 3.76 09/16/2022 06:54 AM    HGB 12.2 09/16/2022 06:54 AM    HCT 36.4 09/16/2022 06:54 AM    MCV 96.7 09/16/2022 06:54 AM    RDW 16.0 09/16/2022 06:54 AM     09/16/2022 06:54 AM   No results found for: INR, PROTIME    Neuroimaging was independently reviewed by myself and discussed results with the patient and/or family  Reviewed notes from different physicians  Reviewed lab and blood testing    Impression:  Acute right-sided weakness likely TIA secondary to hypoperfusion and hypotension.   Recent left ischemic basal ganglia stroke  Hypertension with hypotension   chronic resting tremors  Autonomic dysfunction    Recommendation:  DC Sinemet as it can worsen her orthostatic hypotension  Hydration  Keep an eye on her orthostatics  Continue aspirin, Plavix and statin  DVT and GI prophylaxis  Continue rehab  Neurochecks  Continue Aricept for now  Further work-up in an outpatient setting regarding possibility of secondary parkinsonism        Thank you for referring such patient. If you have any questions regarding my consult note, please don't hesitate to call me. Willis Kidd MD  [phone]    This dictation was generated by voice recognition computer software.  Although all attempts are made to edit the dictation for accuracy, there may be errors in the  transcription that are not intended

## 2022-09-16 NOTE — PROGRESS NOTES
IRF MIKE Admission Assessment  [facility] Acute Rehab Unit    Patient:Yulissa [address] Dx/Hx: Acute CVA (cerebrovascular accident) [facility]) [I63.9]   :1940  FUJ:[id number]  Date of Admit: 2022     Pain Effect on Sleep:  Over the past 5 days, how much of the time has pain made it hard for you to sleep at night?  []  0. Does not apply - I have not had any pain or hurting in the past 5 days  []  1. Rarely or not at all  [x]  2. Occasionally  []  3. Frequently  []  4. Almost constantly  []  8. Unable to answer    Over the past 5 days, how often have you limited your participation in rehabilitation therapy sessions due to pain? [x]  0. Does not apply - I have not received rehabilitation therapy in the past 5 days  []  1. Rarely or not at all  []  2. Occasionally  []  3. Frequently  []  4. Almost constantly  []  8. Unable to answer    Pain Interference with Day-to-Day Activities:  Over the past 5 days, how often have you limited your day-to-day activities (excluding rehabilitation therapy session)? [x]  1. Rarely or not at all  []  2. Occasionally  []  3. Frequently  []  4. Almost constantly  []  8. Unable to answer      High-Risk Drug Classes: Use and Indication   Is taking: Check if the pt is taking any medications by pharmacological classification  Indication noted: If column 1 is checked, check if there is an indication noted for all meds in the drug class Is taking  (check all that apply) Indication noted (check all that apply)   Antipsychotic [] []   Anticoagulant [x] []   Antibiotic [] []   Opioid [] []   Antiplatelet [] []   Hypoglycemic (including insulin) [] []   None of the above [] []     Special Treatments, Procedures, and Programs   Check all of the following treatments, procedures, and programs that apply on admission. On admission (check all that apply)   Cancer Treatments    A1. Chemotherapy []   A2. IV []   A3. Oral []   A10.  Other []   B1. Radiation []   Respiratory Therapies C1. Oxygen Therapy []   C2. Continuous []   C3. Intermittent []   C4. High-concentration []   D1. Suctioning []   D2. Scheduled []   D3. As needed []   E1. Tracheostomy Care []   F1. Invasive Mechanical Ventilator (ventilator or respirator) []   G1. Non-invasive Mechanical Ventilator []   G2. BiPAP []   G3. CPAP []   Other    H1. IV Medications []   H2. Vasoactive medications []   H3. Antibiotics []   H4. Anticoagulation [x]   H10. Other []   I1. Transfusions []   J1. Dialysis []   J2. Hemodialysis []   J3. Peritoneal dialysis []   O1. IV access []   O2. Peripheral []   O3. Midline []   O4.  Central (PICC, tunneled, port) []   None of the above []     Signature:  Electronically signed by Deirdre Whaley RN on 9/16/2022 at 2:38 AM

## 2022-09-16 NOTE — PROGRESS NOTES
Speech Language Pathology  Facility/Department: [facility] ARU  Initial Speech/Language/Cognitive Assessment    NAME: Pedrito Burk  : 1940   MRN: [id number]  ADMISSION DATE: 9/15/2022  ADMITTING DIAGNOSIS: has Left knee pain; Left patella fracture; Contusion of left knee; Orthostatic hypotension; ARF (acute renal failure) ([facility] [address] Anemia; Debility; and Acute CVA (cerebrovascular accident) [facility]) on their problem list.  DATE ONSET: 22    Date of Eval: 2022   Evaluating Therapist: SANYA Patel    RECENT RESULTS  CT OF HEAD/MRI: 22  Impression          1. Acute to subacute lacunar infarct involving the posterior aspect of the left basal ganglia and corona radiata. 2. Mild volume loss and findings consistent with small vessel ischemic change involving the deep white matter. Primary Complaint:   Pt with complaints regarding initial confusion and slurred speech, but stated this has resolved. Pt's goals: \"to get back to normal\"    Pain:  Pain Assessment  Pain Assessment: None - Denies Pain    Vision/ Hearing  Vision  Vision: Impaired    Assessment:  Cognitive Diagnosis: Mild cognitive linguistic deficit   Per H&P: \"Yulissa Valles is an [de-identified]year old female with a past medical history significant for orthostatic hypotension, anemia, and memory loss who presented to [facility] on 22 with recurrent falls and right sided weakness. CT head was negative for acute process. MRI brain revealed infarct in the left basal ganglia and corona radiata. Course was complicated by worsening right sided weakness. Repeat CT head was negative for acute process. Symptoms were suspected to be associated with drop in blood pressure. She was admitted to [facility] on 9/15/22 due to functional deficits below her baseline. This morning patient had an episode of increased right sided weakness. She reports that she had diffuse numbness and tingling in her body at the time. Code stroke was called.  Stat CT head was negative for acute process. Her blood pressure was noted to be low at the time. She reports resolution of these symptoms and that she is back to her baseline right sided weakness. \"    Pt alert and oriented, cooperative and agreeable to participate. Pt's dtr and son in law present at bedside during evaluation. Pt lives at home indep, manages own meds/finances, cooking, cleaning, laundry. Pt has some assist with grocery shopping and yard work, not an active . Pt was previously admitted to the ARU from 03/29/22-04/08/22. Pt scored a 22/30 on the [address] on day of discharge during previous admission. The pt was administered the [address] ([address] [facility]) Examination this date to assess cognition. The pt scored a 23/26 with a score of 27 or greater considered WNL per the SLUMS. See pt's scores on each subtest below:    [address] [facility] Status ([facility]) Examination   Section / subtest   Score Comments   Orientation   3/3    Short-term recall   2/5 2/5 indep improving to 5/5 given category cue    Calculations   3/3    Naming   3/3 Pt able to name 15 items    Attention: number repetition   2/2    Visuospatial tasks: Clock drawing and shape identification   2/2 Writing clock portion omitted d/t pt unable to write with dominant hand post CVA   Auditory comprehension 8/8     Total score: 23/26        The pt demonstrated difficulty with short term recall. The pt's strengths included orientation, naming, money calculations, auditory comprehension of paragraph, and overall attention/thought organization.        Recommendations:  Recommendations  Requires SLP Intervention: Yes  Patient Education: Edu provided re: role of SLP, results of assessment, recommendations  Patient Education Response: Verbalizes understanding  Duration of Treatment: 30 mins          Plan:   Speech Therapy Prognosis  Prognosis: Good  Prognosis Considerations: Family/Community Support;Participation Impairment Severity: None    Auditory Comprehension  Comprehension: Within Functional Limits    Expression  Primary Mode of Expression: Verbal    Verbal Expression  Verbal Expression: Within functional limits    Written Expression  Dominant Hand: Right  Written Expression: Unable to assess (pt unable to use dominant hand)    Pragmatics/Social Functioning  Pragmatics: Within functional limits    Cognition:   Orientation  Overall Orientation Status: Within Normal Limits  Attention  Attention: Within Functional Limits  Memory  Memory: Exceptions to [facility]  Short-term Memory: Moderate  Working Memory: Mild  Problem Solving  Problem Solving: Exceptions to [facility]  Managing Finances:  (to be assessed)  Managing Medications:  (to be assessed)  Numeric Reasoning  Numeric Reasoning: Within Functional Limits  Abstract Reasoning  Abstract Reasoning: Within Functional Limits  Safety/Judgment  Safety/Judgment: Within Functional Limits      Prognosis:  Speech Therapy Prognosis  Prognosis: Good  Prognosis Considerations: Family/Community Support;Participation Level; Motivation  Individuals consulted  Consulted and agree with results and recommendations: Patient;RN;Family member  Family member consulted: Dtr Terese Fowler and son in law Antwon Robertson  RN Name: Greg Parkinson    Education:  Patient Education: Edu provided re: role of SLP, results of assessment, recommendations  Patient Education Response: Verbalizes understanding  Safety Devices in place: Yes  Type of devices: All fall risk precautions in place; Left in bed;Bed alarm in place;Call light within reach;Nurse notified; Other (comment) (family members at bedside)    Therapy Time:   Individual   Time In 1400   Time Out 1430   Minutes [address] M. A CCC-SLP  Speech-Language Pathologist  [id number]

## 2022-09-16 NOTE — CARE COORDINATION
Case Management ARU Admission [address] [place]    Patient:Yulissa Doyle     Rehab Dx/Hx: Acute CVA (cerebrovascular accident) [facility]) [I63.9]   :1940  [facility]:[id number]  Date of Admit: 9/15/2022  Room #: 0160/0160-01       Objective:  met with the patient to complete initial assessment and review the role of Case Management while on the ARU. Patient educated on team conferences. Discussed family training with the patient/family on how it is encouraged on the unit. Order for \"discharge planning\" has been addressed. Family Present: No   Primary : [facility] Decision Maker:   Primary Decision Maker: Nandini Barry - [facility] - [phone]   Current PCP:  Santos Vergara MD       Admit date:  9/15/2022   Insurance: P:MEDICARE PART A AND B  S:[facility]/[employer] MEDICARE SUPP     Precert required for SNF:  [x] No       []   3 Night stay required: [x] No       []   Admitting diagnosis: Acute CVA (cerebrovascular accident) ([facility] [address] [I63.9]       Current home situation: Independent prior level of function with rolling walker. Lives in a one level home with a 2 step entry that has a tub/shower unit with grab bars. DME at home: [x] Walker>Rolling     [x] Cane       [] RTS        [] BSC      [] Shower Chair        [] O2       [] HHN     [] CPAP       [] BiPap      [] Hospital Bed       [x] W/C>Manual        [x] Other: Crutches   Medical concerns requiring training: no   Medication concerns:  Require financial assistance with meds?  [x] No       [] If yes, please explain:   [x] No       []       Services prior to admission: Home care partners: PT/OT x 1/wk  [employer] on aging referral sent on last admission to ARU on 3/18/22  Life alert information given to patient on last admission    Preference for [facility] or OP Therapy: [x] Home health>Home care partners      [] Outpatient       [] No preference   Patient's goal(s): Go home   Working or volunteering PTA? Retired       Transportation needs: Family can provide   Has lack of transportation kept you from medical appointments, meetings, work, or ADL's? [] Yes, it has kept me from medical appointments or from getting my meds  [] Yes, It has kept me from non-medical meetings, appointments, work, or getting things I need  [x] No  [] Pt unable to respond  [] Pt declines to respond     How often do you need to have someone help you when you read instructions, pamphlets, or other written material from your doctor or pharmacy? [] Never                             [] Always  [] Rarely                            [] Patient unable to respond  [x] Sometimes                     [] Pt declines to respond  [] Often   Active with: [] Senior services        [] [place] on aging         [] Other:         Dialysis Facility (if applicable) Name:  Address:  Dialysis Schedule:  Phone:  Fax:       Support system at home and in the community: family   Caregiver physical ability: [x] Cue patient for safety  [] Physical lift/lower: [] Light [] Moderate [] Heavy  [x] Cyndy Bedford / Malorie Ripple  [] Transport   Who will be the one to contact for family training: Keeley Martin   24 hour care on discharge?: TBD   What level does the patient need to be at to return home:  PLOF   Discharge plan:  Pending therapy recommendations   Barriers to discharge: Patient progress       Ethnicity: Are you of , /a, or Malian origin?   [x] No, not of , /a, or Malian origin  [] Yes, [place], [address] [address], Chicano/a  [] Yes, [address] [facility]  [] Yes, [place]  [] Yes, another , , or Malian origin  [] Pt unable to respond  [] Pt declines to respond     Race: [x] White                                                [] [place]              [] [address] [facility]  [] Ulus Rimes or [place] American                [] [place]          [] [place] or [place]  [] [address] or [place] Native     [] [place]             [] Armenian  []  [place] [facility]                                      [] [place]      [] Other   [] [facility]                                             [] Other        [] Pt unable to respond                      [] Pt declines to respond                  [] None of the above   Preferred Language: English     ECOC on chart for MD Signature. Patient had code stroke called this morning 9/16 @ 1022 see prog notes. IM, Neuro and cards consulted.     Signature: Kvng Faustin RN

## 2022-09-16 NOTE — CONSULTS
[address] [place] [address]  [phone]      Attending Physician: Nova Daniels MD  Reason for Consultation/Chief Complaint: Labile hypertension    Subjective   History of Present Illness:  Di Alpers is a [de-identified] y.o. with a history of tremors, orthostatic hypotension, and memory loss who was admitted to [facility] rehab unit following an acute left basal ganglia CVA. Cardiology's been consulted for further evaluation and management of labile blood pressures. The patient reports that she has been taking Florinef 0.1 mg daily for the last few months for treatment of orthostatic hypotension. She says that her blood pressure has recently been very labile. Her BP was recorded as high as 202/85 earlier this morning at approximately 10 AM and then decreased to 98/56 at 12:38 PM.  Review of her MAR, does not appear that she was given any antihypertensive agents in the interval period. Her BP is currently 134/66 and she does not have any active complaints. She does note that she became lightheaded while attempting to work with PT earlier today. Since her stroke, she has had difficulty ambulating and is unable to do so at this time on her own. Past Medical History:   has a past medical history of ARF (acute renal failure) ([facility] [address] Surgical History:   has a past surgical history that includes joint replacement; Tonsillectomy; Intracapsular cataract extraction (Right, 5/21/2019); and Intracapsular cataract extraction (Left, 7/2/2019). Social History:   reports that she has never smoked. She has never used smokeless tobacco. She reports current alcohol use. She reports that she does not use drugs. Family History:  family history includes Cancer in her mother. Home Medications:  Were reviewed and are listed in nursing record and/or below  Prior to Admission medications    Medication Sig Start Date End Date Taking?  Authorizing Provider   carbidopa-levodopa (SINEMET)  MG per tablet Take 0.5 tablets by mouth 3 times daily 9/15/22  Yes Historical Provider, MD   cetirizine (ZYRTEC) 10 MG tablet Take 10 mg by mouth daily   Yes Historical Provider, MD   fludrocortisone (FLORINEF) 0.1 MG tablet Take 1 tablet by mouth daily 4/6/22   Vignesh Deleon MD   ferrous sulfate (IRON 325) 325 (65 Fe) MG tablet Take 1 tablet by mouth 2 times daily (with meals) 4/6/22   Vignesh Deleon MD   sennosides-docusate sodium (SENOKOT-S) 8.6-50 MG tablet Take 2 tablets by mouth daily 4/7/22   Vignesh Deleon MD   magnesium oxide (MAG-OX) 400 (240 Mg) MG tablet Take 1 tablet by mouth daily 4/7/22   Vignesh Deleon MD   potassium chloride (KLOR-CON M) 20 MEQ extended release tablet Take 2 tablets by mouth daily 4/7/22   Vignesh Deleon MD   donepezil (ARICEPT) 10 MG tablet Take 10 mg by mouth nightly    Historical Provider, MD   vitamin B-12 (CYANOCOBALAMIN) 1000 MCG tablet Take 1,000 mcg by mouth daily    Historical Provider, MD   calcium carbonate 600 MG TABS tablet Take 1 tablet by mouth daily Written as one tablet BID, actually taking 1 tablet daily    Historical Provider, MD   solifenacin (VESICARE) 10 MG tablet Take 10 mg by mouth daily    Historical Provider, MD   mirabegron (MYRBETRIQ) 50 MG TB24 Take 50 mg by mouth daily    Historical Provider, MD   folic acid (FOLVITE) 1 MG tablet Take 1 tablet by mouth daily 1/10/20   Anmol Sanford MD   Cholecalciferol (VITAMIN D PO) Take by mouth daily    Historical Provider, MD   Multiple Vitamins-Minerals (MULTIVITAMIN & MINERAL PO) Take  by mouth.       Historical Provider, MD        CURRENT Medications:  0.9 % sodium chloride bolus, Once  traZODone (DESYREL) tablet 50 mg, Nightly PRN  potassium chloride (KLOR-CON M) extended release tablet 10 mEq, Daily  calcium carbonate (TUMS) chewable tablet 500 mg, Daily  hydrALAZINE (APRESOLINE) tablet 10 mg, Q2H PRN  acetaminophen (TYLENOL) tablet 650 mg, Q4H PRN  aspirin chewable tablet 81 mg, Daily  atorvastatin (LIPITOR) tablet 80 mg, Nightly  Vitamin D (CHOLECALCIFEROL) tablet 5,000 Units, Daily  clopidogrel (PLAVIX) tablet 75 mg, Daily  donepezil (ARICEPT) tablet 10 mg, Nightly  ferrous sulfate (IRON 325) tablet 325 mg, Daily with breakfast  fludrocortisone (FLORINEF) tablet 0.1 mg, Daily  folic acid (FOLVITE) tablet 1 mg, Daily  heparin (porcine) injection 5,000 Units, 3 times per day  cetirizine (ZYRTEC) tablet 5 mg, Daily  trospium (SANCTURA) tablet 20 mg, Nightly  vitamin B-12 (CYANOCOBALAMIN) tablet 1,000 mcg, Daily  melatonin disintegrating tablet 5 mg, Nightly PRN  ondansetron (ZOFRAN-ODT) disintegrating tablet 4 mg, Q8H PRN  polyethylene glycol (GLYCOLAX) packet 17 g, Daily PRN  sennosides-docusate sodium (SENOKOT-S) 8.6-50 MG tablet 2 tablet, Daily PRN  bisacodyl (DULCOLAX) suppository 10 mg, Daily PRN  traMADol (ULTRAM) tablet 50 mg, Q6H PRN        Allergies:  Penicillins     Review of Systems:   A 14 point review of symptoms was completed. Pertinent positives were identified in the HPI. All other review of symptoms negative unless otherwise noted below. Objective   PHYSICAL EXAM:    Vitals:    09/16/22 1541   BP: 134/66   Pulse: 81   Resp: 16   Temp: 97.7G   SpO2: 96% on room air    Weight: 111 lb 15.9 oz (50.8 kg)       General: Elderly female lying in bed in no acute distress. Pleasant and interactive on exam.  HEENT: Normocephalic, atraumatic, non-icteric, hearing intact, nares normal, mucous membranes moist.  Neck: Supple, trachea midline. No adenopathy. No thyromegaly. No JVD. Heart: Regular rate and rhythm. Normal S1 and S2. No appreciable murmurs, gallops or rubs. Lungs: Normal respiratory effort. Clear to auscultation bilaterally. No wheezes, rales, or rhonchi. Abdomen: Soft, non-tender. Normoactive bowel sounds. No masses or organomegaly. Skin: No rashes, wounds, or lesions. Pulses: 2+ and symmetric. Extremities: Bilateral lower extremities wrapped with ACE bandage.  No clubbing, cyanosis, or edema. Psych: Normal mood and affect. Neuro: Alert and oriented. Labs   CBC:   Lab Results   Component Value Date/Time    WBC 5.5 09/16/2022 06:54 AM    RBC 3.76 09/16/2022 06:54 AM    HGB 12.2 09/16/2022 06:54 AM    HCT 36.4 09/16/2022 06:54 AM    MCV 96.7 09/16/2022 06:54 AM    RDW 16.0 09/16/2022 06:54 AM     09/16/2022 06:54 AM     CMP:  Lab Results   Component Value Date/Time     09/16/2022 06:54 AM    K 3.4 09/16/2022 06:54 AM     09/16/2022 06:54 AM    CO2 26 09/16/2022 06:54 AM    BUN 22 09/16/2022 06:54 AM    CREATININE 0.7 09/16/2022 06:54 AM    GFRAA >60 09/16/2022 06:54 AM    AGRATIO 1.6 03/25/2022 09:55 AM    LABGLOM >60 09/16/2022 06:54 AM    GLUCOSE 86 09/16/2022 06:54 AM    PROT 7.3 03/25/2022 09:55 AM    CALCIUM 8.8 09/16/2022 06:54 AM    BILITOT 1.3 03/25/2022 09:55 AM    ALKPHOS 89 03/25/2022 09:55 AM    AST 29 03/25/2022 09:55 AM    ALT 15 03/25/2022 09:55 AM     PT/INR:  No results found for: PTINR  HgBA1c:No results found for: LABA1C  No results found for: CKTOTAL, CKMB, CKMBINDEX, TROPONINI      Cardiac Data     Last EKG: None available from this admission. Echo:  TTE 9/12/22:  Summary:   Right ventricular systolic pressure is normal at <35 mmHg. Injection of agitated saline showed no interatrial shunt. Left ventricular function is low normal.   Overall left ventricular ejection fraction is estimated to be 50-55%. Borderline global hypokinesis of the left ventricle. Left atrium is severely (>40 ml/m2) dilated. Moderate aortic regurgitation. No hemodynamically significant valvular aortic stenosis. There is no obvious cardiac source of emboli noted. Stress Test: N/A    Cath: N/A      Assessment and Plan      1. Labile hypertension - May be exacerbated by autonomic dysfunction related to recent stroke. There also appears to be concern for possible secondary Parkinsonism.   Patient reportedly has history of orthostatic hypotension and has been taking Florinef. BP has ranged from 200s/80s to 90s/50s during her admission. She is currently normotensive with BP of 134/66.  -At this time, would recommend decreasing florinef to 0.05 mg daily and holding for SBP >130 mmHg  -Sinemet was discontinued by neurology due to concern that it could worsen orthostatic hypotension  -Advised to make sure that she is maintaining adequate PO fluid intake and instructed to always lysed slowly from lying and sitting positions and sit or lie down immediately whenever she begins to feel lightheaded  -Can check orthostatic vitals when able - If patient is unable to stand due to lower extremity weakness, can check supine and sitting  -Will need to continue to monitor BP closely as she recovers from her CVA and may need to titrate above regimen further as needed    2. Recent CVA  -Recent CTA showed no flow-limiting stenoses in the cervical carotid or vertebral arteries  -TTE with bubble study obtained at [facility] showed an LVEF of 50-55%, normal RV size and systolic function, and no evidence of an interatrial shunt. Left atrium was noted to be severely dilated. -Recommend 30-day cardiac event monitor to further evaluate for paroxysmal atrial fibrillation  -Continue aspirin 81 mg daily, Plavix 75 mg daily, and atorvastatin 80 mg qHS    3. Moderate aortic regurgitation  -Monitor with repeat TTE in one year      Thank you for allowing us to participate in the care of [facility]. Please call me with any questions [phone]. Wash Plant, DO  [address]  [phone] [facility]  [phone] [facility]  9/16/2022 5:07 PM      I will address the patient's cardiac risk factors and adjusted pharmacologic treatment as needed. In addition, I have reinforced the need for patient directed risk factor modification. All questions and concerns were addressed to the patient/family. Alternatives to my treatment were discussed.  The note was completed using EMR. Every effort was made to ensure accuracy; however, inadvertent computerized transcription errors may be present.

## 2022-09-16 NOTE — PROGRESS NOTES
NURSING ASSESSMENT: [address]    Patient:Yulissa Doyle     Rehab Dx/Hx: Acute CVA (cerebrovascular accident) [facility]) [I63.9]   :1940  CMY:[id number]  Date of Admit: 2022  Room #: 0160/0160-01    Subjective:   Patient admitted to room 160 @ 1800 from [facility] via stretcher. Alert and oriented x4. Oriented to room and call light system. Oriented to rehab routine and therapy schedules. Informed about care conferences and ordering of meals with PCA. Drug / Medication Review:   Medications were reviewed by RN at time of admission  [x]  No potential or actual clinically significant medication issues were noted. []  Potential or actual clinically significant medication issues were found and MD was notified. (Specified below if applicable)   []  Allergy to medication   []  Drug interactions (drug/drug, drug/food, drug/disease interactions)   []  Duplicate drug   []  Omission (drug missing from prescribed regimen)   []  Non adherence   []  Adverse reaction   []  Wrong patient, drug, dose, route, time error   []  Ineffective drug therapy    Patient Mood Interview    Symptom Presence  0. No (enter 0 in column 2)  1. Yes (enter 0-3 in column 2)  9. No response (leave column 2 blank  Symptom Frequency  0. Never or 1 day  1. 2-6 days (several days)  2. 7-11 days (half or more days)  3. 12-14 days (nearly everyday) 1. Symptom Presence 2. Symptom Frequency    Enter scores in boxes   Little interest or pleasure in doing things   0 0   Feeling down, depressed, or hopeless                 0               0   If either A or B is coded 2 or 3, CONTINUE asking the questions below. If not, END the interview.      Trouble falling or staying asleep, or sleeping too much       Feeling tired or having little energy       Poor appetite or overeating       Feeling bad about yourself - or that you are a failure or have let yourself or your family down       Trouble concentrating on things, such as reading the newspaper or watching television       Moving or speaking so slowly that other people could have noticed. Or the opposite- being so fidgety or restless that you have been moving around a lot more than usual.       Thoughts that you would be better off dead, or of hurting yourself in some way. Total Severity: Add scores for all frequency responses in column 2                  0   Social isolation (from [employer])  How often do you feel lonely or isolated from those around you? [x] 0. Never  [] 1. Rarely  [] 2. Sometimes  [] 3. Often  [] 4. Always  [] 8. Patient unable to respond. Admission BIMS:  Number of word repeated after first attempt:  []  0. None []  1. One []  2. Two [x]  3. Three    Able to report correct year:  [x]  0. Missed by >5 years, or no answer  []  1. Missed by 2-5 years  []  2. Missed by 1 year  []  3. Correct    Able to report correct month:   [x]  0. Missed by >1 month, or no answer  []  1. Missed by 6 days to 1 month  []  2. Accurate within 5 days    Able to report correct day of the week:  [x]  0. Incorrect or no answer  []  1. Correct    Recall:   Able to recall \"sock\":  []  0. No could not recall  []  1. Yes, after cueing  [x]  2. Yes, no cue required  Able to recall \"blue\":  []  0. No could not recall  [x]  1. Yes, after cueing  []  2. Yes, no cue required  Able to recall \"bed\":  []  0. No could not recall  [x]  1. Yes, after cueing  []  2. Yes, no cue required      4 Eyes Skin Assessment   The patient is being assessed for: Admission     I agree that 2 RN's have performed a thorough Head to Toe Skin Assessment on the patient. ALL assessment sites listed below have been assessed. Areas assessed by both nurses:   [x]   Head, Face, and Ears   [x]   Shoulders, Back, and Chest, Abdomen  [x]   Arms, Elbows, and Hands   [x]   Coccyx, Sacrum, and Ischium  [x]   Legs, Feet, and Heel     Does the Patient have Skin Breakdown?   Yes         Mike Prevention initiated:  Yes   Wound Care Orders initiated:  Yes      [id number] nurse consulted for Pressure Injury (Stage 3,4, Unstageable, DTI, NWPT, Complex wounds)and New or Established Ostomies:  Not Applicable    Primary Nurse eSignature: Electronically signed by Mena Shaffer RN on 9/16/2022 at 2:28 AM   Co-signer eSignature:   oMlly Salazar RN

## 2022-09-17 LAB
EKG ATRIAL RATE: 66 BPM
EKG DIAGNOSIS: NORMAL
EKG P AXIS: 19 DEGREES
EKG P-R INTERVAL: 242 MS
EKG Q-T INTERVAL: 478 MS
EKG QRS DURATION: 138 MS
EKG QTC CALCULATION (BAZETT): 501 MS
EKG R AXIS: -63 DEGREES
EKG T AXIS: 84 DEGREES
EKG VENTRICULAR RATE: 66 BPM

## 2022-09-17 PROCEDURE — 97110 THERAPEUTIC EXERCISES: CPT

## 2022-09-17 PROCEDURE — 99233 SBSQ HOSP IP/OBS HIGH 50: CPT | Performed by: INTERNAL MEDICINE

## 2022-09-17 PROCEDURE — 97530 THERAPEUTIC ACTIVITIES: CPT

## 2022-09-17 PROCEDURE — 97535 SELF CARE MNGMENT TRAINING: CPT

## 2022-09-17 PROCEDURE — 6370000000 HC RX 637 (ALT 250 FOR IP): Performed by: INTERNAL MEDICINE

## 2022-09-17 PROCEDURE — 1280000000 HC REHAB R&B

## 2022-09-17 PROCEDURE — 6360000002 HC RX W HCPCS: Performed by: STUDENT IN AN ORGANIZED HEALTH CARE EDUCATION/TRAINING PROGRAM

## 2022-09-17 PROCEDURE — 6370000000 HC RX 637 (ALT 250 FOR IP): Performed by: STUDENT IN AN ORGANIZED HEALTH CARE EDUCATION/TRAINING PROGRAM

## 2022-09-17 PROCEDURE — 97129 THER IVNTJ 1ST 15 MIN: CPT

## 2022-09-17 PROCEDURE — 93005 ELECTROCARDIOGRAM TRACING: CPT | Performed by: INTERNAL MEDICINE

## 2022-09-17 PROCEDURE — 93010 ELECTROCARDIOGRAM REPORT: CPT | Performed by: INTERNAL MEDICINE

## 2022-09-17 PROCEDURE — 92526 ORAL FUNCTION THERAPY: CPT

## 2022-09-17 RX ADMIN — ASPIRIN 81 MG 81 MG: 81 TABLET ORAL at 09:11

## 2022-09-17 RX ADMIN — CETIRIZINE HYDROCHLORIDE 5 MG: 5 TABLET, FILM COATED ORAL at 09:11

## 2022-09-17 RX ADMIN — DONEPEZIL HYDROCHLORIDE 10 MG: 5 TABLET, FILM COATED ORAL at 21:40

## 2022-09-17 RX ADMIN — FOLIC ACID 1 MG: 1 TABLET ORAL at 09:11

## 2022-09-17 RX ADMIN — HEPARIN SODIUM 5000 UNITS: 5000 INJECTION INTRAVENOUS; SUBCUTANEOUS at 13:11

## 2022-09-17 RX ADMIN — FERROUS SULFATE TAB 325 MG (65 MG ELEMENTAL FE) 325 MG: 325 (65 FE) TAB at 09:12

## 2022-09-17 RX ADMIN — HEPARIN SODIUM 5000 UNITS: 5000 INJECTION INTRAVENOUS; SUBCUTANEOUS at 05:07

## 2022-09-17 RX ADMIN — CLOPIDOGREL BISULFATE 75 MG: 75 TABLET ORAL at 09:11

## 2022-09-17 RX ADMIN — Medication 5000 UNITS: at 09:10

## 2022-09-17 RX ADMIN — HEPARIN SODIUM 5000 UNITS: 5000 INJECTION INTRAVENOUS; SUBCUTANEOUS at 21:40

## 2022-09-17 RX ADMIN — ATORVASTATIN CALCIUM 80 MG: 80 TABLET, FILM COATED ORAL at 21:40

## 2022-09-17 RX ADMIN — CYANOCOBALAMIN TAB 500 MCG 1000 MCG: 500 TAB at 09:11

## 2022-09-17 RX ADMIN — POTASSIUM CHLORIDE 10 MEQ: 750 TABLET, EXTENDED RELEASE ORAL at 09:10

## 2022-09-17 RX ADMIN — FLUDROCORTISONE ACETATE 0.05 MG: 0.1 TABLET ORAL at 09:11

## 2022-09-17 RX ADMIN — TROSPIUM CHLORIDE 20 MG: 20 TABLET, FILM COATED ORAL at 21:39

## 2022-09-17 RX ADMIN — Medication 5 MG: at 21:40

## 2022-09-17 NOTE — PLAN OF CARE
Problem: Skin/Tissue Integrity  Goal: Absence of new skin breakdown  Outcome: Progressing     Problem: Safety - Adult  Goal: Free from fall injury  9/17/2022 1050 by Maria Ines Smith RN  Outcome: Progressing

## 2022-09-17 NOTE — PLAN OF CARE
Problem: Safety - Adult  Goal: Free from fall injury  Outcome: Progressing     Problem: Skin/Tissue Integrity  Goal: Absence of new skin breakdown  Description: 1. Monitor for areas of redness and/or skin breakdown  2. Assess vascular access sites hourly  3. Every 4-6 hours minimum:  Change oxygen saturation probe site  4. Every 4-6 hours:  If on nasal continuous positive airway pressure, respiratory therapy assess nares and determine need for appliance change or resting period.   9/16/2022 2312 by Virginia Carmona RN  Outcome: Progressing  9/16/2022 1901 by Elis Verde RN  Outcome: Progressing     Problem: ABCDS Injury Assessment  Goal: Absence of physical injury  Outcome: Progressing     Problem: Nutrition Deficit:  Goal: Optimize nutritional status  Outcome: Progressing

## 2022-09-17 NOTE — PROGRESS NOTES
Speech Language Pathology  [facility]: ACUTE REHAB UNIT  SPEECH-LANGUAGE PATHOLOGY      [x] Daily  [] Weekly Care Conference Note  [] Discharge    Patient:Yulissa Rajput      :1940  [place]  Rehab Dx/Hx: Acute CVA (cerebrovascular accident) ([facility] [address] [I63.9]    Precautions: falls  Home situation: Pt lives at home [facility] Dx: [] Aphasia  [] Dysarthria  [] Apraxia   [] Oropharyngeal dysphagia [x] Cognitive Impairment  [] Other:   Date of Admit: 9/15/2022  Room #: 0160/0160-01    Current functional status (updated daily):         Pt being seen for : [] Speech/Language Treatment  [x] Dysphagia Treatment [x] Cognitive Treatment  [] Other:  Communication: [x]WFL  [] Aphasia  [] Dysarthria  [] Apraxia  [] Pragmatic Impairment [] Non-verbal  [] Hearing Loss  [] Other:   Cognition: [] WFL  [x] Mild  [x] Moderate  [] Severe [] Unable to Assess  [] Other:  Memory: [] WFL  [] Mild  [x] Moderate  [] Severe [] Unable to Assess  [] Other:  Behavior: [x] Alert  [x] Cooperative  [x]  Pleasant  [] Confused  [] Agitated  [] Uncooperative  [] Distractible [] Motivated  [] Self-Limiting [] Anxious  [] Other:  Endurance:  [x] Adequate for participation in SLP sessions  [] Reduced overall  [] Lethargic  [] Other:  Safety: [x] No concerns at this time  [] Reduced insight into deficits  []  Reduced safety awareness [] Not following call light procedures  [] Unable to Assess  [] Other:    Current Diet Order:ADULT ORAL NUTRITION SUPPLEMENT; Breakfast, Dinner; Standard High Calorie/High Protein Oral Supplement  ADULT DIET;  Regular  Swallowing Precautions: upright for all intake, stay upright for at least 30 mins after intake, small bites/sips, assist feed, oral care 2-3x/day to reduce adverse affects in the event of aspiration, increase physical mobility as able, alternate bites/sips, slow rate of intake, general GERD precautions, and general aspiration precautions        Date: 2022      Tx session 1 Tx session 2   Total Timed Code Min 30    Total Treatment Minutes 30    Individual Treatment Minutes 30    Group Treatment Minutes 0 0   Co-Treat Minutes 0 0   Variance/Reason:  Denies    Pain The patient does not complain of pain     Pain Intervention [] RN notified  [] Repositioned  [] Intervention offered and patient declined  [x] N/A  [] Other: [] RN notified  [] Repositioned  [] Intervention offered and patient declined  [] N/A  [] Other:   Subjective     Pt alert and cooperative, agreeable for tx. Pt seen reclined in bed in room. Objective:  Goals     Dysphagia Goals:    Short-term Goals  Timeframe for Short-term Goals: 5 days (09/20/22)      Goal 1: The patient will tolerate recommended diet without observed clinical signs of aspiration    Indirectly target. Pt reports no issues with swallow function. RN reports no clinical s/s of aspiration. Goal 2: The patient/caregiver will demonstrate understanding of compensatory strategies for improved swallowing safety. Diet tolerance monitoring, and safe swallow strategies. Pt reports tolerating diet with no clinical s/s of aspiration. Cognitive Goals:    Short-term Goals  Timeframe for Short-term Goals: 18 days (10/03/22)    Goal 1: Pt will complete recall tasks with 90% acc given min cues. Grocery List  -5 items   -imm. Recall: 100% acc indep   -1 min recall: 80% acc indep improving to 100% acc given min cues   -2 min recall: 100% acc indep   -5 min recall: 100% acc indep  -10 min recall: 100% acc indep       Goal 2: Pt will complete higher level executive function tasks (meds, math, money, time, etc) with 95% acc given min cues.    Money  -general questions   -100% acc indep     Math word problems   -100% acc indep     Other areas targeted: N/A    Education:   SLP educated the patient re: Role of SLP, recommendations, and POC      Safety Devices: [x] Call light within reach  [] Chair alarm activated  [x] Bed alarm activated  [] Other: [] Call light within reach  [] Chair alarm activated  [] Bed alarm activated  [] Other:    Assessment: Tx Session: Pt was pleasant and alert throughout session. Pt finished breakfast upon SLP entry. Pt reports no s/s of aspiration and no concerns regarding swallow function. Will cont to monitor. Pt did well with cog tasks to date. Pt did well with short term recall. She reported missing one word because she was not completely focused. Pt then got 100% every time after. Pt completed higher level executive tasks with 100% indep. Pt cont to improve upon goals. Cont POC. Plan: Continue as per plan of care. Additional Information:     Barriers toward progress: N/A  Discharge recommendations:  [] Home independently  [] Home with assistance []  24 hour supervision  [] ECF [x] Other: based upon PT/OT assessment   Continued Tx Upon Discharge: ? [] Yes [] No [x] TBD based on progress while on ARU [] Vital Stim indicated [] Other:   Estimated discharge date: est d/c date 10/7/2022    Interventions used this date:  [] Speech/Language Treatment  [] Instruction in HEP [] Group [x] Dysphagia Treatment [x] Cognitive Treatment   [] Other:       Total Time Breakdown / Charges    Time in Time out Total Time / units   Cognitive Tx 0830 0845 15 min/ 1 unit   Speech Tx      Dysphagia Tx 0845 0900 15 min/ 1 unit        Electronically Signed by     Neelam CASSIDY-SLP  Clinical Fellow  [id number]

## 2022-09-17 NOTE — PROGRESS NOTES
[address]   Progress Note  Cardiology    CC:  Orthostatic hypotension, CVA    HPI:    She is in bed. Feels well. Daughter in room. Orthostatic hypotension diagnosed several years ago. Followed by Dr. Aniket Theodore. \"One medicine to take when it is high, and another when it is low\". Unable to put on compression stockings    Medications/Labs all Reviewed    Lab Results   Component Value Date    WBC 5.5 09/16/2022    HGB 12.2 09/16/2022    HCT 36.4 09/16/2022    MCV 96.7 09/16/2022     09/16/2022     Lab Results   Component Value Date    CREATININE 0.7 09/16/2022    BUN 22 (H) 09/16/2022     09/16/2022    K 3.4 (L) 09/16/2022     09/16/2022    CO2 26 09/16/2022     No results found for: INR, PROTIME     PHYSICAL EXAM   BP (!) 107/51   Pulse 73   Temp 97.4 °F (36.3 °C) (Oral)   Resp 16   Ht 5' 5\" (1.651 m)   Wt 111 lb 15.9 oz (50.8 kg)   SpO2 95%   BMI 18.64 kg/m²      Respiratory:  Resp Assessment: Normal respiratory effort  Resp Auscultation: Clear to auscultation bilaterally   Cardiovascular: Auscultation: regular rate and rhythm, normal S1S2, no murmur, rub or gallop  Palpation:  Nl PMI  JVP:  normal  Extremities: No Edema  Abdomen:  Soft, non-tender  Normal bowel sounds  Extremities:   No Cyanosis or Clubbing  Neurological/Psychiatric:  Oriented to time, place, and person  Non-anxious  Skin:   Warm and dry    ECHO 9/2022  Summary:   Right ventricular systolic pressure is normal at <35 mmHg. Injection of agitated saline showed no interatrial shunt. Left ventricular function is low normal.   Overall left ventricular ejection fraction is estimated to be 50-55%. Borderline global hypokinesis of the left ventricle. Left atrium is severely (>40 ml/m2) dilated. Moderate aortic regurgitation. No hemodynamically significant valvular aortic stenosis. There is no obvious cardiac source of emboli noted.       CT HEAD 9/2022  Evolving acute posterior left basal ganglia ischemia No acute hemorrhage. No new areas of abnormal brain attenuation identified     There is bilateral periventricular white matter low attenuation, which is usually secondary to small vessel ischemic change.     EKG 9/17  Sinus LBBB      ASSESSMENT    Orthostatic hypotension:  Baseline HTN  Possible Parkinson's  This is usually very difficult to treat  Recommend abdominal binder, increase sodium diet     CVA:  Agree with outpt [facility] to screen for Afib    AI:  Moderate  Repeat ECHO 1 year    Kacey Sapp MD, 9/17/2022 2:12 PM

## 2022-09-17 NOTE — PROGRESS NOTES
Occupational Therapy  Facility/Department: [facility]  Rehabilitation Occupational Therapy Daily Treatment Note    Date: 22  Patient Name: Salas Tom       Room: 3029/3308-58  MRN: [id number]  Account: [de-identified]   : 1940  [de-identified] y.o.) Gender: female            Past Medical History:  has a past medical history of ARF (acute renal failure) ([facility] [address] Past Surgical History:   has a past surgical history that includes joint replacement; Tonsillectomy; Intracapsular cataract extraction (Right, 2019); and Intracapsular cataract extraction (Left, 2019). Restrictions  Restrictions/Precautions: Up as Tolerated; Fall Risk  Other position/activity restrictions: Dysphagia - Soft and Bite Sized;up as tolerated; Notify physician for pulse less than 50 or greater than 120, respiratory rate less than 12 or greater than 25, oral temperature greater than 101.3 F (10.7 C), systolic BP less than 90 or greater than 171, diastolic BP less than 50 or greater than 100. Subjective  Subjective: Pt met at bedside for OT/PT cotx. Pt reporting \"feeling about the same\". Agreeable to therapy. Restrictions/Precautions: Up as Tolerated; Fall Risk      Pt agreeable to PT/OT co-tx with encouragement. Pt requires co-tx secondary to complexity of condition, decreased activity tolerance and increased assistance for ADL/mobility. Pt benefits from x 2 skilled hands to provide increased cues/feedback and promote improved safety and performance with ADLs. Objective  Vitals:   - Supine: 107/51  - Sittin/62  - After standing and transfers: 109/73     Cognition  Overall Cognitive Status: Exceptions  Arousal/Alertness: Appropriate responses to stimuli  Following Commands: Follows one step commands with increased time; Follows one step commands with repetition  Attention Span: Appears intact  Memory: Decreased recall of recent events  Problem Solving: Assistance required to generate solutions;Assistance required to implement solutions;Assistance required to identify errors made;Assistance required to correct errors made  Insights: Decreased awareness of deficits  Initiation: Requires cues for some  Sequencing: Requires cues for some  Orientation  Overall Orientation Status: Within Functional Limits  Orientation Level: Oriented X4         ADL  Upper Extremity Bathing  Assistance Level: Maximum assistance  Skilled Clinical Factors: sponge bathing while seated in w/c  Lower Extremity Bathing  Assistance Level: Dependent  Skilled Clinical Factors: required use of STEDY to  order for OT to assist with washing bottom  Upper Extremity Dressing  Assistance Level: Maximum assistance  Skilled Clinical Factors: donning t-shirt  Lower Extremity Dressing  Assistance Level: Dependent  Skilled Clinical Factors: assistance with donning pants and brief; pt required use of STEDY with standing  Putting On/Taking Off Footwear  Assistance Level: Dependent  Skilled Clinical Factors: donning socks and shoes  Toileting  Assistance Level: Dependent  Skilled Clinical Factors: assistance with all 3 aspects of toileting  Toilet Transfers  Technique:  (STEDY)  Equipment: Standard toilet;Raised toilet seat with arms (side rail due to pt with fair/poor sitting balance)  Additional Factors: Cues for hand placement; Increased time to complete; With handrails  Assistance Level: Dependent  Skilled Clinical Factors: via STEDY Nt  Tub/Shower Transfers  Skilled Clinical Factors: NT due to safety concerns with sitting balance          Functional Mobility  Device: Wheelchair  Activity: To/From bathroom  Assistance Level: Dependent  Bed Mobility  Overall Assistance Level: Requires x 2 Assistance  Sit to Supine  Assistance Level: Maximum assistance  Supine to Sit  Assistance Level: Maximum assistance  Scooting  Assistance Level: Dependent; Requires x 2 assistance  Transfers  Surface: From bed; To chair with arms;Raised toilet Seat;Wheelchair  Sit to Stand  Assistance Level: education & training; Safety education & training;Home management training;Coordination training;Sensory integraion    Goals  Short Term Goals  Time Frame for Short term goals: 9/28/22 (14 days)  Short Term Goal 1: Pt will complete UB dressing with mod A  Short Term Goal 2: Pt will complete simple grooming task supported in w/c with set-up A  Short Term Goal 3: Pt will complete functional transfers with mod A x1 using LRAD  Short Term Goal 4: Pt will complete toileting with max A  Long Term Goals  Time Frame for Long term goals : 10/05/22 (21 days)  Long Term Goal 1: Pt will complete toilet transfer with min A  Long Term Goal 2: Pt will complete LB dressing with mod A  Long Term Goal 3: Pt will complete bathing with mod A  Long Term Goal 4: Pt will incorporate RUE into ADLs 75% of trials with minimal VC in order to increase functional independence with ADLs        Therapy Time   Individual Concurrent Group Co-treatment   Time In       1330   Time Out       1430   Minutes       60   Timed Code Treatment Minutes: [address], OTR/L

## 2022-09-17 NOTE — PROGRESS NOTES
Physical Therapy  Facility/Department: [facility]  Rehabilitation Physical Therapy Treatment Note    NAME: Leticia Doss  : 1940 ([de-identified] y.o.)  MRN: [id number]  CODE STATUS: Full Code    Date of Service: 22       Restrictions:  Restrictions/Precautions: Up as Tolerated; Fall Risk  Position Activity Restriction  Other position/activity restrictions: Dysphagia - Soft and Bite Sized;up as tolerated; Notify physician for pulse less than 50 or greater than 120, respiratory rate less than 12 or greater than 25, oral temperature greater than 101.3 F (26.1 C), systolic BP less than 90 or greater than 190, diastolic BP less than 50 or greater than 100. SUBJECTIVE  Subjective  Subjective: Patient agreed to participate. Pain: Denies pain during first PT treatment session and during PT/OT co-treatment session. Post Treatment Pain Screening         OBJECTIVE  Cognition  Overall Cognitive Status: Exceptions  Arousal/Alertness: Appropriate responses to stimuli  Following Commands: Follows one step commands with increased time; Follows one step commands with repetition  Attention Span: Appears intact  Memory: Decreased recall of recent events  Safety Judgement: Decreased awareness of need for assistance;Decreased awareness of need for safety  Problem Solving: Assistance required to generate solutions;Assistance required to implement solutions;Assistance required to identify errors made;Assistance required to correct errors made  Insights: Decreased awareness of deficits  Initiation: Requires cues for some  Sequencing: Requires cues for some  Orientation  Overall Orientation Status: Within Functional Limits  Orientation Level: Oriented X4    Functional Mobility  Bed Mobility  Overall Assistance Level: Maximum Assistance; Requires x 2 Assistance (max assist to sit up during first PT session. max assist x 2 to sit up during second PT/OT co-treatment session)  Additional Factors: Increased time to complete; Head of bed flat;With handrails;Verbal cues; Set-up  Roll Left  Assistance Level: Maximum assistance  Roll Right  Assistance Level: Moderate assistance  Sit to Supine  Assistance Level: Maximum assistance; Requires x 2 assistance  Supine to Sit  Assistance Level: Maximum assistance; Requires x 2 assistance (max assist to sit up during first PT session. max assist x 2 to sit up during second PT/OT co-treatment session)  Scooting  Assistance Level: Dependent (to scoot up in bed)  Balance  Sitting Balance: Minimal assistance (patient leaned posteriorally and to the right when seated at edge of bed)  Standing Balance: Maximum assistance  Standing Balance  Time: during the first PT session the patient stood for 2 minutes in santo-stedy with maximum assistance to maintain her standing balance. during the PT/OT co-treatment session the patient stood for the following durations: stood in stedy for 30 seconds after performing sit <> stand transfer from edge of bed. stood 1 minute in stedy before and after utilizing toilet. stood 1 minute in stedy before returning to wheelchair. patient stood for 1 minute in santo-stedy before completing lower body dressing. patient stood from wheelchair to rolling walker with max assist x 2 for 2 minutes. Activity: patient had a tendency to lean posteriorally and to the right. max verbal cueing and max physical assist to correct for each standing attempt. max physical assist to extend her right hip and knee and retract her shoulders. Sit to Stand  Assistance Level: Maximum assistance; Requires x 2 assistance  Skilled Clinical Factors: cueing to correct posture, activate gluteals & quadriceps (to promote right hip and knee extension). cueing to correct for patient pushing to the right. Stand to Sit  Assistance Level: Maximum assistance; Requires x 2 assistance  Skilled Clinical Factors: poor eccentric control of hip extensors. cueing for safe hand placement.   Bed To/From Chair  Assistance Level: Dependent limited by endurance; Patient limited by fatigue  Discharge Recommendations: Patient would benefit from continued therapy after discharge;24 hour supervision or assist;Continue to assess pending progress  PT Equipment Recommendations  Other: if patient does not go to a facility she likely will need wheelchair upon discharge. continue to assess. Goals  Patient Goals   Patient goals : Patient states, \"To be able to get up without being told\" (helped as pt pushes backwards)  Short Term Goals  Time Frame for Short term goals: 9/23/2022  Short term goal 1: Patient demonstrates  sitting  midline 15 minutes wtihout posterior lob 9/17 mod assist to maintain seated balance. Short term goal 2: Patient demonstrates bed<>chair with LRAD with mod assist of 1. 9/17 dependent with santo-stedy  Short term goal 3: Patient demonstrates walking 10 feet or greater wtih mod assist of 2 LRAD. 9/17 not completed  Short term goal 4: Patient demonstrates indep in rolling L<>R and mod assist sup to sit 9/17 max assist x 2  Short term goal 5: Patient demonstrates min assist in [facility] propulsion 150 feet. 9/17 not completed  Long Term Goals  Time Frame for Long term goals : 10/7/2022  Long term goal 1: Patient demonstrates  Modified indep in rolling and sup<>Sit. Long term goal 2: Patient demonstrates   transfers sit<>stand with FWW and min assist.  Long term goal 3: Patient demonstrates bed<>chair MONIK. Long term goal 4: Patient demonstrates gait wtih transfers  and short distances 50 feet or greater with min assist  Long term goal 5: Patient demonstrates up and down 2 steps or greater. with mod assist of 1  Long term goal 6: Patient demonstrates indep WC propulsion iwth L/R turns indep   150 feet.   Long term goal 7: Patient demonstrates stoop to recover object from floor with reacher  with mod assist.  Long term goal 8: Patients family demonstrates ability to  assist patient in and out of car wtih mod assist.    PLAN OF CARE/SAFETY  Plan  Plan: 5-7 times per week  Plan weeks: 21 days  Current Treatment Recommendations: Strengthening;ROM;Balance training;Functional mobility training;Transfer training;Neuromuscular re-education;Stair training;Gait training; Wheelchair mobility training; Endurance training; Therapeutic activities; Positioning;Equipment evaluation, education, & procurement;Home exercise program;Safety education & training;Patient/Caregiver education & training  Safety Devices  Type of Devices: Bed alarm in place; All fall risk precautions in place;Call light within reach; Left in bed;Nurse notified (patient in bed with bed alarm on and daughter at bedside at end of first PT session. patient in chair with chair alarm on at end of second PT/OT co-treatment session.)    EDUCATION  Education  Education Given To: Patient  Education Provided: Role of Therapy;Plan of Care;Transfer Training; Fall Prevention Strategies;Precautions; Safety;Equipment;Visual Perceptual Function  Education Provided Comments: stroke education, R sided use, midline orientation with all mobility  Education Method: Verbal;Demonstration  Barriers to Learning: Other (Comment) (decrased awareness of midline posture)  Education Outcome: Verbalized understanding;Continued education needed        Therapy Time   Individual Concurrent Group Co-treatment   Time In 1230     1330   Time Out 1300     1430   Minutes 30     60     Timed Code Treatment Minutes: 30 Minutes for first session  Timed Code Treatment Minutes: 61 Minutes for PT/OT co-treatment session  Total timed Code PT treatment minutes for 9/17/22= 90 minutes.       Juan Gonsalves, PT, 09/17/22 at 3:14 PM

## 2022-09-18 LAB
ANION GAP SERPL CALCULATED.3IONS-SCNC: 10 MMOL/L (ref 3–16)
BUN BLDV-MCNC: 34 MG/DL (ref 7–20)
CALCIUM SERPL-MCNC: 9.3 MG/DL (ref 8.3–10.6)
CHLORIDE BLD-SCNC: 106 MMOL/L (ref 99–110)
CO2: 25 MMOL/L (ref 21–32)
CREAT SERPL-MCNC: 0.8 MG/DL (ref 0.6–1.2)
GFR AFRICAN AMERICAN: >60
GFR NON-AFRICAN AMERICAN: >60
GLUCOSE BLD-MCNC: 89 MG/DL (ref 70–99)
GLUCOSE BLD-MCNC: 95 MG/DL (ref 70–99)
HCT VFR BLD CALC: 36.2 % (ref 36–48)
HEMOGLOBIN: 12.1 G/DL (ref 12–16)
PERFORMED ON: NORMAL
POTASSIUM REFLEX MAGNESIUM: 3.8 MMOL/L (ref 3.5–5.1)
SODIUM BLD-SCNC: 141 MMOL/L (ref 136–145)
TROPONIN: <0.01 NG/ML

## 2022-09-18 PROCEDURE — 6370000000 HC RX 637 (ALT 250 FOR IP): Performed by: INTERNAL MEDICINE

## 2022-09-18 PROCEDURE — 99233 SBSQ HOSP IP/OBS HIGH 50: CPT | Performed by: INTERNAL MEDICINE

## 2022-09-18 PROCEDURE — 85018 HEMOGLOBIN: CPT

## 2022-09-18 PROCEDURE — 36415 COLL VENOUS BLD VENIPUNCTURE: CPT

## 2022-09-18 PROCEDURE — 80048 BASIC METABOLIC PNL TOTAL CA: CPT

## 2022-09-18 PROCEDURE — 6360000002 HC RX W HCPCS: Performed by: STUDENT IN AN ORGANIZED HEALTH CARE EDUCATION/TRAINING PROGRAM

## 2022-09-18 PROCEDURE — 84484 ASSAY OF TROPONIN QUANT: CPT

## 2022-09-18 PROCEDURE — 85014 HEMATOCRIT: CPT

## 2022-09-18 PROCEDURE — 6370000000 HC RX 637 (ALT 250 FOR IP): Performed by: STUDENT IN AN ORGANIZED HEALTH CARE EDUCATION/TRAINING PROGRAM

## 2022-09-18 PROCEDURE — 1280000000 HC REHAB R&B

## 2022-09-18 RX ORDER — MIDODRINE HYDROCHLORIDE 5 MG/1
2.5 TABLET ORAL
Status: DISCONTINUED | OUTPATIENT
Start: 2022-09-18 | End: 2022-09-20

## 2022-09-18 RX ADMIN — TROSPIUM CHLORIDE 20 MG: 20 TABLET, FILM COATED ORAL at 21:20

## 2022-09-18 RX ADMIN — HEPARIN SODIUM 5000 UNITS: 5000 INJECTION INTRAVENOUS; SUBCUTANEOUS at 06:39

## 2022-09-18 RX ADMIN — POTASSIUM CHLORIDE 10 MEQ: 750 TABLET, EXTENDED RELEASE ORAL at 08:20

## 2022-09-18 RX ADMIN — MIDODRINE HYDROCHLORIDE 2.5 MG: 5 TABLET ORAL at 12:43

## 2022-09-18 RX ADMIN — Medication 5000 UNITS: at 08:18

## 2022-09-18 RX ADMIN — FOLIC ACID 1 MG: 1 TABLET ORAL at 08:18

## 2022-09-18 RX ADMIN — ASPIRIN 81 MG 81 MG: 81 TABLET ORAL at 08:20

## 2022-09-18 RX ADMIN — CLOPIDOGREL BISULFATE 75 MG: 75 TABLET ORAL at 08:20

## 2022-09-18 RX ADMIN — Medication 5 MG: at 21:20

## 2022-09-18 RX ADMIN — HEPARIN SODIUM 5000 UNITS: 5000 INJECTION INTRAVENOUS; SUBCUTANEOUS at 21:20

## 2022-09-18 RX ADMIN — CETIRIZINE HYDROCHLORIDE 5 MG: 5 TABLET, FILM COATED ORAL at 08:20

## 2022-09-18 RX ADMIN — DONEPEZIL HYDROCHLORIDE 10 MG: 5 TABLET, FILM COATED ORAL at 21:20

## 2022-09-18 RX ADMIN — FLUDROCORTISONE ACETATE 0.05 MG: 0.1 TABLET ORAL at 08:18

## 2022-09-18 RX ADMIN — CYANOCOBALAMIN TAB 500 MCG 1000 MCG: 500 TAB at 08:20

## 2022-09-18 RX ADMIN — HEPARIN SODIUM 5000 UNITS: 5000 INJECTION INTRAVENOUS; SUBCUTANEOUS at 12:43

## 2022-09-18 RX ADMIN — FERROUS SULFATE TAB 325 MG (65 MG ELEMENTAL FE) 325 MG: 325 (65 FE) TAB at 08:20

## 2022-09-18 RX ADMIN — ATORVASTATIN CALCIUM 80 MG: 80 TABLET, FILM COATED ORAL at 21:20

## 2022-09-18 NOTE — PROGRESS NOTES
[address]   Progress Note  Cardiology    CC:  Orthostatic hypotension, CVA    HPI:    Code stroke called this am. BP was low, SBP-92 , laying in bed. Mental status reportedly worsened. Discussed with nurse. She says medicine team felt issue was hypotension. She is currently without complaints. Abdominal binder has not been placed. Medications/Labs all Reviewed    Lab Results   Component Value Date    WBC 5.5 09/16/2022    HGB 12.1 09/18/2022    HCT 36.2 09/18/2022    MCV 96.7 09/16/2022     09/16/2022     Lab Results   Component Value Date    CREATININE 0.8 09/18/2022    BUN 34 (H) 09/18/2022     09/18/2022    K 3.8 09/18/2022     09/18/2022    CO2 25 09/18/2022     No results found for: INR, PROTIME     PHYSICAL EXAM   BP (!) 92/54   Pulse 85   Temp 98.5 °F (36.9 °C) (Oral)   Resp 16   Ht 5' 5\" (1.651 m)   Wt 111 lb 15.9 oz (50.8 kg)   SpO2 100%   BMI 18.64 kg/m²      Respiratory:  Resp Assessment: Normal respiratory effort  Resp Auscultation: Clear to auscultation bilaterally   Cardiovascular: Auscultation: regular rate and rhythm, normal S1S2, no murmur, rub or gallop  Palpation:  Nl PMI  JVP:  normal  Extremities: No Edema  Abdomen:  Soft, non-tender  Normal bowel sounds  Extremities:   No Cyanosis or Clubbing  Neurological/Psychiatric:  Oriented to time, place, and person  Non-anxious  Skin:   Warm and dry    ECHO 9/2022  Summary:   Right ventricular systolic pressure is normal at <35 mmHg. Injection of agitated saline showed no interatrial shunt. Left ventricular function is low normal.   Overall left ventricular ejection fraction is estimated to be 50-55%. Borderline global hypokinesis of the left ventricle. Left atrium is severely (>40 ml/m2) dilated. Moderate aortic regurgitation. No hemodynamically significant valvular aortic stenosis. There is no obvious cardiac source of emboli noted.       CT HEAD 9/2022  Evolving acute posterior left basal ganglia ischemia     No acute hemorrhage. No new areas of abnormal brain attenuation identified     There is bilateral periventricular white matter low attenuation, which is usually secondary to small vessel ischemic change. EKG 9/17  Sinus LBBB      ASSESSMENT    Orthostatic hypotension:  Baseline HTN  Possible Parkinson's  This is usually very difficult to treat  Recommend abdominal binder, increase sodium diet   BP low side today in bed.  Will try very low dose midodrine-2.5 mg, but need to watch BP closely  We will likely need to tolerate some hypertension to allow for enough BP for her to get up    CVA:  Agree with outpt [facility] to screen for Afib    AI:  Moderate  Repeat ECHO 1 year    Lacey Zee MD, 9/18/2022 11:53 AM

## 2022-09-18 NOTE — PROGRESS NOTES
RAPID RESPONSE NOTE    Rapid response called for neuro changes. Patient admitted to ARU on 9/15 following CVA. Patient with right sided weakness and dysarthria per nurse. SBP 90's. When physician arrived symptoms improved. She had less dysarthria. Strength in right arm improved and right leg still with some weakness but improved. No vision changes. No new exam findings. BP improved to 117. BS 95  CN 2-12 grossly intact. Stroke team called. No need for repeat imaging. Continue to monitor neuro status.  Labs ordered      Margoth Grewal MD  9/18/2022  12:01 PM

## 2022-09-18 NOTE — PROGRESS NOTES
Juan Luo  9/18/2022  [id number]    Chief Complaint: Acute CVA (cerebrovascular accident) [facility])    Subjective:   Patient is seen resting in bed this afternoon. She denies current complaint, states she is tired, and enjoying her day off from therapy today. No acute events overnight. ROS: No CP, SOB, dyspnea    Objective:  Patient Vitals for the past 24 hrs:   BP Temp Temp src Pulse Resp SpO2   09/18/22 0913 (!) 92/54 -- -- 85 -- --   09/18/22 0815 (!) 92/54 98.5 °F (36.9 °C) Oral 85 16 100 %   09/17/22 2130 (!) 161/81 97.4 °F (36.3 °C) Oral 66 16 97 %     Gen: No distress, pleasant. HEENT: Normocephalic, atraumatic. CV: Regular rate and rhythm. No MRG   Resp: No respiratory distress. CTAB   Abd: Soft, nontender   Ext: No edema. Neuro: Alert, oriented, appropriately interactive. Laboratory data: Available via EMR. Therapy progress:    PT    Supine to Sit: Substantial/maximal assistance  Sit to Supine: Substantial/maximal assistance   Sit to Stand: Dependent  Chair/Bed to Chair Transfer: Independent  Car Transfer:    Ambulation 10 ft:    Ambulation 50 ft:    Ambulation 150 ft:    Stairs - 1 Step:    Stairs - 4 Step:    Stairs - 12 Step:      OT    Eating: Setup or clean-up assistance  Oral Hygiene: Partial/moderate assistance  Bathing: Dependent  Upper Body Dressing: Substantial/maximal assistance  Lower Body Dressing: Dependent  Toilet Transfer: Dependent  Toilet Hygiene: Dependent    Speech Therapy         Body mass index is 18.64 kg/m².     Assessment:  Patient Active Problem List   Diagnosis    Left knee pain    Left patella fracture    Contusion of left knee    Orthostatic hypotension    ARF (acute renal failure) (HCC)    Anemia    Debility    Acute CVA (cerebrovascular accident) ([facility] [address]       Assessment and Plan:  Juan Luo is an [de-identified]year old female with a past medical history significant for orthostatic hypotension, anemia, and memory loss who presented to [facility] on 9/11/22 with recurrent falls and right sided weakness, found to have acute left CVA. She was admitted to [facility] on 9/15/22 due to functional deficits below her baseline. 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 Follow bowel movements. Enema or suppository if needed. Bladder: Check PVR x 3. [facility] if PVR > 200ml or if any volume is > 500 ml. Sleep: Trazodone provided prn.       PPX  DVT: heparin  GI: not indicated    Electronically signed by SUMMER Haney CNP on 9/18/2022 at 1:59 PM

## 2022-09-19 ENCOUNTER — TELEPHONE (OUTPATIENT)
Dept: CARDIOLOGY | Age: 82
End: 2022-09-19

## 2022-09-19 DIAGNOSIS — I49.9 CARDIAC ARRHYTHMIA, UNSPECIFIED CARDIAC ARRHYTHMIA TYPE: ICD-10-CM

## 2022-09-19 DIAGNOSIS — R00.2 HEART PALPITATIONS: ICD-10-CM

## 2022-09-19 DIAGNOSIS — I63.9 ACUTE CVA (CEREBROVASCULAR ACCIDENT) (HCC): Primary | ICD-10-CM

## 2022-09-19 PROBLEM — I35.1 NONRHEUMATIC AORTIC VALVE INSUFFICIENCY: Status: ACTIVE | Noted: 2022-09-19

## 2022-09-19 LAB
ANION GAP SERPL CALCULATED.3IONS-SCNC: 8 MMOL/L (ref 3–16)
BASOPHILS ABSOLUTE: 0 K/UL (ref 0–0.2)
BASOPHILS RELATIVE PERCENT: 0.6 %
BUN BLDV-MCNC: 27 MG/DL (ref 7–20)
CALCIUM SERPL-MCNC: 9.3 MG/DL (ref 8.3–10.6)
CHLORIDE BLD-SCNC: 105 MMOL/L (ref 99–110)
CO2: 24 MMOL/L (ref 21–32)
CREAT SERPL-MCNC: 0.7 MG/DL (ref 0.6–1.2)
EOSINOPHILS ABSOLUTE: 0.2 K/UL (ref 0–0.6)
EOSINOPHILS RELATIVE PERCENT: 2.7 %
GFR AFRICAN AMERICAN: >60
GFR NON-AFRICAN AMERICAN: >60
GLUCOSE BLD-MCNC: 91 MG/DL (ref 70–99)
HCT VFR BLD CALC: 34.4 % (ref 36–48)
HEMOGLOBIN: 11.9 G/DL (ref 12–16)
LYMPHOCYTES ABSOLUTE: 1.6 K/UL (ref 1–5.1)
LYMPHOCYTES RELATIVE PERCENT: 24 %
MCH RBC QN AUTO: 33.6 PG (ref 26–34)
MCHC RBC AUTO-ENTMCNC: 34.7 G/DL (ref 31–36)
MCV RBC AUTO: 96.9 FL (ref 80–100)
MONOCYTES ABSOLUTE: 0.5 K/UL (ref 0–1.3)
MONOCYTES RELATIVE PERCENT: 6.8 %
NEUTROPHILS ABSOLUTE: 4.5 K/UL (ref 1.7–7.7)
NEUTROPHILS RELATIVE PERCENT: 65.9 %
PDW BLD-RTO: 15.9 % (ref 12.4–15.4)
PLATELET # BLD: 264 K/UL (ref 135–450)
PMV BLD AUTO: 7.3 FL (ref 5–10.5)
POTASSIUM REFLEX MAGNESIUM: 3.7 MMOL/L (ref 3.5–5.1)
RBC # BLD: 3.55 M/UL (ref 4–5.2)
SODIUM BLD-SCNC: 137 MMOL/L (ref 136–145)
WBC # BLD: 6.8 K/UL (ref 4–11)

## 2022-09-19 PROCEDURE — 85025 COMPLETE CBC W/AUTO DIFF WBC: CPT

## 2022-09-19 PROCEDURE — 6370000000 HC RX 637 (ALT 250 FOR IP): Performed by: INTERNAL MEDICINE

## 2022-09-19 PROCEDURE — 80048 BASIC METABOLIC PNL TOTAL CA: CPT

## 2022-09-19 PROCEDURE — 1280000000 HC REHAB R&B

## 2022-09-19 PROCEDURE — 97110 THERAPEUTIC EXERCISES: CPT

## 2022-09-19 PROCEDURE — 97530 THERAPEUTIC ACTIVITIES: CPT

## 2022-09-19 PROCEDURE — 99232 SBSQ HOSP IP/OBS MODERATE 35: CPT | Performed by: NURSE PRACTITIONER

## 2022-09-19 PROCEDURE — 6370000000 HC RX 637 (ALT 250 FOR IP): Performed by: STUDENT IN AN ORGANIZED HEALTH CARE EDUCATION/TRAINING PROGRAM

## 2022-09-19 PROCEDURE — 36415 COLL VENOUS BLD VENIPUNCTURE: CPT

## 2022-09-19 PROCEDURE — 6360000002 HC RX W HCPCS: Performed by: STUDENT IN AN ORGANIZED HEALTH CARE EDUCATION/TRAINING PROGRAM

## 2022-09-19 PROCEDURE — 97112 NEUROMUSCULAR REEDUCATION: CPT

## 2022-09-19 PROCEDURE — 97535 SELF CARE MNGMENT TRAINING: CPT

## 2022-09-19 PROCEDURE — 97129 THER IVNTJ 1ST 15 MIN: CPT

## 2022-09-19 PROCEDURE — 97130 THER IVNTJ EA ADDL 15 MIN: CPT

## 2022-09-19 RX ADMIN — CLOPIDOGREL BISULFATE 75 MG: 75 TABLET ORAL at 08:53

## 2022-09-19 RX ADMIN — CETIRIZINE HYDROCHLORIDE 5 MG: 5 TABLET, FILM COATED ORAL at 08:53

## 2022-09-19 RX ADMIN — FOLIC ACID 1 MG: 1 TABLET ORAL at 08:53

## 2022-09-19 RX ADMIN — MIDODRINE HYDROCHLORIDE 2.5 MG: 5 TABLET ORAL at 08:53

## 2022-09-19 RX ADMIN — ANTACID TABLETS 500 MG: 500 TABLET, CHEWABLE ORAL at 08:52

## 2022-09-19 RX ADMIN — CYANOCOBALAMIN TAB 500 MCG 1000 MCG: 500 TAB at 08:53

## 2022-09-19 RX ADMIN — TROSPIUM CHLORIDE 20 MG: 20 TABLET, FILM COATED ORAL at 21:26

## 2022-09-19 RX ADMIN — FERROUS SULFATE TAB 325 MG (65 MG ELEMENTAL FE) 325 MG: 325 (65 FE) TAB at 08:53

## 2022-09-19 RX ADMIN — MIDODRINE HYDROCHLORIDE 2.5 MG: 5 TABLET ORAL at 11:41

## 2022-09-19 RX ADMIN — DONEPEZIL HYDROCHLORIDE 10 MG: 5 TABLET, FILM COATED ORAL at 21:26

## 2022-09-19 RX ADMIN — FLUDROCORTISONE ACETATE 0.05 MG: 0.1 TABLET ORAL at 08:57

## 2022-09-19 RX ADMIN — ASPIRIN 81 MG 81 MG: 81 TABLET ORAL at 08:53

## 2022-09-19 RX ADMIN — POTASSIUM CHLORIDE 10 MEQ: 750 TABLET, EXTENDED RELEASE ORAL at 08:53

## 2022-09-19 RX ADMIN — HEPARIN SODIUM 5000 UNITS: 5000 INJECTION INTRAVENOUS; SUBCUTANEOUS at 14:14

## 2022-09-19 RX ADMIN — HEPARIN SODIUM 5000 UNITS: 5000 INJECTION INTRAVENOUS; SUBCUTANEOUS at 06:30

## 2022-09-19 RX ADMIN — ATORVASTATIN CALCIUM 80 MG: 80 TABLET, FILM COATED ORAL at 21:26

## 2022-09-19 RX ADMIN — MIDODRINE HYDROCHLORIDE 2.5 MG: 5 TABLET ORAL at 16:19

## 2022-09-19 RX ADMIN — Medication 5 MG: at 21:26

## 2022-09-19 RX ADMIN — Medication 5000 UNITS: at 08:52

## 2022-09-19 RX ADMIN — HEPARIN SODIUM 5000 UNITS: 5000 INJECTION INTRAVENOUS; SUBCUTANEOUS at 21:26

## 2022-09-19 NOTE — PROGRESS NOTES
Patient and daughter educated on the setup, application and care of the 30 day heart monitor([employer]). Applied by this nurse and shown application. All questions answered.

## 2022-09-19 NOTE — PROGRESS NOTES
soiled brief x 40 sec with poor upright posture demonstrated. Sit pivot wc <> low mat with max A of 2    Pt sat EOM x 15-20 min while completing various dyn sitting tasks   Pt retrieved clothespins from self with RUE and LUE, requires increased time/ cues to locate clothespins and manipulate them with RUE. PT facilitating seated balance with up to mod A, OT facilitating scanning and R . Pt completed 2 sit to stands from low mat to  [facility] with PT facilitating trunk/hip extension and OT facilitating BLE stability by blocking both knees and reaching activity (pt reaching forward for clothespins). Grossly max a of 2 to complete, pt tolerating up to 2 min as longest standing time    Pt completed 4 reps of RUE and LUE elbow propping at low mat with cross body reach with min a to return to midline    Pt in wc at end of session with PT present to continue with individual therapy session. ASSESSMENT/PROGRESS TOWARDS GOALS  Vital Signs  Heart Rate: 73  Heart Rate Source: Monitor  BP: (!) 125/58  BP Location: Right upper arm  MAP (Calculated): 80.33  SpO2: 98 %  O2 Device: None (Room air)  Comment: 119/61 following w/c transfer, HR - 78 bpm    Assessment  Assessment: pt found in bed this am, dtr present. pt found to be incontinent of urine. max A for bed mobility wiht controls. TD for transfers required this date. sitting balance limited by multiple R lateral/posteior LOB, requiring up to min-mod a to maintain midline at EOB. pt exhibits kyphotic posture throughout. at this time, 24/7 supv/asssit vs SNF upon d/c due to current deficits  Activity Tolerance: Patient limited by endurance; Patient limited by fatigue  Discharge Recommendations: Patient would benefit from continued therapy after discharge;24 hour supervision or assist;Subacute/Skilled Nursing Facility  PT Equipment Recommendations  Equipment Needed: Yes  Wheelchair: Standard  Other: if patient does not go to a facility she likely will need wheelchair upon discharge. continue to assess. Addendum Second Session (Salina Alpers, BILL)  Assessment: Pt in w/c with OT at start of session. Pt agreeable to seated exercises for LE and core strengthening as well as sitting balance. Pt denies dizziness throughout session. Pt's daughter present at end of session. RN notified of pt sitting up in chair at end of session, pt propped with pillows for RUE support, posture, and BLE elevated. Pt denies pain during session. Vitals: 125/58, 73 bpm, 98%    Transfers: Pt completes STS from w/c with santo sears and mod-max A. Pt transferred to recliner with santo sears (dependent). Exercises: Pt reporting a \"good stretch\" with RLE AAROM  -Pt completes x3 mins reaching for cones with LUE outside ROBERT anterior, L, and R to improve trunk control and balance.  -1x 20 LLE AP, 1x 15 RLE AAROM PF/DF  -2x 15 LLE seated marches (2nd set with 1.5 lb ankle weight), 1x 10 RLE AAROM hip flexion  -1x 15 LLE LAQ, 1x 10 RLE AAROM knee extension    Goals  Patient Goals   Patient goals : Patient states, \"To be able to get up without being told\" (helped as pt pushes backwards)  Short Term Goals  Time Frame for Short term goals: 9/23/2022  Short term goal 1: Patient demonstrates  sitting  midline 15 minutes wtihout posterior lob 9/17 mod assist to maintain seated balance. Short term goal 2: Patient demonstrates bed<>chair with LRAD with mod assist of 1. 9/17 dependent with nathaniel  Short term goal 3: Patient demonstrates walking 10 feet or greater wtih mod assist of 2 LRAD. 9/17 not completed  Short term goal 4: Patient demonstrates indep in rolling L<>R and mod assist sup to sit 9/17 max assist x 2  Short term goal 5: Patient demonstrates min assist in [facility] propulsion 150 feet. 9/17 not completed  Long Term Goals  Time Frame for Long term goals : 10/7/2022  Long term goal 1: Patient demonstrates  Modified indep in rolling and sup<>Sit.   Long term goal 2: Patient demonstrates   transfers sit<>stand with FWW and min assist.  Long term goal 3: Patient demonstrates bed<>chair MONIK. Long term goal 4: Patient demonstrates gait wtih transfers  and short distances 50 feet or greater with min assist  Long term goal 5: Patient demonstrates up and down 2 steps or greater. with mod assist of 1  Long term goal 6: Patient demonstrates indep WC propulsion iwth L/R turns indep   150 feet. Long term goal 7: Patient demonstrates stoop to recover object from floor with reacher  with mod assist.  Long term goal 8: Patients family demonstrates ability to  assist patient in and out of car wtih mod assist.    [address] 5-7 times per week  Plan weeks: 21 days  Current Treatment Recommendations: Strengthening;ROM;Balance training;Functional mobility training;Transfer training;Neuromuscular re-education;Stair training;Gait training; Wheelchair mobility training; Endurance training; Therapeutic activities; Positioning;Equipment evaluation, education, & procurement;Home exercise program;Safety education & training;Patient/Caregiver education & training  Safety Devices  Type of Devices:  (left in w/c with PT at end of session)    EDUCATION  Education  Education Given To: Patient  Education Provided: Role of Therapy;Plan of Care;Transfer Training; Fall Prevention Strategies;Precautions; Safety;Equipment;Visual Perceptual Function  Education Provided Comments: stroke education, R sided use, midline orientation with all mobility  Education Method: Verbal;Demonstration  Barriers to Learning: Other (Comment)  Education Outcome: Verbalized understanding;Continued education needed        Therapy Time   Individual Concurrent Group Co-treatment   Time In       1230   Time Out       1330   Minutes       60     Second Session Therapy Time:   Individual Concurrent Group Co-treatment   Time In 1330        Time Out 1400        Minutes 30          Timed Code Treatment Minutes:  90        Alexander Tariq PT, 09/19/22 at 3:30 PM

## 2022-09-19 NOTE — PROGRESS NOTES
Speech Language Pathology  [facility]: ACUTE REHAB UNIT  SPEECH-LANGUAGE PATHOLOGY      [x] Daily  [] Weekly Care Conference Note  [] Discharge    Patient:Yulissa Padron      :1940  MSL:[id number]  Rehab Dx/Hx: Acute CVA (cerebrovascular accident) ([facility] [address] [I63.9]    Precautions: falls  Home situation: Pt lives at home [facility] Dx: [] Aphasia  [] Dysarthria  [] Apraxia   [] Oropharyngeal dysphagia [x] Cognitive Impairment  [] Other:   Date of Admit: 9/15/2022  Room #: 0160/0160-01    Current functional status (updated daily):         Pt being seen for : [] Speech/Language Treatment  [x] Dysphagia Treatment [x] Cognitive Treatment  [] Other:  Communication: [x]WFL  [] Aphasia  [] Dysarthria  [] Apraxia  [] Pragmatic Impairment [] Non-verbal  [] Hearing Loss  [] Other:   Cognition: [] WFL  [x] Mild  [x] Moderate  [] Severe [] Unable to Assess  [] Other:  Memory: [] WFL  [] Mild  [x] Moderate  [] Severe [] Unable to Assess  [] Other:  Behavior: [x] Alert  [x] Cooperative  [x]  Pleasant  [] Confused  [] Agitated  [] Uncooperative  [] Distractible [] Motivated  [] Self-Limiting [] Anxious  [] Other:  Endurance:  [x] Adequate for participation in SLP sessions  [] Reduced overall  [] Lethargic  [] Other:  Safety: [x] No concerns at this time  [] Reduced insight into deficits  []  Reduced safety awareness [] Not following call light procedures  [] Unable to Assess  [] Other:    Current Diet Order:ADULT ORAL NUTRITION SUPPLEMENT; Breakfast, Dinner; Standard High Calorie/High Protein Oral Supplement  ADULT DIET;  Regular  Swallowing Precautions: upright for all intake, stay upright for at least 30 mins after intake, small bites/sips, assist feed, oral care 2-3x/day to reduce adverse affects in the event of aspiration, increase physical mobility as able, alternate bites/sips, slow rate of intake, general GERD precautions, and general aspiration precautions        Date: 2022      Tx session 1  1830 - 1000 Tx session 2  All tx needs met in session 1   Total Timed Code Min 30 0   Total Treatment Minutes 30 0   Individual Treatment Minutes 30 0   Group Treatment Minutes 0 0   Co-Treat Minutes 0 0   Variance/Reason:  N/A    Pain Denies     Pain Intervention [] RN notified  [] Repositioned  [] Intervention offered and patient declined  [x] N/A  [] Other: [] RN notified  [] Repositioned  [] Intervention offered and patient declined  [] N/A  [] Other:   Subjective     Pt alert and cooperative, agreeable for tx. Pt seen upright in bed. Objective:  Goals     Dysphagia Goals:    Short-term Goals  Timeframe for Short-term Goals: 5 days (09/20/22)      Goal 1: The patient will tolerate recommended diet without observed clinical signs of aspiration    Goal not directly targeted. Goal 2: The patient/caregiver will demonstrate understanding of compensatory strategies for improved swallowing safety. Goal not directly targeted. Cognitive Goals:    Short-term Goals  Timeframe for Short-term Goals: 18 days (10/03/22)    Goal 1: Pt will complete recall tasks with 90% acc given min cues. Mental Manipulation: Word Progression  -pt given 3 words; asked to rank in the correct order  -e.g. Sep, May, Nov = May, Sep, Nov  -pt completed with 93% acc given min cues    Goal 2: Pt will complete higher level executive function tasks (meds, math, money, time, etc) with 95% acc given min cues. Math Word Problems  -e.g. if you bake cookies once every 3 weeks, how many times will you have baked cookies in 9 weeks?  -pt completed task with 50% acc indep, improved to 88% acc given min - mod cues      Other areas targeted: N/A    Education:   SLP edu pt re: rationale of tx tasks, recall strategies, calc strategies. Pt verbalized understanding.      Safety Devices: [x] Call light within reach  [] Chair alarm activated  [x] Bed alarm activated  [] Other: [] Call light within reach  [] Chair alarm activated  [] Bed alarm activated  [] Other: Assessment: Pt alert and cooperative, agreeable to tx. Pt continues to make good progress with cognitive goals. Pt did well with a memory task; benefited from min cues. Repetition a successful strategy to help improve recall. Pt with more difficulty with math word problems - required min-mod cues. Continue goals above. Plan: Continue as per plan of care. Additional Information:     Barriers toward progress: N/A  Discharge recommendations:  [] Home independently  [] Home with assistance []  24 hour supervision  [] ECF [x] Other: based upon PT/OT assessment   Continued Tx Upon Discharge: ? [] Yes [] No [x] TBD based on progress while on ARU [] Vital Stim indicated [] Other:   Estimated discharge date: est d/c date 10/7/2022    Interventions used this date:  [] Speech/Language Treatment  [] Instruction in HEP [] Group [x] Dysphagia Treatment [x] Cognitive Treatment   [] Other:       Total Time Breakdown / Charges    Time in Time out Total Time / units   Cognitive Tx 0930 1000 30 min / 2 units   Speech Tx -- -- --   Dysphagia Tx          Electronically Signed by     Bing Rolle MA CCC-SLP #[id number]  Speech Language Pathologist

## 2022-09-19 NOTE — PROGRESS NOTES
[address]   Daily Progress Note    Admit Date:  9/15/2022  HPI:   Marisel Escobedo admitted to ARU from [facility] after acute left basal ganglia stroke. Cardiology consulted for orthostatic hypotension. She has a history of tremors, orthostatic hypotension, and memory loss. Previously treated with Florinef. Subjective:  Ms. Zhanna Alcala denies chest pain or shortness of breath. No lightheadedness. Has not been up yet today, mostly working with speech therapy. Objective:   Patient Vitals for the past 24 hrs:   BP Temp Temp src Pulse Resp SpO2   09/19/22 1045 (!) 103/55 98.1 °F (36.7 °C) Oral 81 16 98 %   09/18/22 2115 (!) 147/85 97.8 °F (36.6 °C) Oral 64 16 98 %   09/18/22 1600 (!) 186/84 -- -- 61 -- --   09/18/22 1240 111/65 -- -- 70 -- --       Intake/Output Summary (Last 24 hours) at 9/19/2022 1140  Last data filed at 9/19/2022 4908  Gross per 24 hour   Intake 580 ml   Output 750 ml   Net -170 ml     Wt Readings from Last 3 Encounters:   09/16/22 111 lb 15.9 oz (50.8 kg)   03/28/22 107 lb 2.3 oz (48.6 kg)   03/25/22 110 lb (49.9 kg)         ASSESSMENT:   Orthostatic hypotension: BP remains labile  CVA, recent left basal ganglia: per neurology  Parkinsonism  Aortic Regurg: moderate      PLAN:  Continue lower dose of Florinef 0.05 mg daily and midodrine 2.5 mg TID  Will have to accept some hypertension to avoid orthostatic hypotension with risk of falls.    Compression socks and abdominal binder to help support BP when upright  30 day event monitor  Continue dual antiplatelet therapy and statin    SUMMER Carrillo - CNP, 9/19/2022, 11:40 AM  [address]   [phone]       Telemetry: N/A  NYHA: III    Physical Exam:  General:  Awake, alert, NAD  Skin:  Warm and dry  Neck:  JVP normal  Chest:  Clear to auscultation   Cardiovascular:  RRR, normal S1S2, + murmur, no g/r  Abdomen:  Soft, nontender, +bowel sounds  Extremities:  no BLE edema      Medications:    midodrine  2.5 mg Oral TID WC sodium chloride  500 mL IntraVENous Once    potassium chloride  10 mEq Oral Daily    fludrocortisone  0.05 mg Oral Daily    calcium carbonate  500 mg Oral Daily    aspirin  81 mg Oral Daily    atorvastatin  80 mg Oral Nightly    Vitamin D  5,000 Units Oral Daily    clopidogrel  75 mg Oral Daily    donepezil  10 mg Oral Nightly    ferrous sulfate  325 mg Oral Daily with breakfast    folic acid  1 mg Oral Daily    heparin (porcine)  5,000 Units SubCUTAneous 3 times per day    cetirizine  5 mg Oral Daily    trospium  20 mg Oral Nightly    vitamin B-12  1,000 mcg Oral Daily         Lab Data: Lab results independently reviewed and analyzed by myself 9/19/2022    CBC:   Recent Labs     09/18/22  0910 09/19/22  0701   WBC  --  6.8   HGB 12.1 11.9*   PLT  --  264     BMP:    Recent Labs     09/18/22  0910 09/19/22  0701    137   K 3.8 3.7   CO2 25 24   BUN 34* 27*   CREATININE 0.8 0.7     INR:  No results for input(s): INR in the last 72 hours. BNP:  No results for input(s): PROBNP in the last 72 hours. Cardiac Enzymes:   Recent Labs     09/18/22  0910   TROPONINI <0.01     Lipids: No results found for: TRIG, HDL, LDLCALC, LDLDIRECT    Cardiac Imaging:    Echo:  TTE 9/12/22:  Summary:   Right ventricular systolic pressure is normal at <35 mmHg. Injection of agitated saline showed no interatrial shunt. Left ventricular function is low normal.   Overall left ventricular ejection fraction is estimated to be 50-55%. Borderline global hypokinesis of the left ventricle. Left atrium is severely (>40 ml/m2) dilated. Moderate aortic regurgitation. No hemodynamically significant valvular aortic stenosis. There is no obvious cardiac source of emboli noted.

## 2022-09-19 NOTE — PROGRESS NOTES
Marlon Noel  9/19/2022  [id number]    Chief Complaint: Acute CVA (cerebrovascular accident) [facility])    Subjective:   No acute events overnight. Today Thai Cottrell is seen in her room, resting in bed. She reports that she is feeling well while in laying in bed. She has not been up with therapies yet today. ROS: No CP, SOB, dyspnea    Objective:  Patient Vitals for the past 24 hrs:   BP Temp Temp src Pulse Resp SpO2   09/19/22 1045 (!) 103/55 98.1 °F (36.7 °C) Oral 81 16 98 %   09/18/22 2115 (!) 147/85 97.8 °F (36.6 °C) Oral 64 16 98 %   09/18/22 1600 (!) 186/84 -- -- 61 -- --     Gen: No distress, pleasant. HEENT: Normocephalic, atraumatic. CV: Regular rate and rhythm. No MRG   Resp: No respiratory distress. CTAB   Abd: Soft, nontender   Ext: No edema. Neuro: Alert, oriented, appropriately interactive. Laboratory data: Available via EMR. Therapy progress:    PT    Supine to Sit: Substantial/maximal assistance  Sit to Supine: Substantial/maximal assistance   Sit to Stand: Dependent  Chair/Bed to Chair Transfer: Independent  Car Transfer:    Ambulation 10 ft:    Ambulation 50 ft:    Ambulation 150 ft:    Stairs - 1 Step:    Stairs - 4 Step:    Stairs - 12 Step:      OT    Eating: Setup or clean-up assistance  Oral Hygiene: Partial/moderate assistance  Bathing: Dependent  Upper Body Dressing: Substantial/maximal assistance  Lower Body Dressing: Dependent  Toilet Transfer: Dependent  Toilet Hygiene: Dependent    Speech Therapy         Body mass index is 18.64 kg/m².     Assessment:  Patient Active Problem List   Diagnosis    Left knee pain    Left patella fracture    Contusion of left knee    Orthostatic hypotension    ARF (acute renal failure) ([facility])    Anemia    Debility    Acute CVA (cerebrovascular accident) ([facility] [address]    Nonrheumatic aortic valve insufficiency       Assessment and Plan:  Marlon oNel is an [de-identified]year old female with a past medical history significant for orthostatic hypotension, anemia, and memory loss who presented to [facility] on 9/11/22 with recurrent falls and right sided weakness, found to have acute left CVA. She was admitted to [facility] on 9/15/22 due to functional deficits below her baseline. 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  - with right hemiparesis  - secondary stroke prevention with asa, plavix, statin  - PT, OT, ST     Autonomic Dysfunction  Orthostatic hypotension  - florinef, midodrine  - Cardiology following, appreciate assistance     Dementia   - possible PD  - donepezil     Overactive Bladder  - home regimen: Myrbetriq, tolterodine  - trospium while inpatient      Bowels: Schedule stool softener. Follow bowel movements. Enema or suppository if needed. Bladder: Check PVR x 3. [facility] if PVR > 200ml or if any volume is > 500 ml. Sleep: Trazodone provided prn.       PPX  DVT: heparin  GI: not indicated    Electronically signed by Darion Dawson MD on 9/19/2022 at 1:02 PM

## 2022-09-19 NOTE — TELEPHONE ENCOUNTER
Monitor company [employer]  Length of monitor 30 days  Monitor ordered by Aparna Harris CNP  Patch ID [id number]  Device number [id number]  Fabiola Egnland, EKG tech, registered and given to Drew Cunningham RN . Nurse to apply at the time of discharge.

## 2022-09-19 NOTE — PROGRESS NOTES
Occupational Therapy  Facility/Department: [address] [place] [address]  Rehabilitation Occupational Therapy Daily Treatment Note    Date: 22  Patient Name: Lili Dunbar       Room: [facility]/6441-58  MRN: [id number]  Account: [de-identified]   : 1940  [de-identified] y.o.) Gender: female                    Past Medical History:  has a past medical history of ARF (acute renal failure) ([facility] [address] Past Surgical History:   has a past surgical history that includes joint replacement; Tonsillectomy; Intracapsular cataract extraction (Right, 2019); and Intracapsular cataract extraction (Left, 2019). Restrictions  Restrictions/Precautions: Up as Tolerated; Fall Risk;General Precautions  Other position/activity restrictions: abdominal binder; up as tolerated; Notify physician for pulse less than 50 or greater than 120, respiratory rate less than 12 or greater than 25, oral temperature greater than 101.3 F (23.3 C), systolic BP less than 90 or greater than 934, diastolic BP less than 50 or greater than 100. Subjective  Subjective: Pt in bed, pleasant, agreeable to OT/PT session, no pain, /58, HR 75, O2 sats 98% on RA. Restrictions/Precautions: Up as Tolerated; Fall Risk;General Precautions             Objective     Cognition  Overall Cognitive Status: Exceptions  Arousal/Alertness: Appropriate responses to stimuli  Following Commands: Follows one step commands with increased time; Follows one step commands with repetition  Attention Span: Appears intact  Memory: Decreased recall of recent events  Safety Judgement: Decreased awareness of need for assistance;Decreased awareness of need for safety  Problem Solving: Assistance required to generate solutions;Assistance required to implement solutions;Assistance required to identify errors made;Assistance required to correct errors made  Insights: Decreased awareness of deficits  Initiation: Requires cues for some  Sequencing: Requires cues for some  Orientation  Overall Orientation Status: Within Functional Limits   Perception  Overall Perceptual Status: Impaired  Unilateral Attention: Cues to attend to right side of body;Cues to attend right visual field;Cues to maintain midline in sitting;Cues to maintain midline in standing  Initiation: Cues to initiate tasks  Motor Planning: Cues to use objects appropriately  Perseveration: Perseverates during ADLs     ADL  Upper Extremity Dressing  Assistance Level: Dependent  Skilled Clinical Factors: max A to doff shirt, total assist to don shirt with assist for all aspects of task, poor use of BUEs  Lower Extremity Dressing  Assistance Level: Dependent  Skilled Clinical Factors: mod/max A for balance from PT while OT assisted in pulling pants over hips, total assist to thread BLEs into pants  Toileting  Assistance Level: Dependent  Skilled Clinical Factors: incontinent of urine          Bed Mobility  Overall Assistance Level: Dependent  Additional Factors: Increased time to complete;Verbal cues; With handrails  Supine to Sit  Assistance Level: Dependent  Skilled Clinical Factors: max A of 2  Scooting  Assistance Level: Dependent  Skilled Clinical Factors: max A of 2  Transfers  Surface: From bed; To chair with arms;Raised toilet Seat;Wheelchair  Additional Factors: Verbal cues; Set-up; With handrails  Device: Walker  Sit to Stand  Assistance Level: Dependent  Skilled Clinical Factors: mod A of 2 in [place], max A of 2 at EOB  Stand to Sit  Assistance Level: Dependent  Skilled Clinical Factors: max A of 2  Bed To/From Chair  Technique: Stand pivot  Assistance Level: Dependent   Weight Bearing  Weight Bearing Technique: Yes  RUE Weight Bearing: Forearm seated  LUE Weight Bearing: Forearm seated  Response To Weight Bearing Technique: x5 each side, CGA/min A for sitting balance     Assessment  Assessment  Assessment: Pt agreeable to OT/PT session.  Pt performed functional transfers with mod/max A of 2 with Ruba Ball for stand step transfer; and mod/max A of 2 for sit pivot w/c<>mat table. BP stable this date and no c/o dizziness with transfers and standing tasks. Pt continues to require co-treat for PT to assist with trunk positioning and posture at midline while OT assisted with reaching of RUE, leaning out of ROBERT, and balance in stance at hips and LEs. Pt required total assist for all dressing tasks with poor use of RUE and poor sequencing. Pt completed standing at [facility] for 30 seconds, 1:45, and 2:00. Pt performed reaching over shoulder of OT while in stance with max A from OT for blocking B knees, hip extension, and forward weight shift, while PT assisted with max A for trunk extension and [place] when reaching for items with RUE. Pt required max A at times when reaching across body with RUE due to poor strength and coordination. PT provided CGA/min A for sitting balance while OT cued for reaching out of ROBERT and assist for movement of RUE. Pt left with PT in gym in w/c. Continue POC. Activity Tolerance: Patient limited by endurance; Patient limited by fatigue  Discharge Recommendations: Subacute/Skilled Nursing Facility  OT Equipment Recommendations  Other: CTA pending progress  Safety Devices  Safety Devices in place: Yes  Type of devices: Call light within reach; Left in chair; All fall risk precautions in place; Patient at risk for falls;Gait belt;Nurse notified (left with PT)    Patient Education  Education  Education Given To: Patient  Education Provided: Role of Therapy;Plan of Care;Precautions;Transfer Training; Fall Prevention Strategies;Cognition; Safety; Family Education;ADL Function;Mobility Training;Equipment;DME/Home Modifications  Education Provided Comments: use of RUE; positioning of RUE, sitting balance, midline, NMR training for balance and posture, weight bearing through RUE  Education Method: Verbal;Demonstration  Barriers to Learning: Cognition  Education Outcome: Verbalized understanding;Continued education needed    Plan  Plan  Times per Week: 5-7x/wk  Current Treatment Recommendations: Strengthening;Balance training;Functional mobility training;Self-Care / ADL;Endurance training;Neuromuscular re-education;Positioning;Modalities; Equipment evaluation, education, & procurement;Patient/Caregiver education & training; Safety education & training;Home management training;Coordination training;Sensory integraion    Goals  Short Term Goals  Time Frame for Short term goals: 9/28/22 (14 days)  Short Term Goal 1: Pt will complete UB dressing with mod A  Short Term Goal 2: Pt will complete simple grooming task supported in w/c with set-up A  Short Term Goal 3: Pt will complete functional transfers with mod A x1 using LRAD  Short Term Goal 4: Pt will complete toileting with max A  Long Term Goals  Time Frame for Long term goals : 10/05/22 (21 days)  Long Term Goal 1: Pt will complete toilet transfer with min A  Long Term Goal 2: Pt will complete LB dressing with mod A  Long Term Goal 3: Pt will complete bathing with mod A  Long Term Goal 4: Pt will incorporate RUE into ADLs 75% of trials with minimal VC in order to increase functional independence with ADLs    Therapy Time   Individual Concurrent Group Co-treatment   Time In       1230   Time Out       1330   Minutes       60   Timed Code Treatment Minutes: [address] [facility] [place], OT

## 2022-09-20 LAB
GLUCOSE BLD-MCNC: 95 MG/DL (ref 70–99)
PERFORMED ON: NORMAL

## 2022-09-20 PROCEDURE — 92526 ORAL FUNCTION THERAPY: CPT

## 2022-09-20 PROCEDURE — 97535 SELF CARE MNGMENT TRAINING: CPT

## 2022-09-20 PROCEDURE — 97129 THER IVNTJ 1ST 15 MIN: CPT

## 2022-09-20 PROCEDURE — 6360000002 HC RX W HCPCS: Performed by: STUDENT IN AN ORGANIZED HEALTH CARE EDUCATION/TRAINING PROGRAM

## 2022-09-20 PROCEDURE — 97530 THERAPEUTIC ACTIVITIES: CPT

## 2022-09-20 PROCEDURE — 6370000000 HC RX 637 (ALT 250 FOR IP): Performed by: STUDENT IN AN ORGANIZED HEALTH CARE EDUCATION/TRAINING PROGRAM

## 2022-09-20 PROCEDURE — 97110 THERAPEUTIC EXERCISES: CPT

## 2022-09-20 PROCEDURE — 92610 EVALUATE SWALLOWING FUNCTION: CPT

## 2022-09-20 PROCEDURE — 99233 SBSQ HOSP IP/OBS HIGH 50: CPT | Performed by: NURSE PRACTITIONER

## 2022-09-20 PROCEDURE — 97130 THER IVNTJ EA ADDL 15 MIN: CPT

## 2022-09-20 PROCEDURE — 1280000000 HC REHAB R&B

## 2022-09-20 PROCEDURE — 6370000000 HC RX 637 (ALT 250 FOR IP): Performed by: INTERNAL MEDICINE

## 2022-09-20 RX ORDER — MIDODRINE HYDROCHLORIDE 5 MG/1
5 TABLET ORAL
Status: DISCONTINUED | OUTPATIENT
Start: 2022-09-20 | End: 2022-10-04

## 2022-09-20 RX ORDER — MIDODRINE HYDROCHLORIDE 5 MG/1
5 TABLET ORAL ONCE
Status: COMPLETED | OUTPATIENT
Start: 2022-09-20 | End: 2022-09-20

## 2022-09-20 RX ORDER — FLUDROCORTISONE ACETATE 0.1 MG/1
0.1 TABLET ORAL DAILY
Status: DISCONTINUED | OUTPATIENT
Start: 2022-09-21 | End: 2022-10-08 | Stop reason: HOSPADM

## 2022-09-20 RX ADMIN — HEPARIN SODIUM 5000 UNITS: 5000 INJECTION INTRAVENOUS; SUBCUTANEOUS at 13:13

## 2022-09-20 RX ADMIN — MIDODRINE HYDROCHLORIDE 5 MG: 5 TABLET ORAL at 11:32

## 2022-09-20 RX ADMIN — HEPARIN SODIUM 5000 UNITS: 5000 INJECTION INTRAVENOUS; SUBCUTANEOUS at 04:59

## 2022-09-20 RX ADMIN — POTASSIUM CHLORIDE 10 MEQ: 750 TABLET, EXTENDED RELEASE ORAL at 08:59

## 2022-09-20 RX ADMIN — FERROUS SULFATE TAB 325 MG (65 MG ELEMENTAL FE) 325 MG: 325 (65 FE) TAB at 08:59

## 2022-09-20 RX ADMIN — MIDODRINE HYDROCHLORIDE 5 MG: 5 TABLET ORAL at 09:07

## 2022-09-20 RX ADMIN — CLOPIDOGREL BISULFATE 75 MG: 75 TABLET ORAL at 08:59

## 2022-09-20 RX ADMIN — FLUDROCORTISONE ACETATE 0.05 MG: 0.1 TABLET ORAL at 09:39

## 2022-09-20 RX ADMIN — Medication 5 MG: at 21:08

## 2022-09-20 RX ADMIN — TROSPIUM CHLORIDE 20 MG: 20 TABLET, FILM COATED ORAL at 21:08

## 2022-09-20 RX ADMIN — CYANOCOBALAMIN TAB 500 MCG 1000 MCG: 500 TAB at 08:59

## 2022-09-20 RX ADMIN — Medication 5000 UNITS: at 08:59

## 2022-09-20 RX ADMIN — ASPIRIN 81 MG 81 MG: 81 TABLET ORAL at 08:59

## 2022-09-20 RX ADMIN — DONEPEZIL HYDROCHLORIDE 10 MG: 5 TABLET, FILM COATED ORAL at 21:08

## 2022-09-20 RX ADMIN — CETIRIZINE HYDROCHLORIDE 5 MG: 5 TABLET, FILM COATED ORAL at 08:59

## 2022-09-20 RX ADMIN — FOLIC ACID 1 MG: 1 TABLET ORAL at 08:59

## 2022-09-20 RX ADMIN — MIDODRINE HYDROCHLORIDE 5 MG: 5 TABLET ORAL at 16:10

## 2022-09-20 RX ADMIN — HEPARIN SODIUM 5000 UNITS: 5000 INJECTION INTRAVENOUS; SUBCUTANEOUS at 21:09

## 2022-09-20 RX ADMIN — ATORVASTATIN CALCIUM 80 MG: 80 TABLET, FILM COATED ORAL at 21:08

## 2022-09-20 NOTE — PROGRESS NOTES
This RN called to bedside by Occupational therapist while patient was still semi-fowlers in bed. Patient observed to be slow  responding to OT questions. Upon assessment patient had symptoms of increased right side weakness, right facial droop, slurred speech/drooling, and not making eye contact. 0800- Rapid response called, blood pressure 80/40  0815- Blood pressure 115/53 improving, trendelenburg, more responsive, MD at bedside. New orders for midodrine ordered.

## 2022-09-20 NOTE — PROGRESS NOTES
Responded to rapid response, pt busy with care, no family, offered silent prayer. Spiritual care available to follow up as needed.

## 2022-09-20 NOTE — PROGRESS NOTES
Occupational Therapy  Facility/Department: [facility]  Rehabilitation Occupational Therapy Daily Treatment Note    Date: 22  Patient Name: Danilo Pryor       Room: 3705/7240-19  MRN: [id number]  Account: [de-identified]   : 1940  [de-identified] y.o.) Gender: female                    Past Medical History:  has a past medical history of ARF (acute renal failure) ([facility] [address] Past Surgical History:   has a past surgical history that includes joint replacement; Tonsillectomy; Intracapsular cataract extraction (Right, 2019); and Intracapsular cataract extraction (Left, 2019). Restrictions  Restrictions/Precautions: Up as Tolerated; Fall Risk;General Precautions  Other position/activity restrictions: abdominal binder/knee high KENN hose; up as tolerated; Notify physician for pulse less than 50 or greater than 120, respiratory rate less than 12 or greater than 25, oral temperature greater than 101.3 F (30.1 C), systolic BP less than 90 or greater than 713, diastolic BP less than 50 or greater than 100. Subjective  Subjective: Pt in bed, increased R sided facial droop on arrival, speech slurred, lethargic, BP not reading, RN notified and took manual BP at 80/40, HR 72, O2 sats 97% on RA. Rapid Response called. Restrictions/Precautions: Up as Tolerated; Fall Risk;General Precautions             Objective     Cognition  Overall Cognitive Status: Exceptions  Arousal/Alertness: Appropriate responses to stimuli  Following Commands: Follows one step commands with increased time; Follows one step commands with repetition  Attention Span: Appears intact  Memory: Decreased recall of recent events  Safety Judgement: Decreased awareness of need for assistance;Decreased awareness of need for safety  Problem Solving: Assistance required to generate solutions;Assistance required to implement solutions;Assistance required to identify errors made;Assistance required to correct errors made  Insights: Decreased awareness of deficits  Initiation: Requires cues for some  Sequencing: Requires cues for some  Orientation  Overall Orientation Status: Within Functional Limits         ADL  Grooming/Oral Hygiene  Assistance Level: Supervision  Skilled Clinical Factors: to wash face in bed  Lower Extremity Dressing  Assistance Level: Dependent  Skilled Clinical Factors: to don pants and change brief  Putting On/Taking Off Footwear  Assistance Level: Dependent  Skilled Clinical Factors: to don B KENN hose          Functional Mobility  Skilled Clinical Factors: Pt unable to perform mobility due to low BP and medical instability this date  Bed Mobility  Overall Assistance Level: Moderate Assistance  Additional Factors: Increased time to complete;Verbal cues; With handrails  Roll Left  Assistance Level: Moderate assistance  Roll Right  Assistance Level: Contact guard assist  Sit to Supine  Skilled Clinical Factors: Unable to assess due to low BP and medical instability  Supine to Sit  Skilled Clinical Factors: Unable to assess due to low BP and medical instability  Sit to Stand  Skilled Clinical Factors: Unable to assess due to low BP and medical instability  Stand to Sit  Skilled Clinical Factors: Unable to assess due to low BP and medical instability  Bed To/From Chair  Skilled Clinical Factors: Unable to assess due to low BP and medical instability   OT Exercises  A/AROM Exercises: elbow flexion and finger flexion/extension AROM BUE; chest press SROM; AROM LUE and AAROM RUE shoulder horizontal adduction, shoulder flexion; all x5 reps     Assessment  Assessment  Assessment: Pt agreeable to PT/OT session. Pt presented with R sided facial droop, slurring of words, and BP 80/40 on arrival. RN notified and called Rapid Response. Team arrived and cleared pt for therapy with close monitoring of BP. KENN hose applied and pt put in trendelenburg. BP improved to 115/58 and up to 119/54. Bed placed in supine with /56.  HOB slowly elevated to 30* with BP 106/58 and 45* with BP 90/52. Pt reported no dizziness. Pt completed bed level exercises. HOB returned to 30* and BP 99/50 at end of session. Abdominal binder removed and KENN hose left on. Pt required co-treat due to medical instability, poor activity tolerance, and high level of assist needed during mobility. Continue POC. Activity Tolerance: Patient limited by endurance; Patient limited by fatigue  Discharge Recommendations: Subacute/Skilled Nursing Facility  OT Equipment Recommendations  Other: CTA pending progress  Safety Devices  Safety Devices in place: Yes  Type of devices: Call light within reach; All fall risk precautions in place; Patient at risk for falls;Gait belt;Nurse notified; Left in bed;Bed alarm in place    Patient Education  Education  Education Given To: Patient  Education Provided: Role of Therapy;Plan of Care;Precautions;Transfer Training; Fall Prevention Strategies;Cognition; Safety; Family Education;ADL Function;Mobility Training;Equipment;DME/Home Modifications; Home Exercise Program  Education Provided Comments: use of RUE; positioning of RUE, use of KENN hose/abdominal binder, management of BP  Education Method: Verbal;Demonstration  Barriers to Learning: Cognition  Education Outcome: Verbalized understanding;Continued education needed    Plan  Plan  Times per Week: 5-7x/wk  Current Treatment Recommendations: Strengthening;Balance training;Functional mobility training;Self-Care / ADL;Endurance training;Neuromuscular re-education;Positioning;Modalities; Equipment evaluation, education, & procurement;Patient/Caregiver education & training; Safety education & training;Home management training;Coordination training;Sensory integraion    Goals  Short Term Goals  Time Frame for Short term goals: 9/28/22 (14 days)  Short Term Goal 1: Pt will complete UB dressing with mod A  Short Term Goal 2: Pt will complete simple grooming task supported in w/c with set-up A  Short Term Goal 3: Pt will complete functional transfers with mod A x1 using LRAD  Short Term Goal 4: Pt will complete toileting with max A  Long Term Goals  Time Frame for Long term goals : 10/05/22 (21 days)  Long Term Goal 1: Pt will complete toilet transfer with min A  Long Term Goal 2: Pt will complete LB dressing with mod A  Long Term Goal 3: Pt will complete bathing with mod A  Long Term Goal 4: Pt will incorporate RUE into ADLs 75% of trials with minimal VC in order to increase functional independence with ADLs    Therapy Time   Individual Concurrent Group Co-treatment   Time In       0800   Time Out       0900   Minutes       60   Timed Code Treatment Minutes: 16 Amina Orlando, OT

## 2022-09-20 NOTE — PLAN OF CARE
At risk for skin breakdown, see Mike scale. Unable to repositin self in bed. Turn  and reposition every 2 hours. Heels elevated off bed. Protective barrier placed as needed. Patient kept clean and dry. Pillows used for positioning. Will continue to monitor for skin breakdown.

## 2022-09-20 NOTE — PLAN OF CARE
Problem: Safety - Adult  Goal: Free from fall injury  9/20/2022 0924 by Namita Cancino RN  Outcome: Progressing  9/20/2022 0243 by Anahi Chavez RN  Outcome: Progressing

## 2022-09-20 NOTE — PROGRESS NOTES
[facility]   Daily Progress Note    Admit Date:  9/15/2022  HPI:   Saurav Rojo admitted to ARU from [facility] after acute left basal ganglia stroke. Cardiology consulted for orthostatic hypotension. She has a history of tremors, orthostatic hypotension, and memory loss. Previously treated with Florinef. Subjective:  Ms. Marilynne Denver denies lightheadedness or dizziness. Has had BP checked several times but not documented. Abdominal binder off due to causing difficulty breathing. Tolerating the compression socks okay. Rapid response called this a.m. for slurred speech and decreased level of orientation. Blood pressure noted to be 49A systolic. She was placed in Trendelenburg and repeat BP up to 483 systolic. Compression socks and abdominal binder placed. Midodrine dose increased from 2.5 to 5 mg this AM.  Her mentation and speech cleared.     Objective:   Patient Vitals for the past 24 hrs:   BP Temp Temp src Pulse Resp SpO2   09/20/22 0815 (!) 115/58 -- -- -- -- --   09/19/22 1959 (!) 176/73 98.3 °F (36.8 °C) Oral 60 16 97 %   09/19/22 1526 (!) 125/58 -- -- 73 -- 98 %   09/19/22 1235 (!) 125/58 -- -- 75 -- 98 %   09/19/22 1045 (!) 103/55 98.1 °F (36.7 °C) Oral 81 16 98 %         Intake/Output Summary (Last 24 hours) at 9/20/2022 1001  Last data filed at 9/20/2022 0920  Gross per 24 hour   Intake 540 ml   Output --   Net 540 ml       Wt Readings from Last 3 Encounters:   09/16/22 111 lb 15.9 oz (50.8 kg)   03/28/22 107 lb 2.3 oz (48.6 kg)   03/25/22 110 lb (49.9 kg)         ASSESSMENT:   Orthostatic hypotension: BP dropped again this am, symptomatic  CVA, recent left basal ganglia: per neurology  Parkinsonism  Aortic Regurg: moderate      PLAN:  Agree with increasing midodrine to 5 mg TID (early AM, noon, 5 pm)  Increase florinef back to 0.1 mg daily  Continue compression socks, increase fluid intake and no sodium limitation    Erle Chihuahua, SUMMER - CNP, 9/20/2022, 10:01 AM  Fernanda Coll[facility] [place]   [phone]       Telemetry: N/A  NYHA: III    Physical Exam:  General:  Awake, alert, NAD  Skin:  Warm and dry, flushed  Neck:  JVP normal  Chest:  Clear to auscultation   Cardiovascular:  RRR, normal S1S2, + murmur, no g/r  Abdomen:  Soft, nontender, +bowel sounds  Extremities:  no BLE edema, KENN hose in place      Medications:    midodrine  5 mg Oral TID WC    sodium chloride  500 mL IntraVENous Once    potassium chloride  10 mEq Oral Daily    fludrocortisone  0.05 mg Oral Daily    calcium carbonate  500 mg Oral Daily    aspirin  81 mg Oral Daily    atorvastatin  80 mg Oral Nightly    Vitamin D  5,000 Units Oral Daily    clopidogrel  75 mg Oral Daily    donepezil  10 mg Oral Nightly    ferrous sulfate  325 mg Oral Daily with breakfast    folic acid  1 mg Oral Daily    heparin (porcine)  5,000 Units SubCUTAneous 3 times per day    cetirizine  5 mg Oral Daily    trospium  20 mg Oral Nightly    vitamin B-12  1,000 mcg Oral Daily         Lab Data: Lab results independently reviewed and analyzed by myself 9/20/2022    CBC:   Recent Labs     09/18/22  0910 09/19/22  0701   WBC  --  6.8   HGB 12.1 11.9*   PLT  --  264       BMP:    Recent Labs     09/18/22  0910 09/19/22  0701    137   K 3.8 3.7   CO2 25 24   BUN 34* 27*   CREATININE 0.8 0.7       INR:  No results for input(s): INR in the last 72 hours. BNP:  No results for input(s): PROBNP in the last 72 hours. Cardiac Enzymes:   Recent Labs     09/18/22  0910   TROPONINI <0.01       Lipids: No results found for: TRIG, HDL, LDLCALC, LDLDIRECT    Cardiac Imaging:    Echo:  TTE 9/12/22:  Summary:   Right ventricular systolic pressure is normal at <35 mmHg. Injection of agitated saline showed no interatrial shunt. Left ventricular function is low normal.   Overall left ventricular ejection fraction is estimated to be 50-55%. Borderline global hypokinesis of the left ventricle. Left atrium is severely (>40 ml/m2) dilated.    Moderate aortic regurgitation. No hemodynamically significant valvular aortic stenosis. There is no obvious cardiac source of emboli noted.

## 2022-09-20 NOTE — PROGRESS NOTES
Cardiology notified of rapid response called due to slow response, slurred speech and low blood pressure. Awaiting return call.

## 2022-09-20 NOTE — PROGRESS NOTES
2  All tx needs met in session 1   Total Timed Code Min 30 0   Total Treatment Minutes 30 0   Individual Treatment Minutes 30 0   Group Treatment Minutes 0 0   Co-Treat Minutes 0 0   Variance/Reason:  N/A    Pain No c/o pain     Pain Intervention [] RN notified  [] Repositioned  [] Intervention offered and patient declined  [x] N/A  [] Other: [] RN notified  [] Repositioned  [] Intervention offered and patient declined  [] N/A  [] Other:   Subjective     Pt alert and cooperative, agreeable for tx. Pt seen upright in bed. Objective:  Goals     Dysphagia Goals:    Short-term Goals  Timeframe for Short-term Goals: 5 days (09/20/22)      Goal 1: The patient will tolerate recommended diet without observed clinical signs of aspiration    Goal not directly targeted. Goal 2: The patient/caregiver will demonstrate understanding of compensatory strategies for improved swallowing safety. Goal not directly targeted. Cognitive Goals:    Short-term Goals  Timeframe for Short-term Goals: 18 days (10/03/22)    Goal 1: Pt will complete recall tasks with 90% acc given min cues. 6-item grocery list:   -15-minute delay: 6/6, no cues to recall.    -Pt benefited from min cues to encode, strategies of association, grouping, and visualization were reviewed for improved recall. Goal 2: Pt will complete higher level executive function tasks (meds, math, money, time, etc) with 95% acc given min cues. Functional problem-solving scenarios, ID possible solutions: 90% accuracy, min cues        Other areas targeted: Multiple meaning words / graded thought organization task: 90% accuracy, no cues; 100% given min cues and increased time       Education:   SLP edu pt re: role of ST and rationale for today's structured tasks. Pt verbalized understanding.      Safety Devices: [x] Call light within reach  [] Chair alarm activated  [x] Bed alarm activated  [] Other: [] Call light within reach  [] Chair alarm activated  [] Bed alarm activated  [] Other:    Assessment: Pt pleasant and cooperative, actively participated in all structured tasks. She benefited from min cues from SLP to provide further details/explanation during functional problem-solving task (see above). She continues to make progress across all goals. Plan: Continue as per plan of care. Additional Information:     Barriers toward progress: N/A  Discharge recommendations:  [] Home independently  [] Home with assistance []  24 hour supervision  [] ECF [x] Other: based upon PT/OT assessment   Continued Tx Upon Discharge: ? [] Yes [] No [x] TBD based on progress while on ARU [] Vital Stim indicated [] Other:   Estimated discharge date: est d/c date 10/7/2022    Interventions used this date:  [] Speech/Language Treatment  [] Instruction in HEP [] Group [] Dysphagia Treatment [x] Cognitive Treatment   [] Other:       Total Time Breakdown / Charges    Time in Time out Total Time / units   Cognitive Tx 1330 1400 30 min / 2 units   Speech Tx -- -- --   Dysphagia Tx          Electronically Signed by     Amelia Dillard M.S. [id number] [facility]  Speech-language pathologist  [phone]

## 2022-09-20 NOTE — PROGRESS NOTES
Physical Therapy  Facility/Department: [facility]  Rehabilitation Physical Therapy Treatment Note    NAME: Jennifer Berry  : 1940 ([de-identified] y.o.)  MRN: [id number]  CODE STATUS: Full Code    Date of Service: 22       Restrictions:  Restrictions/Precautions: Up as Tolerated; Fall Risk;General Precautions  Position Activity Restriction  Other position/activity restrictions: abdominal binder/knee high KENN hose; up as tolerated; Notify physician for pulse less than 50 or greater than 120, respiratory rate less than 12 or greater than 25, oral temperature greater than 101.3 F (45.0 C), systolic BP less than 90 or greater than 342, diastolic BP less than 50 or greater than 100. SUBJECTIVE  Subjective  Subjective: pt found in bed , with R facial droop and slurred speech. RN notified, see note for details  Pain: denies pain         OBJECTIVE  Cognition  Overall Cognitive Status: Exceptions  Arousal/Alertness: Appropriate responses to stimuli  Following Commands: Follows one step commands with increased time; Follows one step commands with repetition  Attention Span: Appears intact  Memory: Decreased recall of recent events  Safety Judgement: Decreased awareness of need for assistance;Decreased awareness of need for safety  Problem Solving: Assistance required to generate solutions;Assistance required to implement solutions;Assistance required to identify errors made;Assistance required to correct errors made  Insights: Decreased awareness of deficits  Initiation: Requires cues for some  Sequencing: Requires cues for some  Orientation  Overall Orientation Status: Within Functional Limits    Functional Mobility       Upon entry to room, vitals unable to be obtained via machine and pt presents with R facial droop and slurred speech/some confusion. RN notified, obtained vitals manually. RADHA= 80/40, HR 73 bpm, Spo2= 97% on ra.    Rapid response called as therapist inverted bed to trendelenburg position and donned KENN hose  BP increases to 115/58, pulse= 68 bpm, Spo2= 95%. Via rolling with min-max a , therapists donned abdominal binder     Team cleared pt to participate in therapy as pt tolerates with close observation of bP. Pt returned to flat position in bed , BP= 105/59, pulse= 73 bpm     Pt completed various LE there ex with AAROM on RLE including ankle pumps x 20, SAQ x 10, heel slides x 10, hip abduction. Therapists slowly increasing pt to semi-dutta position in bed and monitoring BP. BP decreases to 91/53, pulse- 72 bpm at 15* elevation and 90/52, pulse= 74 bpm at 30* elevation. Pt  denies dizziness. Pt in bed at end of session with call light/needs within reach and alarm donned. BP- 99/50, pulse= 72 bpm.  ASSESSMENT/PROGRESS TOWARDS GOALS  Vital Signs  Heart Rate: 60  Heart Rate Source: Monitor  BP: (!) 80/40  BP Location: Left upper arm  MAP (Calculated): 53.33  SpO2: 98 %  O2 Device: None (Room air)    Assessment  Assessment: upon entry of PT / OT for co treatment this am, pt found in bed wtih R facial droop and slurred speech. unable to obtain vitals via machine, RN came to assess pt. Bp 80/40, pulse= 73 bpm, Spo2= 97%. pt placed in trendelenberg with LEs elevated and rapid response called. pt Bp increasing to 110/50s with improved positiong. pt cleared to work with therapy this am wiht close observation of vitals. pt completed bed level exercise with slow transition from supine to semi-flowler position wiht BP decreasing to 96/53. pt in bed at end of session with call light/needs within reach. Activity Tolerance: Patient limited by endurance; Patient limited by fatigue  Discharge Recommendations: Patient would benefit from continued therapy after discharge;24 hour supervision or assist;Subacute/Skilled Nursing Facility  PT Equipment Recommendations  Equipment Needed: Yes  Wheelchair: Standard  Other: if patient does not go to a facility she likely will need wheelchair upon discharge. continue to assess.     Addendum Second Session (Adeola Pang, PT)  Assessment: Pt completed supine exercises for LE strengthening. Pt in bed throughout session. Pt requires AAROM for RLE exercises d/t weakness. Vitals: 154/75, 97%, 66 bpm    Exercises:  -1x 10 BLE quad sets  -2x 10 maria del carmen glute sets  -2x 10 LLE heel slides, 1x 10 AAROM RLE heel slides  -1x 10 LLE supine abduction, 1x 10 RLE AAROM supine abduction  -1x 5 LLE SLR, 1x 5 RLE SAQ with AAROM  -1x 15 LLE AP AROM, AAROM RLE      Goals  Patient Goals   Patient goals : Patient states, \"To be able to get up without being told\" (helped as pt pushes backwards)  Short Term Goals  Time Frame for Short term goals: 9/23/2022  Short term goal 1: Patient demonstrates  sitting  midline 15 minutes wtihout posterior lob 9/17 mod assist to maintain seated balance. Short term goal 2: Patient demonstrates bed<>chair with LRAD with mod assist of 1. 9/17 dependent with nathaniel  Short term goal 3: Patient demonstrates walking 10 feet or greater wtih mod assist of 2 LRAD. 9/17 not completed  Short term goal 5: Patient demonstrates min assist in [facility] propulsion 150 feet. 9/17 not completed  Long Term Goals  Time Frame for Long term goals : 10/7/2022  Long term goal 1: Patient demonstrates  Modified indep in rolling and sup<>Sit. Long term goal 2: Patient demonstrates   transfers sit<>stand with FWW and min assist.  Long term goal 3: Patient demonstrates bed<>chair MONIK. Long term goal 4: Patient demonstrates gait wtih transfers  and short distances 50 feet or greater with min assist  Long term goal 5: Patient demonstrates up and down 2 steps or greater. with mod assist of 1  Long term goal 6: Patient demonstrates indep WC propulsion iwth L/R turns indep   150 feet.   Long term goal 7: Patient demonstrates stoop to recover object from floor with reacher  with mod assist.  Long term goal 8: Patients family demonstrates ability to  assist patient in and out of car wtih mod assist.    PLAN OF CARE/SAFETY  Plan  Plan: 5-7 times per week  Plan weeks: 21 days  Current Treatment Recommendations: Strengthening;ROM;Balance training;Functional mobility training;Transfer training;Neuromuscular re-education;Stair training;Gait training; Wheelchair mobility training; Endurance training; Therapeutic activities; Positioning;Equipment evaluation, education, & procurement;Home exercise program;Safety education & training;Patient/Caregiver education & training  Safety Devices  Type of Devices: Bed alarm in place;Call light within reach;Nurse notified; Left in bed    EDUCATION  Education  Education Given To: Patient  Education Provided: Role of Therapy;Plan of Care;Transfer Training; Fall Prevention Strategies;Precautions; Safety;Equipment;Visual Perceptual Function  Education Provided Comments: stroke education, R sided use, midline orientation with all mobility  Education Method: Verbal;Demonstration  Barriers to Learning: Other (Comment)  Education Outcome: Verbalized understanding;Continued education needed        Therapy Time   Individual Concurrent Group Co-treatment   Time In  1430     0800   Time Out 1500     0900   Minutes 30     60     Timed Code Treatment Minutes: 80 Minutes       Jessica Howe PT, 09/20/22 at 3:19 PM

## 2022-09-20 NOTE — PROGRESS NOTES
Pedrito Bias  9/20/2022  [id number]    Chief Complaint: Acute CVA (cerebrovascular accident) [facility])    Subjective:   No acute events overnight. This morning patient was seated up when she developed worsening right side weakness and slurred speech. Rapid response was called. She was placed supine and had improvement of symptoms back to baseline. ROS: No CP, SOB, dyspnea    Objective:  Patient Vitals for the past 24 hrs:   BP Temp Temp src Pulse Resp SpO2   09/20/22 0815 (!) 115/58 -- -- -- -- --   09/20/22 0805 (!) 80/40 -- -- -- -- --   09/19/22 1959 (!) 176/73 98.3 °F (36.8 °C) Oral 60 16 97 %   09/19/22 1526 (!) 125/58 -- -- 73 -- 98 %   09/19/22 1235 (!) 125/58 -- -- 75 -- 98 %   09/19/22 1045 (!) 103/55 98.1 °F (36.7 °C) Oral 81 16 98 %     Gen: No distress, pleasant. Supine in bed  HEENT: Normocephalic, atraumatic. CV: extremities well perfused  Resp: No respiratory distress. Abd: Soft, nontender   Ext: No edema. Neuro: Alert, oriented, appropriately interactive. Laboratory data: Available via EMR. Therapy progress:    PT    Supine to Sit: Substantial/maximal assistance  Sit to Supine: Substantial/maximal assistance   Sit to Stand: Dependent  Chair/Bed to Chair Transfer: Independent  Car Transfer:    Ambulation 10 ft:    Ambulation 50 ft:    Ambulation 150 ft:    Stairs - 1 Step:    Stairs - 4 Step:    Stairs - 12 Step:      OT    Eating: Setup or clean-up assistance  Oral Hygiene: Partial/moderate assistance  Bathing: Dependent  Upper Body Dressing: Substantial/maximal assistance  Lower Body Dressing: Dependent  Toilet Transfer: Dependent  Toilet Hygiene: Dependent    Speech Therapy         Body mass index is 18.64 kg/m².     Assessment:  Patient Active Problem List   Diagnosis    Left knee pain    Left patella fracture    Contusion of left knee    Orthostatic hypotension    ARF (acute renal failure) (HCC)    Anemia    Debility    Acute CVA (cerebrovascular accident) ([facility] [address]    Nonrheumatic aortic valve insufficiency       Assessment and Plan:  Reilly Ivy is an [de-identified]year old female with a past medical history significant for orthostatic hypotension, anemia, and memory loss who presented to [facility] on 9/11/22 with recurrent falls and right sided weakness, found to have acute left CVA. She was admitted to [facility] on 9/15/22 due to functional deficits below her baseline. 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 Follow bowel movements. Enema or suppository if needed. Bladder: Check PVR x 3. [address] if PVR > 200ml or if any volume is > 500 ml. Sleep: Trazodone provided prn.       PPX  DVT: heparin  GI: not indicated    Electronically signed by Yuri Kumar MD on 9/20/2022 at 10:35 AM

## 2022-09-20 NOTE — PATIENT CARE CONFERENCE
[facility]  Inpatient Rehabilitation  Weekly Team Conference Note    Date: 2022  Patient Name: Cassidy Betancourt        MRN: [id number]    : 1940  ([de-identified] y.o.)  Gender: female   Referring Practitioner: Dr. Freddie Arellano  Diagnosis: Acute CVA      Interventions to be utilized toward barriers to discharge, per discipline:  NURSING  Nursing observed barriers to dc: Limited family support, Limited participation, Decreased endurance, Upper extremity weakness, Lower extremity weakness, Long standing deficits, and Incontinence of bladder  Nursing interventions:Assist with ADLs, vitals management, bladder management. Family Education: yes daughter Lisa Miller Risk:  Yes      PHYSICAL THERAPY  Physical therapy observed barriers to dc:    Baseline: pt lives alone in 1 level home with 2 FELIX. Ind with ADLs and mobility in home with AD. Current level: max A of 1-2 for bed mobility, max A of 2 via STEDY transfers, mod A wc mobility, non-ambulatory at this time due to poor standing tolerance/ balance. Barriers to DC: R mode, lack of assist at home, low BP, poor activity tolerance   Needs in order to achieve dc home/next level of care: if pt is to return home at [facility], pt will have to be functioning at a SBA or better level of mobility with LRAD. Continue to assess for d/c planning, rec SNF vs .  DME: TBD      Physical therapy interventions:   Current Treatment Recommendations: Strengthening, ROM, Balance training, Functional mobility training, Transfer training, Neuromuscular re-education, Stair training, Gait training, Wheelchair mobility training, Endurance training, Therapeutic activities, Positioning, Equipment evaluation, education, & procurement, Home exercise program, Safety education & training, Patient/Caregiver education & training    PT Goals:            Short Term Goals  Time Frame for Short term goals: 2022  Short term goal 1: Patient demonstrates  sitting  midline 15 minutes wtihout posterior lob 9/17 mod assist to maintain seated balance. Short term goal 2: Patient demonstrates bed<>chair with LRAD with mod assist of 1. 9/17 dependent with santo-stedy  Short term goal 3: Patient demonstrates walking 10 feet or greater wtih mod assist of 2 LRAD. 9/17 not completed  Short term goal 4: Patient demonstrates indep in rolling L<>R and mod assist sup to sit 9/17 max assist x 2  Short term goal 5: Patient demonstrates min assist in [facility] propulsion 150 feet. 9/17 not completed            Long Term Goals  Time Frame for Long term goals : 10/7/2022  Long term goal 1: Patient demonstrates  Modified indep in rolling and sup<>Sit. Long term goal 2: Patient demonstrates   transfers sit<>stand with FWW and min assist.  Long term goal 3: Patient demonstrates bed<>chair MONIK. Long term goal 4: Patient demonstrates gait wtih transfers  and short distances 50 feet or greater with min assist  Long term goal 5: Patient demonstrates up and down 2 steps or greater. with mod assist of 1  Additional Goals?: Yes  Long term goal 6: Patient demonstrates indep WC propulsion iwth L/R turns indep   150 feet. Long term goal 7: Patient demonstrates stoop to recover object from floor with reacher  with mod assist.  Long term goal 8: Patients family demonstrates ability to  assist patient in and out of car wtih mod assist.    PT Assessment:  Recommendation:   PT Equipment Recommendations  Equipment Needed: Yes  Mobility Devices: Wheelchair  Wheelchair: Standard  Other: if patient does not go to a facility she likely will need wheelchair upon discharge. continue to assess. Assessment  Assessment: upon entry of PT / OT for co treatment this am, pt found in bed wtih R facial droop and slurred speech. unable to obtain vitals via machine, RN came to assess pt. Bp 80/40, pulse= 73 bpm, Spo2= 97%. pt placed in trendelenberg with LEs elevated and rapid response called.  pt Bp increasing to 110/50s with improved positiong. pt cleared to work with therapy this am wiht close observation of vitals. pt completed bed level exercise with slow transition from supine to semi-flowler position wiht BP decreasing to 96/53. pt in bed at end of session with call light/needs within reach. Activity Tolerance: Patient limited by endurance; Patient limited by fatigue  Discharge Recommendations: Patient would benefit from continued therapy after discharge;24 hour supervision or assist;Subacute/Skilled Nursing Facility  PT Equipment Recommendations  Equipment Needed: Yes  Wheelchair: Standard  Other: if patient does not go to a facility she likely will need wheelchair upon discharge. continue to assess.     OCCUPATIONAL THERAPY  Occupational therapy observed barriers to dc:    Baseline: mod I ADLs and transfers with RW, lives alone   Current level: max to total for all ADLs, assist of 2 with Rodrigue Salaam for transfers   Barriers to DC: lack of assist at home, R mode, poor strength, poor activity tolerance   Needs in order to achieve dc home/next level of care: SPV/min A for all ADLs and transfers to return home with family assistance    Occupational Therapy interventions:  Current Treatment Recommendations: Strengthening, Balance training, Functional mobility training, Self-Care / ADL, Endurance training, Neuromuscular re-education, Positioning, Modalities, Equipment evaluation, education, & procurement, Patient/Caregiver education & training, Safety education & training, Home management training, Coordination training, Sensory integraion    OT Goals:     Short Term Goals  Time Frame for Short term goals: 9/28/22 (14 days)  Short Term Goal 1: Pt will complete UB dressing with mod A  Short Term Goal 2: Pt will complete simple grooming task supported in w/c with set-up A  Short Term Goal 3: Pt will complete functional transfers with mod A x1 using LRAD  Short Term Goal 4: Pt will complete toileting with max A  Long Term Goals  Time Frame for Long term goals : 10/05/22 (21 days)  Long Term Goal 1: Pt will complete toilet transfer with min A  Long Term Goal 2: Pt will complete LB dressing with mod A  Long Term Goal 3: Pt will complete bathing with mod A  Long Term Goal 4: Pt will incorporate RUE into ADLs 75% of trials with minimal VC in order to increase functional independence with ADLs    OT Assessment:  Assessment  Assessment: Pt agreeable to PT/OT session. Pt presented with R sided facial droop, slurring of words, and BP 80/40 on arrival. RN notified and called Rapid Response. Team arrived and cleared pt for therapy with close monitoring of BP. KENN hose applied and pt put in trendelenburg. BP improved to 115/58 and up to 119/54. Bed placed in supine with /56. HOB slowly elevated to 30* with /58 and 45* with BP 90/52. Pt reported no dizziness. Pt completed bed level exercises. HOB returned to 30* and BP 99/50 at end of session. Abdominal binder removed and KENN hose left on. Pt required co-treat due to medical instability, poor activity tolerance, and high level of assist needed during mobility. Continue POC. Activity Tolerance: Patient limited by endurance; Patient limited by fatigue  Discharge Recommendations: Subacute/Skilled Nursing Facility  OT Equipment Recommendations  Other: CTA pending progress  Safety Devices  Safety Devices in place: Yes  Type of devices: Call light within reach; All fall risk precautions in place; Patient at risk for falls;Gait belt;Nurse notified; Left in bed;Bed alarm in place    SPEECH THERAPY (intentionally left blank if not actively being seen by this service):  Speech therapy observed barriers to dc:    Baseline: lives independently, manages own medications and finances   Current level: difficulty with short-term recall   Barriers to DC: physical limitations, short-term recall   Needs in order to achieve dc home/next level of care: tolerance of safest and LRD, improved safety awareness    Speech Therapy interventions:  Dysphagia: Therapeutic Interventions: Diet tolerance monitoring, Patient/Family education  Speech/Language/Cognition: Compensatory strategy training and carryover, recall/STM, problem solving, reasoning, exec function, thought organization, attention. Dysphagia Goals:  Timeframe for Long-term Goals: 7 days (09/22/22)  Goal 1: Pt will tolerate safest and least restrictive diet without any clinical s/s of aspiration. Short-term Goals  Timeframe for Short-term Goals: 5 days (09/20/22)  Dysphagia Goals: The patient will tolerate recommended diet without observed clinical signs of aspiration, The patient/caregiver will demonstrate understanding of compensatory strategies for improved swallowing safety. Speech/Language/Cog Goals:  Timeframe for Long-term Goals: 21 days (10/06/22)  Goal 1: Pt will improve overall cognitive linguistic skills to increase safety and independence to return to OF)        Short-term Goals  Timeframe for Short-term Goals: 18 days (10/03/22)  Goal 1: Pt will complete recall tasks with 90% acc given min cues. Goal 2: Pt will complete higher level executive function tasks (meds, math, money, time, etc) with 95% acc given min cues. Timeframe for Short-term Goals: 18 days (10/03/22)    ST Assessment:Pt pleasant and cooperative, actively participated in all structured tasks. She benefited from min cues from SLP to provide further details/explanation during functional problem-solving task. She continues to make progress across all goals. NUTRITION  Weight: 115 lb 4.8 oz (52.3 kg) / Body mass index is 19.19 kg/m². Diet Order: ADULT ORAL NUTRITION SUPPLEMENT; Breakfast, Dinner; Standard High Calorie/High Protein Oral Supplement  ADULT DIET; Regular  PO Meals Eaten (%): 76 - 100%  Education: Not indicated       CASE MANAGEMENT  Assessment: [de-identified] yr old female. Dx:Acute CVA (cerebrovascular accident). Independent prior level of function with rolling walker.  Lives in a one level home with a 2 step entry that has a tub/shower unit with grab bars. Therapy recommendations are Patient would benefit from continued therapy after discharge;24 hour supervision or assist vs Subacute/Skilled Nursing Facility. Interdisciplinary Goals:   1.) Pt will complete toileting with max A.  2.) Pt will complete functional transfer with max  a of 1  3.) Pt will independently use compensatory swallow strategies during meals. [x]  Family Training discussed at conference and to be scheduled. Discharge Plan   Estimated discharge date: 10/8/2022  Destination: Home health vs SNF  Pass:No  Services at Discharge: Home Health vs SNF Physical Therapy, Occupational Therapy, Speech Therapy, and Nursing   Equipment at Discharge: TBD pending progress. Team Members Present at Conference:  : Ambar Longo RN  Occupational Therapist: Michael Handley OT  Physical Therapist: Nelson Connelly PT, DPT   Speech Therapist: Heydi Hopkins [place]  Nurse: Gayatri Larsen RN  Dietician: Ramy Ross RDN, LD  : Jacob Bhatt OTR/L  Psychiatry: N/A    Family members present at conference: No      I led this team conference and I approve the established interdisciplinary plan of care as documented within the medical record of [facility].     MD: Electronically signed by Leonard Perales MD on 9/21/2022 at 1:58 PM

## 2022-09-21 LAB
ANION GAP SERPL CALCULATED.3IONS-SCNC: 10 MMOL/L (ref 3–16)
BASOPHILS ABSOLUTE: 0 K/UL (ref 0–0.2)
BASOPHILS RELATIVE PERCENT: 0.7 %
BUN BLDV-MCNC: 32 MG/DL (ref 7–20)
CALCIUM SERPL-MCNC: 9.3 MG/DL (ref 8.3–10.6)
CHLORIDE BLD-SCNC: 104 MMOL/L (ref 99–110)
CO2: 25 MMOL/L (ref 21–32)
CREAT SERPL-MCNC: 0.7 MG/DL (ref 0.6–1.2)
EOSINOPHILS ABSOLUTE: 0.2 K/UL (ref 0–0.6)
EOSINOPHILS RELATIVE PERCENT: 2.5 %
GFR AFRICAN AMERICAN: >60
GFR NON-AFRICAN AMERICAN: >60
GLUCOSE BLD-MCNC: 88 MG/DL (ref 70–99)
HCT VFR BLD CALC: 33.8 % (ref 36–48)
HEMOGLOBIN: 11.5 G/DL (ref 12–16)
LYMPHOCYTES ABSOLUTE: 1.7 K/UL (ref 1–5.1)
LYMPHOCYTES RELATIVE PERCENT: 25.4 %
MCH RBC QN AUTO: 33.1 PG (ref 26–34)
MCHC RBC AUTO-ENTMCNC: 34 G/DL (ref 31–36)
MCV RBC AUTO: 97.3 FL (ref 80–100)
MONOCYTES ABSOLUTE: 0.5 K/UL (ref 0–1.3)
MONOCYTES RELATIVE PERCENT: 6.9 %
NEUTROPHILS ABSOLUTE: 4.2 K/UL (ref 1.7–7.7)
NEUTROPHILS RELATIVE PERCENT: 64.5 %
PDW BLD-RTO: 16 % (ref 12.4–15.4)
PLATELET # BLD: 286 K/UL (ref 135–450)
PMV BLD AUTO: 7.4 FL (ref 5–10.5)
POTASSIUM REFLEX MAGNESIUM: 4.2 MMOL/L (ref 3.5–5.1)
RBC # BLD: 3.48 M/UL (ref 4–5.2)
SODIUM BLD-SCNC: 139 MMOL/L (ref 136–145)
WBC # BLD: 6.6 K/UL (ref 4–11)

## 2022-09-21 PROCEDURE — 97530 THERAPEUTIC ACTIVITIES: CPT

## 2022-09-21 PROCEDURE — 6370000000 HC RX 637 (ALT 250 FOR IP): Performed by: NURSE PRACTITIONER

## 2022-09-21 PROCEDURE — 92526 ORAL FUNCTION THERAPY: CPT

## 2022-09-21 PROCEDURE — 97535 SELF CARE MNGMENT TRAINING: CPT

## 2022-09-21 PROCEDURE — 99232 SBSQ HOSP IP/OBS MODERATE 35: CPT | Performed by: NURSE PRACTITIONER

## 2022-09-21 PROCEDURE — 36415 COLL VENOUS BLD VENIPUNCTURE: CPT

## 2022-09-21 PROCEDURE — 97129 THER IVNTJ 1ST 15 MIN: CPT

## 2022-09-21 PROCEDURE — 80048 BASIC METABOLIC PNL TOTAL CA: CPT

## 2022-09-21 PROCEDURE — 1280000000 HC REHAB R&B

## 2022-09-21 PROCEDURE — 6370000000 HC RX 637 (ALT 250 FOR IP): Performed by: STUDENT IN AN ORGANIZED HEALTH CARE EDUCATION/TRAINING PROGRAM

## 2022-09-21 PROCEDURE — 6360000002 HC RX W HCPCS: Performed by: STUDENT IN AN ORGANIZED HEALTH CARE EDUCATION/TRAINING PROGRAM

## 2022-09-21 PROCEDURE — 97110 THERAPEUTIC EXERCISES: CPT

## 2022-09-21 PROCEDURE — 85025 COMPLETE CBC W/AUTO DIFF WBC: CPT

## 2022-09-21 RX ADMIN — TROSPIUM CHLORIDE 20 MG: 20 TABLET, FILM COATED ORAL at 21:28

## 2022-09-21 RX ADMIN — FERROUS SULFATE TAB 325 MG (65 MG ELEMENTAL FE) 325 MG: 325 (65 FE) TAB at 08:02

## 2022-09-21 RX ADMIN — CYANOCOBALAMIN TAB 500 MCG 1000 MCG: 500 TAB at 08:02

## 2022-09-21 RX ADMIN — HEPARIN SODIUM 5000 UNITS: 5000 INJECTION INTRAVENOUS; SUBCUTANEOUS at 21:28

## 2022-09-21 RX ADMIN — POTASSIUM CHLORIDE 10 MEQ: 750 TABLET, EXTENDED RELEASE ORAL at 08:02

## 2022-09-21 RX ADMIN — FOLIC ACID 1 MG: 1 TABLET ORAL at 08:02

## 2022-09-21 RX ADMIN — CETIRIZINE HYDROCHLORIDE 5 MG: 5 TABLET, FILM COATED ORAL at 08:02

## 2022-09-21 RX ADMIN — ATORVASTATIN CALCIUM 80 MG: 80 TABLET, FILM COATED ORAL at 21:28

## 2022-09-21 RX ADMIN — MIDODRINE HYDROCHLORIDE 5 MG: 5 TABLET ORAL at 12:46

## 2022-09-21 RX ADMIN — ASPIRIN 81 MG 81 MG: 81 TABLET ORAL at 08:02

## 2022-09-21 RX ADMIN — Medication 5 MG: at 21:28

## 2022-09-21 RX ADMIN — MIDODRINE HYDROCHLORIDE 5 MG: 5 TABLET ORAL at 17:27

## 2022-09-21 RX ADMIN — DONEPEZIL HYDROCHLORIDE 10 MG: 5 TABLET, FILM COATED ORAL at 21:28

## 2022-09-21 RX ADMIN — FLUDROCORTISONE ACETATE 0.1 MG: 0.1 TABLET ORAL at 08:02

## 2022-09-21 RX ADMIN — HEPARIN SODIUM 5000 UNITS: 5000 INJECTION INTRAVENOUS; SUBCUTANEOUS at 14:35

## 2022-09-21 RX ADMIN — HEPARIN SODIUM 5000 UNITS: 5000 INJECTION INTRAVENOUS; SUBCUTANEOUS at 06:25

## 2022-09-21 RX ADMIN — CLOPIDOGREL BISULFATE 75 MG: 75 TABLET ORAL at 08:02

## 2022-09-21 RX ADMIN — Medication 5000 UNITS: at 08:01

## 2022-09-21 RX ADMIN — MIDODRINE HYDROCHLORIDE 5 MG: 5 TABLET ORAL at 06:24

## 2022-09-21 NOTE — PROGRESS NOTES
Speech Language Pathology  [facility]: ACUTE REHAB UNIT  SPEECH-LANGUAGE PATHOLOGY      [x] Daily  [] Weekly Care Conference Note  [] Discharge    Patient:Yulissa Alfonso      :1940  GNA:[id number]  Rehab Dx/Hx: Acute CVA (cerebrovascular accident) ([facility] [address] [I63.9]    Precautions: falls  Home situation: Pt lives at home [facility] Dx: [] Aphasia  [] Dysarthria  [] Apraxia   [] Oropharyngeal dysphagia [x] Cognitive Impairment  [] Other:   Date of Admit: 9/15/2022  Room #: 0160/0160-01    Current functional status (updated daily):         Pt being seen for : [] Speech/Language Treatment  [x] Dysphagia Treatment [x] Cognitive Treatment  [] Other:  Communication: [x]WFL  [] Aphasia  [] Dysarthria  [] Apraxia  [] Pragmatic Impairment [] Non-verbal  [] Hearing Loss  [] Other:   Cognition: [] WFL  [x] Mild  [x] Moderate  [] Severe [] Unable to Assess  [] Other:  Memory: [] WFL  [] Mild  [x] Moderate  [] Severe [] Unable to Assess  [] Other:  Behavior: [x] Alert  [x] Cooperative  [x]  Pleasant  [] Confused  [] Agitated  [] Uncooperative  [] Distractible [] Motivated  [] Self-Limiting [] Anxious  [] Other:  Endurance:  [x] Adequate for participation in SLP sessions  [] Reduced overall  [] Lethargic  [] Other:  Safety: [x] No concerns at this time  [] Reduced insight into deficits  []  Reduced safety awareness [] Not following call light procedures  [] Unable to Assess  [] Other:    Current Diet Order:ADULT ORAL NUTRITION SUPPLEMENT; Breakfast, Dinner; Standard High Calorie/High Protein Oral Supplement  ADULT DIET;  Regular, thin   Swallowing Precautions: upright for all intake, stay upright for at least 30 mins after intake, small bites/sips, assist feed, oral care 2-3x/day to reduce adverse affects in the event of aspiration, increase physical mobility as able, alternate bites/sips, slow rate of intake, general GERD precautions, and general aspiration precautions        Date: 2022      Tx session 1  [id number] Tx session 2  All tx needs met in session 1   Total Timed Code Min 10 0   Total Treatment Minutes 30 0   Individual Treatment Minutes 30 0   Group Treatment Minutes 0 0   Co-Treat Minutes 0 0   Variance/Reason:  N/A    Pain No c/o pain     Pain Intervention [] RN notified  [] Repositioned  [] Intervention offered and patient declined  [x] N/A  [] Other: [] RN notified  [] Repositioned  [] Intervention offered and patient declined  [] N/A  [] Other:   Subjective     Pt alert and cooperative, agreeable for tx. Pt seen upright in bed. Reports she has not been brushing her teeth routinely while hospitalized but used an electric brush at home. Objective:  Goals     Dysphagia Goals:    Short-term Goals  Timeframe for Short-term Goals: 5 days (09/20/22)      Goal 1: The patient will tolerate recommended diet without observed clinical signs of aspiration    After set up of her meal tray, pt swallowed soft solids from regular menu and thin liquids by cup with functional bilateral mastication and complete oral clearance of solids. Pharyngeal swallow is audible with no aspiration symptoms. Pt reports occasional cough that she associates with PO intake, though this was not observed today. Goal Met 9/21/22      Goal 2: The patient/caregiver will demonstrate understanding of compensatory strategies for improved swallowing safety. Following education, pt repeated aspiration and reflux precautions listed above. To combat oral bacteria, pt completed toothbrushing with set up of supplies. Cognitive Goals:    Short-term Goals  Timeframe for Short-term Goals: 18 days (10/03/22)    Goal 1: Pt will complete recall tasks with 90% acc given min cues. Not directly targeted    Goal 2: Pt will complete higher level executive function tasks (meds, math, money, time, etc) with 95% acc given min cues.      Not directly targeted      Other areas targeted: Problem solving/flexibility of thought to provide 2-3 alternative solutions to problems when some equipment was missing 100% given min cues      Education:   SLP edu pt re: role of ST and rationale for today's structured tasks. Pt verbalized understanding. Safety Devices: [x] Call light within reach  [] Chair alarm activated  [x] Bed alarm activated  [] Other: [] Call light within reach  [] Chair alarm activated  [] Bed alarm activated  [] Other:    Assessment: Pt pleasant and cooperative, actively participated in all structured tasks. Pt demonstrated clinically safe swallow of regular menu items, though pt had selected softer options. No aspiration symptoms, no oral dysphagia symptoms. Pt repeated aspiration and reflux precautions  after education and may benefit from reinforcement to ensure carryover. Pt demonstrated functional flexibility of thought in verbal problem solving. She required verbal cues to resume use of her LUE to self-feed when she was unable to grasp the utensil with her R hand. She continues to make progress across all goals. Plan: Continue as per plan of care. Additional Information:     Barriers toward progress: N/A  Discharge recommendations:  [] Home independently  [] Home with assistance []  24 hour supervision  [] ECF [x] Other: based upon PT/OT assessment   Continued Tx Upon Discharge: ? [] Yes [] No [x] TBD based on progress while on ARU [] Vital Stim indicated [] Other:   Estimated discharge date: est d/c date 10/7/2022    Interventions used this date:  [] Speech/Language Treatment  [] Instruction in HEP [] Group [x] Dysphagia Treatment [x] Cognitive Treatment   [] Other: Total Time Breakdown / Charges    Time in Time out Total Time / units   Cognitive Tx 1120 1130 10 min / 1 unit   Speech Tx -- -- --   Dysphagia Tx 1100 1120 20 min/ 1 unit       Electronically Signed by     Stuart Delvalle.  Jael BRODY CCC-SLP  Speech Language Pathologist

## 2022-09-21 NOTE — PROGRESS NOTES
[facility]   Daily Progress Note    Admit Date:  9/15/2022  HPI:   Meme Snowden admitted to ARU from [facility] after acute left basal ganglia stroke. Cardiology consulted for orthostatic hypotension. She has a history of tremors, orthostatic hypotension, and memory loss. Previously treated with Florinef. Subjective:  Ms. Cristobal Solis awake and alert, states feels better today. Was able to work with therapy today. Objective:   Patient Vitals for the past 24 hrs:   BP Temp Temp src Pulse Resp SpO2 Weight   09/21/22 0919 123/75 -- -- 72 -- 96 % --   09/21/22 0800 123/75 98 °F (36.7 °C) Oral 72 16 96 % --   09/21/22 0615 (!) 148/78 -- -- -- -- -- --   09/20/22 2106 (!) 144/74 97.9 °F (36.6 °C) Oral 74 18 96 % --   09/20/22 1700 -- -- -- -- -- -- 115 lb 4.8 oz (52.3 kg)   09/20/22 1600 125/61 -- -- -- -- -- --   09/20/22 1515 (!) 80/40 -- -- 60 -- 98 % --   09/20/22 1130 (!) 169/75 98 °F (36.7 °C) Oral 60 18 98 % --         Intake/Output Summary (Last 24 hours) at 9/21/2022 1045  Last data filed at 9/21/2022 1010  Gross per 24 hour   Intake 1320 ml   Output --   Net 1320 ml       Wt Readings from Last 3 Encounters:   09/20/22 115 lb 4.8 oz (52.3 kg)   03/28/22 107 lb 2.3 oz (48.6 kg)   03/25/22 110 lb (49.9 kg)         ASSESSMENT:   Orthostatic hypotension: BP stable overnight and this a.m.  CVA, recent left basal ganglia: per neurology  Parkinsonism  Aortic Regurg: moderate      PLAN:  Continue midodrine 5 mg 3 times daily, Florinef 0.1 mg daily  Continue compression socks.   Encourage p.o. intake of fluids and protein supplements  Labs scheduled for Monday Wednesday Friday    Chente Medina, SUMMER - CNP, 9/21/2022, 10:45 AM  [facility]   [phone]       Telemetry: N/A  NYHA: III    Physical Exam:  General:  Awake, alert, NAD  Skin:  Warm and dry, flushed  Neck:  JVP normal  Chest:  Clear to auscultation   Cardiovascular:  RRR, normal S1S2, + murmur, no g/r  Abdomen:  Soft, nontender, +bowel sounds  Extremities:  no BLE edema, KENN hose in place      Medications:    midodrine  5 mg Oral TID WC    fludrocortisone  0.1 mg Oral Daily    sodium chloride  500 mL IntraVENous Once    potassium chloride  10 mEq Oral Daily    calcium carbonate  500 mg Oral Daily    aspirin  81 mg Oral Daily    atorvastatin  80 mg Oral Nightly    Vitamin D  5,000 Units Oral Daily    clopidogrel  75 mg Oral Daily    donepezil  10 mg Oral Nightly    ferrous sulfate  325 mg Oral Daily with breakfast    folic acid  1 mg Oral Daily    heparin (porcine)  5,000 Units SubCUTAneous 3 times per day    cetirizine  5 mg Oral Daily    trospium  20 mg Oral Nightly    vitamin B-12  1,000 mcg Oral Daily         Lab Data: Lab results independently reviewed and analyzed by myself 9/21/2022    CBC:   Recent Labs     09/19/22  0701 09/21/22  0647   WBC 6.8 6.6   HGB 11.9* 11.5*    286       BMP:    Recent Labs     09/19/22  0701 09/21/22  0647    139   K 3.7 4.2   CO2 24 25   BUN 27* 32*   CREATININE 0.7 0.7       INR:  No results for input(s): INR in the last 72 hours. BNP:  No results for input(s): PROBNP in the last 72 hours. Cardiac Enzymes:   No results for input(s): TROPONINI in the last 72 hours. Lipids: No results found for: TRIG, HDL, LDLCALC, LDLDIRECT    Cardiac Imaging:    Echo:  TTE 9/12/22:  Summary:   Right ventricular systolic pressure is normal at <35 mmHg. Injection of agitated saline showed no interatrial shunt. Left ventricular function is low normal.   Overall left ventricular ejection fraction is estimated to be 50-55%. Borderline global hypokinesis of the left ventricle. Left atrium is severely (>40 ml/m2) dilated. Moderate aortic regurgitation. No hemodynamically significant valvular aortic stenosis. There is no obvious cardiac source of emboli noted.

## 2022-09-21 NOTE — PROGRESS NOTES
Occupational Therapy  Facility/Department: [facility]  Rehabilitation Occupational Therapy Daily Treatment Note    Date: 22  Patient Name: Galindo Bey       Room: [facility]/5469-75  MRN: [id number]  Account: [de-identified]   : 1940  [de-identified] y.o.) Gender: female                    Past Medical History:  has a past medical history of ARF (acute renal failure) ([facility] [address] Past Surgical History:   has a past surgical history that includes joint replacement; Tonsillectomy; Intracapsular cataract extraction (Right, 2019); and Intracapsular cataract extraction (Left, 2019). Restrictions  Restrictions/Precautions: Up as Tolerated; Fall Risk;General Precautions  Other position/activity restrictions: abdominal binder/knee high KENN hose; up as tolerated; Notify physician for pulse less than 50 or greater than 120, respiratory rate less than 12 or greater than 25, oral temperature greater than 101.3 F (57.7 C), systolic BP less than 90 or greater than 795, diastolic BP less than 50 or greater than 100. Subjective  Subjective: pt in bed with RN passing meds at start of session, agreeable to therapy and questioning \"when this will start to get better\", pt also asking difference between heart attack and stroke; BP after supine to sit remained stable at 121/72 from 123/75  Restrictions/Precautions: Up as Tolerated; Fall Risk;General Precautions             Objective     Cognition  Overall Cognitive Status: Exceptions  Arousal/Alertness: Appropriate responses to stimuli  Following Commands: Follows one step commands with increased time; Follows one step commands with repetition  Attention Span: Appears intact  Memory: Decreased recall of recent events  Safety Judgement: Decreased awareness of need for assistance;Decreased awareness of need for safety  Problem Solving: Assistance required to generate solutions;Assistance required to implement solutions;Assistance required to identify errors made;Assistance required to correct errors made  Insights: Decreased awareness of deficits  Initiation: Requires cues for some  Sequencing: Requires cues for some  Orientation  Overall Orientation Status: Within Functional Limits  Orientation Level: Oriented X4         ADL  Grooming/Oral Hygiene  Assistance Level: Maximum assistance  Skilled Clinical Factors: washing hands at sink with iCurrent support; verbal cues for BUE sequencing of task, tactile cues for RUE use  Upper Extremity Dressing  Assistance Level: Maximum assistance  Skilled Clinical Factors: doffing t shirt and donning crew neck sweatshirt sitting EOB with Min-Mod A for balance sitting EOB; increased use of BUE this date, max cues for sequencing and mode dressing technique, cont mode dressing education for inc carryover  Lower Extremity Dressing  Assistance Level: Dependent  Skilled Clinical Factors: don pants sitting EOB, pt able to pull up over knees with [place] for R hand grasp on waist band once threaded BLE; Dep to don brief  Putting On/Taking Off Footwear  Assistance Level: Dependent  Skilled Clinical Factors: donning non skid socks over KENN hose  Toileting  Assistance Level: Dependent  Skilled Clinical Factors: incontinent of urine in brief, dep for brief change and hygiene after urination while on BSC over standard toilet  Toilet Transfers  Technique:  (via iCurrent)  Equipment: Raised toilet seat with arms (standard BSC over standard toilet)  Additional Factors: Cues for hand placement; Increased time to complete; With handrails  Assistance Level: Dependent  Skilled Clinical Factors: x2 Mod STS, Max Ax1-2 for balance while in stance in iCurrent at toilet        Functional Mobility  Device: Wheelchair  Activity: To/From therapy gym  Assistance Level: Dependent  Bed Mobility  Overall Assistance Level: Requires x 2 Assistance;Maximum Assistance  Additional Factors: Increased time to complete;Verbal cues; With handrails  Sit to Supine  Assistance Level: Requires x 2 assistance;Maximum assistance  Skilled Clinical Factors: HOB elevated, [place] to sequence motor plan for bringing legs over EOB and to bring RUE to bed rail crossing midline  Supine to Sit  Assistance Level: Maximum assistance; Requires x 2 assistance  Transfers  Surface: To bed;From bed; Wheelchair;Raised toilet Seat  Additional Factors: Verbal cues; Set-up; With handrails  Sit to Stand  Assistance Level: Moderate assistance; Requires x 2 assistance  Skilled Clinical Factors: cues for hand placement and VC for use of BLE and WB  Stand to Sit  Assistance Level: Moderate assistance; Requires x 2 assistance  Skilled Clinical Factors: cues for R sided safety and for control when return to sit d/t poor eccentric control     OT Exercises  Static Standing Balance Exercises: x2 sit to stand while in // bars with Max Ax2 for static standing balance; 1 trial of stand to Jaabri Leaven from EOB with max cues for posture  Postural Correction Exercises: verbal and tactile cues at anterior/posterior shoulders, posterior hips; external verbal cues for hip and neck extension via looking to targets, pt with fair response to internal reminder questiosn such as \"where are you looking right now\"  Motor Control/Coordination: RUE AROM during functional tasks     Assessment  Assessment  Assessment: Co-tx collaboration this date to safely meet goals and will have better occupational performance outcomes with in a co-treatment than 1:1 session. Jose Almonte is progressing in therapy slowly, currently limited by generalized weakness, medical complications during stay, R sided weakness, decreased insight. Demo'd poor safety awareness, requiring consistent cues for R side awareness especially while in stance.    Functional Mobility: Max Ax2 for static standing balance while in stance at // bars or Jabari Leaven d/t R side lean/rotation; Min-Mod A for dynamic balance sitting EOB with cues for anterior weight shift (hands on knees)  ADL: A for mode dressing techniques, Max A for UB and LB dressing, dep for urination on toilet after episode on incontinence in brief  TA: static stands with Max A x2, plan to trial reaching in stance in future session for generalization to ADLs  Activity was tolerated fairly well, pt required increased time for rest between activities. Continue OT POC to address performance deficits in ADLs and functional mobility. Vitals at beginning of session: BP: 123/75, SPO2 96% on RA, HR 72      Activity Tolerance: Patient limited by endurance; Patient limited by fatigue  Discharge Recommendations: Subacute/Skilled Nursing Facility  Factors Affecting Discharge: Pt lives alone and currently require high level of assistance due to new deficits from CVA  OT Equipment Recommendations  Equipment Needed: Yes  Other: CTA pending progress, may defer DME rec to d/c facility  Safety Devices  Safety Devices in place: Yes  Type of devices: Call light within reach; All fall risk precautions in place; Patient at risk for falls;Gait belt;Nurse notified; Left in bed;Bed alarm in place    Patient Education  Education  Education Given To: Patient  Education Provided: Role of Therapy;Plan of Care;Precautions;Transfer Training; Fall Prevention Strategies;Cognition; Safety; Family Education;ADL Function;Mobility Training;Equipment;DME/Home Modifications; Home Exercise Program  Education Provided Comments: use of RUE; positioning of RUE, use of KENN hose/abdominal binder, management of BP  Education Method: Verbal;Demonstration  Barriers to Learning: Cognition  Education Outcome: Verbalized understanding;Continued education needed    Plan  Plan  Times per Week: 5-7x/wk  Specific Instructions for Next Treatment: ADLs, mode dressing technique  Current Treatment Recommendations: Strengthening;Balance training;Functional mobility training;Self-Care / ADL;Endurance training;Neuromuscular re-education;Positioning;Modalities; Equipment evaluation, education, & procurement;Patient/Caregiver education & training; Safety education & training;Home management training;Coordination training;Sensory integraion    Goals  Patient Goals   Patient goals : \"to get going and get better\"  Short Term Goals  Time Frame for Short term goals: 9/28/22 (14 days)  Short Term Goal 1: Pt will complete UB dressing with mod A- goal progressing 9/21  Short Term Goal 2: Pt will complete simple grooming task supported in w/c with set-up A- goal progressing 9/21  Short Term Goal 3: Pt will complete functional transfers with mod A x1 using LRAD- goal progressing 9/21  Short Term Goal 4: Pt will complete toileting with max A- goal progressing 9/21  Long Term Goals  Time Frame for Long term goals : 10/05/22 (21 days)  Long Term Goal 1: Pt will complete toilet transfer with min A  Long Term Goal 2: Pt will complete LB dressing with mod A  Long Term Goal 3: Pt will complete bathing with mod A  Long Term Goal 4: Pt will incorporate RUE into ADLs 75% of trials with minimal VC in order to increase functional independence with ADLs    Therapy Time   Individual Concurrent Group Co-treatment   Time In       0800   Time Out       0900   Minutes       60   Timed Code Treatment Minutes: 61 Minutes       Tessa Parker

## 2022-09-21 NOTE — PROGRESS NOTES
Glee Snellen  9/21/2022  [id number]    Chief Complaint: Acute CVA (cerebrovascular accident) [facility])    Subjective:   No acute events overnight. Today Raza Galloway is seen in her room, seated up in bed. She denies increased weakness, slurred speech or dizziness at this time. She denies other acute complaints. ROS: No CP, SOB, dyspnea    Objective:  Patient Vitals for the past 24 hrs:   BP Temp Temp src Pulse Resp SpO2 Weight   09/21/22 1245 (!) 104/50 -- -- -- -- -- --   09/21/22 0919 123/75 -- -- 72 -- 96 % --   09/21/22 0800 123/75 98 °F (36.7 °C) Oral 72 16 96 % --   09/21/22 0615 (!) 148/78 -- -- -- -- -- --   09/20/22 2106 (!) 144/74 97.9 °F (36.6 °C) Oral 74 18 96 % --   09/20/22 1700 -- -- -- -- -- -- 115 lb 4.8 oz (52.3 kg)     Gen: No distress, pleasant. Seated up in bed  HEENT: Normocephalic, atraumatic. CV: extremities well perfused  Resp: No respiratory distress. Abd: Soft, nontender   Ext: No edema. Neuro: Alert, oriented, appropriately interactive. Psych: appropriate mood and affect    Laboratory data: Available via EMR. Therapy progress:    PT    Supine to Sit: Substantial/maximal assistance  Sit to Supine: Substantial/maximal assistance   Sit to Stand: Dependent  Chair/Bed to Chair Transfer: Independent  Car Transfer:    Ambulation 10 ft:    Ambulation 50 ft:    Ambulation 150 ft:    Stairs - 1 Step:    Stairs - 4 Step:    Stairs - 12 Step:      OT    Eating: Setup or clean-up assistance  Oral Hygiene: Partial/moderate assistance  Bathing: Dependent  Upper Body Dressing: Substantial/maximal assistance  Lower Body Dressing: Dependent  Toilet Transfer: Dependent  Toilet Hygiene: Dependent    Speech Therapy         Body mass index is 19.19 kg/m².     Assessment:  Patient Active Problem List   Diagnosis    Left knee pain    Left patella fracture    Contusion of left knee    Orthostatic hypotension    ARF (acute renal failure) (HCC)    Anemia    Debility    Acute CVA (cerebrovascular accident) ([address] Nonrheumatic aortic valve insufficiency       Assessment and Plan:  Glee Snellen is an [de-identified]year old female with a past medical history significant for orthostatic hypotension, anemia, and memory loss who presented to [facility] on 9/11/22 with recurrent falls and right sided weakness, found to have acute left CVA. She was admitted to [facility] on 9/15/22 due to functional deficits below her baseline. 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  - with right hemiparesis  - secondary stroke prevention with asa, plavix, statin  - PT, OT, ST    Dysphagia  - ST     Autonomic Dysfunction  Orthostatic hypotension  - florinef, increased midodrine  - Cardiology following, appreciate assistance     Dementia   - possible PD  - donepezil     Overactive Bladder  - home regimen: Myrbetriq, tolterodine  - trospium while inpatient      Bowels: Schedule stool softener. Follow bowel movements. Enema or suppository if needed. Bladder: Check PVR x 3. [address] if PVR > 200ml or if any volume is > 500 ml. Sleep: Trazodone provided prn. PPX  DVT: heparin  GI: not indicated    Services: [facility] [place]  EDOD: 10/8/22    Interdisciplinary team conference was held today with entire rehab treatment team including PT, OT, SLP, Dietician, RN, and SW. Discussion focused on progress toward rehab goals and discharge planning. Barriers: endurance, orthostatic hypotension, availability of assistance, cormorbidities. Total treatment time >35 min with greater than 50% spent in care coordination.     Electronically signed by Krunal Alcantar MD on 9/21/2022 at 4:07 PM

## 2022-09-21 NOTE — PROGRESS NOTES
Physical Therapy  Facility/Department: [facility]  Rehabilitation Physical Therapy Treatment Note    NAME: Brandon Zelaya  : 1940 ([de-identified] y.o.)  MRN: [id number]  CODE STATUS: Full Code    Date of Service: 22       Restrictions:  Restrictions/Precautions: Up as Tolerated; Fall Risk;General Precautions  Position Activity Restriction  Other position/activity restrictions: abdominal binder/knee high KENN hose; up as tolerated; Notify physician for pulse less than 50 or greater than 120, respiratory rate less than 12 or greater than 25, oral temperature greater than 101.3 F (85.3 C), systolic BP less than 90 or greater than 788, diastolic BP less than 50 or greater than 100. SUBJECTIVE  Subjective  Subjective: pt found in bed, improved alertness this am. Rn provided pt with am medications  Pain: denies pain     OBJECTIVE  Cognition  Overall Cognitive Status: Exceptions  Arousal/Alertness: Appropriate responses to stimuli  Following Commands: Follows one step commands with increased time; Follows one step commands with repetition  Attention Span: Appears intact  Memory: Decreased recall of recent events  Safety Judgement: Decreased awareness of need for assistance;Decreased awareness of need for safety  Problem Solving: Assistance required to generate solutions;Assistance required to implement solutions;Assistance required to identify errors made;Assistance required to correct errors made  Insights: Decreased awareness of deficits  Initiation: Requires cues for some  Sequencing: Requires cues for some  Orientation  Overall Orientation Status: Within Functional Limits  Orientation Level: Oriented X4    Functional Mobility     Pt found in bed, vitals assessed. Improved alertness noted this am compared to yesterday. Rn provided pt with am medications at start of session. Supine to sit with max A of 2 and bed controls.    Pt sat EOB 13-15 min with intermittent LUE support on rail, SBA-min a with max VC to maintain midline as pt exhibits increased R lateral/posterior lean with no self correction. Sit to stand EOB to STEDY with max A of 2 and TD via STEDY to w/c    Sit to stand wc to // bars with max a of 2 with pt pulling with LUE on // bar   Pt standing for 1.5-2 min total with max A of 2 with BUE support for static standing as pt demo's L lateral trunk rotation, hip/trunk flexion, R lateral lean with no self correction and demos ability to maintain midline position for up to 10 sec prior to adopting poor static posture. At this time, pt found to be incontinent of urine    TD via STEDY from w/c to commode   Pt requires max A of 1-2 from PT and OT while OT assisted with pericare and clothing management due to above stated static standing balance deficits. Pt attempted to wash hands at sink standing in STEDY but unable to maintain trunk extension for safety, thus completed seated on STEDY paddles. While seated on paddles, pt requires max A of 1 for midline position from PT while OT assisted with hand washing. Sit to stand EOB to STEDY with max A of 2 x 15 sec with max A of 2 for static standing balance with ma xVC for equal Wbing through Bles/ forward directed gaze    Max A of 2 sit to supine    Pt in bed with alarm donned and call light/needs within reach. Second Session:  Pt found in bed, pleasant and agreeable. Denies pain but endorsing fatigue. Pt completed 2x10 LLE AROM ankle pumps, heel slides, SAQ, hip abduction  Pt completed 2x10 RLE AAROM ankle pumps, heel slides, quad sets, hip abduction     Pt completed x10 LUE cross body reaches. ASSESSMENT/PROGRESS TOWARDS GOALS  Vital Signs  Heart Rate: 72  Heart Rate Source: Monitor  BP: 123/75  BP Location: Left upper arm  MAP (Calculated): 91  SpO2: 96 %  O2 Device: None (Room air)    Assessment  Assessment: pt seen as co treat with OT/ PT for increased safety progressing functional mobility/ maximize functional gains while decreasing effects of fatigue. pt requires max a of 2 for bed mobility, sit to stand transfers in STEDY (TD via STEDY to differnet locations). pt incontinent of urine and requires assist to change soiled brief. pt demos increased R latearl /posterior lean with no self correction. poor standing balance with max A of 2 to maintai midline posture as pt adopsts a significant R lateral lean/L trunk rotation and lack of trunk/hip extension. at this time, rec SNF vs home with 24/7 pending progress and family availability to provide 24/7. DME: TBD pending progress. Activity Tolerance: Patient limited by endurance; Patient limited by fatigue  Discharge Recommendations: Patient would benefit from continued therapy after discharge;24 hour supervision or assist;Subacute/Skilled Nursing Facility  PT Equipment Recommendations  Equipment Needed: Yes  Wheelchair: Standard  Other: if patient does not go to a facility she likely will need wheelchair upon discharge. continue to assess. Goals  Patient Goals   Patient goals : Patient states, \"To be able to get up without being told\" (helped as pt pushes backwards)  Short Term Goals  Time Frame for Short term goals: 9/23/2022  Short term goal 1: Patient demonstrates  sitting  midline 15 minutes wtihout posterior lob 9/17 mod assist to maintain seated balance. Short term goal 2: Patient demonstrates bed<>chair with LRAD with mod assist of 1. 9/17 dependent with santo-stedy  Short term goal 3: Patient demonstrates walking 10 feet or greater wtih mod assist of 2 LRAD. 9/17 not completed  Short term goal 4: Patient demonstrates indep in rolling L<>R and mod assist sup to sit 9/17 max assist x 2  Short term goal 5: Patient demonstrates min assist in R [address] propulsion 150 feet. 9/17 not completed  Long Term Goals  Time Frame for Long term goals : 10/7/2022  Long term goal 1: Patient demonstrates  Modified indep in rolling and sup<>Sit.   Long term goal 2: Patient demonstrates   transfers sit<>stand with FWW and min assist.  Long term goal 3: Patient demonstrates bed<>chair min A. Long term goal 4: Patient demonstrates gait wtih transfers  and short distances 50 feet or greater with min assist  Long term goal 5: Patient demonstrates up and down 2 steps or greater. with mod assist of 1  Long term goal 6: Patient demonstrates indep WC propulsion iwth L/R turns indep   150 feet. Long term goal 7: Patient demonstrates stoop to recover object from floor with reacher  with mod assist.  Long term goal 8: Patients family demonstrates ability to  assist patient in and out of car wtih mod assist.    [address] 5-7 times per week  Plan weeks: 21 days  Current Treatment Recommendations: Strengthening;ROM;Balance training;Functional mobility training;Transfer training;Neuromuscular re-education;Stair training;Gait training; Wheelchair mobility training; Endurance training; Therapeutic activities; Positioning;Equipment evaluation, education, & procurement;Home exercise program;Safety education & training;Patient/Caregiver education & training  Safety Devices  Type of Devices: Bed alarm in place;Call light within reach;Nurse notified; Left in bed    EDUCATION  Education  Education Given To: Patient  Education Provided: Role of Therapy;Plan of Care;Transfer Training; Fall Prevention Strategies;Precautions; Safety;Equipment;Visual Perceptual Function  Education Provided Comments: stroke education, R sided use, midline orientation with all mobility  Education Method: Verbal;Demonstration  Barriers to Learning: Other (Comment)  Education Outcome: Verbalized understanding;Continued education needed        Therapy Time   Individual Concurrent Group Co-treatment   Time In 1000     0800   Time Out 1030     0900   Minutes 30     60     Timed Code Treatment Minutes: [address] Minutes       Jesica Altman PT, 09/21/22 at 9:25 AM

## 2022-09-22 PROCEDURE — 97112 NEUROMUSCULAR REEDUCATION: CPT

## 2022-09-22 PROCEDURE — 97129 THER IVNTJ 1ST 15 MIN: CPT

## 2022-09-22 PROCEDURE — 97130 THER IVNTJ EA ADDL 15 MIN: CPT

## 2022-09-22 PROCEDURE — 1280000000 HC REHAB R&B

## 2022-09-22 PROCEDURE — 97530 THERAPEUTIC ACTIVITIES: CPT

## 2022-09-22 PROCEDURE — 99232 SBSQ HOSP IP/OBS MODERATE 35: CPT | Performed by: NURSE PRACTITIONER

## 2022-09-22 PROCEDURE — 6370000000 HC RX 637 (ALT 250 FOR IP): Performed by: NURSE PRACTITIONER

## 2022-09-22 PROCEDURE — 97535 SELF CARE MNGMENT TRAINING: CPT

## 2022-09-22 PROCEDURE — 6370000000 HC RX 637 (ALT 250 FOR IP): Performed by: STUDENT IN AN ORGANIZED HEALTH CARE EDUCATION/TRAINING PROGRAM

## 2022-09-22 PROCEDURE — 97110 THERAPEUTIC EXERCISES: CPT

## 2022-09-22 PROCEDURE — 6360000002 HC RX W HCPCS: Performed by: STUDENT IN AN ORGANIZED HEALTH CARE EDUCATION/TRAINING PROGRAM

## 2022-09-22 RX ORDER — SENNA AND DOCUSATE SODIUM 50; 8.6 MG/1; MG/1
2 TABLET, FILM COATED ORAL 2 TIMES DAILY
Status: DISCONTINUED | OUTPATIENT
Start: 2022-09-22 | End: 2022-09-26

## 2022-09-22 RX ORDER — SENNA AND DOCUSATE SODIUM 50; 8.6 MG/1; MG/1
2 TABLET, FILM COATED ORAL DAILY
Status: DISCONTINUED | OUTPATIENT
Start: 2022-09-23 | End: 2022-09-22

## 2022-09-22 RX ORDER — POLYETHYLENE GLYCOL 3350 17 G/17G
17 POWDER, FOR SOLUTION ORAL DAILY
Status: DISCONTINUED | OUTPATIENT
Start: 2022-09-23 | End: 2022-09-26

## 2022-09-22 RX ADMIN — FOLIC ACID 1 MG: 1 TABLET ORAL at 08:52

## 2022-09-22 RX ADMIN — MIDODRINE HYDROCHLORIDE 5 MG: 5 TABLET ORAL at 12:18

## 2022-09-22 RX ADMIN — HEPARIN SODIUM 5000 UNITS: 5000 INJECTION INTRAVENOUS; SUBCUTANEOUS at 14:13

## 2022-09-22 RX ADMIN — CYANOCOBALAMIN TAB 500 MCG 1000 MCG: 500 TAB at 08:52

## 2022-09-22 RX ADMIN — DONEPEZIL HYDROCHLORIDE 10 MG: 5 TABLET, FILM COATED ORAL at 21:13

## 2022-09-22 RX ADMIN — FERROUS SULFATE TAB 325 MG (65 MG ELEMENTAL FE) 325 MG: 325 (65 FE) TAB at 08:52

## 2022-09-22 RX ADMIN — ASPIRIN 81 MG 81 MG: 81 TABLET ORAL at 08:52

## 2022-09-22 RX ADMIN — MIDODRINE HYDROCHLORIDE 5 MG: 5 TABLET ORAL at 17:24

## 2022-09-22 RX ADMIN — FLUDROCORTISONE ACETATE 0.1 MG: 0.1 TABLET ORAL at 08:52

## 2022-09-22 RX ADMIN — Medication 5 MG: at 21:14

## 2022-09-22 RX ADMIN — DOCUSATE SODIUM 50 MG AND SENNOSIDES 8.6 MG 2 TABLET: 8.6; 5 TABLET, FILM COATED ORAL at 08:52

## 2022-09-22 RX ADMIN — MIDODRINE HYDROCHLORIDE 5 MG: 5 TABLET ORAL at 05:44

## 2022-09-22 RX ADMIN — CLOPIDOGREL BISULFATE 75 MG: 75 TABLET ORAL at 08:52

## 2022-09-22 RX ADMIN — CETIRIZINE HYDROCHLORIDE 5 MG: 5 TABLET, FILM COATED ORAL at 08:52

## 2022-09-22 RX ADMIN — TROSPIUM CHLORIDE 20 MG: 20 TABLET, FILM COATED ORAL at 21:14

## 2022-09-22 RX ADMIN — DOCUSATE SODIUM 50 MG AND SENNOSIDES 8.6 MG 2 TABLET: 8.6; 5 TABLET, FILM COATED ORAL at 21:13

## 2022-09-22 RX ADMIN — HEPARIN SODIUM 5000 UNITS: 5000 INJECTION INTRAVENOUS; SUBCUTANEOUS at 21:13

## 2022-09-22 RX ADMIN — HEPARIN SODIUM 5000 UNITS: 5000 INJECTION INTRAVENOUS; SUBCUTANEOUS at 05:44

## 2022-09-22 RX ADMIN — POTASSIUM CHLORIDE 10 MEQ: 750 TABLET, EXTENDED RELEASE ORAL at 08:52

## 2022-09-22 RX ADMIN — Medication 5000 UNITS: at 08:52

## 2022-09-22 RX ADMIN — ATORVASTATIN CALCIUM 80 MG: 80 TABLET, FILM COATED ORAL at 21:14

## 2022-09-22 RX ADMIN — POLYETHYLENE GLYCOL 3350 17 G: 17 POWDER, FOR SOLUTION ORAL at 08:52

## 2022-09-22 ASSESSMENT — PAIN SCALES - GENERAL: PAINLEVEL_OUTOF10: 0

## 2022-09-22 NOTE — PROGRESS NOTES
Occupational Therapy  Facility/Department: [facility]  Rehabilitation Occupational Therapy Daily Treatment Note    Date: 22  Patient Name: Reilly Ivy       Room: 2829/9382-91  MRN: [id number]  Account: [de-identified]   : 1940  [de-identified] y.o.) Gender: female                    Past Medical History:  has a past medical history of ARF (acute renal failure) ([facility] [address] Past Surgical History:   has a past surgical history that includes joint replacement; Tonsillectomy; Intracapsular cataract extraction (Right, 2019); and Intracapsular cataract extraction (Left, 2019). Restrictions  Restrictions/Precautions: Up as Tolerated; Fall Risk;General Precautions  Other position/activity restrictions: abdominal binder/knee high KENN hose; up as tolerated; Notify physician for pulse less than 50 or greater than 120, respiratory rate less than 12 or greater than 25, oral temperature greater than 101.3 F (81.2 C), systolic BP less than 90 or greater than 485, diastolic BP less than 50 or greater than 100. Subjective  Subjective: Pt in bed, upset about feeling constipated and \"no one is doing anything about it\", RN stated he gave stool softener and laxative this morning and will give suppository later in day if still no BM, pain reported in buttocks but did not rate, agreeable to therapy with encouragement, /77, HR 85, O2 sats 95% on RA. Restrictions/Precautions: Up as Tolerated; Fall Risk;General Precautions             Objective     Cognition  Overall Cognitive Status: Exceptions  Arousal/Alertness: Appropriate responses to stimuli  Following Commands: Follows one step commands with increased time; Follows one step commands with repetition  Attention Span: Appears intact  Memory: Decreased recall of recent events  Safety Judgement: Decreased awareness of need for assistance;Decreased awareness of need for safety  Problem Solving: Assistance required to generate solutions;Assistance required to implement solutions;Assistance required to identify errors made;Assistance required to correct errors made  Insights: Decreased awareness of deficits  Initiation: Requires cues for some  Sequencing: Requires cues for some  Orientation  Overall Orientation Status: Within Functional Limits   Perception  Overall Perceptual Status: Impaired  Unilateral Attention: Cues to attend to right side of body;Cues to attend right visual field;Cues to maintain midline in sitting;Cues to maintain midline in standing  Initiation: Cues to initiate tasks  Motor Planning: Cues to use objects appropriately  Perseveration: Perseverates during ADLs     ADL  Grooming/Oral Hygiene  Assistance Level: Dependent  Skilled Clinical Factors: sitting in [employer] to brush teeth, [place] to grasp toothbrush and reach to water, max VCs for sequencing to squeeze toothpaste with LUE, mod A of 2 to stand briefly from [employer] to spit in sink  Lower Extremity Dressing  Assistance Level: Dependent  Skilled Clinical Factors: to don brief/pants in bed, able to bridge to allow OT to pull pants over hips with mod/max A of 2  Putting On/Taking Off Footwear  Assistance Level: Dependent  Skilled Clinical Factors: donning non skid socks over KENN hose  Toileting  Assistance Level: Dependent  Skilled Clinical Factors: BM on toilet, total assist for clothing management and hygiene in stance in [address] Transfers  Technique:  [employer])  Equipment: Raised toilet seat with arms (over toilet)  Additional Factors: Cues for hand placement; Increased time to complete; With handrails  Assistance Level: Dependent  Skilled Clinical Factors: max A +min A with [employer]          Functional Mobility  Device: Wheelchair  Activity: To/From therapy gym  Assistance Level: Dependent  Bed Mobility  Overall Assistance Level: Dependent  Additional Factors: Increased time to complete;Verbal cues; With handrails  Sit to Supine  Assistance Level: Dependent  Skilled Clinical Factors: max A of 2  Supine to Sit  Assistance Level: Dependent  Skilled Clinical Factors: max A of 2  Transfers  Surface: To bed;From bed; Wheelchair;Raised toilet Seat  Additional Factors: Verbal cues; Set-up; With handrails  Device:  Fanta Aletha)  Sit to Stand  Assistance Level: Dependent  Skilled Clinical Factors: max A+ min A at times, mod A of 2 at times, to  [address] Level: Dependent  Skilled Clinical Factors: mod/max A of 2  Bed To/From Chair  Technique: Sit pivot  Assistance Level: Dependent  Skilled Clinical Factors: max + min A in Fanta Aletha out of bed, max A of 2 sit pivot into bed from w/c   Weight Bearing  Weight Bearing Technique: Yes  RUE Weight Bearing: Forearm seated  LUE Weight Bearing: Forearm seated  Response To Weight Bearing Technique: x2 each side with reaching across body with contralateral hand to place cone on mat table, min A for sitting balance and max A for reaching with RUE     Assessment  Assessment  Assessment: Pt agreeable to OT/PT session. Pt continues to require co-treat due to poor activity tolerance, weakness, and poor body awareness. PT assisted with standing balance during transfers with Fanta Aletha while OT assisted with ADLs. OT assisted with sitting balance on mat table and reaching with RUE while PT assisted with cueing for reaching out of ROBERT and shifting weight/leaning. Pt continues to require assist of 2 and Fanta Aletha for standing tasks and max A of 2 for sit pivot transfers. Pt BP still low, dropping to 91/51 while up in w/c and on mat table. Pt with minimal to no symptoms. Continue POC. Activity Tolerance: Patient limited by endurance; Patient limited by fatigue  Discharge Recommendations: Subacute/Skilled Nursing Facility  OT Equipment Recommendations  Other: CTA pending progress, may defer DME rec to d/c facility  Safety Devices  Safety Devices in place: Yes  Type of devices: Call light within reach; All fall risk precautions in place; Patient at risk for falls;Gait belt;Nurse notified; Left in bed;Bed alarm in place    Patient Education  Education  Education Given To: Patient  Education Provided: Role of Therapy;Plan of Care;Precautions;Transfer Training; Fall Prevention Strategies;Cognition; Safety; Family Education;ADL Function;Mobility Training;Equipment;DME/Home Modifications; Home Exercise Program  Education Provided Comments: use of RUE; positioning of RUE, use of KENN hose/abdominal binder, management of BP, sitting balance, benefit of OOB activity  Education Method: Verbal;Demonstration  Barriers to Learning: Cognition  Education Outcome: Verbalized understanding;Continued education needed    Plan  Plan  Times per Week: 5-7x/wk  Current Treatment Recommendations: Strengthening;Balance training;Functional mobility training;Self-Care / ADL;Endurance training;Neuromuscular re-education;Positioning;Modalities; Equipment evaluation, education, & procurement;Patient/Caregiver education & training; Safety education & training;Home management training;Coordination training;Sensory integraion    Goals  Short Term Goals  Time Frame for Short term goals: 9/28/22 (14 days)  Short Term Goal 1: Pt will complete UB dressing with mod A- goal progressing 9/22  Short Term Goal 2: Pt will complete simple grooming task supported in w/c with set-up A- goal progressing 9/22  Short Term Goal 3: Pt will complete functional transfers with mod A x1 using LRAD- goal progressing 9/22  Short Term Goal 4: Pt will complete toileting with max A- goal progressing 9/22  Long Term Goals  Time Frame for Long term goals : 10/05/22 (21 days)  Long Term Goal 1: Pt will complete toilet transfer with min A  Long Term Goal 2: Pt will complete LB dressing with mod A  Long Term Goal 3: Pt will complete bathing with mod A  Long Term Goal 4: Pt will incorporate RUE into ADLs 75% of trials with minimal VC in order to increase functional independence with ADLs    Therapy Time   Individual Concurrent Group Co-treatment   Time In       1030   Time Out       1130   Minutes       60   Timed Code Treatment Minutes: [address]

## 2022-09-22 NOTE — PLAN OF CARE
Problem: Skin/Tissue Integrity  Goal: Absence of new skin breakdown  Description: 1. Monitor for areas of redness and/or skin breakdown  2. Assess vascular access sites hourly  3. Every 4-6 hours minimum:  Change oxygen saturation probe site  4. Every 4-6 hours:  If on nasal continuous positive airway pressure, respiratory therapy assess nares and determine need for appliance change or resting period.   9/21/2022 2306 by Aki Ingram RN  Outcome: Progressing  9/21/2022 1841 by Raheem Huang RN  Outcome: Progressing     Problem: Safety - Adult  Goal: Free from fall injury  Outcome: Progressing     Problem: Nutrition Deficit:  Goal: Optimize nutritional status  Outcome: Progressing     Problem: Chronic Conditions and Co-morbidities  Goal: Patient's chronic conditions and co-morbidity symptoms are monitored and maintained or improved  Outcome: Progressing

## 2022-09-22 NOTE — PROGRESS NOTES
Comprehensive Nutrition Assessment    Type and Reason for Visit:  Reassess    Nutrition Recommendations/Plan:   Continue regular diet and encourage PO intake   Ensure BID- vanilla   Monitor nutrition adequacy, pertinent labs, bowel habits, wt changes, and clinical progress     Malnutrition Assessment:  Malnutrition Status: At risk for malnutrition (Comment) (09/16/22 0444)    Context:  Acute Illness       Nutrition Assessment:    Follow up: Pt improving nutritionally AEB PO intakes of 1-100%, mostly % of meals and diet adv from soft and bite sized to regular diet on 9/16. Pt reports she usually has a good appetite and eats most meals but unable to eat lunch today. Encouraged pt to try to drink ensure. Pt reports she had a big breakfast today and will eat a big dinner tonight. Ensure ordered BID, % intake on average. Wt stable in EMR. Continue to encourage PO intake, will continue to monitor. Nutrition Related Findings:    + BM today after constipation for a few days. Labs reviewed. Wound Type: Skin Tears       Current Nutrition Intake & Therapies:    Average Meal Intake: %, 1-25%  Average Supplements Intake: %  ADULT ORAL NUTRITION SUPPLEMENT; Breakfast, Dinner; Standard High Calorie/High Protein Oral Supplement  ADULT DIET; Regular    Anthropometric Measures:  Height: 5' 5\" (165.1 cm)  Ideal Body Weight (IBW): 125 lbs (57 kg)       Current Body Weight: 115 lb (52.2 kg), 86.4 % IBW.  Weight Source: Bed Scale  Current BMI (kg/m2): 19.1  Usual Body Weight: 110 lb (49.9 kg)  % Weight Change (Calculated): -1.8                 BMI Categories: Underweight (BMI less than 22) age over 72    Estimated Daily Nutrient Needs:  Energy Requirements Based On: Kcal/kg (25-30 kcals/kg)  Weight Used for Energy Requirements: Ideal (57 kg)  Energy (kcal/day): 8360-1068  Weight Used for Protein Requirements: Ideal (1-1.2 g/kg)  Protein (g/day): 57-68 g  Method Used for Fluid Requirements: 1 ml/kcal    Nutrition Diagnosis:   Increased nutrient needs related to increase demand for energy/nutrients as evidenced by BMI, other (comment) (Increase therapy regimen.)    Nutrition Interventions:   Food and/or Nutrient Delivery: Continue Current Diet, Continue Oral Nutrition Supplement  Nutrition Education/Counseling: Education not indicated  Coordination of Nutrition Care: Continue to monitor while inpatient       Goals:  Previous Goal Met: Progressing toward Goal(s)  Goals: PO intake 50% or greater, prior to discharge       Nutrition Monitoring and Evaluation:   Behavioral-Environmental Outcomes: None Identified  Food/Nutrient Intake Outcomes: Food and Nutrient Intake, Supplement Intake  Physical Signs/Symptoms Outcomes: Biochemical Data, Chewing or Swallowing, Weight, Nutrition Focused Physical Findings    Discharge Planning:    Continue current diet, Continue Oral Nutrition Supplement     Jose Bearden MS, [address] [place] [address],   Contact: Office: [phone]; [address]: [phone]

## 2022-09-22 NOTE — PROGRESS NOTES
[facility]   Daily Progress Note    Admit Date:  9/15/2022  HPI:   Max Alicia admitted to ARU from [facility] after acute left basal ganglia stroke. Cardiology consulted for orthostatic hypotension. She has a history of tremors, orthostatic hypotension, and memory loss. Previously treated with Florinef. Subjective:  Ms. Laura Juarez seen up sitting on side of bed, working with therapy. Her main complaint is constipation. She does report episode of lightheadedness with sitting upright after couple minutes. BP dropped from systolic 264B to 827B. Objective:   Patient Vitals for the past 24 hrs:   BP Temp Temp src Pulse Resp SpO2   09/22/22 1133 (!) 161/77 -- -- 85 -- 95 %   09/22/22 0845 120/75 97.4 °F (36.3 °C) Oral 86 16 96 %   09/22/22 0530 (!) 166/78 -- -- 61 -- --   09/21/22 2123 (!) 142/69 97.5 °F (36.4 °C) Oral 65 16 95 %   09/21/22 1715 (!) 111/55 -- -- 75 -- --   09/21/22 1245 (!) 104/50 -- -- -- -- --         Intake/Output Summary (Last 24 hours) at 9/22/2022 1151  Last data filed at 9/22/2022 4328  Gross per 24 hour   Intake 1320 ml   Output 1000 ml   Net 320 ml       Wt Readings from Last 3 Encounters:   09/20/22 115 lb 4.8 oz (52.3 kg)   03/28/22 107 lb 2.3 oz (48.6 kg)   03/25/22 110 lb (49.9 kg)         ASSESSMENT:   Orthostatic hypotension: BP stable overnight and this a.m.  CVA, recent left basal ganglia: per neurology  Parkinsonism  Aortic Regurg: moderate  Constipation: Has received stool softeners and has suppository ordered      PLAN:  Continue midodrine 5 mg 3 times daily, Florinef 0.1 mg daily  Continue compression socks. Encourage p.o. intake of fluids and protein supplements  Labs scheduled for Monday Wednesday Friday  Orthostatic hypotension is well treated though continues to be symptomatic. This will likely continue. We will follow peripherally. Call if needed.     Mikayla Ward, SUMMER - CNP, 9/22/2022, 11:51 AM  [facility]   [phone]       Telemetry: N/A  NYHA: III    Physical Exam:  General:  Awake, alert, NAD  Skin:  Warm and dry, flushed  Neck:  JVP normal  Chest:  Clear to auscultation   Cardiovascular:  RRR, normal S1S2, + murmur, no g/r  Abdomen:  Soft, nontender, +bowel sounds  Extremities:  no BLE edema, KENN hose in place      Medications:    [START ON 9/23/2022] sennosides-docusate sodium  2 tablet Oral Daily    [START ON 9/23/2022] polyethylene glycol  17 g Oral Daily    midodrine  5 mg Oral TID WC    fludrocortisone  0.1 mg Oral Daily    sodium chloride  500 mL IntraVENous Once    potassium chloride  10 mEq Oral Daily    calcium carbonate  500 mg Oral Daily    aspirin  81 mg Oral Daily    atorvastatin  80 mg Oral Nightly    Vitamin D  5,000 Units Oral Daily    clopidogrel  75 mg Oral Daily    donepezil  10 mg Oral Nightly    ferrous sulfate  325 mg Oral Daily with breakfast    folic acid  1 mg Oral Daily    heparin (porcine)  5,000 Units SubCUTAneous 3 times per day    cetirizine  5 mg Oral Daily    trospium  20 mg Oral Nightly    vitamin B-12  1,000 mcg Oral Daily         Lab Data: Lab results independently reviewed and analyzed by myself 9/22/2022    CBC:   Recent Labs     09/21/22  0647   WBC 6.6   HGB 11.5*          BMP:    Recent Labs     09/21/22  0647      K 4.2   CO2 25   BUN 32*   CREATININE 0.7       INR:  No results for input(s): INR in the last 72 hours. BNP:  No results for input(s): PROBNP in the last 72 hours. Cardiac Enzymes:   No results for input(s): TROPONINI in the last 72 hours. Lipids: No results found for: TRIG, HDL, LDLCALC, LDLDIRECT    Cardiac Imaging:    Echo:  TTE 9/12/22:  Summary:   Right ventricular systolic pressure is normal at <35 mmHg. Injection of agitated saline showed no interatrial shunt. Left ventricular function is low normal.   Overall left ventricular ejection fraction is estimated to be 50-55%. Borderline global hypokinesis of the left ventricle.    Left atrium is severely (>40 ml/m2) dilated. Moderate aortic regurgitation. No hemodynamically significant valvular aortic stenosis. There is no obvious cardiac source of emboli noted.

## 2022-09-22 NOTE — PROGRESS NOTES
Jeet Machado  9/22/2022  [id number]    Chief Complaint: Acute CVA (cerebrovascular accident) [facility])    Subjective:   No acute events overnight. Today Kyra Ascencio is seen in her room, resting in bed. She reports significant constipation and abdominal pain. She denies other acute complaints at this time. ROS: No CP, SOB, dyspnea    Objective:  Patient Vitals for the past 24 hrs:   BP Temp Temp src Pulse Resp SpO2   09/22/22 1133 (!) 161/77 -- -- 85 -- 95 %   09/22/22 0845 120/75 97.4 °F (36.3 °C) Oral 86 16 96 %   09/22/22 0530 (!) 166/78 -- -- 61 -- --   09/21/22 2123 (!) 142/69 97.5 °F (36.4 °C) Oral 65 16 95 %     Gen: No distress, pleasant. Supine in bed  HEENT: Normocephalic, atraumatic. CV: extremities well perfused  Resp: No respiratory distress. Abd: Soft, nontender, bowel sounds active  Ext: No edema. Neuro: Alert, oriented, appropriately interactive. Psych: appropriate mood and affect    Laboratory data: Available via EMR. Therapy progress:    PT    Supine to Sit: Substantial/maximal assistance  Sit to Supine: Substantial/maximal assistance   Sit to Stand: Dependent  Chair/Bed to Chair Transfer: Independent  Car Transfer:    Ambulation 10 ft:    Ambulation 50 ft:    Ambulation 150 ft:    Stairs - 1 Step:    Stairs - 4 Step:    Stairs - 12 Step:      OT    Eating: Setup or clean-up assistance  Oral Hygiene: Partial/moderate assistance  Bathing: Dependent  Upper Body Dressing: Substantial/maximal assistance  Lower Body Dressing: Dependent  Toilet Transfer: Dependent  Toilet Hygiene: Dependent    Speech Therapy         Body mass index is 19.19 kg/m².     Assessment:  Patient Active Problem List   Diagnosis    Left knee pain    Left patella fracture    Contusion of left knee    Orthostatic hypotension    ARF (acute renal failure) ([facility])    Anemia    Debility    Acute CVA (cerebrovascular accident) ([address]    Nonrheumatic aortic valve insufficiency       Assessment and Plan:  Jeet Machado is an [de-identified]year old female with a past medical history significant for orthostatic hypotension, anemia, and memory loss who presented to [facility] on 9/11/22 with recurrent falls and right sided weakness, found to have acute left CVA. She was admitted to [facility] on 9/15/22 due to functional deficits below her baseline. 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 Follow bowel movements. Enema or suppository if needed. Bladder: Check PVR x 3. [address] if PVR > 200ml or if any volume is > 500 ml. Sleep: Trazodone provided prn.       PPX  DVT: heparin  GI: not indicated    Services: SNF v [facility]  EDOD: 10/8/22    Electronically signed by Gabriella Sanchez MD on 9/22/2022 at 6:52 PM

## 2022-09-22 NOTE — PROGRESS NOTES
Physical Therapy  [facility]  Rehabilitation Physical Therapy Treatment Note    NAME: Derik Alarcon  : 1940 ([de-identified] y.o.)  MRN: [id number]  CODE STATUS: Full Code    Date of Service: 22       Restrictions:  Restrictions/Precautions: Up as Tolerated; Fall Risk;General Precautions  Position Activity Restriction  Other position/activity restrictions: abdominal binder/knee high KENN hose; up as tolerated; Notify physician for pulse less than 50 or greater than 120, respiratory rate less than 12 or greater than 25, oral temperature greater than 101.3 F (02.2 C), systolic BP less than 90 or greater than 612, diastolic BP less than 50 or greater than 100. SUBJECTIVE  Subjective  Subjective: pt found in bed, reporting constipation. RN aware and provided pt with stool softener this am  Pain: discomfort 2* constipation         OBJECTIVE  Cognition  Memory: Decreased recall of recent events  Safety Judgement: Decreased awareness of need for assistance;Decreased awareness of need for safety  Problem Solving: Assistance required to generate solutions;Assistance required to implement solutions;Assistance required to identify errors made;Assistance required to correct errors made  Insights: Decreased awareness of deficits  Orientation  Overall Orientation Status: Within Functional Limits    Functional Mobility     Pt found supine in bed, vitals assessed. 161/77, pulse= 85 bpm Sp02=95% on room air    Rolling in bed to the L with max a, to the R with min a to don pants and brief    Sit to supine with max a of 1 and increased time/cues for sequencing. BP sitting EOB x 4-5 min while speaking to MD= 140/86, pulse= 87 bpm  Sit to stand EOB to STEDY with max a of 1+ min a of 1. TD via STEDY to commode   While on commode, pt able to void bowels, RN notiifed.     Pt requires max a of 1- TD in STEDY with BLEs blocked from PT while OT assisted with pericare and clothing management     Pt sitting in STEDY paddles in partial standing while completing ADL( teeth brushing) with 1-2 UE support at all times, min-max A for sitting posture/ tolerance as pt demo's forward / R lateral lean with fatigue. BP in w/c following bathroom tasks (approx 10 min) = 97/53, 90 bpm    Pt completed 20 L ankle pumps , bp increasing to 100/56, pulse= 89 bpm, pt stating no increase in dizziness. Sit pivot w/c <> EOM with max A of 2. Bp= 91/51 with no increase in dizziness. Pt complete dyn sitting task: reaching for cone with LUE with CGA or RUE with mod-max a superiorly-> placing cone across midline (2 reps to L, 2 reps to r). Pt requires hand over hand assist to mobilize RUE and CGA-mod A for dyn sitting balance. Sit pivot w/c to EOB with max A of 2. Mod A of 2 to sit to supine and TD to scoot to [facility]  Pt in bed at end of session with call light/needs within reach and alarm donned. Second Session:  Pt found in bed, denies pain. States she feels better than the am.     Therapist provided 3 x 1 min passive PF RLE stretch supine for increased gastroc length     Pt completed 15 reps of BLE supine ankle pumps (PROM RLE), heel slides alternating (AAROM RLE), hip abduction (AAROM RLE), glute sets, saq BLE  Pt in bed at end of session and call light/need within reach. ASSESSMENT/PROGRESS TOWARDS GOALS  Vital Signs  Heart Rate: 85  Heart Rate Source: Monitor  BP: (!) 161/77  BP Location: Left upper arm  MAP (Calculated): 105  SpO2: 95 %  O2 Device: None (Room air)    Assessment  Assessment: pt seen as co treat with OT for increased safety progressing functional mobility. reports constipation at start of session but able to void bowels on commode during session, Rn aware. grossly max A of 2 for sit pivot transfers and max a of 1+ min a of 1 to stand at [place] [employer]. poor sitting posture/ tolerance and poor standing posture/balance. pt demo's increased R hip adduciton with static standing.  Overall pt has delayed or lack of task initiation, slow responses to commands and little to no carryover of cues provided by therapist. at this time, rec SNF upon d/c pending progress. Activity Tolerance: Patient limited by endurance; Patient limited by fatigue  Discharge Recommendations: Subacute/Skilled Nursing Facility  PT Equipment Recommendations  Equipment Needed: Yes  Wheelchair: Standard  Other: if patient does not go to a facility she likely will need wheelchair upon discharge. continue to assess. Goals  Patient Goals   Patient goals : Patient states, \"To be able to get up without being told\" (helped as pt pushes backwards)  Short Term Goals  Time Frame for Short term goals: 9/23/2022  Short term goal 1: Patient demonstrates  sitting  midline 15 minutes wtihout posterior lob 9/17 mod assist to maintain seated balance. Short term goal 2: Patient demonstrates bed<>chair with LRAD with mod assist of 1. 9/17 dependent with santo-stedy  Short term goal 3: Patient demonstrates walking 10 feet or greater wtih mod assist of 2 LRAD. 9/17 not completed  Short term goal 4: Patient demonstrates indep in rolling L<>R and mod assist sup to sit 9/17 max assist x 2  Short term goal 5: Patient demonstrates min assist in [facility] propulsion 150 feet. 9/17 not completed  Long Term Goals  Time Frame for Long term goals : 10/7/2022  Long term goal 1: Patient demonstrates  Modified indep in rolling and sup<>Sit. Long term goal 2: Patient demonstrates   transfers sit<>stand with FWW and min assist.  Long term goal 3: Patient demonstrates bed<>chair min A. Long term goal 4: Patient demonstrates gait wtih transfers  and short distances 50 feet or greater with min assist  Long term goal 5: Patient demonstrates up and down 2 steps or greater. with mod assist of 1  Long term goal 6: Patient demonstrates indep WC propulsion iwth L/R turns indep   150 feet.   Long term goal 7: Patient demonstrates stoop to recover object from floor with reacher  with mod assist.  Long term goal 8: Patients family demonstrates ability to  assist patient in and out of car wtih mod assist.    [address] 5-7 times per week  Plan weeks: 21 days  Current Treatment Recommendations: Strengthening;ROM;Balance training;Functional mobility training;Transfer training;Neuromuscular re-education;Stair training;Gait training; Wheelchair mobility training; Endurance training; Therapeutic activities; Positioning;Equipment evaluation, education, & procurement;Home exercise program;Safety education & training;Patient/Caregiver education & training  Safety Devices  Type of Devices: Bed alarm in place;Call light within reach;Nurse notified; Left in bed    EDUCATION  Education  Education Given To: Patient  Education Provided: Role of Therapy;Plan of Care;Transfer Training; Fall Prevention Strategies;Precautions; Safety;Equipment;Visual Perceptual Function  Education Provided Comments: stroke education, R sided use, midline orientation with all mobility  Education Method: Verbal;Demonstration  Barriers to Learning: Other (Comment)  Education Outcome: Verbalized understanding;Continued education needed        Therapy Time   Individual Concurrent Group Co-treatment   Time In 1330     1030   Time Out 1400     1130   Minutes 30     60     Timed Code Treatment Minutes: [address] Minutes       Federica Weiner, PT, 09/22/22 at 11:37 AM

## 2022-09-22 NOTE — PROGRESS NOTES
Speech Language Pathology  [facility]: ACUTE REHAB UNIT  SPEECH-LANGUAGE PATHOLOGY      [x] Daily  [] Weekly Care Conference Note  [] Discharge    Patient:Yulissa Mcfadden      :1940  IGS:[id number]  Rehab Dx/Hx: Acute CVA (cerebrovascular accident) ([facility] [address] [I63.9]    Precautions: falls  Home situation: Pt lives at home [facility] Dx: [] Aphasia  [] Dysarthria  [] Apraxia   [] Oropharyngeal dysphagia [x] Cognitive Impairment  [] Other:   Date of Admit: 9/15/2022  Room #: 0160/0160-01    Current functional status (updated daily):         Pt being seen for : [] Speech/Language Treatment  [x] Dysphagia Treatment [x] Cognitive Treatment  [] Other:  Communication: [x]WFL  [] Aphasia  [] Dysarthria  [] Apraxia  [] Pragmatic Impairment [] Non-verbal  [] Hearing Loss  [] Other:   Cognition: [] WFL  [x] Mild  [x] Moderate  [] Severe [] Unable to Assess  [] Other:  Memory: [] WFL  [] Mild  [x] Moderate  [] Severe [] Unable to Assess  [] Other:  Behavior: [x] Alert  [x] Cooperative  [x]  Pleasant  [] Confused  [] Agitated  [] Uncooperative  [] Distractible [] Motivated  [] Self-Limiting [] Anxious  [] Other:  Endurance:  [x] Adequate for participation in SLP sessions  [] Reduced overall  [] Lethargic  [] Other:  Safety: [x] No concerns at this time  [] Reduced insight into deficits  []  Reduced safety awareness [] Not following call light procedures  [] Unable to Assess  [] Other:    Current Diet Order:ADULT ORAL NUTRITION SUPPLEMENT; Breakfast, Dinner; Standard High Calorie/High Protein Oral Supplement  ADULT DIET;  Regular, thin   Swallowing Precautions: upright for all intake, stay upright for at least 30 mins after intake, small bites/sips, assist feed, oral care 2-3x/day to reduce adverse affects in the event of aspiration, increase physical mobility as able, alternate bites/sips, slow rate of intake, general GERD precautions, and general aspiration precautions        Date: 2022      Tx session 1  2349-4819 Tx session 2  All tx needs met in session 1   Total Timed Code Min 30    Total Treatment Minutes 30    Individual Treatment Minutes 30    Group Treatment Minutes 0    Co-Treat Minutes 0    Variance/Reason:  N/A    Pain 10 constipation discomfort     Pain Intervention [x] RN notified  [x] Repositioned  [] Intervention offered and patient declined  [] N/A  [] Other: [] RN notified  [] Repositioned  [] Intervention offered and patient declined  [] N/A  [] Other:   Subjective     Pt alert and oriented, cooperative and agreeable to participate in therapy. Pt seen partially reclined in bed due to discomfort. Objective:  Goals     Dysphagia Goals:    Short-term Goals  Timeframe for Short-term Goals: 5 days (09/20/22)          Goal Met 9/21/22      Goal 2: The patient/caregiver will demonstrate understanding of compensatory strategies for improved swallowing safety. Briefly discussed, pt able to verbalize appropriate use of strategies. Goal met 09/22/22. Cognitive Goals:    Short-term Goals  Timeframe for Short-term Goals: 18 days (10/03/22)    Goal 1: Pt will complete recall tasks with 90% acc given min cues. Indirectly targeted during verbal 4 step sequencing:  -90% acc indep improving to 100% acc given min cues     Goal 2: Pt will complete higher level executive function tasks (meds, math, money, time, etc) with 95% acc given min cues. Answering questions about a prescription label:  -100% acc indep     Verbal 4 step sequencing:  -75% acc indep improving to 100% acc given min cues     Discussed medication management and assist needed by dtr. Other areas targeted: N/A    Education:   Edu provided re: importance of med management       Safety Devices: [x] Call light within reach  [] Chair alarm activated  [x] Bed alarm activated  [] Other: [] Call light within reach  [] Chair alarm activated  [] Bed alarm activated  [] Other:    Assessment: Pt pleasant and cooperative, agreeable to participate.  Pt in \"severe\" pain due to discomfort of being constipated. Discussed medication management with pt, pt's dtr will be able to assist if needed as pt was managing these indep prior to CVA. Pt answered questions about a prescription label with 100% acc indep. Pt completed verbal 4 step sequencing task (ex: opening a bank account, renewing your 's license) with 33% acc indep improving to 100% acc given min cues. Pt is making consistent gains and progress towards goals. Plan: Continue as per plan of care. Additional Information:     Barriers toward progress: N/A  Discharge recommendations:  [] Home independently  [] Home with assistance []  24 hour supervision  [] ECF [x] Other: based upon PT/OT assessment   Continued Tx Upon Discharge: ? [] Yes [] No [x] TBD based on progress while on ARU [] Vital Stim indicated [] Other:   Estimated discharge date: est d/c date 10/7/2022    Interventions used this date:  [] Speech/Language Treatment  [] Instruction in HEP [] Group [] Dysphagia Treatment [x] Cognitive Treatment   [] Other: Total Time Breakdown / Charges    Time in Time out Total Time / units   Cognitive Tx 0900 0930 30 min/ 2 units    Speech Tx -- -- --   Dysphagia Tx -- -- --       Electronically Signed by     Anaya Dunn A CCC-SLP  Speech-Language Pathologist  [id number]

## 2022-09-23 LAB
ANION GAP SERPL CALCULATED.3IONS-SCNC: 10 MMOL/L (ref 3–16)
BASOPHILS ABSOLUTE: 0.1 K/UL (ref 0–0.2)
BASOPHILS RELATIVE PERCENT: 0.7 %
BUN BLDV-MCNC: 34 MG/DL (ref 7–20)
CALCIUM SERPL-MCNC: 9.6 MG/DL (ref 8.3–10.6)
CHLORIDE BLD-SCNC: 102 MMOL/L (ref 99–110)
CO2: 24 MMOL/L (ref 21–32)
CREAT SERPL-MCNC: 0.8 MG/DL (ref 0.6–1.2)
EOSINOPHILS ABSOLUTE: 0.2 K/UL (ref 0–0.6)
EOSINOPHILS RELATIVE PERCENT: 2.2 %
GFR AFRICAN AMERICAN: >60
GFR NON-AFRICAN AMERICAN: >60
GLUCOSE BLD-MCNC: 94 MG/DL (ref 70–99)
HCT VFR BLD CALC: 34.6 % (ref 36–48)
HEMOGLOBIN: 11.7 G/DL (ref 12–16)
LYMPHOCYTES ABSOLUTE: 2.2 K/UL (ref 1–5.1)
LYMPHOCYTES RELATIVE PERCENT: 22.2 %
MCH RBC QN AUTO: 32.8 PG (ref 26–34)
MCHC RBC AUTO-ENTMCNC: 33.7 G/DL (ref 31–36)
MCV RBC AUTO: 97.5 FL (ref 80–100)
MONOCYTES ABSOLUTE: 0.6 K/UL (ref 0–1.3)
MONOCYTES RELATIVE PERCENT: 6.2 %
NEUTROPHILS ABSOLUTE: 6.7 K/UL (ref 1.7–7.7)
NEUTROPHILS RELATIVE PERCENT: 68.7 %
PDW BLD-RTO: 16.3 % (ref 12.4–15.4)
PLATELET # BLD: 315 K/UL (ref 135–450)
PMV BLD AUTO: 7.3 FL (ref 5–10.5)
POTASSIUM REFLEX MAGNESIUM: 4.5 MMOL/L (ref 3.5–5.1)
RBC # BLD: 3.55 M/UL (ref 4–5.2)
SODIUM BLD-SCNC: 136 MMOL/L (ref 136–145)
WBC # BLD: 9.8 K/UL (ref 4–11)

## 2022-09-23 PROCEDURE — 1280000000 HC REHAB R&B

## 2022-09-23 PROCEDURE — 97129 THER IVNTJ 1ST 15 MIN: CPT

## 2022-09-23 PROCEDURE — 97130 THER IVNTJ EA ADDL 15 MIN: CPT

## 2022-09-23 PROCEDURE — 97530 THERAPEUTIC ACTIVITIES: CPT

## 2022-09-23 PROCEDURE — 80048 BASIC METABOLIC PNL TOTAL CA: CPT

## 2022-09-23 PROCEDURE — 6370000000 HC RX 637 (ALT 250 FOR IP): Performed by: NURSE PRACTITIONER

## 2022-09-23 PROCEDURE — 36415 COLL VENOUS BLD VENIPUNCTURE: CPT

## 2022-09-23 PROCEDURE — 97112 NEUROMUSCULAR REEDUCATION: CPT

## 2022-09-23 PROCEDURE — 6360000002 HC RX W HCPCS: Performed by: STUDENT IN AN ORGANIZED HEALTH CARE EDUCATION/TRAINING PROGRAM

## 2022-09-23 PROCEDURE — 6370000000 HC RX 637 (ALT 250 FOR IP): Performed by: STUDENT IN AN ORGANIZED HEALTH CARE EDUCATION/TRAINING PROGRAM

## 2022-09-23 PROCEDURE — 97110 THERAPEUTIC EXERCISES: CPT

## 2022-09-23 PROCEDURE — 85025 COMPLETE CBC W/AUTO DIFF WBC: CPT

## 2022-09-23 PROCEDURE — 97535 SELF CARE MNGMENT TRAINING: CPT

## 2022-09-23 RX ADMIN — HEPARIN SODIUM 5000 UNITS: 5000 INJECTION INTRAVENOUS; SUBCUTANEOUS at 21:21

## 2022-09-23 RX ADMIN — Medication 5000 UNITS: at 08:47

## 2022-09-23 RX ADMIN — ATORVASTATIN CALCIUM 80 MG: 80 TABLET, FILM COATED ORAL at 21:21

## 2022-09-23 RX ADMIN — MIDODRINE HYDROCHLORIDE 5 MG: 5 TABLET ORAL at 11:40

## 2022-09-23 RX ADMIN — FERROUS SULFATE TAB 325 MG (65 MG ELEMENTAL FE) 325 MG: 325 (65 FE) TAB at 08:48

## 2022-09-23 RX ADMIN — DOCUSATE SODIUM 50 MG AND SENNOSIDES 8.6 MG 2 TABLET: 8.6; 5 TABLET, FILM COATED ORAL at 08:53

## 2022-09-23 RX ADMIN — MIDODRINE HYDROCHLORIDE 5 MG: 5 TABLET ORAL at 16:38

## 2022-09-23 RX ADMIN — CYANOCOBALAMIN TAB 500 MCG 1000 MCG: 500 TAB at 08:48

## 2022-09-23 RX ADMIN — FOLIC ACID 1 MG: 1 TABLET ORAL at 08:48

## 2022-09-23 RX ADMIN — FLUDROCORTISONE ACETATE 0.1 MG: 0.1 TABLET ORAL at 08:47

## 2022-09-23 RX ADMIN — HEPARIN SODIUM 5000 UNITS: 5000 INJECTION INTRAVENOUS; SUBCUTANEOUS at 13:30

## 2022-09-23 RX ADMIN — ASPIRIN 81 MG 81 MG: 81 TABLET ORAL at 08:48

## 2022-09-23 RX ADMIN — HEPARIN SODIUM 5000 UNITS: 5000 INJECTION INTRAVENOUS; SUBCUTANEOUS at 05:21

## 2022-09-23 RX ADMIN — CETIRIZINE HYDROCHLORIDE 5 MG: 5 TABLET, FILM COATED ORAL at 08:48

## 2022-09-23 RX ADMIN — TROSPIUM CHLORIDE 20 MG: 20 TABLET, FILM COATED ORAL at 21:21

## 2022-09-23 RX ADMIN — POTASSIUM CHLORIDE 10 MEQ: 750 TABLET, EXTENDED RELEASE ORAL at 08:48

## 2022-09-23 RX ADMIN — DOCUSATE SODIUM 50 MG AND SENNOSIDES 8.6 MG 2 TABLET: 8.6; 5 TABLET, FILM COATED ORAL at 21:21

## 2022-09-23 RX ADMIN — MIDODRINE HYDROCHLORIDE 5 MG: 5 TABLET ORAL at 05:21

## 2022-09-23 RX ADMIN — Medication 5 MG: at 21:21

## 2022-09-23 RX ADMIN — CLOPIDOGREL BISULFATE 75 MG: 75 TABLET ORAL at 08:48

## 2022-09-23 RX ADMIN — POLYETHYLENE GLYCOL 3350 17 G: 17 POWDER, FOR SOLUTION ORAL at 08:48

## 2022-09-23 RX ADMIN — DONEPEZIL HYDROCHLORIDE 10 MG: 5 TABLET, FILM COATED ORAL at 21:21

## 2022-09-23 ASSESSMENT — PAIN SCALES - GENERAL
PAINLEVEL_OUTOF10: 0

## 2022-09-23 NOTE — PROGRESS NOTES
Speech Language Pathology  [facility]: ACUTE REHAB UNIT  SPEECH-LANGUAGE PATHOLOGY      [x] Daily  [] Weekly Care Conference Note  [] Discharge    Patient:Yulissa Juarez      :1940  QAJ:[id number]  Rehab Dx/Hx: Acute CVA (cerebrovascular accident) ([facility] [address] [I63.9]    Precautions: falls  Home situation: Pt lives at home [facility] Dx: [] Aphasia  [] Dysarthria  [] Apraxia   [] Oropharyngeal dysphagia [x] Cognitive Impairment  [] Other:   Date of Admit: 9/15/2022  Room #: 0160/0160-01    Current functional status (updated daily):         Pt being seen for : [] Speech/Language Treatment  [x] Dysphagia Treatment [x] Cognitive Treatment  [] Other:  Communication: [x]WFL  [] Aphasia  [] Dysarthria  [] Apraxia  [] Pragmatic Impairment [] Non-verbal  [] Hearing Loss  [] Other:   Cognition: [] WFL  [x] Mild  [x] Moderate  [] Severe [] Unable to Assess  [] Other:  Memory: [] WFL  [] Mild  [x] Moderate  [] Severe [] Unable to Assess  [] Other:  Behavior: [x] Alert  [x] Cooperative  [x]  Pleasant  [] Confused  [] Agitated  [] Uncooperative  [] Distractible [] Motivated  [] Self-Limiting [] Anxious  [] Other:  Endurance:  [x] Adequate for participation in SLP sessions  [] Reduced overall  [] Lethargic  [] Other:  Safety: [x] No concerns at this time  [] Reduced insight into deficits  []  Reduced safety awareness [] Not following call light procedures  [] Unable to Assess  [] Other:    Current Diet Order:ADULT ORAL NUTRITION SUPPLEMENT; Breakfast, Dinner; Standard High Calorie/High Protein Oral Supplement  ADULT DIET;  Regular, thin   Swallowing Precautions: upright for all intake, stay upright for at least 30 mins after intake, small bites/sips, assist feed, oral care 2-3x/day to reduce adverse affects in the event of aspiration, increase physical mobility as able, alternate bites/sips, slow rate of intake, general GERD precautions, and general aspiration precautions        Date: 2022      Tx session 1  [id number] Tx session 2  All tx needs met in session 1   Total Timed Code Min 30    Total Treatment Minutes 30    Individual Treatment Minutes 30    Group Treatment Minutes 0    Co-Treat Minutes 0    Variance/Reason:  N/A    Pain Denies    Pain Intervention [] RN notified  [] Repositioned  [] Intervention offered and patient declined  [x] N/A  [] Other: [] RN notified  [] Repositioned  [] Intervention offered and patient declined  [] N/A  [] Other:   Subjective     Pt alert and oriented, cooperative and agreeable to participate in therapy. Pt seen sitting upright in bed. Objective:  Goals     Dysphagia Goals:    Short-term Goals  Timeframe for Short-term Goals: 5 days (09/20/22)          Goal Met 9/21/22        Goal met 09/22/22. Cognitive Goals:    Short-term Goals  Timeframe for Short-term Goals: 18 days (10/03/22)    Goal 1: Pt will complete recall tasks with 90% acc given min cues. Higher level recall/mental manipulation ranking task:  -pt given a set of three words and asked to rank them given a specific direction  -ex: jessi, plane, fly (rank from smallest to largest)  -50% acc indep improving to 100% acc given mod cues     Goal 2: Pt will complete higher level executive function tasks (meds, math, money, time, etc) with 95% acc given min cues. Did not target. Other areas targeted: Overall reduced thought organization today. Pt stated she feels sad stating \"I am frustrated that I am not getting better as quick as I think I should\" SLP provided extensive education regarding recovery from CVA, progress, barriers, etc. Pt verbalized her appreciation for the discussion.        Education:   Edu provided re: CVA recovery, compensatory memory strategies      Safety Devices: [x] Call light within reach  [] Chair alarm activated  [x] Bed alarm activated  [] Other: [] Call light within reach  [] Chair alarm activated  [] Bed alarm activated  [] Other:    Assessment: Pt pleasant and cooperative, agreeable to participate. Pt completed functional recall taskw with 50% acc today, improving to 100% acc given mod cues, which is reduced accuracy compared to previous tx sessions. Pt expressing her frustrations about her feeling as if she's not getting better in therapy, quick enough. Extensive discussion had with pt about recovery post CVA, progress/barriers/needs for ongoing therapy. Pt verbalized her appreciation for discussion. Plan: Continue as per plan of care. Additional Information:     Barriers toward progress: N/A  Discharge recommendations:  [] Home independently  [] Home with assistance []  24 hour supervision  [] ECF [x] Other: based upon PT/OT assessment   Continued Tx Upon Discharge: ? [] Yes [] No [x] TBD based on progress while on ARU [] Vital Stim indicated [] Other:   Estimated discharge date: est d/c date 10/7/2022    Interventions used this date:  [] Speech/Language Treatment  [] Instruction in HEP [] Group [] Dysphagia Treatment [x] Cognitive Treatment   [] Other: Total Time Breakdown / Charges    Time in Time out Total Time / units   Cognitive Tx 1405 1435 30 min/ 2 units    Speech Tx -- -- --   Dysphagia Tx -- -- --       Electronically Signed by     Aubrey Garcia. A CCC-SLP  Speech-Language Pathologist  [id number]

## 2022-09-23 NOTE — PROGRESS NOTES
Physical Therapy  Facility/Department: [address] [place] [address]  Rehabilitation Physical Therapy Treatment Note    NAME: Salas Tom  : 1940 ([de-identified] y.o.)  MRN: [id number]  CODE STATUS: Full Code    Date of Service: 22       Restrictions:  Restrictions/Precautions: Up as Tolerated; Fall Risk;General Precautions  Position Activity Restriction  Other position/activity restrictions: abdominal binder/knee high KENN hose; up as tolerated; Notify physician for pulse less than 50 or greater than 120, respiratory rate less than 12 or greater than 25, oral temperature greater than 101.3 F (40.0 C), systolic BP less than 90 or greater than 557, diastolic BP less than 50 or greater than 100. SUBJECTIVE  Subjective  Subjective: Pt supine in bed upon arrival and agreeable to PT session this morning. Requests to sit up in chair  Pain: Pt denies pain            OBJECTIVE  Orientation  Overall Orientation Status: Within Functional Limits  Orientation Level: Oriented X4    Functional Mobility  Supine to Sit  Assistance Level: Moderate assistance  Skilled Clinical Factors: with HOB elevated 30*, increased time and use of rail; assist for BLE management  Scooting  Assistance Level: Moderate assistance;Maximum assistance  Skilled Clinical Factors: to scoot hips to edge of bed  Transfers  Surface: From bed  Sit to Stand  Assistance Level: Dependent  Skilled Clinical Factors: TD using Stedy from EOB; pt with strong posterior lean throughout. Stand to Sit  Assistance Level: Dependent  Skilled Clinical Factors: TD using Stedy  Bed To/From Chair  Assistance Level: Dependent  Skilled Clinical Factors: using Stedy, bed>recliner chair        PT Exercises  Exercise Treatment: BLE supine ankle pumps (PROM RLE), heel slides alternating (AAROM RLE), hip abduction (AAROM RLE), glute sets    Second Session:  Pt found in recliner, denies pain but requesting to use commode.  Initially demo's improved activity tolerance however Bp decreases after 2-3 stands with pt c/o dizziness and low BP (see below). At this time, continue to rec SNF upon d/c due to pt's current level of assist, limited assist at home. Vitals assessed in recliner, Bp= 112/58, pulse= 78 bpm, Spo2= 96%. Sit to stand recliner to STEDY with mod A of 1+ min A of 1 with pt pulling. TD via STEDY to commode   On commode, pt requires mod-max A of 1 for balance while 2nd person assists with pericare and clothing management. Pt able to void bladder and bowels while on commode. TD via STEDY to transfer to w/c    Wc mobility x 50 ft with mod-max A with hand over hand assist to initiate and maintain propulsion with LUE and LLE, cues for sequencing with limited carry over. Sit to stand w/c to // bars x 3-4 reps with min A of 1+ mod Aof 1 and pt pulling on bars. Pt standing for 4 min 20 sec with max A of 1+ min -max A of 1 for static/dyn standing balance. Without physical assist to maintain midline, pt demos posterior lean with increased trunk / hip flexion. While standing, pt reaching LUE for ball x 2 reps , RUE for ball x 1 reps with max A of 2 to maintain upright position. PT facilitating trunk/ hip extension and RLE stability, OT facilitating upright posture and UE movements. No c/o dizziness, RADHA= 92/59    Pt standing for 30 sec, statically with max A of 1 + min A of 1 with BUE support, however pt with c/o \"wooziness\". Bp= 98/67 seated. Attempted to measure BP standing, unable to obtain reading. BP sitting = 89/53. Pt reports ease in symptoms with sitting break    Sit pivot w/c to recliner with max a of 2. Pt in recliner at end of session with call light/needs within reach and alarm donned. ASSESSMENT/PROGRESS TOWARDS GOALS  Vital Signs  Heart Rate: 78  Heart Rate Source: Monitor  BP: 109/68  MAP (Calculated): 81.67  SpO2: 100 %  O2 Device: None (Room air)  Comment: After transferring to recliner, BP 85/50. After ~1 min, BP increases to 101/58 sitting in chair.  Pt denies lightheadedness or dizziness    Assessment  Assessment: Pt seen for PT session focusing on bed mobility and functional transfers using STedy this morning. Pt able to complete bed mobility with mod A, sitting balance on EOB with min A, and sit<>stand and bed>chair transfers with TD using Stedy. Pt demo's posterior lean throughout session requiring max cues for anterior weight shift. Tolerated LE P/AAROM once seated in recliner. Pt denies lightheadedness or dizziness throughout session. Will continue to progress functional mobility as appropriate. Activity Tolerance: Patient limited by endurance; Patient limited by fatigue  Discharge Recommendations: Subacute/Skilled Nursing Facility  PT Equipment Recommendations  Equipment Needed: Yes  Mobility Devices: Wheelchair  Wheelchair: Standard  Other: if patient does not go to a facility she likely will need wheelchair upon discharge. continue to assess. Goals  Patient Goals   Patient goals : Patient states, \"To be able to get up without being told\" (helped as pt pushes backwards)  Short Term Goals  Time Frame for Short term goals: 9/23/2022  Short term goal 1: Patient demonstrates  sitting  midline 15 minutes wtihout posterior lob - GOAL NOT MET  Short term goal 2: Patient demonstrates bed<>chair with LRAD with mod assist of 1. GOAL NOT MET, requires mod-max a of 2  Short term goal 3: Patient demonstrates walking 10 feet or greater wtih mod assist of 2 LRAD. GOAL NOT MET, pt is not ambulatory. Short term goal 4: Patient demonstrates indep in rolling L<>R and mod assist sup to sit - GOAL NOT MET, requires min-max a for rolling and max A to transition supine   Short term goal 5: Patient demonstrates min assist in [facility] propulsion 150 feet. GOAL NOT MET, x 50 ft with mod-max  A  Long Term Goals  Time Frame for Long term goals : 10/7/2022  Long term goal 1: Patient demonstrates  Modified indep in rolling and sup<>Sit.   Long term goal 2: Patient demonstrates   transfers sit<>stand with FWW and min assist.  Long term goal 3: Patient demonstrates bed<>chair min A. Long term goal 4: Patient demonstrates gait wtih transfers  and short distances 50 feet or greater with min assist  Long term goal 5: Patient demonstrates up and down 2 steps or greater. with mod assist of 1  Additional Goals?: Yes  Long term goal 6: Patient demonstrates indep WC propulsion iwth L/R turns indep   150 feet. Long term goal 7: Patient demonstrates stoop to recover object from floor with reacher  with mod assist.  Long term goal 8: Patients family demonstrates ability to  assist patient in and out of car wtih mod assist.    [address] 5-7 times per week  Plan weeks: 21 days  Current Treatment Recommendations: Strengthening;ROM;Balance training;Functional mobility training;Transfer training;Neuromuscular re-education;Stair training;Gait training; Wheelchair mobility training; Endurance training; Therapeutic activities; Positioning;Equipment evaluation, education, & procurement;Home exercise program;Safety education & training;Patient/Caregiver education & training  Safety Devices  Type of Devices: All fall risk precautions in place;Call light within reach; Chair alarm in place; Left in chair;Nurse notified    EDUCATION  Education  Education Given To: Patient  Education Provided: Role of Therapy;Plan of Care;Transfer Training; Fall Prevention Strategies;Precautions; Safety;Equipment;Visual Perceptual Function  Education Provided Comments: Educated pt on role of PT, bed mobility, transfers using Stedy, LE exercises  Education Method: Verbal;Demonstration  Barriers to Learning: Cognition  Education Outcome: Verbalized understanding;Continued education needed        Therapy Time   Individual Concurrent Group Co-treatment   Time In 0900         Time Out 0930         Minutes 30           Timed Code Treatment Minutes: [facility], PT, DPT 09/23/22 at 9:29 AM     Second Session Therapy Time: [place] PT, DPT (Second session only)   Individual Concurrent Group Co-treatment   Time In      1030   Time Out      1130   Minutes      60     Timed Code Treatment Minutes:  60    Total Treatment Minutes:  90

## 2022-09-23 NOTE — PROGRESS NOTES
Occupational Therapy  Facility/Department: [facility]  Rehabilitation Occupational Therapy Daily Treatment Note    Date: 22  Patient Name: Pedrito Burk       Room: 9629/8081-59  MRN: [id number]  Account: [de-identified]   : 1940  [de-identified] y.o.) Gender: female                    Past Medical History:  has a past medical history of ARF (acute renal failure) ([facility] [address] Past Surgical History:   has a past surgical history that includes joint replacement; Tonsillectomy; Intracapsular cataract extraction (Right, 2019); and Intracapsular cataract extraction (Left, 2019). Restrictions  Restrictions/Precautions: Up as Tolerated; Fall Risk;General Precautions  Other position/activity restrictions: abdominal binder/knee high KENN hose; up as tolerated; Notify physician for pulse less than 50 or greater than 120, respiratory rate less than 12 or greater than 25, oral temperature greater than 101.3 F (25.4 C), systolic BP less than 90 or greater than 590, diastolic BP less than 50 or greater than 100. Subjective  Subjective: Pt in recliner on arrival, reports no pain, feeling better today but still constipated and tired, agreeable to OT/PT session, /58, HR 84, O2 sats 95% on RA. Restrictions/Precautions: Up as Tolerated; Fall Risk;General Precautions             Objective     Cognition  Overall Cognitive Status: Exceptions  Arousal/Alertness: Appropriate responses to stimuli  Following Commands: Follows one step commands with increased time; Follows one step commands with repetition  Attention Span: Appears intact  Memory: Decreased recall of recent events  Safety Judgement: Decreased awareness of need for assistance;Decreased awareness of need for safety  Problem Solving: Assistance required to generate solutions;Assistance required to implement solutions;Assistance required to identify errors made;Assistance required to correct errors made  Insights: Decreased awareness of deficits  Initiation: Requires cues for some  Sequencing: Requires cues for some  Orientation  Overall Orientation Status: Within Functional Limits         ADL  Toileting  Assistance Level: Dependent  Skilled Clinical Factors: total assist for hygiene and clothing management while standing in Garrie Dose  Toilet Transfers  Technique: Stand step  Equipment: Raised toilet seat with arms (over toilet)  Additional Factors: Cues for hand placement; Increased time to complete; With handrails  Assistance Level: Dependent  Skilled Clinical Factors: max A +min A with Garrie Dose          Functional Mobility  Skilled Clinical Factors: Pt standing to parallel bars for 4:20, 1:10, and 30 seconds, Pt unable to tolerate taking steps this date  Transfers  Surface: Wheelchair;Raised toilet Seat; To chair with arms;From chair with arms  Additional Factors: Verbal cues; Set-up; With handrails  Device:  Garrie Dose)  Sit to Stand  Assistance Level: Dependent  Skilled Clinical Factors: max A+ min A at times, mod A of 2 at times, to  Garrie Dose, mod/max A of 2 to  parallel bars  Stand to Sit  Assistance Level: Dependent  Skilled Clinical Factors: mod/max A of 2  Bed To/From Chair  Technique: Sit pivot  Assistance Level: Dependent  Skilled Clinical Factors: max A of 2         Assessment  Assessment  Assessment: Pt agreeable to OT/PT session. Pt continues to require max A of 2 for standing tasks and max A + min A with use of Garrie Dose. Pt demonstrated mild improvement with RUE when attempting to grasp bar of Garrie Dose and toss ball with max VCs. Pt continues to require max VCs for posture with forward lean and R lateral lean. Pt completed standing in parallel bars with max A from PT for hip extension and blocking B knees while OT provided max A at trunk for trunk extension and support at RUE. Pt demonstrated severe forward lean when lifting LUE from parallel bar but able to toss ball x3 with max A of 2 for balance and poor ability to toss ball with RUE x1.  Pt BP dropping to 89/57 after 3rd stand and returned to room in recliner with LEs elevated. /56 in recliner. Continue POC. Activity Tolerance: Patient limited by endurance; Patient limited by fatigue  Discharge Recommendations: Subacute/Skilled Nursing Facility  OT Equipment Recommendations  Other: CTA pending progress, may defer DME rec to d/c facility  Safety Devices  Safety Devices in place: Yes  Type of devices: Call light within reach; All fall risk precautions in place; Patient at risk for falls;Gait belt;Nurse notified; Left in chair;Chair alarm in place    Patient Education  Education  Education Given To: Patient  Education Provided: Role of Therapy;Plan of Care;Precautions;Transfer Training; Fall Prevention Strategies;Cognition; Safety; Family Education;ADL Function;Mobility Training;Equipment;DME/Home Modifications; Home Exercise Program  Education Provided Comments: use of RUE; positioning of RUE, use of KENN hose/abdominal binder, management of BP, sitting balance, benefit of OOB activity  Education Method: Verbal;Demonstration  Barriers to Learning: Cognition  Education Outcome: Verbalized understanding;Continued education needed    Plan  Plan  Times per Week: 5-7x/wk  Current Treatment Recommendations: Strengthening;Balance training;Functional mobility training;Self-Care / ADL;Endurance training;Neuromuscular re-education;Positioning;Modalities; Equipment evaluation, education, & procurement;Patient/Caregiver education & training; Safety education & training;Home management training;Coordination training;Sensory integraion    Goals  Short Term Goals  Time Frame for Short term goals: 9/28/22 (14 days)  Short Term Goal 1: Pt will complete UB dressing with mod A- goal progressing 9/23  Short Term Goal 2: Pt will complete simple grooming task supported in w/c with set-up A- goal progressing 9/23  Short Term Goal 3: Pt will complete functional transfers with mod A x1 using LRAD- goal progressing 9/23  Short Term Goal 4: Pt will complete toileting with max A- goal progressing 9/23  Long Term Goals  Time Frame for Long term goals : 10/05/22 (21 days)  Long Term Goal 1: Pt will complete toilet transfer with min A  Long Term Goal 2: Pt will complete LB dressing with mod A  Long Term Goal 3: Pt will complete bathing with mod A  Long Term Goal 4: Pt will incorporate RUE into ADLs 75% of trials with minimal VC in order to increase functional independence with ADLs    Therapy Time   Individual Concurrent Group Co-treatment   Time In       1030   Time Out       1130   Minutes       60   Timed Code Treatment Minutes: 16 Amina Orlando, OT

## 2022-09-23 NOTE — PLAN OF CARE
Problem: Skin/Tissue Integrity  Goal: Absence of new skin breakdown  Description: 1. Monitor for areas of redness and/or skin breakdown  2. Assess vascular access sites hourly  3. Every 4-6 hours minimum:  Change oxygen saturation probe site  4. Every 4-6 hours:  If on nasal continuous positive airway pressure, respiratory therapy assess nares and determine need for appliance change or resting period.   Outcome: Progressing     Problem: Safety - Adult  Goal: Free from fall injury  Outcome: Progressing     Problem: ABCDS Injury Assessment  Goal: Absence of physical injury  Outcome: Progressing     Problem: Nutrition Deficit:  Goal: Optimize nutritional status  Outcome: Progressing  Flowsheets (Taken 9/22/2022 1037 by Hussain Monet MS, RD, LD)  Nutrient intake appropriate for improving, restoring, or maintaining nutritional needs:   Assess nutritional status and recommend course of action   Monitor oral intake, labs, and treatment plans   Recommend appropriate diets, oral nutritional supplements, and vitamin/mineral supplements

## 2022-09-23 NOTE — PROGRESS NOTES
Lili Dunbar  9/23/2022  [id number]    Chief Complaint: Acute CVA (cerebrovascular accident) [facility])    Subjective:   No acute events overnight. Today Alba Washington is seen in the gym with therapy. She was able to stand for several minutes without feeling light headed or dizzy. She reports improved constipation. She denies other acute complaints at this time. ROS: No CP, SOB, dyspnea    Objective:  Patient Vitals for the past 24 hrs:   BP Temp Temp src Pulse Resp SpO2   09/23/22 0920 109/68 -- -- 78 -- 100 %   09/23/22 0830 109/68 97.5 °F (36.4 °C) Oral 78 16 100 %   09/23/22 0522 (!) 117/59 -- -- 63 -- --   09/22/22 2112 121/63 97.4 °F (36.3 °C) Oral 66 16 97 %     Gen: No distress, pleasant. Seated in chair  HEENT: Normocephalic, atraumatic. CV: RRR  Resp: No respiratory distress. Lungs CTAB  Abd: Soft, nontender, bowel sounds active  Ext: No edema. Neuro: Alert, oriented, appropriately interactive. Psych: appropriate mood and affect    Laboratory data: Available via EMR. Therapy progress:    PT    Supine to Sit: Substantial/maximal assistance  Sit to Supine: Substantial/maximal assistance   Sit to Stand: Dependent  Chair/Bed to Chair Transfer: Independent  Car Transfer:    Ambulation 10 ft:    Ambulation 50 ft:    Ambulation 150 ft:    Stairs - 1 Step:    Stairs - 4 Step:    Stairs - 12 Step:      OT    Eating: Setup or clean-up assistance  Oral Hygiene: Partial/moderate assistance  Bathing: Dependent  Upper Body Dressing: Substantial/maximal assistance  Lower Body Dressing: Dependent  Toilet Transfer: Dependent  Toilet Hygiene: Dependent    Speech Therapy         Body mass index is 19.19 kg/m².     Assessment:  Patient Active Problem List   Diagnosis    Left knee pain    Left patella fracture    Contusion of left knee    Orthostatic hypotension    ARF (acute renal failure) ([facility])    Anemia    Debility    Acute CVA (cerebrovascular accident) ([address]    Nonrheumatic aortic valve insufficiency       Assessment and Plan:  Salas Tom is an [de-identified]year old female with a past medical history significant for orthostatic hypotension, anemia, and memory loss who presented to [facility] on 9/11/22 with recurrent falls and right sided weakness, found to have acute left CVA. She was admitted to [facility] on 9/15/22 due to functional deficits below her baseline. 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 Follow bowel movements. Enema or suppository if needed. Bladder: Check PVR x 3. [facility] if PVR > 200ml or if any volume is > 500 ml. Sleep: Trazodone provided prn.       PPX  DVT: heparin  GI: not indicated    Services: [facility] [place]  EDOD: 10/8/22    Electronically signed by Minesh Turner MD on 9/23/2022 at 3:00 PM

## 2022-09-23 NOTE — PLAN OF CARE
Patient Remains free from falls and injuries. Patient does need some assistance with turning. No new red areas noted or skin breakdown seen at this time.  Javi Bonds RN

## 2022-09-24 PROCEDURE — 6370000000 HC RX 637 (ALT 250 FOR IP): Performed by: STUDENT IN AN ORGANIZED HEALTH CARE EDUCATION/TRAINING PROGRAM

## 2022-09-24 PROCEDURE — 6360000002 HC RX W HCPCS: Performed by: STUDENT IN AN ORGANIZED HEALTH CARE EDUCATION/TRAINING PROGRAM

## 2022-09-24 PROCEDURE — 6370000000 HC RX 637 (ALT 250 FOR IP): Performed by: NURSE PRACTITIONER

## 2022-09-24 PROCEDURE — 1280000000 HC REHAB R&B

## 2022-09-24 RX ADMIN — MIDODRINE HYDROCHLORIDE 5 MG: 5 TABLET ORAL at 05:40

## 2022-09-24 RX ADMIN — ANTACID TABLETS 500 MG: 500 TABLET, CHEWABLE ORAL at 09:11

## 2022-09-24 RX ADMIN — CYANOCOBALAMIN TAB 500 MCG 1000 MCG: 500 TAB at 09:11

## 2022-09-24 RX ADMIN — Medication 5000 UNITS: at 09:11

## 2022-09-24 RX ADMIN — MIDODRINE HYDROCHLORIDE 5 MG: 5 TABLET ORAL at 13:30

## 2022-09-24 RX ADMIN — FERROUS SULFATE TAB 325 MG (65 MG ELEMENTAL FE) 325 MG: 325 (65 FE) TAB at 09:18

## 2022-09-24 RX ADMIN — HEPARIN SODIUM 5000 UNITS: 5000 INJECTION INTRAVENOUS; SUBCUTANEOUS at 13:30

## 2022-09-24 RX ADMIN — CLOPIDOGREL BISULFATE 75 MG: 75 TABLET ORAL at 09:11

## 2022-09-24 RX ADMIN — POTASSIUM CHLORIDE 10 MEQ: 750 TABLET, EXTENDED RELEASE ORAL at 09:18

## 2022-09-24 RX ADMIN — MIDODRINE HYDROCHLORIDE 5 MG: 5 TABLET ORAL at 16:47

## 2022-09-24 RX ADMIN — ASPIRIN 81 MG 81 MG: 81 TABLET ORAL at 09:11

## 2022-09-24 RX ADMIN — TROSPIUM CHLORIDE 20 MG: 20 TABLET, FILM COATED ORAL at 21:41

## 2022-09-24 RX ADMIN — DOCUSATE SODIUM 50 MG AND SENNOSIDES 8.6 MG 2 TABLET: 8.6; 5 TABLET, FILM COATED ORAL at 21:41

## 2022-09-24 RX ADMIN — HEPARIN SODIUM 5000 UNITS: 5000 INJECTION INTRAVENOUS; SUBCUTANEOUS at 21:42

## 2022-09-24 RX ADMIN — CETIRIZINE HYDROCHLORIDE 5 MG: 5 TABLET, FILM COATED ORAL at 09:11

## 2022-09-24 RX ADMIN — FOLIC ACID 1 MG: 1 TABLET ORAL at 09:11

## 2022-09-24 RX ADMIN — DONEPEZIL HYDROCHLORIDE 10 MG: 5 TABLET, FILM COATED ORAL at 21:41

## 2022-09-24 RX ADMIN — FLUDROCORTISONE ACETATE 0.1 MG: 0.1 TABLET ORAL at 09:18

## 2022-09-24 RX ADMIN — HEPARIN SODIUM 5000 UNITS: 5000 INJECTION INTRAVENOUS; SUBCUTANEOUS at 05:40

## 2022-09-24 RX ADMIN — ATORVASTATIN CALCIUM 80 MG: 80 TABLET, FILM COATED ORAL at 21:41

## 2022-09-24 NOTE — PLAN OF CARE
Problem: Skin/Tissue Integrity  Goal: Absence of new skin breakdown  Description: 1. Monitor for areas of redness and/or skin breakdown  2. Assess vascular access sites hourly  3. Every 4-6 hours minimum:  Change oxygen saturation probe site  4. Every 4-6 hours:  If on nasal continuous positive airway pressure, respiratory therapy assess nares and determine need for appliance change or resting period.   9/24/2022 1106 by Tamra Montes RN  Outcome: Progressing  9/24/2022 0026 by Farheen Santos RN  Outcome: Progressing     Problem: Safety - Adult  Goal: Free from fall injury  9/24/2022 1106 by Tamra Montes RN  Outcome: Progressing  9/24/2022 0026 by Farheen Santos RN  Outcome: Progressing

## 2022-09-25 PROCEDURE — 6360000002 HC RX W HCPCS: Performed by: STUDENT IN AN ORGANIZED HEALTH CARE EDUCATION/TRAINING PROGRAM

## 2022-09-25 PROCEDURE — 6370000000 HC RX 637 (ALT 250 FOR IP): Performed by: STUDENT IN AN ORGANIZED HEALTH CARE EDUCATION/TRAINING PROGRAM

## 2022-09-25 PROCEDURE — 1280000000 HC REHAB R&B

## 2022-09-25 RX ADMIN — HEPARIN SODIUM 5000 UNITS: 5000 INJECTION INTRAVENOUS; SUBCUTANEOUS at 06:38

## 2022-09-25 RX ADMIN — DOCUSATE SODIUM 50 MG AND SENNOSIDES 8.6 MG 2 TABLET: 8.6; 5 TABLET, FILM COATED ORAL at 19:56

## 2022-09-25 RX ADMIN — DONEPEZIL HYDROCHLORIDE 10 MG: 5 TABLET, FILM COATED ORAL at 19:56

## 2022-09-25 RX ADMIN — TROSPIUM CHLORIDE 20 MG: 20 TABLET, FILM COATED ORAL at 19:56

## 2022-09-25 RX ADMIN — FOLIC ACID 1 MG: 1 TABLET ORAL at 08:16

## 2022-09-25 RX ADMIN — ASPIRIN 81 MG 81 MG: 81 TABLET ORAL at 08:16

## 2022-09-25 RX ADMIN — HEPARIN SODIUM 5000 UNITS: 5000 INJECTION INTRAVENOUS; SUBCUTANEOUS at 19:35

## 2022-09-25 RX ADMIN — MIDODRINE HYDROCHLORIDE 5 MG: 5 TABLET ORAL at 06:39

## 2022-09-25 RX ADMIN — HEPARIN SODIUM 5000 UNITS: 5000 INJECTION INTRAVENOUS; SUBCUTANEOUS at 14:39

## 2022-09-25 RX ADMIN — CYANOCOBALAMIN TAB 500 MCG 1000 MCG: 500 TAB at 08:16

## 2022-09-25 RX ADMIN — FERROUS SULFATE TAB 325 MG (65 MG ELEMENTAL FE) 325 MG: 325 (65 FE) TAB at 08:16

## 2022-09-25 RX ADMIN — Medication 5000 UNITS: at 08:16

## 2022-09-25 RX ADMIN — CETIRIZINE HYDROCHLORIDE 5 MG: 5 TABLET, FILM COATED ORAL at 08:16

## 2022-09-25 RX ADMIN — ATORVASTATIN CALCIUM 80 MG: 80 TABLET, FILM COATED ORAL at 19:56

## 2022-09-25 RX ADMIN — MIDODRINE HYDROCHLORIDE 5 MG: 5 TABLET ORAL at 16:17

## 2022-09-25 RX ADMIN — CLOPIDOGREL BISULFATE 75 MG: 75 TABLET ORAL at 08:16

## 2022-09-25 RX ADMIN — POTASSIUM CHLORIDE 10 MEQ: 750 TABLET, EXTENDED RELEASE ORAL at 08:17

## 2022-09-25 NOTE — PROGRESS NOTES
Assessment completed and documented. VSS. A/ox4. Denies pain. Able to move right arm, limited movement. Bilateral foot drop. Purewick in place, patent. Q2turn with pillow support. Bed locked and in lowest position. Bedside table and call light within reach. Denies further needs at this time.

## 2022-09-26 LAB
AMORPHOUS: ABNORMAL /HPF
ANION GAP SERPL CALCULATED.3IONS-SCNC: 15 MMOL/L (ref 3–16)
BACTERIA: ABNORMAL /HPF
BASOPHILS ABSOLUTE: 0.1 K/UL (ref 0–0.2)
BASOPHILS RELATIVE PERCENT: 0.8 %
BILIRUBIN URINE: NEGATIVE
BLOOD, URINE: NEGATIVE
BUN BLDV-MCNC: 38 MG/DL (ref 7–20)
CALCIUM SERPL-MCNC: 9.7 MG/DL (ref 8.3–10.6)
CHLORIDE BLD-SCNC: 102 MMOL/L (ref 99–110)
CLARITY: CLEAR
CO2: 22 MMOL/L (ref 21–32)
COLOR: YELLOW
CREAT SERPL-MCNC: 0.8 MG/DL (ref 0.6–1.2)
CRYSTALS, UA: ABNORMAL /HPF
EOSINOPHILS ABSOLUTE: 0.2 K/UL (ref 0–0.6)
EOSINOPHILS RELATIVE PERCENT: 3.2 %
EPITHELIAL CELLS, UA: ABNORMAL /HPF (ref 0–5)
GFR AFRICAN AMERICAN: >60
GFR NON-AFRICAN AMERICAN: >60
GLUCOSE BLD-MCNC: 98 MG/DL (ref 70–99)
GLUCOSE URINE: NEGATIVE MG/DL
HCT VFR BLD CALC: 33.7 % (ref 36–48)
HEMOGLOBIN: 11.5 G/DL (ref 12–16)
HYALINE CASTS: ABNORMAL /LPF (ref 0–2)
KETONES, URINE: NEGATIVE MG/DL
LEUKOCYTE ESTERASE, URINE: ABNORMAL
LYMPHOCYTES ABSOLUTE: 2.1 K/UL (ref 1–5.1)
LYMPHOCYTES RELATIVE PERCENT: 26.7 %
MCH RBC QN AUTO: 32.9 PG (ref 26–34)
MCHC RBC AUTO-ENTMCNC: 34.1 G/DL (ref 31–36)
MCV RBC AUTO: 96.3 FL (ref 80–100)
MICROSCOPIC EXAMINATION: YES
MONOCYTES ABSOLUTE: 0.5 K/UL (ref 0–1.3)
MONOCYTES RELATIVE PERCENT: 6.9 %
NEUTROPHILS ABSOLUTE: 4.9 K/UL (ref 1.7–7.7)
NEUTROPHILS RELATIVE PERCENT: 62.4 %
NITRITE, URINE: POSITIVE
PDW BLD-RTO: 16 % (ref 12.4–15.4)
PH UA: 6 (ref 5–8)
PLATELET # BLD: 302 K/UL (ref 135–450)
PMV BLD AUTO: 7.2 FL (ref 5–10.5)
POTASSIUM REFLEX MAGNESIUM: 3.9 MMOL/L (ref 3.5–5.1)
PROTEIN UA: NEGATIVE MG/DL
RBC # BLD: 3.5 M/UL (ref 4–5.2)
RBC UA: ABNORMAL /HPF (ref 0–4)
SODIUM BLD-SCNC: 139 MMOL/L (ref 136–145)
SPECIFIC GRAVITY UA: 1.01 (ref 1–1.03)
URINE REFLEX TO CULTURE: ABNORMAL
URINE TYPE: ABNORMAL
UROBILINOGEN, URINE: 0.2 E.U./DL
WBC # BLD: 7.8 K/UL (ref 4–11)
WBC UA: ABNORMAL /HPF (ref 0–5)

## 2022-09-26 PROCEDURE — 6370000000 HC RX 637 (ALT 250 FOR IP): Performed by: NURSE PRACTITIONER

## 2022-09-26 PROCEDURE — 85025 COMPLETE CBC W/AUTO DIFF WBC: CPT

## 2022-09-26 PROCEDURE — 6370000000 HC RX 637 (ALT 250 FOR IP): Performed by: STUDENT IN AN ORGANIZED HEALTH CARE EDUCATION/TRAINING PROGRAM

## 2022-09-26 PROCEDURE — 6360000002 HC RX W HCPCS: Performed by: STUDENT IN AN ORGANIZED HEALTH CARE EDUCATION/TRAINING PROGRAM

## 2022-09-26 PROCEDURE — 97530 THERAPEUTIC ACTIVITIES: CPT

## 2022-09-26 PROCEDURE — 81001 URINALYSIS AUTO W/SCOPE: CPT

## 2022-09-26 PROCEDURE — 1280000000 HC REHAB R&B

## 2022-09-26 PROCEDURE — 97130 THER IVNTJ EA ADDL 15 MIN: CPT

## 2022-09-26 PROCEDURE — 97535 SELF CARE MNGMENT TRAINING: CPT

## 2022-09-26 PROCEDURE — 97110 THERAPEUTIC EXERCISES: CPT

## 2022-09-26 PROCEDURE — 36415 COLL VENOUS BLD VENIPUNCTURE: CPT

## 2022-09-26 PROCEDURE — 80048 BASIC METABOLIC PNL TOTAL CA: CPT

## 2022-09-26 PROCEDURE — 97129 THER IVNTJ 1ST 15 MIN: CPT

## 2022-09-26 RX ORDER — SENNA AND DOCUSATE SODIUM 50; 8.6 MG/1; MG/1
2 TABLET, FILM COATED ORAL DAILY
Status: DISCONTINUED | OUTPATIENT
Start: 2022-09-27 | End: 2022-10-08 | Stop reason: HOSPADM

## 2022-09-26 RX ORDER — POLYETHYLENE GLYCOL 3350 17 G/17G
17 POWDER, FOR SOLUTION ORAL DAILY PRN
Status: DISCONTINUED | OUTPATIENT
Start: 2022-09-26 | End: 2022-10-08 | Stop reason: HOSPADM

## 2022-09-26 RX ADMIN — Medication 5 MG: at 21:05

## 2022-09-26 RX ADMIN — HEPARIN SODIUM 5000 UNITS: 5000 INJECTION INTRAVENOUS; SUBCUTANEOUS at 21:04

## 2022-09-26 RX ADMIN — Medication 5000 UNITS: at 08:21

## 2022-09-26 RX ADMIN — MIDODRINE HYDROCHLORIDE 5 MG: 5 TABLET ORAL at 05:11

## 2022-09-26 RX ADMIN — HEPARIN SODIUM 5000 UNITS: 5000 INJECTION INTRAVENOUS; SUBCUTANEOUS at 05:10

## 2022-09-26 RX ADMIN — CLOPIDOGREL BISULFATE 75 MG: 75 TABLET ORAL at 08:21

## 2022-09-26 RX ADMIN — HEPARIN SODIUM 5000 UNITS: 5000 INJECTION INTRAVENOUS; SUBCUTANEOUS at 14:55

## 2022-09-26 RX ADMIN — FERROUS SULFATE TAB 325 MG (65 MG ELEMENTAL FE) 325 MG: 325 (65 FE) TAB at 08:20

## 2022-09-26 RX ADMIN — FOLIC ACID 1 MG: 1 TABLET ORAL at 08:20

## 2022-09-26 RX ADMIN — CETIRIZINE HYDROCHLORIDE 5 MG: 5 TABLET, FILM COATED ORAL at 08:21

## 2022-09-26 RX ADMIN — TROSPIUM CHLORIDE 20 MG: 20 TABLET, FILM COATED ORAL at 21:05

## 2022-09-26 RX ADMIN — FLUDROCORTISONE ACETATE 0.1 MG: 0.1 TABLET ORAL at 08:24

## 2022-09-26 RX ADMIN — ASPIRIN 81 MG 81 MG: 81 TABLET ORAL at 08:21

## 2022-09-26 RX ADMIN — CYANOCOBALAMIN TAB 500 MCG 1000 MCG: 500 TAB at 08:21

## 2022-09-26 RX ADMIN — POTASSIUM CHLORIDE 10 MEQ: 750 TABLET, EXTENDED RELEASE ORAL at 08:21

## 2022-09-26 RX ADMIN — MIDODRINE HYDROCHLORIDE 5 MG: 5 TABLET ORAL at 12:14

## 2022-09-26 RX ADMIN — DONEPEZIL HYDROCHLORIDE 10 MG: 5 TABLET, FILM COATED ORAL at 21:05

## 2022-09-26 RX ADMIN — ATORVASTATIN CALCIUM 80 MG: 80 TABLET, FILM COATED ORAL at 21:05

## 2022-09-26 RX ADMIN — MIDODRINE HYDROCHLORIDE 5 MG: 5 TABLET ORAL at 18:00

## 2022-09-26 ASSESSMENT — PAIN SCALES - GENERAL: PAINLEVEL_OUTOF10: 0

## 2022-09-26 NOTE — PROGRESS NOTES
Speech Language Pathology  [facility]: ACUTE REHAB UNIT  SPEECH-LANGUAGE PATHOLOGY      [x] Daily  [] Weekly Care Conference Note  [] Discharge    Patient:Lois Juanita Sandhoff      :1940  TAMIA:[id number]  Rehab Dx/Hx: Acute CVA (cerebrovascular accident) ([facility] [address] [I63.9]    Precautions: falls  Home situation: Pt lives at home [facility] Dx: [] Aphasia  [] Dysarthria  [] Apraxia   [] Oropharyngeal dysphagia [x] Cognitive Impairment  [] Other:   Date of Admit: 9/15/2022  Room #: 0160/0160-01    Current functional status (updated daily):         Pt being seen for : [] Speech/Language Treatment  [x] Dysphagia Treatment [x] Cognitive Treatment  [] Other:  Communication: [x]WFL  [] Aphasia  [] Dysarthria  [] Apraxia  [] Pragmatic Impairment [] Non-verbal  [] Hearing Loss  [] Other:   Cognition: [] WFL  [x] Mild  [x] Moderate  [] Severe [] Unable to Assess  [] Other:  Memory: [] WFL  [] Mild  [x] Moderate  [] Severe [] Unable to Assess  [] Other:  Behavior: [x] Alert  [x] Cooperative  [x]  Pleasant  [] Confused  [] Agitated  [] Uncooperative  [] Distractible [] Motivated  [] Self-Limiting [] Anxious  [] Other:  Endurance:  [x] Adequate for participation in SLP sessions  [] Reduced overall  [] Lethargic  [] Other:  Safety: [x] No concerns at this time  [] Reduced insight into deficits  []  Reduced safety awareness [] Not following call light procedures  [] Unable to Assess  [] Other:    Current Diet Order:ADULT ORAL NUTRITION SUPPLEMENT; Breakfast, Dinner; Standard High Calorie/High Protein Oral Supplement  ADULT DIET;  Regular, thin   Swallowing Precautions: upright for all intake, stay upright for at least 30 mins after intake, small bites/sips, assist feed, oral care 2-3x/day to reduce adverse affects in the event of aspiration, increase physical mobility as able, alternate bites/sips, slow rate of intake, general GERD precautions, and general aspiration precautions        Date: 2022      Tx session 1  6084 - 7219 Tx session 2  All tx needs met in session 1   Total Timed Code Min 30    Total Treatment Minutes 30    Individual Treatment Minutes 30    Group Treatment Minutes 0    Co-Treat Minutes 0    Variance/Reason:  N/A    Pain Denies    Pain Intervention [] RN notified  [] Repositioned  [] Intervention offered and patient declined  [x] N/A  [] Other: [] RN notified  [] Repositioned  [] Intervention offered and patient declined  [] N/A  [] Other:   Subjective     Pt alert and oriented, cooperative and agreeable to participate in therapy. Pt seen sitting upright in bed. Objective:  Goals     Dysphagia Goals:    Short-term Goals  Timeframe for Short-term Goals: 5 days (09/20/22)          Goal Met 9/21/22        Goal met 09/22/22. Cognitive Goals:    Short-term Goals  Timeframe for Short-term Goals: 18 days (10/03/22)    Goal 1: Pt will complete recall tasks with 90% acc given min cues. Memory and Mental Manipulation  -pt given 4 words; asked to repeat in the order they occur  -e.g. Nov, March, Aug, Jan --> Jan, March, Aug, Nov  -pt completed task with 72% acc indep, improved to 86% acc given min-mod cues    Goal 2: Pt will complete higher level executive function tasks (meds, math, money, time, etc) with 95% acc given min cues. Math Word Problems  -e.g. a box of 42 diapers lasts 1 month. How many diapers will be used in 3 months?  -pt completed task with 44% acc indep, improved to 100% acc given mod cues      Other areas targeted: N/A    Education:   SLP edu pt re: recall strategies, calc strateiges    Safety Devices: [x] Call light within reach  [] Chair alarm activated  [x] Bed alarm activated  [] Other: [] Call light within reach  [] Chair alarm activated  [] Bed alarm activated  [] Other:    Assessment: Pt alert and cooperative, agreeable to tx. SLP initiated a more challenging recall task for pt to complete - a memory and mental manipulation task where pt was to rank 4 words in the order they occur.  Pt often did well recalling 3 of the words, but had difficulty with the 4th word. Pt benefited from min-mod cues to increase acc, as well as repetition. Pt benefited from mod cues + visual aids to improve acc with math word problems. Pt often able to verbally describe how to complete the problem, but then required the cues to execute the math. Continue goals above. Plan: Continue as per plan of care. Additional Information:     Barriers toward progress: N/A  Discharge recommendations:  [] Home independently  [] Home with assistance []  24 hour supervision  [] ECF [x] Other: based upon PT/OT assessment   Continued Tx Upon Discharge: ? [] Yes [] No [x] TBD based on progress while on ARU [] Vital Stim indicated [] Other:   Estimated discharge date: est d/c date 10/7/2022    Interventions used this date:  [] Speech/Language Treatment  [] Instruction in HEP [] Group [] Dysphagia Treatment [x] Cognitive Treatment   [] Other:       Total Time Breakdown / Charges    Time in Time out Total Time / units   Cognitive Tx 0930 1000 30 min/ 2 units    Speech Tx -- -- --   Dysphagia Tx -- -- --       Electronically Signed by     Pj Haddad MA CCC-SLP #[id number]  Speech Language Pathologist

## 2022-09-26 NOTE — PROGRESS NOTES
Physical Therapy  Facility/Department: [address] [place] [address]  Rehabilitation Physical Therapy Treatment Note    NAME: Juan Luo  : 1940 ([de-identified] y.o.)  MRN: [id number]  CODE STATUS: Full Code    Date of Service: 22       Restrictions:  Restrictions/Precautions: Up as Tolerated; Fall Risk;General Precautions  Position Activity Restriction  Other position/activity restrictions: abdominal binder/knee high GIANCARLO hose; up as tolerated; Notify physician for pulse less than 50 or greater than 120, respiratory rate less than 12 or greater than 25, oral temperature greater than 101.3 F (18.7 C), systolic BP less than 90 or greater than 953, diastolic BP less than 50 or greater than 100. SUBJECTIVE  Subjective  Subjective: pt found in bed, incontinent of bowel  Pain: denies pain       OBJECTIVE  Orientation  Overall Orientation Status: Within Functional Limits    Functional Mobility     Pt found in bed, RN present and pt incontinent of bowel with RN assisting. Therapist assisted. Rolling L and R in bed with mod-max A and bed rails multiple times to complete pericare/clothing management. While completing pericare, pt incontinent of bowel once more and stated she had to go more. Therapist positioned bedpan and allowed pt to void     Bp supine= 133/68, pulse= 70 bpm, Spo2= 98% on room air. Rolling L and R to remove bed pan and initiate pericare with mod-max A  Supine to sit with max A of 1 with bed controls   Sitting EOB, pt c/o dizzness and wooziness. BP= 98/60. Does not ease with time and 20 AP/ LAQ    Sit to supine with max A of 2  Bp= 147/67. Pt required bed change at this time, more bed mobility completed with max A of 1- TD. Therapist donned Giancarlo hose and fresh gown/brief. Supine to sit with max A of 1+ min A of 1 and bed controls  BP sitting= 107/65 with no complaints of  dizziness    Pt completed dyn sitting activity: reaching RUE/ LUE across midline for object x 3 reps each with min A for sitting balance.  Pt demo's increasing posterior / R lateral lean   BP following sitting activity= 96/58    Returns to supine with max A of 2  Pt in bed at end of session with call light/needs within reach and alarm donned. Second Session:  Pt found in bed, denies pain. RN stating pt received medications for BP prior to therapy start time. Pt pleasant and agreeable however becomes symptomatic with 1  [place]. Initial vitals supine in bed= 183/79, upylse= 53 bpm, Spo2= 98% on room air    Supine to sit with max A - TD with bed controls (pt with little initiation of transfer and requires consistent cues throughout)  Bp sitting EOB= 175/84, pulse- 60 bpm, no c/o dizziness. Sit to stand EOB to STEDY with max a of 1 and multiple attempts (pt pulling self on bar). Pt sitting in STEdy paddles complaining of increased dizziness as 30-40 sec passes demonstrating increased posterior/L lateral lean. BP in STEDY paddles = 122/63    Pt returned to sittting EOB. Bp= 169/77  While EOB, pt completed 15 reps of BLE LAQ (AAROM RLE)  Sit to supine with max A of 1-2  Pt completed 15 reps of LLE supine heel slides/ hip abduction and AAROM RLE heel slides/hip abduction  Pt in bed at end of session with call light/needs within reach and bed alarm donned. ASSESSMENT/PROGRESS TOWARDS GOALS  Vital Signs  Heart Rate: 70  Heart Rate Source: Monitor  BP: 133/68  BP Location: Left upper arm  MAP (Calculated): 89.67  SpO2: 98 %  O2 Device: None (Room air)    Assessment  Assessment: pt found in bed, incontinent of bowel. co treat with OT for increased safety progressing mobility. pt session significantly limited by drops in BP wiht position changes and c/o dizziness. pt incontinent of bowel upon arrival and incontinent of bowel while therapist was assisting with pericare. completed ADL at bed level due to decreased tolerance. KENN hose donned and pt retunred to sitting EOB where BP decreases but remains stable in 90s/50s.  at this time, rec SNF upon d/c due to current presentation and limited assist at home. Activity Tolerance: Patient limited by endurance; Patient limited by fatigue  Discharge Recommendations: Subacute/Skilled Nursing Facility    Goals  Patient Goals   Patient goals : Patient states, \"To be able to get up without being told\" (helped as pt pushes backwards)  Short Term Goals  Time Frame for Short term goals: 9/23/2022  Short term goal 1: Patient demonstrates  sitting  midline 15 minutes wtihout posterior lob 9/17 mod assist to maintain seated balance. Short term goal 3: Patient demonstrates walking 10 feet or greater wtih mod assist of 2 LRAD. 9/17 not completed  Short term goal 4: Patient demonstrates indep in rolling L<>R and mod assist sup to sit 9/17 max assist x 2  Short term goal 5: Patient demonstrates min assist in [employer] propulsion 150 feet. 9/17 not completed  Long Term Goals  Time Frame for Long term goals : 10/7/2022  Long term goal 1: Patient demonstrates  Modified indep in rolling and sup<>Sit. Long term goal 2: Patient demonstrates   transfers sit<>stand with FWW and min assist.  Long term goal 3: Patient demonstrates bed<>chair min A. Long term goal 4: Patient demonstrates gait wtih transfers  and short distances 50 feet or greater with min assist  Long term goal 5: Patient demonstrates up and down 2 steps or greater. with mod assist of 1  Long term goal 6: Patient demonstrates indep WC propulsion iwth L/R turns indep   150 feet. Long term goal 7: Patient demonstrates stoop to recover object from floor with reacher  with mod assist.  Long term goal 8: Patients family demonstrates ability to  assist patient in and out of car wtih mod assist.    [address] 5-7 times per week  Plan weeks: 21 days  Current Treatment Recommendations: Strengthening;ROM;Balance training;Functional mobility training;Transfer training;Neuromuscular re-education;Stair training;Gait training; Wheelchair mobility training; Endurance training; Therapeutic activities; Positioning;Equipment evaluation, education, & procurement;Home exercise program;Safety education & training;Patient/Caregiver education & training  Safety Devices  Type of Devices: All fall risk precautions in place;Call light within reach; Chair alarm in place; Left in chair;Nurse notified    EDUCATION  Education  Education Given To: Patient  Education Provided: Role of Therapy;Plan of Care;Transfer Training; Fall Prevention Strategies;Precautions; Safety;Equipment;Visual Perceptual Function  Education Provided Comments: Educated pt on role of PT, bed mobility, transfers using Stedy, LE exercises  Education Method: Verbal;Demonstration  Barriers to Learning: Cognition  Education Outcome: Verbalized understanding;Continued education needed        Therapy Time   Individual Concurrent Group Co-treatment   Time In 1330     1000   Time Out 1400     1100   Minutes 30     60     Timed Code Treatment Minutes: 80 Minutes       Claudia Noel PT, 09/26/22 at 11:43 AM

## 2022-09-26 NOTE — PLAN OF CARE
Problem: Skin/Tissue Integrity  Goal: Absence of new skin breakdown  Description: 1. Monitor for areas of redness and/or skin breakdown  2. Assess vascular access sites hourly  3. Every 4-6 hours minimum:  Change oxygen saturation probe site  4. Every 4-6 hours:  If on nasal continuous positive airway pressure, respiratory therapy assess nares and determine need for appliance change or resting period.   9/26/2022 1238 by Jaycee Fletcher RN  Outcome: Progressing  9/25/2022 9744 by Coty Rodriguez RN  Outcome: Progressing

## 2022-09-26 NOTE — PROGRESS NOTES
Galindo Peed  9/26/2022  [id number]    Chief Complaint: Acute CVA (cerebrovascular accident) [facility])    Subjective:   No acute events overnight. Today Moe Kenny is seen in her room, resting in bed. She reports feeling well and denies feeling dizzy or light headed. She reports constipation is improved. Nursing reports that patient is more confused today. Will obtain UA. ROS: No CP, SOB, dyspnea    Objective:  Patient Vitals for the past 24 hrs:   BP Temp Temp src Pulse Resp SpO2   09/26/22 0950 -- -- -- 78 -- --   09/26/22 0815 101/61 97.4 °F (36.3 °C) Oral 77 16 99 %   09/25/22 1952 (!) 111/56 97.2 °F (36.2 °C) Oral 78 16 97 %   09/25/22 1616 (!) 107/56 97.5 °F (36.4 °C) Oral 63 16 97 %     Gen: No distress, pleasant. Seated in chair  HEENT: Normocephalic, atraumatic. CV: RRR  Resp: No respiratory distress. Lungs CTAB  Abd: Soft, nontender, bowel sounds active  Ext: No edema. Neuro: Alert, oriented, appropriately interactive. Psych: appropriate mood and affect    Laboratory data: Available via EMR. Therapy progress:    PT    Supine to Sit: Substantial/maximal assistance  Sit to Supine: Substantial/maximal assistance   Sit to Stand: Dependent  Chair/Bed to Chair Transfer: Independent  Car Transfer:    Ambulation 10 ft:    Ambulation 50 ft:    Ambulation 150 ft:    Stairs - 1 Step:    Stairs - 4 Step:    Stairs - 12 Step:      OT    Eating: Setup or clean-up assistance  Oral Hygiene: Partial/moderate assistance  Bathing: Dependent  Upper Body Dressing: Substantial/maximal assistance  Lower Body Dressing: Dependent  Toilet Transfer: Dependent  Toilet Hygiene: Dependent    Speech Therapy         Body mass index is 19.19 kg/m².     Assessment:  Patient Active Problem List   Diagnosis    Left knee pain    Left patella fracture    Contusion of left knee    Orthostatic hypotension    ARF (acute renal failure) (HCC)    Anemia    Debility    Acute CVA (cerebrovascular accident) ([address]    Nonrheumatic aortic valve insufficiency       Assessment and Plan:  Jose Almonte is an [de-identified]year old female with a past medical history significant for orthostatic hypotension, anemia, and memory loss who presented to [facility] on 9/11/22 with recurrent falls and right sided weakness, found to have acute left CVA. She was admitted to [facility] on 9/15/22 due to functional deficits below her baseline. 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 Follow bowel movements. Enema or suppository if needed. Bladder: Check PVR x 3. [facility] if PVR > 200ml or if any volume is > 500 ml. Sleep: Trazodone provided prn.       PPX  DVT: heparin  GI: not indicated    Services: [facility] [place]  EDOD: 10/8/22    Electronically signed by Lashawn Villegas MD on 9/26/2022 at 11:30 AM

## 2022-09-26 NOTE — PROGRESS NOTES
Occupational Therapy  Facility/Department: [facility] ARU  Rehabilitation Occupational Therapy Daily Treatment Note    Date: 22  Patient Name: Cody Martines       Room: 7363/7135-57  MRN: [id number]  Account: [de-identified]   : 1940  [de-identified] y.o.) Gender: female                    Past Medical History:  has a past medical history of ARF (acute renal failure) ([facility] [address] Past Surgical History:   has a past surgical history that includes joint replacement; Tonsillectomy; Intracapsular cataract extraction (Right, 2019); and Intracapsular cataract extraction (Left, 2019). Restrictions  Restrictions/Precautions: Up as Tolerated; Fall Risk;General Precautions  Other position/activity restrictions: abdominal binder/knee high KENN hose; up as tolerated; Notify physician for pulse less than 50 or greater than 120, respiratory rate less than 12 or greater than 25, oral temperature greater than 101.3 F (24.9 C), systolic BP less than 90 or greater than 264, diastolic BP less than 50 or greater than 100. Subjective  Subjective: pt in bed with RN completing joey care after pt with incontinent of bowel, agreeable to co tx, /68, HR 70, SPO2 on RA 98%; pt reporting no pain  Restrictions/Precautions: Up as Tolerated; Fall Risk;General Precautions             Objective     Cognition  Overall Cognitive Status: Exceptions  Arousal/Alertness: Appropriate responses to stimuli  Following Commands: Follows one step commands with increased time; Follows one step commands with repetition  Attention Span: Appears intact  Memory: Decreased recall of recent events  Safety Judgement: Decreased awareness of need for assistance;Decreased awareness of need for safety  Problem Solving: Assistance required to generate solutions;Assistance required to implement solutions;Assistance required to identify errors made;Assistance required to correct errors made  Insights: Decreased awareness of deficits  Initiation: Requires cues for some  Sequencing: Requires cues for some  Cognition Comment: pt in increased confusion this date  Orientation  Overall Orientation Status: Within Functional Limits  Orientation Level: Oriented X4         ADL  Upper Extremity Bathing  Assistance Level: Maximum assistance  Skilled Clinical Factors: sponge bathing while in bed, pt required cues for initation  Lower Extremity Bathing  Assistance Level: Dependent  Skilled Clinical Factors: sponge bathing while in bed  Upper Extremity Dressing  Assistance Level: Maximum assistance  Skilled Clinical Factors: gown exchange while in bed  Lower Extremity Dressing  Assistance Level: Dependent  Skilled Clinical Factors: brief exchange while in bed  Putting On/Taking Off Footwear  Assistance Level: Dependent  Skilled Clinical Factors: KENN hose donning and non skid socks  Toileting  Assistance Level: Dependent  Skilled Clinical Factors: total assist for hygiene and clothing management while in bed after bowel incontnence          Functional Mobility  Skilled Clinical Factors: deferred mobility this date d/t soft BP  Bed Mobility  Overall Assistance Level: Dependent  Additional Factors: Increased time to complete;Verbal cues; With handrails  Sit to Supine  Assistance Level: Dependent  Skilled Clinical Factors: max A of 2  Supine to Sit  Assistance Level: Dependent  Skilled Clinical Factors: max A of 2  Scooting  Assistance Level: Dependent  Skilled Clinical Factors: max A of 2   OT Exercises  Dynamic Sitting Balance Exercises: reaching across midline to retrieve and place objects of various weights both in upper and lower quadrants; required increased physical A to right balance during grab/release especially when use of R hand     Assessment  Assessment  Assessment: Lili Dunbar is progressing in therapy slowly, currently limited by cognition, soft BP with positional changes, R side weakness. Demo'd poor safety awareness, requiring cues for positioning sitting EOB.    Functional Mobility: Max Ax2 for supine <>sit, Max A to roll R and L   ADL: Dep for LB bathing, Max A for UB bathing (sponge in bed), dep for toileting hygiene and brief change   TA: reaching outside ROBERT while sitting EOB with R and L hand   Activity was tolerated poorly, pt required return to supine d/t soft BP with positional changes. Continue OT POC to address performance deficits in ADLs and functional mobility. Activity Tolerance: Patient limited by endurance; Patient limited by fatigue;Treatment limited secondary to medical complications (BP dropping up to 40 points systolic to 58T/129Y with positional changes)  Discharge Recommendations: Subacute/Skilled Nursing Facility  Factors Affecting Discharge: Pt lives alone and currently require high level of assistance due to new deficits from CVA  OT Equipment Recommendations  Equipment Needed: Yes  Other: CTA pending progress, may defer DME rec to d/c facility  Safety Devices  Safety Devices in place: Yes  Type of devices: Call light within reach; All fall risk precautions in place; Patient at risk for falls;Gait belt;Nurse notified; Left in bed;Bed alarm in place    Patient Education  Education  Education Given To: Patient  Education Provided: Role of Therapy;Plan of Care;Precautions;Transfer Training; Fall Prevention Strategies;Cognition; Safety; Family Education;ADL Function;Mobility Training;Equipment;DME/Home Modifications; Home Exercise Program  Education Provided Comments: disease specific: use of RUE and positioning while sitting EOB  Education Method: Verbal;Demonstration  Barriers to Learning: Cognition  Education Outcome: Verbalized understanding;Continued education needed    Plan  Plan  Times per Week: 5-7x/wk  Specific Instructions for Next Treatment: ADLs, mode dressing technique  Current Treatment Recommendations: Strengthening;Balance training;Functional mobility training;Self-Care / ADL;Endurance training;Neuromuscular re-education;Positioning;Modalities; Equipment evaluation, education, & procurement;Patient/Caregiver education & training; Safety education & training;Home management training;Coordination training;Sensory integraion    Goals  Patient Goals   Patient goals : \"to get going and get better\"  Short Term Goals  Time Frame for Short term goals: 9/28/22 (14 days)  Short Term Goal 1: Pt will complete UB dressing with mod A- goal progressing 9/26  Short Term Goal 2: Pt will complete simple grooming task supported in w/c with set-up A- goal progressing 9/26  Short Term Goal 3: Pt will complete functional transfers with mod A x1 using LRAD- goal progressing 9/26  Short Term Goal 4: Pt will complete toileting with max A- goal progressing 9/26  Long Term Goals  Time Frame for Long term goals : 10/05/22 (21 days)  Long Term Goal 1: Pt will complete toilet transfer with min A  Long Term Goal 2: Pt will complete LB dressing with mod A  Long Term Goal 3: Pt will complete bathing with mod A  Long Term Goal 4: Pt will incorporate RUE into ADLs 75% of trials with minimal VC in order to increase functional independence with ADLs      Therapy Time   Individual Concurrent Group Co-treatment   Time In       1000   Time Out       1100   Minutes       60   Timed Code Treatment Minutes: 60 Minutes       Co-tx collaboration this date to safely meet goals and will have better occupational performance outcomes with in a co-treatment than 1:1 session.    Ramin Olson OT

## 2022-09-26 NOTE — CARE COORDINATION
CM spoke to admission with [facility] to see if they are still active with patient and they are.  Vicente Pickett RN

## 2022-09-27 PROCEDURE — 97530 THERAPEUTIC ACTIVITIES: CPT

## 2022-09-27 PROCEDURE — 97130 THER IVNTJ EA ADDL 15 MIN: CPT

## 2022-09-27 PROCEDURE — 97535 SELF CARE MNGMENT TRAINING: CPT

## 2022-09-27 PROCEDURE — 97110 THERAPEUTIC EXERCISES: CPT

## 2022-09-27 PROCEDURE — 97112 NEUROMUSCULAR REEDUCATION: CPT

## 2022-09-27 PROCEDURE — 6370000000 HC RX 637 (ALT 250 FOR IP): Performed by: STUDENT IN AN ORGANIZED HEALTH CARE EDUCATION/TRAINING PROGRAM

## 2022-09-27 PROCEDURE — 1280000000 HC REHAB R&B

## 2022-09-27 PROCEDURE — 6370000000 HC RX 637 (ALT 250 FOR IP): Performed by: NURSE PRACTITIONER

## 2022-09-27 PROCEDURE — 97129 THER IVNTJ 1ST 15 MIN: CPT

## 2022-09-27 PROCEDURE — 6360000002 HC RX W HCPCS: Performed by: STUDENT IN AN ORGANIZED HEALTH CARE EDUCATION/TRAINING PROGRAM

## 2022-09-27 RX ADMIN — CLOPIDOGREL BISULFATE 75 MG: 75 TABLET ORAL at 08:15

## 2022-09-27 RX ADMIN — TROSPIUM CHLORIDE 20 MG: 20 TABLET, FILM COATED ORAL at 20:23

## 2022-09-27 RX ADMIN — MIDODRINE HYDROCHLORIDE 5 MG: 5 TABLET ORAL at 05:43

## 2022-09-27 RX ADMIN — HEPARIN SODIUM 5000 UNITS: 5000 INJECTION INTRAVENOUS; SUBCUTANEOUS at 20:23

## 2022-09-27 RX ADMIN — DONEPEZIL HYDROCHLORIDE 10 MG: 5 TABLET, FILM COATED ORAL at 20:23

## 2022-09-27 RX ADMIN — TRAMADOL HYDROCHLORIDE 50 MG: 50 TABLET ORAL at 20:23

## 2022-09-27 RX ADMIN — Medication 5000 UNITS: at 08:15

## 2022-09-27 RX ADMIN — MIDODRINE HYDROCHLORIDE 5 MG: 5 TABLET ORAL at 13:07

## 2022-09-27 RX ADMIN — CETIRIZINE HYDROCHLORIDE 5 MG: 5 TABLET, FILM COATED ORAL at 08:15

## 2022-09-27 RX ADMIN — ATORVASTATIN CALCIUM 80 MG: 80 TABLET, FILM COATED ORAL at 20:22

## 2022-09-27 RX ADMIN — HEPARIN SODIUM 5000 UNITS: 5000 INJECTION INTRAVENOUS; SUBCUTANEOUS at 05:43

## 2022-09-27 RX ADMIN — TRAZODONE HYDROCHLORIDE 50 MG: 50 TABLET ORAL at 20:23

## 2022-09-27 RX ADMIN — FLUDROCORTISONE ACETATE 0.1 MG: 0.1 TABLET ORAL at 08:16

## 2022-09-27 RX ADMIN — POTASSIUM CHLORIDE 10 MEQ: 750 TABLET, EXTENDED RELEASE ORAL at 08:16

## 2022-09-27 RX ADMIN — CYANOCOBALAMIN TAB 500 MCG 1000 MCG: 500 TAB at 08:16

## 2022-09-27 RX ADMIN — FERROUS SULFATE TAB 325 MG (65 MG ELEMENTAL FE) 325 MG: 325 (65 FE) TAB at 08:16

## 2022-09-27 RX ADMIN — MIDODRINE HYDROCHLORIDE 5 MG: 5 TABLET ORAL at 17:10

## 2022-09-27 RX ADMIN — ASPIRIN 81 MG 81 MG: 81 TABLET ORAL at 08:16

## 2022-09-27 RX ADMIN — HEPARIN SODIUM 5000 UNITS: 5000 INJECTION INTRAVENOUS; SUBCUTANEOUS at 14:16

## 2022-09-27 RX ADMIN — FOLIC ACID 1 MG: 1 TABLET ORAL at 08:15

## 2022-09-27 ASSESSMENT — PAIN DESCRIPTION - LOCATION: LOCATION: SHOULDER

## 2022-09-27 ASSESSMENT — PAIN SCALES - GENERAL
PAINLEVEL_OUTOF10: 0
PAINLEVEL_OUTOF10: 7

## 2022-09-27 ASSESSMENT — PAIN DESCRIPTION - ORIENTATION: ORIENTATION: RIGHT;LEFT

## 2022-09-27 ASSESSMENT — PAIN DESCRIPTION - PAIN TYPE: TYPE: ACUTE PAIN

## 2022-09-27 ASSESSMENT — PAIN DESCRIPTION - DESCRIPTORS: DESCRIPTORS: ACHING;SORE

## 2022-09-27 NOTE — PROGRESS NOTES
Physical Therapy  Facility/Department: [facility]  Rehabilitation Physical Therapy Treatment Note    NAME: Bowen Marcus  : 1940 ([de-identified] y.o.)  MRN: [id number]  CODE STATUS: Full Code    Date of Service: 22       Restrictions:  Restrictions/Precautions: Up as Tolerated; Fall Risk;General Precautions  Position Activity Restriction  Other position/activity restrictions: abdominal binder/knee high KENN hose; up as tolerated; Notify physician for pulse less than 50 or greater than 120, respiratory rate less than 12 or greater than 25, oral temperature greater than 101.3 F (17.5 C), systolic BP less than 90 or greater than 147, diastolic BP less than 50 or greater than 100. SUBJECTIVE  Subjective  Subjective: pt found in bed, negative self talk noted throughout session despite encouragement from therapist.  Pain: denies pain       OBJECTIVE  Orientation  Overall Orientation Status: Within Functional Limits    Functional Mobility       Pt found in bed, vitals assessed, 118/58, pulse= 61 bpm, Spo2= 98% on room air. Supine to sit with max A and HOB maximally elevated with max VC for sequencing. Sit to stand Eob to STEDY with mod A of 2 with PT facilitating WB through BLEs (otherwise, pt only pulls with arms and feet slide forward off of STEDY). TD via STEDY to recliner  Bp in recliner seated= 82/52  Therapist elevated pt's feet, pt completed 15 reps of LLE recliner ankle pumps, glute sets, quad sets  BP re-measured= 124/58. Pt in recliner at end of session with call light/needs within reach and RN notified and pts vitals. Second session:  Pt found in recliner, requires encouragement to participate. Overall denies pain but endorses increased fatigue. Initial vitals with pt in recliner= 101/64, pulse= 77 pm, Spo2= 98% room air.    Vitals with feet lowered to seated position= 96/64 with no complaints of dizziness     Max A of 2 to STEDY , TD via STEDY commode    Pt utilized commode, able to void bladder. Max a of 2 to stand commode to BLUERIDGE VIS bMenu AND BakedCode , requires max a of 1- TD to stand from PT while OT assisted with pericare and clothing management. Sit to stand w/c to // bars x 3 reps with max A of 2.    1st stand: 1 min with Bue support, static standing shoulder width ROBERT prior to fatigue with max A of 2 to maintain midline (bP= 104/58)   2n stand: 2 min of standing with 1-2 UE support, pt completed 5 RUE and 5 LUE shoulder taps with max a of 2 for balance/ maintian midline and trunk extension. PT facilitating LE stability and hip extension, OT facilitating trunk extension and posture (pt adopts increased L trunk rotation with fatigue). 3rd stand: 2 min with pt reaching for beanbag with LUE on IL side-> placing to target across midline with RUE support on bar. Pt requires max a of 2 for dyn standing balance and to maintain trunk/hip extension. PT facilitating LE stability and hip extension, OT facilitating trunk control and UE movement. TD via STEDy to transition from w/c to recliner  Pt in recliner at end of session whit call light/needs within reach and alarm donned. ASSESSMENT/PROGRESS TOWARDS GOALS  Vital Signs  Heart Rate: 61  Heart Rate Source: Monitor  BP: (!) 118/58  BP Location: Left upper arm  MAP (Calculated): 78  SpO2: 98 %  O2 Device: None (Room air)    Assessment  Assessment: pt found in bed, negative self talk noted throughuot session. therapist provided encouragement and motivation. pt requires max a for bed mobility with HOB maximally elevated, TD for standing to STEDY. BP drops with seated position in recliner however returns to baseline with feet elevated and ther ex. KENN hose donned during session this am. continue to rec SNF upon d/c  Activity Tolerance: Patient limited by endurance; Patient limited by fatigue;Treatment limited secondary to medical complications  Discharge Recommendations: Subacute/Skilled Nursing Facility    Goals  Patient Goals   Patient goals : Patient states, \"To be able to get up without being told\" (helped as pt pushes backwards)  Short Term Goals  Time Frame for Short term goals: 9/23/2022  Short term goal 1: Patient demonstrates  sitting  midline 15 minutes wtihout posterior lob 9/17 mod assist to maintain seated balance. Short term goal 2: Patient demonstrates bed<>chair with LRAD with mod assist of 1. 9/17 dependent with santo-stedy  Short term goal 3: Patient demonstrates walking 10 feet or greater wtih mod assist of 2 LRAD. 9/17 not completed  Short term goal 4: Patient demonstrates indep in rolling L<>R and mod assist sup to sit 9/17 max assist x 2  Short term goal 5: Patient demonstrates min assist in [facility] propulsion 150 feet. 9/17 not completed  Long Term Goals  Time Frame for Long term goals : 10/7/2022  Long term goal 1: Patient demonstrates  Modified indep in rolling and sup<>Sit. Long term goal 2: Patient demonstrates   transfers sit<>stand with FWW and min assist.  Long term goal 3: Patient demonstrates bed<>chair min A. Long term goal 4: Patient demonstrates gait wtih transfers  and short distances 50 feet or greater with min assist  Long term goal 5: Patient demonstrates up and down 2 steps or greater. with mod assist of 1  Long term goal 6: Patient demonstrates indep WC propulsion iwth L/R turns indep   150 feet. Long term goal 7: Patient demonstrates stoop to recover object from floor with reacher  with mod assist.  Long term goal 8: Patients family demonstrates ability to  assist patient in and out of car wtih mod assist.    Ray[address].: 5-7 times per week  Plan weeks: 21 days  Current Treatment Recommendations: Strengthening;ROM;Balance training;Functional mobility training;Transfer training;Neuromuscular re-education;Stair training;Gait training; Wheelchair mobility training; Endurance training; Therapeutic activities; Positioning;Equipment evaluation, education, & procurement;Home exercise program;Safety education & training;Patient/Caregiver education & training  Safety Devices  Type of Devices: All fall risk precautions in place;Call light within reach; Chair alarm in place; Left in chair;Nurse notified    EDUCATION  Education  Education Given To: Patient  Education Provided: Role of Therapy;Plan of Care;Transfer Training; Fall Prevention Strategies;Precautions; Safety;Equipment;Visual Perceptual Function  Education Provided Comments: Educated pt on role of PT, bed mobility, transfers using Stedy, LE exercises  Education Method: Verbal;Demonstration  Barriers to Learning: Cognition  Education Outcome: Verbalized understanding;Continued education needed       Therapy Time:   Individual Concurrent Group Co-treatment   Time In 0830     1030   Time Out 0900     1130   Minutes 30     60     Timed Code Treatment Minutes:  90    Total Treatment Minutes:  90    Gianna Whitley PT, 09/27/22 at 9:22 AM

## 2022-09-27 NOTE — PATIENT CARE CONFERENCE
[facility]  Inpatient Rehabilitation  Weekly Team Conference Note    Date: 2022  Patient Name: Deedee Duong        MRN: [id number]    : 1940  ([de-identified] y.o.)  Gender: female   Referring Practitioner: Dr. Elizabeth Lovett  Diagnosis: Acute CVA      Interventions to be utilized toward barriers to discharge, per discipline:  NURSING  Nursing observed barriers to dc: Confusion, Upper extremity weakness, Lower extremity weakness, Incontinence of bowel, Incontinence of bladder, Skin Care, and Medication managment  Nursing interventions:  Assist with ADLs, Toileting, Meals  Family Education:   Fall Risk:  Yes      PHYSICAL THERAPY  Physical therapy observed barriers to dc:         Baseline: pt lives alone in 1 level home with 2 FELIX. Ind with ADLs and mobility in home with AD. Current level: max A of 1-2 for bed mobility, max A of 2 via STEDY transfers, mod A wc mobility, non-ambulatory at this time due to poor standing tolerance/ balance. Barriers to DC: R mode, lack of assist at home, low BP, poor activity tolerance              Needs in order to achieve dc home/next level of care: if pt is to return home at [facility], pt will have to be functioning at a SBA or better level of mobility with LRAD. Continue to assess for d/c planning, rec SNF vs .  DME: TBD      Physical therapy interventions:   Current Treatment Recommendations: Strengthening, ROM, Balance training, Functional mobility training, Transfer training, Neuromuscular re-education, Stair training, Gait training, Wheelchair mobility training, Endurance training, Therapeutic activities, Positioning, Equipment evaluation, education, & procurement, Home exercise program, Safety education & training, Patient/Caregiver education & training    PT Goals:            Short Term Goals  Time Frame for Short term goals: 2022  Short term goal 1: Patient demonstrates  sitting  midline 15 minutes wtihout posterior lob 9/17 mod assist to maintain seated balance. Short term goal 2: Patient demonstrates bed<>chair with LRAD with mod assist of 1. 9/17 dependent with santo-stedy  Short term goal 3: Patient demonstrates walking 10 feet or greater wtih mod assist of 2 LRAD. 9/17 not completed  Short term goal 4: Patient demonstrates indep in rolling L<>R and mod assist sup to sit 9/17 max assist x 2  Short term goal 5: Patient demonstrates min assist in [facility] propulsion 150 feet. 9/17 not completed            Long Term Goals  Time Frame for Long term goals : 10/7/2022  Long term goal 1: Patient demonstrates  Modified indep in rolling and sup<>Sit. Long term goal 2: Patient demonstrates   transfers sit<>stand with FWW and min assist.  Long term goal 3: Patient demonstrates bed<>chair min A. Long term goal 4: Patient demonstrates gait wtih transfers  and short distances 50 feet or greater with min assist  Long term goal 5: Patient demonstrates up and down 2 steps or greater. with mod assist of 1  Additional Goals?: Yes  Long term goal 6: Patient demonstrates indep WC propulsion iwth L/R turns indep   150 feet. Long term goal 7: Patient demonstrates stoop to recover object from floor with reacher  with mod assist.  Long term goal 8: Patients family demonstrates ability to  assist patient in and out of car wtih mod assist.    PT Assessment:  Recommendation:   PT Equipment Recommendations  Equipment Needed: Yes  Mobility Devices: Wheelchair  Wheelchair: Standard  Other: if patient does not go to a facility she likely will need wheelchair upon discharge. continue to assess. Assessment  Assessment: pt found in bed, negative self talk noted throughuot session. therapist provided encouragement and motivation. pt requires max a for bed mobility with HOB maximally elevated, TD for standing to STEDY. BP drops with seated position in recliner however returns to baseline with feet elevated and ther ex.  KENN hose donned during session this am. continue to rec SNF upon d/c  Activity Tolerance: Patient limited by endurance; Patient limited by fatigue;Treatment limited secondary to medical complications  Discharge Recommendations: [facility] [facility]  Occupational therapy observed barriers to dc:    Baseline: mod I ADLs and transfers with RW, lives alone              Current level: max to total for all ADLs, assist of 2 with Alyse Balbalec for transfers              Barriers to DC: lack of assist at home, R mode, poor strength, poor activity tolerance              Needs in order to achieve dc home/next level of care: SPV/min A for all ADLs and transfers to return home with family assistance    Occupational Therapy interventions:  Current Treatment Recommendations: Strengthening, Balance training, Functional mobility training, Self-Care / ADL, Endurance training, Neuromuscular re-education, Positioning, Modalities, Equipment evaluation, education, & procurement, Patient/Caregiver education & training, Safety education & training, Home management training, Coordination training, Sensory integraion    OT Goals:  Patient Goals   Patient goals : \"to get going and get better\"  Short Term Goals  Time Frame for Short term goals: 9/28/22 (14 days)  Short Term Goal 1: Pt will complete UB dressing with mod A- goal not addressed 9/27  Short Term Goal 2: Pt will complete simple grooming task supported in w/c with set-up A- goal progressing 9/27  Short Term Goal 3: Pt will complete functional transfers with mod A x1 using LRAD- goal progressing 9/27  Short Term Goal 4: Pt will complete toileting with max A- goal progressing 9/27 with dep with max Ax2  Long Term Goals  Time Frame for Long term goals : 10/05/22 (21 days)  Long Term Goal 1: Pt will complete toilet transfer with min A  Long Term Goal 2: Pt will complete LB dressing with mod A  Long Term Goal 3: Pt will complete bathing with mod A  Long Term Goal 4: Pt will incorporate PASCALE into ADLs 75% of trials with minimal VC in order to increase functional independence with ADLs    OT Assessment:   Aram Thomas is progressing in therapy slowly, currently limited by soft BP, generalized fatigue. Demo'd poor safety awareness, requiring consistent cueing for hand placement and anterior weight shifting in stance. Functional Mobility: max Ax2 for STS in [place] and in // bars; dep for w/c mobility to/from gym  ADL: Max A x2/dependent for toileting while on toilet and for donning pants/brief exchange   TA: static and dynamic standing in // bars with reaching across midline and unilateral reaching with RUE  Activity was tolerated poorly: BP after toileting 119/70, after stands 104/58, 106/56, and 91/55; pt endorsing inc fatigue at end of session but agreeable to sit up in chair for lunch. Continue OT POC to address performance deficits in ADLs and functional mobility. SPEECH THERAPY  Speech therapy observed barriers to dc:               Baseline: lives independently, manages own medications and finances              Current level: difficulty with short-term recall, mild cognitive deficits               Barriers to DC: physical limitations, short-term recall              Needs in order to achieve dc home/next level of care: improved safety awareness and insight     Speech Therapy interventions:  Dysphagia: Therapeutic Interventions: Diet tolerance monitoring, Patient/Family education  Speech/Language/Cognition: Compensatory strategy training and carryover, recall/STM, problem solving, reasoning, exec function, thought organization, attention.     Dysphagia Goals:  Timeframe for Long-term Goals: 7 days (09/22/22)  Goal 1: Pt will tolerate safest and least restrictive diet without any clinical s/s of aspiration. - Goal Met 9/21/22    Short-term Goals  Timeframe for Short-term Goals: 5 days (09/20/22)  Goal 1: The patient will tolerate recommended diet without observed clinical signs of  aspiration - Goal Met 9/21/22  Goal 2: The patient/caregiver will demonstrate understanding of compensatory strategies for improved swallowing safety. -  Goal met 09/22/22. Speech/Language/Cog Goals:  Timeframe for Long-term Goals: 21 days (10/06/22)  Goal 1: Pt will improve overall cognitive linguistic skills to increase safety and independence to return to PLOF) - 9/28/2022 : Goal addressed, see above. Ongoing, progressing. Short-term Goals  Timeframe for Short-term Goals: 18 days (10/03/22)  Goal 1: Pt will complete recall tasks with 90% acc given min cues. - 9/27/2022 : Goal addressed, see above. Ongoing, progressing. Goal 2: Pt will complete higher level executive function tasks (meds, math, money, time, etc) with 95% acc given min cues. - 9/27/2022 : Goal addressed, see above. Ongoing, progressing. ST Assessment:  Pt alert and cooperative, agreeable to tx. Pt with difficulty with recall task this date despite edu and guidance with use of recall strategies. Pt required min-mod cues for a picture retention task. Pt did well with money general questions given min cues. Pt benefited from min-mod cues to use a sample prescription label to answer questions. Recommend assist with meds at d/c. Continue goals above. NUTRITION  Weight: 115 lb 4.8 oz (52.3 kg) / Body mass index is 19.19 kg/m². Diet Order: ADULT ORAL NUTRITION SUPPLEMENT; Breakfast, Dinner; Standard High Calorie/High Protein Oral Supplement  ADULT DIET; Regular  PO Meals Eaten (%): 1 - 25%  Education: Not indicated       CASE MANAGEMENT  Assessment: [de-identified] yr old female. Dx:Acute CVA (cerebrovascular accident). Independent prior level of function with rolling walker. Lives in a one level home with a 2 step entry that has a tub/shower unit with grab bars. Therapy recommendations are Subacute/Skilled Nursing Facility. CM spoke with daughter about therapy recs and provided a SNF list. DME defer to SNF.     Interdisciplinary Goals:   1.) Pt will complete functional transfer with max a of 1.  2.) Pt will complete LB dressing with Mod A   3.) Pt will implement use of compensatory memory strategies within functional environment across all disciplines to assist with daily recall of staff, schedule, and safety precautions. [x]  Family Training discussed at conference and to be scheduled. Discharge Plan   Estimated discharge date: 10/8/2022  Destination: [facility]. Pass:No  Services at Discharge: Defer to SNF. Equipment at Discharge: Defer to SNF. Team Members Present at Conference:  : Zelda Gillis RN    Occupational Therapist: Jessica Harper, OTR/L [id number]   Physical Therapist: Cheyanne Ding PT, DPT   Speech Therapist: Kati Mattson, [address]  Nurse: Danielle Kyle RN  Dietician: Omar Pimentel RD, LD  : Rosita Sorenson OTR/L  Psychiatry: N/A    Family members present at conference: No      I led this team conference and I approve the established interdisciplinary plan of care as documented within the medical record of [facility].     MD: Electronically signed by Rose Raza MD on 9/28/2022 at 12:13 PM

## 2022-09-27 NOTE — PROGRESS NOTES
Occupational Therapy  Facility/Department: [facility]  Rehabilitation Occupational Therapy Daily Treatment Note    Date: 22  Patient Name: Juan Luo       Room: 4387/2681-78  MRN: [id number]  Account: [de-identified]   : 1940  [de-identified] y.o.) Gender: female                    Past Medical History:  has a past medical history of ARF (acute renal failure) ([facility] [address] Past Surgical History:   has a past surgical history that includes joint replacement; Tonsillectomy; Intracapsular cataract extraction (Right, 2019); and Intracapsular cataract extraction (Left, 2019). Restrictions  Restrictions/Precautions: Up as Tolerated; Fall Risk;General Precautions  Other position/activity restrictions: abdominal binder/knee high KENN hose; up as tolerated; Notify physician for pulse less than 50 or greater than 120, respiratory rate less than 12 or greater than 25, oral temperature greater than 101.3 F (48.6 C), systolic BP less than 90 or greater than 152, diastolic BP less than 50 or greater than 100. Subjective  Subjective: pt up in recliner reporting fatigue at beginning of session; /64, SPO2 on RA 98%, HR 77  Restrictions/Precautions: Up as Tolerated; Fall Risk;General Precautions             Objective     Cognition  Overall Cognitive Status: Exceptions  Arousal/Alertness: Appropriate responses to stimuli  Following Commands: Follows one step commands with increased time; Follows one step commands with repetition  Attention Span: Appears intact  Memory: Decreased recall of recent events  Safety Judgement: Decreased awareness of need for assistance;Decreased awareness of need for safety  Problem Solving: Assistance required to generate solutions;Assistance required to implement solutions;Assistance required to identify errors made;Assistance required to correct errors made  Insights: Decreased awareness of deficits  Initiation: Requires cues for some  Sequencing: Requires cues for some  Cognition Comment: pt with some confusion this date, testing + for UTI  Orientation  Overall Orientation Status: Within Functional Limits  Orientation Level: Oriented X4         ADL  Grooming/Oral Hygiene  Assistance Level: Dependent  Skilled Clinical Factors: given hand  after toileting  Lower Extremity Dressing  Assistance Level: Dependent  Skilled Clinical Factors: brief exchange and donning pants in santo sears at toilet  Toileting  Assistance Level: Dependent  Skilled Clinical Factors: total assist for hygiene and clothing management at [facility] over standard toilet with santo stedy  Toilet Transfers  Technique:  (via [employer])  Equipment: Raised toilet seat with arms  Additional Factors: Cues for hand placement; Increased time to complete  Assistance Level: Dependent  Skilled Clinical Factors: Max Ax2 with [place] [employer]          Functional Mobility  Device: Wheelchair  Activity: To/From therapy gym  Assistance Level: Dependent  Transfers  Surface: To chair with arms;From chair with arms; Wheelchair  Additional Factors: Verbal cues; Set-up; With handrails  Device:  [employer])  Sit to Stand  Assistance Level: Dependent  Skilled Clinical Factors:  Max A x2  Stand to Sit  Assistance Level: Dependent  Skilled Clinical Factors: mod/max A of 2   OT Exercises  A/AROM Exercises: 2 sets of x5 reps shoulder flexion up to OT's hand, when measured R shoulder AROM 60*  Static Standing Balance Exercises: x2 stands in // with Max A x2 and max verbal and tactile cues for upright posture and to square hips and shoulders with upright mirror for visual feedback  Dynamic Standing Balance Exercises: x3 minutes in stance with L reach outside ROBERT in // bars with Max Ax1 from PT and Mod Ax1 from OT during verbal facilitation of reach to target across midline to R side to increase WS/WB onto RLE/RUE  Postural Correction Exercises: verbal and tactile cues at anterior/posterior shoulders, posterior hips; external verbal cues for hip and neck extension via looking to targets, pt with fair response to internal reminder questiosn such as \"where are you looking right now\"  Motor Control/Coordination: RUE AROM during reaching task in stance     Assessment  Assessment  Assessment: Jenny Sanz is progressing in therapy slowly, currently limited by soft BP, generalized fatigue. Demo'd poor safety awareness, requiring consistent cueing for hand placement and anterior weight shifting in stance. Functional Mobility: max Ax2 for STS in [place] and in // bars; dep for w/c mobility to/from gym  ADL: Max A x2/dependent for toileting while on toilet and for donning pants/brief exchange   TA: static and dynamic standing in // bars with reaching across midline and unilateral reaching with RUE  Activity was tolerated poorly: BP after toileting 119/70, after stands 104/58, 106/56, and 91/55; pt endorsing inc fatigue at end of session but agreeable to sit up in chair for lunch. Continue OT POC to address performance deficits in ADLs and functional mobility. Activity Tolerance: Patient limited by endurance; Patient limited by fatigue;Treatment limited secondary to medical complications  Discharge Recommendations: Subacute/Skilled Nursing Facility  Factors Affecting Discharge: Pt lives alone and currently require high level of assistance due to new deficits from CVA  OT Equipment Recommendations  Equipment Needed: Yes  Other: CTA pending progress, may defer DME rec to d/c facility  Safety Devices  Safety Devices in place: Yes  Type of devices: Call light within reach; All fall risk precautions in place; Patient at risk for falls;Gait belt;Nurse notified; Left in chair;Chair alarm in place    Patient Education  Education  Education Given To: Patient  Education Provided: Role of Therapy;Plan of Care;Precautions;Transfer Training; Fall Prevention Strategies;Cognition; Safety; Family Education;ADL Function;Mobility Training;Equipment;DME/Home Modifications; Home Exercise Program  Education Provided Comments: disease specific: use of RUE and benefits of OOB mobility/up in chair for meals  Education Method: Verbal;Demonstration  Barriers to Learning: Cognition  Education Outcome: Verbalized understanding;Continued education needed    Plan  Plan  Times per Week: 5-7x/wk  Current Treatment Recommendations: Strengthening;Balance training;Functional mobility training;Self-Care / ADL;Endurance training;Neuromuscular re-education;Positioning;Modalities; Equipment evaluation, education, & procurement;Patient/Caregiver education & training; Safety education & training;Home management training;Coordination training;Sensory integraion    Goals  Patient Goals   Patient goals : \"to get going and get better\"  Short Term Goals  Time Frame for Short term goals: 9/28/22 (14 days)  Short Term Goal 1: Pt will complete UB dressing with mod A- goal not addressed 9/27  Short Term Goal 2: Pt will complete simple grooming task supported in w/c with set-up A- goal progressing 9/27  Short Term Goal 3: Pt will complete functional transfers with mod A x1 using LRAD- goal progressing 9/27  Short Term Goal 4: Pt will complete toileting with max A- goal progressing 9/27 with dep with max Ax2  Long Term Goals  Time Frame for Long term goals : 10/05/22 (21 days)  Long Term Goal 1: Pt will complete toilet transfer with min A  Long Term Goal 2: Pt will complete LB dressing with mod A  Long Term Goal 3: Pt will complete bathing with mod A  Long Term Goal 4: Pt will incorporate RUE into ADLs 75% of trials with minimal VC in order to increase functional independence with ADLs    Therapy Time   Individual Concurrent Group Co-treatment   Time In       1030   Time Out       1130   Minutes       60   Timed Code Treatment Minutes: 60 Minutes     Co-tx collaboration this date to safely meet goals and will have better occupational performance outcomes with in a co-treatment than 1:1 session.    Ping Nevarez, OT

## 2022-09-27 NOTE — PROGRESS NOTES
[facility]  9/27/2022  [id number]    Chief Complaint: Acute CVA (cerebrovascular accident) [facility])    Subjective:   No acute events overnight. Today Ava Alvarez is seen in her room with her daughter present. She reports feeling that she is improving with therapy. She endorses feeling down about her situation. ROS: No CP, SOB, dyspnea    Objective:  Patient Vitals for the past 24 hrs:   BP Temp Temp src Pulse Resp SpO2   09/27/22 1700 (!) 96/59 -- -- 71 16 96 %   09/27/22 1300 100/61 -- -- 75 16 98 %   09/27/22 1010 -- -- -- 62 -- --   09/27/22 0919 (!) 118/58 -- -- 61 -- 98 %   09/27/22 0800 112/64 98.2 °F (36.8 °C) Oral 64 16 98 %   09/26/22 2105 134/62 97.2 °F (36.2 °C) Oral 64 16 97 %     Gen: No distress, pleasant. Seated in chair  HEENT: Normocephalic, atraumatic. CV: extremities well perfused  Resp: No respiratory distress. Abd: Soft, nondistended  Ext: No edema. Neuro: Alert, oriented, appropriately interactive. Psych: appropriate mood and affect    Laboratory data: Available via EMR. Therapy progress:    PT    Supine to Sit: Substantial/maximal assistance  Sit to Supine: Substantial/maximal assistance   Sit to Stand: Dependent  Chair/Bed to Chair Transfer: Independent  Car Transfer:    Ambulation 10 ft:    Ambulation 50 ft:    Ambulation 150 ft:    Stairs - 1 Step:    Stairs - 4 Step:    Stairs - 12 Step:      OT    Eating: Setup or clean-up assistance  Oral Hygiene: Partial/moderate assistance  Bathing: Dependent  Upper Body Dressing: Substantial/maximal assistance  Lower Body Dressing: Dependent  Toilet Transfer: Dependent  Toilet Hygiene: Dependent    Speech Therapy         Body mass index is 19.19 kg/m².     Assessment:  Patient Active Problem List   Diagnosis    Left knee pain    Left patella fracture    Contusion of left knee    Orthostatic hypotension    ARF (acute renal failure) (HCC)    Anemia    Debility    Acute CVA (cerebrovascular accident) ([address]    Nonrheumatic aortic valve insufficiency       Assessment and Plan:  Dia Sanabria is an [de-identified]year old female with a past medical history significant for orthostatic hypotension, anemia, and memory loss who presented to [facility] on 9/11/22 with recurrent falls and right sided weakness, found to have acute left CVA. She was admitted to [facility] on 9/15/22 due to functional deficits below her baseline. 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 Follow bowel movements. Enema or suppository if needed. Bladder: Check PVR x 3. [address] if PVR > 200ml or if any volume is > 500 ml. Sleep: Trazodone provided prn.       PPX  DVT: heparin  GI: not indicated    Services: SNF v [facility]  EDOD: 10/8/22    Electronically signed by North Phillips MD on 9/27/2022 at 6:06 PM

## 2022-09-27 NOTE — PROGRESS NOTES
Speech Language Pathology  [facility]: ACUTE REHAB UNIT  SPEECH-LANGUAGE PATHOLOGY      [x] Daily  [] Weekly Care Conference Note  [] Discharge    Patient:Yulissa Padron      :1940  [facility]:[id number]  Rehab Dx/Hx: Acute CVA (cerebrovascular accident) ([facility] [address] [I63.9]    Precautions: falls  Home situation: Pt lives at home [facility] Dx: [] Aphasia  [] Dysarthria  [] Apraxia   [] Oropharyngeal dysphagia [x] Cognitive Impairment  [] Other:   Date of Admit: 9/15/2022  Room #: 0160/0160-01    Current functional status (updated daily):         Pt being seen for : [] Speech/Language Treatment  [x] Dysphagia Treatment [x] Cognitive Treatment  [] Other:  Communication: [x]WFL  [] Aphasia  [] Dysarthria  [] Apraxia  [] Pragmatic Impairment [] Non-verbal  [] Hearing Loss  [] Other:   Cognition: [] WFL  [x] Mild  [x] Moderate  [] Severe [] Unable to Assess  [] Other:  Memory: [] WFL  [] Mild  [x] Moderate  [] Severe [] Unable to Assess  [] Other:  Behavior: [x] Alert  [x] Cooperative  [x]  Pleasant  [] Confused  [] Agitated  [] Uncooperative  [] Distractible [] Motivated  [] Self-Limiting [] Anxious  [] Other:  Endurance:  [x] Adequate for participation in SLP sessions  [] Reduced overall  [] Lethargic  [] Other:  Safety: [x] No concerns at this time  [] Reduced insight into deficits  []  Reduced safety awareness [] Not following call light procedures  [] Unable to Assess  [] Other:    Current Diet Order:ADULT ORAL NUTRITION SUPPLEMENT; Breakfast, Dinner; Standard High Calorie/High Protein Oral Supplement  ADULT DIET;  Regular, thin   Swallowing Precautions: upright for all intake, stay upright for at least 30 mins after intake, small bites/sips, assist feed, oral care 2-3x/day to reduce adverse affects in the event of aspiration, increase physical mobility as able, alternate bites/sips, slow rate of intake, general GERD precautions, and general aspiration precautions        Date: 2022      Tx session 1  1230 - 1300 Tx session 2  All tx needs met in session 1   Total Timed Code Min 30    Total Treatment Minutes 30    Individual Treatment Minutes 30    Group Treatment Minutes 0    Co-Treat Minutes 0    Variance/Reason:  N/A    Pain Denies    Pain Intervention [] RN notified  [] Repositioned  [] Intervention offered and patient declined  [x] N/A  [] Other: [] RN notified  [] Repositioned  [] Intervention offered and patient declined  [] N/A  [] Other:   Subjective     Pt alert and oriented, cooperative and agreeable to participate in therapy. Pt seen sitting upright in bedside chair. Objective:  Goals     Dysphagia Goals:    Short-term Goals  Timeframe for Short-term Goals: 5 days (09/20/22)          Goal Met 9/21/22        Goal met 09/22/22. Cognitive Goals:    Short-term Goals  Timeframe for Short-term Goals: 18 days (10/03/22)    Goal 1: Pt will complete recall tasks with 90% acc given min cues. Picture Retention Task   -pt given a picture scene; SLP reviewed details with pt   -immediate recall: pt recalled details with 67% given min cues, improved to 83% acc given mod cues  -10 min delay: pt recalled details with 66% given min cues, improved to 73% acc given mod cues    Goal 2: Pt will complete higher level executive function tasks (meds, math, money, time, etc) with 95% acc given min cues.    Money General Questions  -e.g. are 5 dimes equal to 9 nickels?  -pt completed task with 79% acc indep, improved to 86% acc given min cues     Sample Prescription Label  -pt used a sample label to answer questions with 67% acc indep, improved to 92% acc given min-mod cues       Other areas targeted: N/A    Education:   SLP edu pt re: recall strategies, calc strategies, med management strategies     Safety Devices: [x] Call light within reach  [x] Chair alarm activated  [] Bed alarm activated  [] Other: [] Call light within reach  [] Chair alarm activated  [] Bed alarm activated  [] Other:    Assessment: Pt alert and cooperative, agreeable to tx. Pt with difficulty with recall task this date despite edu and guidance with use of recall strategies. Pt required min-mod cues for a picture retention task. Pt did well with money general questions given min cues. Pt benefited from min-mod cues to use a sample prescription label to answer questions. Recommend assist with meds at d/c. Continue goals above. Plan: Continue as per plan of care. Additional Information:     Barriers toward progress: N/A  Discharge recommendations:  [] Home independently  [] Home with assistance []  24 hour supervision  [] ECF [x] Other: based upon PT/OT assessment   Continued Tx Upon Discharge: ? [] Yes [] No [x] TBD based on progress while on ARU [] Vital Stim indicated [] Other:   Estimated discharge date: est d/c date 10/7/2022    Interventions used this date:  [] Speech/Language Treatment  [] Instruction in HEP [] Group [] Dysphagia Treatment [x] Cognitive Treatment   [] Other:       Total Time Breakdown / Charges    Time in Time out Total Time / units   Cognitive Tx 1230 1300 30 min/ 2 units    Speech Tx -- -- --   Dysphagia Tx -- -- --       Electronically Signed by     Pily Pierce MA CCC-SLP #[id number]  Speech Language Pathologist

## 2022-09-28 LAB
ANION GAP SERPL CALCULATED.3IONS-SCNC: 11 MMOL/L (ref 3–16)
BASOPHILS ABSOLUTE: 0.1 K/UL (ref 0–0.2)
BASOPHILS RELATIVE PERCENT: 0.5 %
BUN BLDV-MCNC: 30 MG/DL (ref 7–20)
CALCIUM SERPL-MCNC: 9.8 MG/DL (ref 8.3–10.6)
CHLORIDE BLD-SCNC: 101 MMOL/L (ref 99–110)
CO2: 24 MMOL/L (ref 21–32)
CREAT SERPL-MCNC: 0.7 MG/DL (ref 0.6–1.2)
EOSINOPHILS ABSOLUTE: 0.3 K/UL (ref 0–0.6)
EOSINOPHILS RELATIVE PERCENT: 2.3 %
GFR AFRICAN AMERICAN: >60
GFR NON-AFRICAN AMERICAN: >60
GLUCOSE BLD-MCNC: 87 MG/DL (ref 70–99)
HCT VFR BLD CALC: 34.1 % (ref 36–48)
HEMOGLOBIN: 11.2 G/DL (ref 12–16)
LYMPHOCYTES ABSOLUTE: 2.1 K/UL (ref 1–5.1)
LYMPHOCYTES RELATIVE PERCENT: 18.1 %
MCH RBC QN AUTO: 32.1 PG (ref 26–34)
MCHC RBC AUTO-ENTMCNC: 32.9 G/DL (ref 31–36)
MCV RBC AUTO: 97.5 FL (ref 80–100)
MONOCYTES ABSOLUTE: 0.7 K/UL (ref 0–1.3)
MONOCYTES RELATIVE PERCENT: 6.5 %
NEUTROPHILS ABSOLUTE: 8.2 K/UL (ref 1.7–7.7)
NEUTROPHILS RELATIVE PERCENT: 72.6 %
PDW BLD-RTO: 16.1 % (ref 12.4–15.4)
PLATELET # BLD: 306 K/UL (ref 135–450)
PMV BLD AUTO: 7.4 FL (ref 5–10.5)
POTASSIUM REFLEX MAGNESIUM: 4 MMOL/L (ref 3.5–5.1)
RBC # BLD: 3.5 M/UL (ref 4–5.2)
SODIUM BLD-SCNC: 136 MMOL/L (ref 136–145)
WBC # BLD: 11.3 K/UL (ref 4–11)

## 2022-09-28 PROCEDURE — 6370000000 HC RX 637 (ALT 250 FOR IP): Performed by: STUDENT IN AN ORGANIZED HEALTH CARE EDUCATION/TRAINING PROGRAM

## 2022-09-28 PROCEDURE — 6360000002 HC RX W HCPCS: Performed by: STUDENT IN AN ORGANIZED HEALTH CARE EDUCATION/TRAINING PROGRAM

## 2022-09-28 PROCEDURE — 85025 COMPLETE CBC W/AUTO DIFF WBC: CPT

## 2022-09-28 PROCEDURE — 97530 THERAPEUTIC ACTIVITIES: CPT

## 2022-09-28 PROCEDURE — 1280000000 HC REHAB R&B

## 2022-09-28 PROCEDURE — 36415 COLL VENOUS BLD VENIPUNCTURE: CPT

## 2022-09-28 PROCEDURE — 97129 THER IVNTJ 1ST 15 MIN: CPT

## 2022-09-28 PROCEDURE — 80048 BASIC METABOLIC PNL TOTAL CA: CPT

## 2022-09-28 PROCEDURE — 97110 THERAPEUTIC EXERCISES: CPT

## 2022-09-28 PROCEDURE — 97130 THER IVNTJ EA ADDL 15 MIN: CPT

## 2022-09-28 PROCEDURE — 97116 GAIT TRAINING THERAPY: CPT

## 2022-09-28 PROCEDURE — 6370000000 HC RX 637 (ALT 250 FOR IP): Performed by: NURSE PRACTITIONER

## 2022-09-28 PROCEDURE — 97535 SELF CARE MNGMENT TRAINING: CPT

## 2022-09-28 RX ADMIN — CYANOCOBALAMIN TAB 500 MCG 1000 MCG: 500 TAB at 09:01

## 2022-09-28 RX ADMIN — HEPARIN SODIUM 5000 UNITS: 5000 INJECTION INTRAVENOUS; SUBCUTANEOUS at 21:01

## 2022-09-28 RX ADMIN — TROSPIUM CHLORIDE 20 MG: 20 TABLET, FILM COATED ORAL at 21:01

## 2022-09-28 RX ADMIN — MIDODRINE HYDROCHLORIDE 5 MG: 5 TABLET ORAL at 17:51

## 2022-09-28 RX ADMIN — HEPARIN SODIUM 5000 UNITS: 5000 INJECTION INTRAVENOUS; SUBCUTANEOUS at 06:00

## 2022-09-28 RX ADMIN — POTASSIUM CHLORIDE 10 MEQ: 750 TABLET, EXTENDED RELEASE ORAL at 09:01

## 2022-09-28 RX ADMIN — MIDODRINE HYDROCHLORIDE 5 MG: 5 TABLET ORAL at 11:44

## 2022-09-28 RX ADMIN — ATORVASTATIN CALCIUM 80 MG: 80 TABLET, FILM COATED ORAL at 21:00

## 2022-09-28 RX ADMIN — ASPIRIN 81 MG 81 MG: 81 TABLET ORAL at 09:01

## 2022-09-28 RX ADMIN — Medication 5000 UNITS: at 09:00

## 2022-09-28 RX ADMIN — FOLIC ACID 1 MG: 1 TABLET ORAL at 09:01

## 2022-09-28 RX ADMIN — CLOPIDOGREL BISULFATE 75 MG: 75 TABLET ORAL at 09:01

## 2022-09-28 RX ADMIN — FLUDROCORTISONE ACETATE 0.1 MG: 0.1 TABLET ORAL at 09:00

## 2022-09-28 RX ADMIN — DONEPEZIL HYDROCHLORIDE 10 MG: 5 TABLET, FILM COATED ORAL at 21:01

## 2022-09-28 RX ADMIN — TRAZODONE HYDROCHLORIDE 50 MG: 50 TABLET ORAL at 21:01

## 2022-09-28 RX ADMIN — HEPARIN SODIUM 5000 UNITS: 5000 INJECTION INTRAVENOUS; SUBCUTANEOUS at 14:26

## 2022-09-28 RX ADMIN — CETIRIZINE HYDROCHLORIDE 5 MG: 5 TABLET, FILM COATED ORAL at 09:01

## 2022-09-28 RX ADMIN — FERROUS SULFATE TAB 325 MG (65 MG ELEMENTAL FE) 325 MG: 325 (65 FE) TAB at 09:01

## 2022-09-28 RX ADMIN — MIDODRINE HYDROCHLORIDE 5 MG: 5 TABLET ORAL at 06:00

## 2022-09-28 ASSESSMENT — PAIN SCALES - GENERAL
PAINLEVEL_OUTOF10: 0

## 2022-09-28 NOTE — PLAN OF CARE
Problem: Skin/Tissue Integrity  Goal: Absence of new skin breakdown  Description: 1. Monitor for areas of redness and/or skin breakdown  2. Assess vascular access sites hourly  3. Every 4-6 hours minimum:  Change oxygen saturation probe site  4. Every 4-6 hours:  If on nasal continuous positive airway pressure, respiratory therapy assess nares and determine need for appliance change or resting period.   9/27/2022 2308 by Trish Frye RN  Outcome: Progressing  9/27/2022 1536 by Ting Garza RN  Outcome: Progressing

## 2022-09-28 NOTE — PROGRESS NOTES
Comprehensive Nutrition Assessment    Type and Reason for Visit:  Reassess    Nutrition Recommendations/Plan:   Continue regular diet and encourage PO intake   Assist w/ meals as needed  Increase ensure to TID  Monitor nutrition adequacy, pertinent labs, bowel habits, wt changes, and clinical progress     Malnutrition Assessment:  Malnutrition Status: At risk for malnutrition (Comment) (09/16/22 2276)    Context:  Acute Illness     Findings of the 6 clinical characteristics of malnutrition:  Energy Intake:  Mild decrease in energy intake (Comment)   Muscle Mass Loss: Moderate muscle mass loss Clavicles (pectoralis & deltoids), Temples (temporalis)    Nutrition Assessment:    Follow up: Pt nutritionally compromised AEB PO intakes 1-100% of meals over the past few days. Pt reports good appetite, she is a \"small eater\" and does not always eat the whole plate. Wt stable in EMR. Adequate acceptance of ensure, consuming % twice daily per pt. RD to increase ensure to TID to promote adequate nutrition. Continue to encourage PO intake, will continue to monitor. Nutrition Related Findings:    + 3 BM on 9/26. Pt reports diarrhea. Labs reviewed. Wound Type: Skin Tears       Current Nutrition Intake & Therapies:    Average Meal Intake: 1-25%, 51-75%, %  Average Supplements Intake: %  ADULT DIET; Regular  ADULT ORAL NUTRITION SUPPLEMENT; Breakfast, Dinner, Lunch; Standard High Calorie/High Protein Oral Supplement    Anthropometric Measures:  Height: 5' 5\" (165.1 cm)  Ideal Body Weight (IBW): 125 lbs (57 kg)       Current Body Weight: 115 lb (52.2 kg), 86.4 % IBW.  Weight Source: Bed Scale  Current BMI (kg/m2): 19.1  Usual Body Weight: 110 lb (49.9 kg)  % Weight Change (Calculated): -1.8  Weight Adjustment For: No Adjustment                 BMI Categories: Underweight (BMI less than 22) age over 72    Estimated Daily Nutrient Needs:  Energy Requirements Based On: Kcal/kg (25-30 kcals/kg)  Weight Used for Energy Requirements: Ideal (57 kg)  Energy (kcal/day): 3440-2482  Weight Used for Protein Requirements: Ideal (1-1.2 g/kg)  Protein (g/day): 57-68 g  Method Used for Fluid Requirements: 1 ml/kcal    Nutrition Diagnosis:   Inadequate oral intake related to inadequate protein-energy intake as evidenced by intake 0-25%, intake 51-75%, BMI    Nutrition Interventions:   Food and/or Nutrient Delivery: Continue Current Diet, Continue Oral Nutrition Supplement  Nutrition Education/Counseling: Education not indicated  Coordination of Nutrition Care: Continue to monitor while inpatient       Goals:  Previous Goal Met: Progressing toward Goal(s)  Goals: PO intake 50% or greater, prior to discharge       Nutrition Monitoring and Evaluation:   Behavioral-Environmental Outcomes: None Identified  Food/Nutrient Intake Outcomes: Food and Nutrient Intake, Supplement Intake  Physical Signs/Symptoms Outcomes: Biochemical Data, Chewing or Swallowing, Weight, Nutrition Focused Physical Findings    Discharge Planning:    Continue current diet, Continue Oral Nutrition Supplement     Phyllistine MS Shakira, [address] [place] [address],   Contact: Office: [phone]; [facility]: [phone]

## 2022-09-28 NOTE — PROGRESS NOTES
Bowen Marcus  9/28/2022  [id number]    Chief Complaint: Acute CVA (cerebrovascular accident) [facility])    Subjective:   No acute events overnight. Today Bethany Johnson is seen in her room with nursing and therapy present. She reports that she is feeling well. She denies feeling light headed or dizzy. She denies other acute complaints at this time. ROS: No CP, SOB, dyspnea    Objective:  Patient Vitals for the past 24 hrs:   BP Temp Temp src Pulse Resp SpO2   09/28/22 1143 102/63 -- -- 67 -- --   09/28/22 0927 116/64 -- -- 79 -- 99 %   09/28/22 0854 116/64 97.4 °F (36.3 °C) Oral 79 16 99 %   09/28/22 0545 (!) 149/75 -- -- -- -- --   09/27/22 2015 113/65 97.4 °F (36.3 °C) Oral 64 16 97 %   09/27/22 1700 (!) 96/59 -- -- 71 16 96 %   09/27/22 1300 100/61 -- -- 75 16 98 %     Gen: No distress, pleasant. Seated in chair  HEENT: Normocephalic, atraumatic. CV: RRR  Resp: No respiratory distress. Lungs CTAB  Abd: Soft, nondistended  Ext: No edema. Neuro: Alert, oriented, appropriately interactive. Psych: appropriate mood and affect    Laboratory data: Available via EMR. Therapy progress:    PT    Supine to Sit: Substantial/maximal assistance  Sit to Supine: Substantial/maximal assistance   Sit to Stand: Dependent  Chair/Bed to Chair Transfer: Independent  Car Transfer:    Ambulation 10 ft:    Ambulation 50 ft:    Ambulation 150 ft:    Stairs - 1 Step:    Stairs - 4 Step:    Stairs - 12 Step:      OT    Eating: Setup or clean-up assistance  Oral Hygiene: Partial/moderate assistance  Bathing: Dependent  Upper Body Dressing: Substantial/maximal assistance  Lower Body Dressing: Dependent  Toilet Transfer: Dependent  Toilet Hygiene: Dependent    Speech Therapy         Body mass index is 19.19 kg/m².     Assessment:  Patient Active Problem List   Diagnosis    Left knee pain    Left patella fracture    Contusion of left knee    Orthostatic hypotension    ARF (acute renal failure) (HCC)    Anemia    Debility    Acute CVA (cerebrovascular accident) ([facility] [address]    Nonrheumatic aortic valve insufficiency       Assessment and Plan:  Brandon Zelaya is an [de-identified]year old female with a past medical history significant for orthostatic hypotension, anemia, and memory loss who presented to [facility] on 9/11/22 with recurrent falls and right sided weakness, found to have acute left CVA. She was admitted to [facility] on 9/15/22 due to functional deficits below her baseline. 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 Follow bowel movements. Enema or suppository if needed. Bladder: Check PVR x 3. [facility] if PVR > 200ml or if any volume is > 500 ml. Sleep: Trazodone provided prn. PPX  DVT: heparin  GI: not indicated    Services: SNF   EDOD: 10/8/22    Interdisciplinary team conference was held today with entire rehab treatment team including PT, OT, SLP, Dietician, RN, and SW. Discussion focused on progress toward rehab goals and discharge planning. Barriers: orthostatic hypotension, endurance, cormorbidities. Total treatment time >35 min with greater than 50% spent in care coordination.     Electronically signed by Bong Cason MD on 9/28/2022 at 12:23 PM

## 2022-09-28 NOTE — PLAN OF CARE
Patient remains free from falls and injuries. Patient calls out for staff assistance if needing to transfer or ambulate. Call light within reach and non skid footwear on when out of bed.  Tj Randhawa RN

## 2022-09-28 NOTE — PROGRESS NOTES
Speech Language Pathology  [facility]: ACUTE REHAB UNIT  SPEECH-LANGUAGE PATHOLOGY      [x] Daily  [] Weekly Care Conference Note  [] Discharge    Patient:Yulissa Torres      :1940  LTX:[id number]  Rehab Dx/Hx: Acute CVA (cerebrovascular accident) ([facility] [address] [I63.9]    Precautions: falls  Home situation: Pt lives at home [facility] Dx: [] Aphasia  [] Dysarthria  [] Apraxia   [] Oropharyngeal dysphagia [x] Cognitive Impairment  [] Other:   Date of Admit: 9/15/2022  Room #: 0160/0160-01    Current functional status (updated daily):         Pt being seen for : [] Speech/Language Treatment  [x] Dysphagia Treatment [x] Cognitive Treatment  [] Other:  Communication: [x]WFL  [] Aphasia  [] Dysarthria  [] Apraxia  [] Pragmatic Impairment [] Non-verbal  [] Hearing Loss  [] Other:   Cognition: [] WFL  [x] Mild  [x] Moderate  [] Severe [] Unable to Assess  [] Other:  Memory: [] WFL  [] Mild  [x] Moderate  [] Severe [] Unable to Assess  [] Other:  Behavior: [x] Alert  [x] Cooperative  [x]  Pleasant  [] Confused  [] Agitated  [] Uncooperative  [] Distractible [] Motivated  [] Self-Limiting [] Anxious  [] Other:  Endurance:  [x] Adequate for participation in SLP sessions  [] Reduced overall  [] Lethargic  [] Other:  Safety: [x] No concerns at this time  [] Reduced insight into deficits  []  Reduced safety awareness [] Not following call light procedures  [] Unable to Assess  [] Other:    Current Diet Order:ADULT ORAL NUTRITION SUPPLEMENT; Breakfast, Dinner; Standard High Calorie/High Protein Oral Supplement  ADULT DIET;  Regular, thin   Swallowing Precautions: upright for all intake, stay upright for at least 30 mins after intake, small bites/sips, assist feed, oral care 2-3x/day to reduce adverse affects in the event of aspiration, increase physical mobility as able, alternate bites/sips, slow rate of intake, general GERD precautions, and general aspiration precautions        Date: 2022      Tx session 1  1000 - 1340 Tx session 2  All tx needs met in session 1   Total Timed Code Min 30    Total Treatment Minutes 30    Individual Treatment Minutes 30    Group Treatment Minutes 0    Co-Treat Minutes 0    Variance/Reason:  N/A    Pain Denies    Pain Intervention [] RN notified  [] Repositioned  [] Intervention offered and patient declined  [x] N/A  [] Other: [] RN notified  [] Repositioned  [] Intervention offered and patient declined  [] N/A  [] Other:   Subjective     Pt alert and oriented, cooperative and agreeable to participate in therapy. Pt seen sitting upright in bedside chair. Objective:  Goals     Dysphagia Goals:    Short-term Goals  Timeframe for Short-term Goals: 5 days (09/20/22)          Goal Met 9/21/22        Goal met 09/22/22. Cognitive Goals:    Short-term Goals  Timeframe for Short-term Goals: 18 days (10/03/22)    Goal 1: Pt will complete recall tasks with 90% acc given min cues. Sorting and Remembering Categories  -pt given 12 items that could  be sorted into 3 groups  -edu re: recall strategies - grouping/chunking, repetition, association, visualization  -immediate recall: with category cues given, pt recalled 7/12 items; improved to 11/12 given mod cues  -10 min delay: with category cues given, pt recalled 11/12 items; improved to 12/12 given min cues      Goal 2: Pt will complete higher level executive function tasks (meds, math, money, time, etc) with 95% acc given min cues.    Telling Time  -pt completed with 67% acc indep, improved to 92% acc given min-mod cues    Setting Time on a Clock  -pt completed with 75% acc indep, improved to 83% acc given mod cues  -pt with more difficulty with minute hand      Other areas targeted: N/A    Education:   SLP edu pt re: recall strategies, time strategies     Safety Devices: [x] Call light within reach  [x] Chair alarm activated  [] Bed alarm activated  [] Other: [] Call light within reach  [] Chair alarm activated  [] Bed alarm activated  [] Other: Assessment: Pt alert and cooperative, agreeable to tx. SLP edu pt re: recall strategies - repetition, grouping/chunking, visualization, and association. Use of these strategies improved pt's ability to recall items in a sorting and remembering categories task immediately and following a 10 min delay when category cues were given. Pt with some difficulty with telling time and setting time - pt having more difficulty with the minute hand and benefited from min-mod cues for telling time and mod cues for setting time. Continue goals above. Plan: Continue as per plan of care. Additional Information:     Barriers toward progress: N/A  Discharge recommendations:  [] Home independently  [] Home with assistance []  24 hour supervision  [] ECF [x] Other: based upon PT/OT assessment   Continued Tx Upon Discharge: ? [] Yes [] No [x] TBD based on progress while on ARU [] Vital Stim indicated [] Other:   Estimated discharge date: est d/c date 10/7/2022    Interventions used this date:  [] Speech/Language Treatment  [] Instruction in HEP [] Group [] Dysphagia Treatment [x] Cognitive Treatment   [] Other:       Total Time Breakdown / Charges    Time in Time out Total Time / units   Cognitive Tx 1000 1030 30 min/ 2 units    Speech Tx -- -- --   Dysphagia Tx -- -- --       Electronically Signed by     Jessica Jones MA [facility]-SLP #[id number]  Speech Language Pathologist

## 2022-09-28 NOTE — PROGRESS NOTES
Occupational Therapy  Facility/Department: [facility]  Rehabilitation Occupational Therapy Daily Treatment Note    Date: 22  Patient Name: Virgilio Arreaga       Room: 5437/7521-53  MRN: [id number]  Account: [de-identified]   : 1940  [de-identified] y.o.) Gender: female                    Past Medical History:  has a past medical history of ARF (acute renal failure) ([facility] [address] Past Surgical History:   has a past surgical history that includes joint replacement; Tonsillectomy; Intracapsular cataract extraction (Right, 2019); and Intracapsular cataract extraction (Left, 2019). Restrictions  Restrictions/Precautions: Up as Tolerated; Fall Risk;General Precautions  Other position/activity restrictions: abdominal binder/knee high KENN hose; up as tolerated; Notify physician for pulse less than 50 or greater than 120, respiratory rate less than 12 or greater than 25, oral temperature greater than 101.3 F (58.0 C), systolic BP less than 90 or greater than 310, diastolic BP less than 50 or greater than 100. Subjective  Subjective: pt up in recliner reporting fatigue at beginning of session; /69, SPO2 on RA 99%, HR 56  Restrictions/Precautions: Up as Tolerated; Fall Risk;General Precautions             Objective     Cognition  Overall Cognitive Status: Exceptions  Arousal/Alertness: Appropriate responses to stimuli  Following Commands: Follows one step commands with increased time; Follows one step commands with repetition  Attention Span: Appears intact  Memory: Decreased recall of recent events  Problem Solving: Assistance required to generate solutions;Assistance required to implement solutions;Assistance required to identify errors made;Assistance required to correct errors made  Insights: Decreased awareness of deficits  Initiation: Requires cues for some  Sequencing: Requires cues for some  Cognition Comment: pt with some confusion this date  Orientation  Overall Orientation Status: Within Functional Limits ADL  Grooming/Oral Hygiene  Assistance Level: Dependent;Verbal cues; Increased time to complete;Set-up  Skilled Clinical Factors: Max Ax[facility] for [facility], cues for sequencing, bilateral coordination at midline, and posture in stance/semi squat  Lower Extremity Dressing  Assistance Level: Dependent  Skilled Clinical Factors: brief exchange in santo sears at toilet  Putting On/Taking Off Footwear  Assistance Level: Dependent  Skilled Clinical Factors: doffing non skid socks, donning/doffing tennis shoes  Toileting  Assistance Level: Dependent  Skilled Clinical Factors: total assist for hygiene and clothing management at [facility] over standard toilet with santo sears  Toilet Transfers  Technique:  (via [email or web address])  Equipment: Raised toilet seat with arms  Additional Factors: Cues for hand placement; Increased time to complete  Assistance Level: Dependent  Skilled Clinical Factors: Max Ax2 with Raw[facility]s          Functional Mobility  Device: Wheelchair  Activity: To/From therapy gym  Assistance Level: Dependent  Skilled Clinical Factors: Mod A with W/c mobility training in hallway for 1/2 distance ARU gym to room; cues for hand use on wheel and to abduct LLE  Bed Mobility  Overall Assistance Level: Dependent  Additional Factors: Increased time to complete;Verbal cues; With handrails  Sit to Supine  Assistance Level: Dependent  Skilled Clinical Factors: max A of 2  Scooting  Assistance Level: Minimal assistance  Skilled Clinical Factors: forward in W/C, dep for bed boost with x2  Transfers  Surface: To bed;Bedside commode; Wheelchair  Additional Factors: Verbal cues; Set-up; With handrails  Device: Walker [email or web address])  Sit to Stand  Skilled Clinical Factors: Max A x2, progressing at times to Mod Ax1, max Ax1 with extensive cues for anterior weight shift and positioning  Stand to Sit  Assistance Level: Dependent  Skilled Clinical Factors: mod/max A of 2 for eccentric control  Bed To/From Chair  Technique:  Sit pivot  Assistance Level: Dependent  Skilled Clinical Factors: max A of 2 W/C to bed     OT Exercises  A/AROM Exercises: pt given \"homework\" of gross grasp and single digit opposition with both hands when in bed/in the evenings  Dynamic Sitting Balance Exercises: shoulder flexion with same side stretch to encourage WS to unilateral ischial tuberosity while seated in W/C, completed x1 each side with Max verbal and tactile cues  Static Standing Balance Exercises: standing in // bars with Max Ax2, OT providing verbal and tactile cues for trunk extension, WB through RUE and prevention of elbow buckling with tactile input at tricep; x3 shift to LLE to retrieve and throw foam ring in frisbee fashion to encourage increased motor planning for novel task  Dynamic Standing Balance Exercises: x2 steps in // bars with Max Ax2 and W/C follow (/65), x3 steps in // bars with Max Ax2 with W/C follow (/60); pt required consistent cueing from OT for trunk entension via tactile cues on anterior shoulders and anterior weight shift through hips/feet with tactile cue at midline of lower back and cues for R hand placement on bar; PT A pt with BLE step sequencing  Postural Correction Exercises: verbal and tactile cues at anterior/posterior shoulders, posterior hips; external verbal cues for hip and neck extension via looking to targets  Motor Control/Coordination: x3 shift to LLE to retrieve and throw foam ring in frisbee fashion to encourage increased motor planning for novel task while sitting in WC in prep for activity in stance     Assessment  Assessment  Assessment: Jose Almonte is progressing in therapy slowly, currently limited by R sided weakness, decreased cognition. Demo'd poor safety awareness, requiring consistent cueing for body positioning during transfers and standing practice.    Functional Mobility: grossly Max Ax2 for STS in [facility] and for static and dynamic standing while in // bars, able to progress to 2 + 3 steps this date with max assistance for positioning and sequencing, pt continued to be limited by decreased proprioception while seated and in stance  ADL: dep for hygiene after urinating on toilet, for brief change on toilet, and for gym shoe management   TA: reaching/throwing with RUE with weight shift to LLE/LUE  Activity was tolerated fairly well, pt required frequent rest breaks but BP maintained normal limits during activity. Continue OT POC to address performance deficits in ADLs and functional mobility. Activity Tolerance: Patient limited by endurance; Patient limited by fatigue;Treatment limited secondary to medical complications  Discharge Recommendations: Subacute/Skilled Nursing Facility  Factors Affecting Discharge: Pt lives alone and currently require high level of assistance due to new deficits from CVA  OT Equipment Recommendations  Equipment Needed: Yes  Other: CTA pending progress, may defer DME rec to d/c facility  Safety Devices  Safety Devices in place: Yes  Type of devices: Call light within reach; All fall risk precautions in place; Patient at risk for falls;Gait belt;Nurse notified; Bed alarm in place; Left in bed    Patient Education  Education  Education Given To: Patient  Education Provided: Role of Therapy;Plan of Care;Precautions;Transfer Training; Fall Prevention Strategies;Cognition; Safety; Family Education;ADL Function;Mobility Training;Equipment;DME/Home Modifications; Home Exercise Program  Education Provided Comments: disease specific: use of RUE and benefits of OOB mobility/up in chair for meals  Education Method: Verbal;Demonstration  Barriers to Learning: Cognition  Education Outcome: Verbalized understanding;Continued education needed    Plan  Plan  Times per Week: 5-7x/wk  Specific Instructions for Next Treatment: ADLs, mode dressing technique  Current Treatment Recommendations: Strengthening;Balance training;Functional mobility training;Self-Care / ADL;Endurance training;Neuromuscular re-education;Positioning;Modalities; Equipment evaluation, education, & procurement;Patient/Caregiver education & training; Safety education & training;Home management training;Coordination training;Sensory integraion    Goals  Patient Goals   Patient goals : \"to get going and get better\"  Short Term Goals  Time Frame for Short term goals: 9/28/22 (14 days)  Short Term Goal 1: Pt will complete UB dressing with mod A- goal not addressed 9/28  Short Term Goal 2: Pt will complete simple grooming task supported in w/c with set-up A- goal progressing 9/28  Short Term Goal 3: Pt will complete functional transfers with mod A x1 using LRAD- goal progressing 9/28  Short Term Goal 4: Pt will complete toileting with max A- goal progressing 9/28 with dep with max Ax2  Long Term Goals  Time Frame for Long term goals : 10/05/22 (21 days), extend goals to 10/08  Long Term Goal 1: Pt will complete toilet transfer with min A  Long Term Goal 2: Pt will complete LB dressing with mod A  Long Term Goal 3: Pt will complete bathing with mod A  Long Term Goal 4: Pt will incorporate RUE into ADLs 75% of trials with minimal VC in order to increase functional independence with ADLs    Therapy Time   Individual Concurrent Group Co-treatment   Time In       1300   Time Out       1400   Minutes       60   Timed Code Treatment Minutes: 60 Minutes       Co-tx collaboration this date to safely meet goals and will have better occupational performance outcomes with in a co-treatment than 1:1 session.    Radha Grewal, OT Plan: The cyst is deep and feels like it had ruptured at some point with sinus tract formation. Will consult Dr. Jett or Dr. CARLSON to see if either of them would do this cyst excision for the patient and have my MA call her back. If needed we may have to refer her to a general surgeon Discontinue Regimen: trying to pop the lesion Initiate Treatment: Augmentin 875mg one po BID X 10 days Detail Level: Zone

## 2022-09-28 NOTE — PROGRESS NOTES
Physical Therapy  Facility/Department: [facility]  Rehabilitation Physical Therapy Treatment Note    NAME: Jose Almonte  : 1940 ([de-identified] y.o.)  MRN: [id number]  CODE STATUS: Full Code    Date of Service: 22       Restrictions:  Restrictions/Precautions: Up as Tolerated; Fall Risk;General Precautions  Position Activity Restriction  Other position/activity restrictions: abdominal binder/knee high KENN hose; up as tolerated; Notify physician for pulse less than 50 or greater than 120, respiratory rate less than 12 or greater than 25, oral temperature greater than 101.3 F (79.2 C), systolic BP less than 90 or greater than 370, diastolic BP less than 50 or greater than 100. SUBJECTIVE  Subjective  Subjective: pt found in bed, incontinent of urine  Pain: denies pain         OBJECTIVE  Orientation  Overall Orientation Status: Within Functional Limits    Functional Mobility     Pt found in bed, vitals assessed supine. Bp= 116/64, pulse= 79 bpm, Spo2= 99% on room air. KENN hose donned    Rolling L and R with mod-max A while completing pericare/ clothing management. Supine to sit with max A and increased time. BP sitting= 91/55 , pulse=80 with no c/o lightneadiness    Sit to stand EOB to STEDY with max a of 2, TD via STEDY to recliner   BP in recliner= 75/55, therapist elevated Les and pt completed ankle pumps/ heel slides  Bp increases to 93/50    RN notified of pts vitals and position in recliner, pt with call light/needs within reach. Second Session:  Pt found in recliner. Denies pain. Pt brief soiled. Vitals seated in recliner= 112/69, pulse= 56 bpm, Spo2= 99% on room air    Max A of 2 sit to stand recliner to STEDY  TD via STEYD to commode   Pt requires max A of 1 - TD from PT for static standing balance while OT assisted with pericare/ clothing management and some balance assist   Max A of 2 to wash hands at sink seated on STEYD paddles with TD to maintain trunk extension.      Sit to stand from w/c to // bars x 3 reps with max A of 2   1st stand: pt ambulated 2 steps in //bars with max A of 2 (PT facilitating RLE stability and mobility, appropriate ROBERT, weight shifting with OT facilitating RUE movement, trunk extension and posture)   2nd stand: pt ambulated 3 steps in // bars with max A of 2    3rd stand: pt completed dyn standing task: reaching for ring with RUE on IL side within ROBERT to toss laterally with LE support on bars and max a of 2 for balance due to poor postural control. Seated weight shifting with upwards reach BUE (OT assisting RUE) for increased WB and upright posture. Wc mobility x 50 ft with mod A and PT facilitating LLE movement with OT facilitating LUE movement as pt demo's poor sequencing of extremities to maintain propulsion or travel in straight path. Sit pivot w/c to EOB with max A of 2  Max a 1 sit to supine  Pt in bed at end of session with call lgiht/needs within reach. ASSESSMENT/PROGRESS TOWARDS GOALS  Vital Signs  Heart Rate: 79  Heart Rate Source: Monitor  BP: 116/64  BP Location: Left upper arm  MAP (Calculated): 81.33  SpO2: 99 %  O2 Device: None (Room air)    Assessment  Assessment: pt found in bed, incontinent of urine. improved overall alertness this date, minimal complaints of light headiness that resolve with activity. minimal improvement in bed mobility,max a of 1 however continues ot require TD for transfers. continue to rec SNF upon d/c. Activity Tolerance: Patient limited by endurance; Patient limited by fatigue;Treatment limited secondary to medical complications  Discharge Recommendations: Subacute/Skilled Nursing Facility  PT Equipment Recommendations  Equipment Needed: Yes  Wheelchair: Standard    Goals  Patient Goals   Patient goals : Patient states, \"To be able to get up without being told\" (helped as pt pushes backwards)  Short Term Goals  Time Frame for Short term goals: 9/23/2022  Short term goal 1: Patient demonstrates  sitting  midline 15 minutes wtihout posterior lob 9/17 mod assist to maintain seated balance. Short term goal 2: Patient demonstrates bed<>chair with LRAD with mod assist of 1. 9/17 dependent with santo-stedy  Short term goal 3: Patient demonstrates walking 10 feet or greater wtih mod assist of 2 LRAD. 9/17 not completed  Short term goal 4: Patient demonstrates indep in rolling L<>R and mod assist sup to sit 9/17 max assist x 2  Short term goal 5: Patient demonstrates min assist in R Katherine Mikey 23 propulsion 150 feet. 9/17 not completed  Long Term Goals  Time Frame for Long term goals : 10/7/2022  Long term goal 1: Patient demonstrates  Modified indep in rolling and sup<>Sit. Long term goal 2: Patient demonstrates   transfers sit<>stand with FWW and min assist.  Long term goal 3: Patient demonstrates bed<>chair min A. Long term goal 4: Patient demonstrates gait wtih transfers  and short distances 50 feet or greater with min assist  Long term goal 5: Patient demonstrates up and down 2 steps or greater. with mod assist of 1  Long term goal 6: Patient demonstrates indep WC propulsion iwth L/R turns indep   150 feet. Long term goal 7: Patient demonstrates stoop to recover object from floor with reacher  with mod assist.  Long term goal 8: Patients family demonstrates ability to  assist patient in and out of car wtih mod assist.    Raymond AZUL 23.: 5-7 times per week  Plan weeks: 21 days  Current Treatment Recommendations: Strengthening;ROM;Balance training;Functional mobility training;Transfer training;Neuromuscular re-education;Stair training;Gait training; Wheelchair mobility training; Endurance training; Therapeutic activities; Positioning;Equipment evaluation, education, & procurement;Home exercise program;Safety education & training;Patient/Caregiver education & training  Safety Devices  Type of Devices: All fall risk precautions in place;Call light within reach; Chair alarm in place; Left in chair;Nurse notified    EDUCATION  Education  Education Given To: Patient  Education Provided: Role of Therapy;Plan of Care;Transfer Training; Fall Prevention Strategies;Precautions; Safety;Equipment;Visual Perceptual Function  Education Provided Comments: Educated pt on role of PT, bed mobility, transfers using Stedy, LE exercises  Education Method: Verbal;Demonstration  Barriers to Learning: Cognition  Education Outcome: Verbalized understanding;Continued education needed        Therapy Time   Individual Concurrent Group Co-treatment   Time In 0900     1300   Time Out 0930     1400   Minutes 30     60     Timed Code Treatment Minutes: 80 Minutes       Haylie Adams PT, 09/28/22 at 9:29 AM

## 2022-09-29 LAB
BASOPHILS ABSOLUTE: 0 K/UL (ref 0–0.2)
BASOPHILS RELATIVE PERCENT: 0.5 %
EOSINOPHILS ABSOLUTE: 0.2 K/UL (ref 0–0.6)
EOSINOPHILS RELATIVE PERCENT: 2.6 %
HCT VFR BLD CALC: 36.8 % (ref 36–48)
HEMOGLOBIN: 12.6 G/DL (ref 12–16)
LYMPHOCYTES ABSOLUTE: 1.7 K/UL (ref 1–5.1)
LYMPHOCYTES RELATIVE PERCENT: 18.9 %
MCH RBC QN AUTO: 32.7 PG (ref 26–34)
MCHC RBC AUTO-ENTMCNC: 34.2 G/DL (ref 31–36)
MCV RBC AUTO: 95.7 FL (ref 80–100)
MONOCYTES ABSOLUTE: 0.6 K/UL (ref 0–1.3)
MONOCYTES RELATIVE PERCENT: 6.7 %
NEUTROPHILS ABSOLUTE: 6.3 K/UL (ref 1.7–7.7)
NEUTROPHILS RELATIVE PERCENT: 71.3 %
PDW BLD-RTO: 15.6 % (ref 12.4–15.4)
PLATELET # BLD: 328 K/UL (ref 135–450)
PMV BLD AUTO: 7.5 FL (ref 5–10.5)
RBC # BLD: 3.85 M/UL (ref 4–5.2)
WBC # BLD: 8.8 K/UL (ref 4–11)

## 2022-09-29 PROCEDURE — 97110 THERAPEUTIC EXERCISES: CPT

## 2022-09-29 PROCEDURE — 6360000002 HC RX W HCPCS: Performed by: STUDENT IN AN ORGANIZED HEALTH CARE EDUCATION/TRAINING PROGRAM

## 2022-09-29 PROCEDURE — 1280000000 HC REHAB R&B

## 2022-09-29 PROCEDURE — 6370000000 HC RX 637 (ALT 250 FOR IP): Performed by: STUDENT IN AN ORGANIZED HEALTH CARE EDUCATION/TRAINING PROGRAM

## 2022-09-29 PROCEDURE — 97130 THER IVNTJ EA ADDL 15 MIN: CPT

## 2022-09-29 PROCEDURE — 97530 THERAPEUTIC ACTIVITIES: CPT

## 2022-09-29 PROCEDURE — 85025 COMPLETE CBC W/AUTO DIFF WBC: CPT

## 2022-09-29 PROCEDURE — 36415 COLL VENOUS BLD VENIPUNCTURE: CPT

## 2022-09-29 PROCEDURE — 97129 THER IVNTJ 1ST 15 MIN: CPT

## 2022-09-29 PROCEDURE — 97535 SELF CARE MNGMENT TRAINING: CPT

## 2022-09-29 PROCEDURE — 6370000000 HC RX 637 (ALT 250 FOR IP): Performed by: NURSE PRACTITIONER

## 2022-09-29 RX ADMIN — HEPARIN SODIUM 5000 UNITS: 5000 INJECTION INTRAVENOUS; SUBCUTANEOUS at 13:31

## 2022-09-29 RX ADMIN — MIDODRINE HYDROCHLORIDE 5 MG: 5 TABLET ORAL at 12:01

## 2022-09-29 RX ADMIN — TRAZODONE HYDROCHLORIDE 50 MG: 50 TABLET ORAL at 20:39

## 2022-09-29 RX ADMIN — HEPARIN SODIUM 5000 UNITS: 5000 INJECTION INTRAVENOUS; SUBCUTANEOUS at 05:51

## 2022-09-29 RX ADMIN — FOLIC ACID 1 MG: 1 TABLET ORAL at 08:10

## 2022-09-29 RX ADMIN — POTASSIUM CHLORIDE 10 MEQ: 750 TABLET, EXTENDED RELEASE ORAL at 08:10

## 2022-09-29 RX ADMIN — Medication 5000 UNITS: at 08:10

## 2022-09-29 RX ADMIN — DONEPEZIL HYDROCHLORIDE 10 MG: 5 TABLET, FILM COATED ORAL at 20:39

## 2022-09-29 RX ADMIN — CETIRIZINE HYDROCHLORIDE 5 MG: 5 TABLET, FILM COATED ORAL at 08:10

## 2022-09-29 RX ADMIN — ASPIRIN 81 MG 81 MG: 81 TABLET ORAL at 08:10

## 2022-09-29 RX ADMIN — FLUDROCORTISONE ACETATE 0.1 MG: 0.1 TABLET ORAL at 08:10

## 2022-09-29 RX ADMIN — CYANOCOBALAMIN TAB 500 MCG 1000 MCG: 500 TAB at 08:10

## 2022-09-29 RX ADMIN — HEPARIN SODIUM 5000 UNITS: 5000 INJECTION INTRAVENOUS; SUBCUTANEOUS at 20:39

## 2022-09-29 RX ADMIN — MIDODRINE HYDROCHLORIDE 5 MG: 5 TABLET ORAL at 08:09

## 2022-09-29 RX ADMIN — TROSPIUM CHLORIDE 20 MG: 20 TABLET, FILM COATED ORAL at 20:39

## 2022-09-29 RX ADMIN — ATORVASTATIN CALCIUM 80 MG: 80 TABLET, FILM COATED ORAL at 20:39

## 2022-09-29 RX ADMIN — CLOPIDOGREL BISULFATE 75 MG: 75 TABLET ORAL at 08:10

## 2022-09-29 RX ADMIN — FERROUS SULFATE TAB 325 MG (65 MG ELEMENTAL FE) 325 MG: 325 (65 FE) TAB at 08:10

## 2022-09-29 ASSESSMENT — PAIN SCALES - GENERAL
PAINLEVEL_OUTOF10: 0

## 2022-09-29 NOTE — PROGRESS NOTES
Occupational Therapy  Facility/Department: [address] [place] [address]  Rehabilitation Occupational Therapy Daily Treatment Note    Date: 22  Patient Name: Glee Snellen       Room: 8762/7785-76  MRN: [id number]  Account: [de-identified]   : 1940  [de-identified] y.o.) Gender: female                    Past Medical History:  has a past medical history of ARF (acute renal failure) ([facility] [address] Past Surgical History:   has a past surgical history that includes joint replacement; Tonsillectomy; Intracapsular cataract extraction (Right, 2019); and Intracapsular cataract extraction (Left, 2019). Restrictions  Restrictions/Precautions: Up as Tolerated; Fall Risk;General Precautions  Other position/activity restrictions: abdominal binder/knee high KENN hose; up as tolerated; Notify physician for pulse less than 50 or greater than 120, respiratory rate less than 12 or greater than 25, oral temperature greater than 101.3 F (00.4 C), systolic BP less than 90 or greater than 409, diastolic BP less than 50 or greater than 100. Subjective  Subjective: pt in bed at arrival, agreeable for shower this date  Restrictions/Precautions: Up as Tolerated; Fall Risk;General Precautions             Objective     Cognition  Overall Cognitive Status: Exceptions  Arousal/Alertness: Appropriate responses to stimuli  Following Commands: Follows one step commands with increased time; Follows one step commands with repetition  Attention Span: Appears intact  Memory: Decreased recall of recent events  Safety Judgement: Decreased awareness of need for assistance;Decreased awareness of need for safety  Problem Solving: Assistance required to generate solutions;Assistance required to implement solutions;Assistance required to identify errors made;Assistance required to correct errors made  Insights: Decreased awareness of deficits  Initiation: Requires cues for some  Sequencing: Requires cues for some  Cognition Comment: pt with some confusion this date  Orientation  Overall Orientation Status: Within Functional Limits  Orientation Level: Oriented X4         ADL  Upper Extremity Bathing  Assistance Level: Maximum assistance  Skilled Clinical Factors: on shower chair in walk in shower, phsyical assist to set up loofah with soap, rinse UB and dry UB, and to physically wash back and hair; cues for positioning while in chair  Lower Extremity Bathing  Assistance Level: Dependent  Skilled Clinical Factors: while in stance- physical assist for posterior buttocks washing, physical A x2 (x1 for leg positioning x1 for cleansing) for anterior joey area washing while seated, phsycial assist for wash/rinse/drying B legs and feet,  Upper Extremity Dressing  Assistance Level: Maximum assistance  Skilled Clinical Factors: doffing gown while sitting on shower chair, physical assist and verbal cues for donning long sleeve shirt with cues for mode technique, cues to find head hole, thread RUE and pull down in back, pt able to thread LUE and put head through hole  Lower Extremity Dressing  Assistance Level: Dependent  Skilled Clinical Factors: doffing brief in stance at [facility], donning pull on brief with A to thread BLE and pull up over hips, donning long pants with A to thread BLE and manage up over hips with Max Ax1 for clothing Max Ax[facility]  Putting On/Taking Off Footwear  Assistance Level: Dependent  Skilled Clinical Factors: donning non skid socks, pt attempted to doff but was unable to fully pull over feet and became lightheaded with positional change, dep for KENN hose and gym shoes donning with physical assist at proximal knee for WB to push into shoe  Toileting  Assistance Level: Dependent  Skilled Clinical Factors: incontient of urine during session, dep for hygiene        Functional Mobility  Device:  (via Earlean Mitts)  Activity: To/From therapy gym  Assistance Level: Dependent  Skilled Clinical Factors:  Max Ax2 for sit to stand with Irasema Isidro Tali  Supine to Sit  Assistance Level: Dependent  Skilled Clinical Factors: mod Ax2  Transfers  Surface: From bed  Additional Factors: Verbal cues; Set-up; With handrails  Device:  (via Ane Peaks)  Sit to Stand  Assistance Level: Dependent  Skilled Clinical Factors: Max A x2, progressing at times to Mod Ax1, max Ax1 with extensive cues for anterior weight shift and positioning  Stand to Sit  Assistance Level: Dependent  Skilled Clinical Factors: mod/max A of 2 for eccentric control  Bed To/From Chair  Technique:  (via santo stedy)  Assistance Level: Dependent     OT Exercises  Circulation/Endurance Exercises: PT guided pt through BLE exercises while sitting in shower chair to increase BP  Postural Correction Exercises: verbal and tactile cues at anterior/posterior shoulders     Assessment  Assessment  Assessment: Dia Sanabria is progressing in therapy slowly, currently limited by orthostatic hypotension and generalized decreased endurance. Demo'd fair safety awareness, requiring consistent cues for standing posture in santo sears. Functional Mobility: Mod Ax2 for bed mobility, Max Ax2 for STS and dep for functional transfers with [place]aks  ADL: Max A for UB dressing and bathing, Dep for LB bathing and dressing  TA: BLE exercises with PT and education on orthostatic hypotension  Activity was tolerated fairly well, pt required rest breaks and increased time. Vitals throughout session, BP 99/57 (in bed), 80/53 (in shower), 89/50 (after BLE exercises) SPO2 100%, HR 82. Pt endorsing lightheadedness but reports improvement with exercises and after shower (/61). Continue OT POC to address performance deficits in ADLs and functional mobility. Activity Tolerance: Patient limited by endurance; Patient limited by fatigue;Treatment limited secondary to medical complications  Discharge Recommendations: Subacute/Skilled Nursing Facility  Factors Affecting Discharge: Pt lives alone and currently require high level of assistance due to new deficits from CVA  OT Equipment Recommendations  Equipment Needed: Yes (will need if d/c home, TBD pending progress)  Other: CTA pending progress, may defer DME rec to d/c facility  Safety Devices  Safety Devices in place: Yes  Type of devices: Call light within reach; All fall risk precautions in place; Patient at risk for falls;Gait belt;Nurse notified; Chair alarm in place; Left in chair    Patient Education  Education  Education Given To: Patient  Education Provided: Role of Therapy;Plan of Care;Precautions;Transfer Training; Fall Prevention Strategies;Cognition; Safety; Family Education;ADL Function;Mobility Training;Equipment;DME/Home Modifications; Home Exercise Program  Education Provided Comments: disease specific: education on water/sodium intake for orthostatic hypotension  Education Method: Verbal;Demonstration  Barriers to Learning: Cognition  Education Outcome: Verbalized understanding;Continued education needed    Plan  Plan  Times per Week: 5-7x/wk  Specific Instructions for Next Treatment: ADLs, mode dressing technique  Current Treatment Recommendations: Strengthening;Balance training;Functional mobility training;Self-Care / ADL;Endurance training;Neuromuscular re-education;Positioning;Modalities; Equipment evaluation, education, & procurement;Patient/Caregiver education & training; Safety education & training;Home management training;Coordination training;Sensory integraion    Goals  Patient Goals   Patient goals : \"to get going and get better\"  Short Term Goals  Time Frame for Short term goals: 9/28/22 (14 days)  Short Term Goal 1: Pt will complete UB dressing with mod A- goal progressing with max A 9/29  Short Term Goal 2: Pt will complete simple grooming task supported in w/c with set-up A- goal progressing 9/29  Short Term Goal 3: Pt will complete functional transfers with mod A x1 using LRAD- goal progressing 9/29  Short Term Goal 4: Pt will complete toileting with max A- goal progressing 9/29  Long Term Goals  Time Frame for Long term goals : 10/05/22 (21 days), extend goals to 10/08  Long Term Goal 1: Pt will complete toilet transfer with min A  Long Term Goal 2: Pt will complete LB dressing with mod A  Long Term Goal 3: Pt will complete bathing with mod A  Long Term Goal 4: Pt will incorporate RUE into ADLs 75% of trials with minimal VC in order to increase functional independence with ADLs      Therapy Time   Individual Concurrent Group Co-treatment   Time In       0930   Time Out       1030   Minutes       60   Timed Code Treatment Minutes: 60 Minutes       Co-tx collaboration this date to safely meet goals and will have better occupational performance outcomes with in a co-treatment than 1:1 session.    Gavin Love, OT

## 2022-09-29 NOTE — PROGRESS NOTES
Aram Thomas  9/29/2022  [id number]    Chief Complaint: Acute CVA (cerebrovascular accident) [facility])    Subjective:   No acute events overnight. Today Randa Frazier is seen in her room working with therapy. She is seated on a shower chair and reports feeling light headed. She denies other acute complaints at this time. ROS: No CP, SOB, dyspnea    Objective:  Patient Vitals for the past 24 hrs:   BP Temp Temp src Pulse Resp SpO2   09/29/22 0801 98/61 97.5 °F (36.4 °C) Oral 76 16 99 %   09/29/22 0545 (!) 170/79 -- -- -- -- --   09/28/22 2045 108/60 97.3 °F (36.3 °C) Oral 62 16 98 %   09/28/22 1750 108/62 -- -- 70 -- --   09/28/22 1414 112/69 -- -- 56 -- 99 %     Gen: No distress, pleasant. HEENT: Normocephalic, atraumatic. CV: extremities well perfused  Resp: No respiratory distress. Abd: Soft, nondistended  Ext: No edema. Neuro: Alert, oriented, appropriately interactive. Psych: appropriate mood and affect    Laboratory data: Available via EMR. Therapy progress:    PT    Supine to Sit: Substantial/maximal assistance  Sit to Supine: Substantial/maximal assistance   Sit to Stand: Dependent  Chair/Bed to Chair Transfer: Independent  Car Transfer:    Ambulation 10 ft:    Ambulation 50 ft:    Ambulation 150 ft:    Stairs - 1 Step:    Stairs - 4 Step:    Stairs - 12 Step:      OT    Eating: Setup or clean-up assistance  Oral Hygiene: Partial/moderate assistance  Bathing: Dependent  Upper Body Dressing: Substantial/maximal assistance  Lower Body Dressing: Dependent  Toilet Transfer: Dependent  Toilet Hygiene: Dependent    Speech Therapy         Body mass index is 19.19 kg/m².     Assessment:  Patient Active Problem List   Diagnosis    Left knee pain    Left patella fracture    Contusion of left knee    Orthostatic hypotension    ARF (acute renal failure) (HCC)    Anemia    Debility    Acute CVA (cerebrovascular accident) ([facility] [address]    Nonrheumatic aortic valve insufficiency       Assessment and Plan:  Aram Thomas is an [de-identified]year old female with a past medical history significant for orthostatic hypotension, anemia, and memory loss who presented to [facility] on 9/11/22 with recurrent falls and right sided weakness, found to have acute left CVA. She was admitted to [facility] on 9/15/22 due to functional deficits below her baseline. 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 Follow bowel movements. Enema or suppository if needed. Bladder: Check PVR x 3. [address] if PVR > 200ml or if any volume is > 500 ml. Sleep: Trazodone provided prn.       PPX  DVT: heparin  GI: not indicated    Services: SNF   EDOD: 10/8/22    Electronically signed by Kinjal Bower MD on 9/29/2022 at 12:04 PM

## 2022-09-29 NOTE — PROGRESS NOTES
[facility] Department of Neurology   Progress Note    [facility] 3RD FLOOR INTENSIVE CARE UNIT  [address]  [facility] [address]  [phone]    Patient: Carol Arambula Date: 2018   : 1961 Attending: Deanna Kemp MD   56 year old female      PROBLEM LIST  Patient Active Problem List   Diagnosis   • Dissection of cerebral artery (CMS/HCC)   • DM type 2 (diabetes mellitus, type 2) (CMS/HCC)   • HTN (hypertension)   • Uncontrolled hypertension       SUBJECTIVE: 56 year old lady admitted for seizure and basilar artery dissection.  Has one episode of starring for a duration of 2 minutes yesterday evening but has not had any further transient neurologic symptoms since that time.  Patient states her left hand has not worked well from the prior stroke and she is afraid to drive because she does not know when the next seizure or stroke is going to happen.  She has not noticed any overt problems with her right hand currently.  No changes in vision or new weakness / numbness.  No facial numbness or weakness.      Reviewed Pertinent: Allergies, Medical History and Medications    MEDICATIONS:   Current Facility-Administered Medications   Medication Dose Route Frequency Provider Last Rate Last Dose   • sodium chloride (PF) 0.9 % injection 2 mL  2 mL Injection 2 times per day Ivy Mills MD   2 mL at 18 0846   • sodium chloride (PF) 0.9 % injection 2 mL  2 mL Injection PRN Ivy Mills MD       • acetaminophen (TYLENOL) tablet 650 mg  650 mg Oral Q4H PRN Deanna Kemp MD        Or   • acetaminophen (TYLENOL) suppository 650 mg  650 mg Rectal Q4H PRN Deanna Kemp MD       • heparin (porcine) injection 5,000 Units  5,000 Units Subcutaneous 3 times per day Deanna Kemp MD   5,000 Units at 18 0512   • ondansetron (ZOFRAN) injection 4 mg  4 mg Intravenous BID PRN Deanna Kemp MD       • prochlorperazine (COMPAZINE)  Physical Therapy  Facility/Department: [facility]  Rehabilitation Physical Therapy Treatment Note    NAME: Di Alpers  : 1940 ([de-identified] y.o.)  MRN: [id number]  CODE STATUS: Full Code    Date of Service: 22       Restrictions:  Restrictions/Precautions: Up as Tolerated; Fall Risk;General Precautions  Position Activity Restriction  Other position/activity restrictions: abdominal binder/knee high KENN hose; up as tolerated; Notify physician for pulse less than 50 or greater than 120, respiratory rate less than 12 or greater than 25, oral temperature greater than 101.3 F (61.3 C), systolic BP less than 90 or greater than 427, diastolic BP less than 50 or greater than 100. SUBJECTIVE  Subjective  Subjective: Pt in bed, agreeable to co-treat  Pain: Pt does not indicate pain during session     OBJECTIVE  Vitals: 99/57 in supine>80/53 after transfer to shower chair>89/50 in sitting following LE therex>109/61 at end of session up in recliner  Cognition  Overall Cognitive Status: Exceptions  Arousal/Alertness: Appropriate responses to stimuli  Following Commands: Follows one step commands with increased time; Follows one step commands with repetition  Attention Span: Appears intact  Memory: Decreased recall of recent events  Safety Judgement: Decreased awareness of need for assistance;Decreased awareness of need for safety  Problem Solving: Assistance required to generate solutions;Assistance required to implement solutions;Assistance required to identify errors made;Assistance required to correct errors made  Insights: Decreased awareness of deficits  Initiation: Requires cues for some  Sequencing: Requires cues for some  Orientation  Overall Orientation Status: Within Functional Limits    Functional Mobility  Sit to Supine  Assistance Level: Requires x 2 assistance; Moderate assistance  Skilled Clinical Factors: mod A x2 with HOB elevated  Sit to Stand  Assistance Level: Dependent;Maximum assistance; Requires x 2 assistance  Skilled Clinical Factors: max A x2 with santo stefay from bed, recliner, and shower chair  Stand to Sit  Assistance Level: Dependent;Maximum assistance; Requires x 2 assistance  Skilled Clinical Factors: max A x2 with santo stedy  Bed To/From Chair  Assistance Level: Dependent  Skilled Clinical Factors: santo stedy>shower chair>recliner    Pt stands in santo stedy with max A to remain upright with cues while donning/doffing pants and briefs    Pt completes 10 mins sitting balance with CGA-SBA while forward reaching and completing dynamic UE movements to bathe self    PT Exercises  Exercise Treatment: 1x 15 BLE LAQ, 1x 10 BLE seated marches, 1x 15 BLE AP (AAROM RLE)      ASSESSMENT/PROGRESS TOWARDS GOALS  Assessment  Assessment: Patient seen as co-treat for safe transfers, sitting balance, and standing balance as well as to safely complete ADL. Co-treat also completed given pt's low activity tolerance and for safety given low BP at times. Pt requires mod A x2 for supine>sit, STS with santo stedy and cues for safety with max A x2. Pt completes sitting balance during ADL on shower chair with CGA-SBA while pt reaching to change socks as well as while bathing self. Pt initially reporting dizziness with low BP, improves with seated rest and LE exercises to promote circulation. Pt's BP improves as session progresses. Pt completes multiple stands to transfer between surfaces and for dressing following shower. Pt demos posterior lean, requires cues for upright posture and hip extension. Pt requires facilitation for WB through BLE for STS transfers as well. Pt will continue to benefit from skilled PT in ARU to progress strength, endurance, balance, and independence with mobility. Activity Tolerance: Patient limited by endurance; Patient limited by fatigue;Treatment limited secondary to medical complications  Discharge Recommendations: Subacute/Skilled Nursing Facility  PT Equipment Recommendations  Equipment Needed: injection 5 mg  5 mg Intravenous Q4H PRN Deanna Kemp MD       • aspirin tablet 325 mg  325 mg Oral Daily Deanna Kemp MD   325 mg at 07/14/18 0845   • labetalol (NORMODYNE) injection 10 mg  10 mg Intravenous Q10 Min PRN Deanna Kemp MD   10 mg at 07/13/18 1819   • niCARdipine (CARDENE) 20 mg in sodium chloride 0.9% 200 mL premix  0-15 mg/hr Intravenous Continuous Deanna Kemp MD   Stopped at 07/14/18 0003   • atorvastatin (LIPITOR) tablet 80 mg  80 mg Oral Nightly Deanna Kemp MD   80 mg at 07/13/18 1420   • docusate sodium-sennosides (SENOKOT S) 50-8.6 MG 2 tablet  2 tablet Oral Daily PRN Deanna Kemp MD       • magnesium hydroxide (MILK OF MAGNESIA) concentrate 2,400 mg  10 mL Oral Daily PRN Deanna Kemp MD       • famotidine (PEPCID) injection 20 mg  20 mg Intravenous 2 times per day Deanna Kemp MD   20 mg at 07/14/18 0846   • sodium chloride 0.9% infusion   Intravenous Continuous Deanna Kemp  mL/hr at 07/14/18 0133     • potassium chloride (KLOR-CON M) reid ER tablet 20 mEq  20 mEq Oral Q4H PRN Deanna Kemp MD   20 mEq at 07/14/18 0845   • potassium chloride (KLOR-CON M) reid ER tablet 40 mEq  40 mEq Oral Q4H PRN Deanna Kemp MD       • sodium chloride 0.9 % flush bag 500 mL  500 mL Intravenous PRN Deanna Kemp MD       • magnesium sulfate 1 g in dextrose 5% 100 mL IVPB premix  1 g Intravenous Daily PRN Deanna Kemp  mL/hr at 07/14/18 0524 1 g at 07/14/18 0524   • magnesium sulfate 2 g in 50 mL premix IVPB  2 g Intravenous Daily PRN Deanna Kemp MD       • magnesium sulfate 2 g in 50 mL premix IVPB  2 g Intravenous Q4H PRN Deanna Kemp MD       • sodium phosphate 15 mmol in dextrose 5 % 250 mL IVPB  15 mmol Intravenous PRN Deanna Kemp MD       • sodium phosphate 30 mmol in dextrose 5 % 500 mL IVPB  30 mmol Intravenous PRN Deanna Kemp MD       • calcium gluconate 2 g in dextrose 5 % 70 mL total  volume IVPB  2 g Intravenous PRN Deanna Kemp MD       • dextrose 50 % injection 25 g  25 g Intravenous PRN Deanna Kemp MD       • dextrose 5 % infusion   Intravenous Continuous PRN Deanna Kemp MD       • glucagon (GLUCAGEN) injection 1 mg  1 mg Intramuscular PRN Deanna Kemp MD       • dextrose (GLUTOSE) 40 % gel 15 g  15 g Oral PRN Deanna Kemp MD       • insulin lispro (HumaLOG) sliding scale injection   Subcutaneous 4 times per day Deanna Kemp MD       • acetaminophen (TYLENOL) tablet 650 mg  650 mg Oral Q4H PRN Deanna Kemp MD       • acetaminophen-codeine (TYLENOL NO.3) 300-30 MG per tablet 1-2 tablet  1-2 tablet Oral Q4H PRN Deanna Kemp MD       • fentaNYL (SUBLIMAZE) injection 25-50 mcg  25-50 mcg Intravenous Q1H PRN Deanna Kemp MD       • levETIRAcetam (KepPRA) in sodium chloride premix IVPB 1,000 mg  1,000 mg Intravenous 2 times per day Deanna Kemp MD   Stopped at 07/14/18 0900   • amLODIPine (NORVASC) tablet 5 mg  5 mg Oral Daily Deanna Kemp MD   5 mg at 07/14/18 0845   • atorvastatin (LIPITOR) tablet 5 mg  5 mg Oral Nightly Deanna Kemp MD   5 mg at 07/13/18 2021     Continuous infusions: • niCARdipine (CARDENE) 20 mg in sodium chloride 0.9% 200 mL premix Stopped (07/14/18 0003)   • sodium chloride 0.9% infusion 100 mL/hr at 07/14/18 0133   • dextrose 5 % infusion           Vital Last Value 24 Hour Range   Temperature 97.7 °F (36.5 °C) (07/14/18 0700) Temp  Min: 97.7 °F (36.5 °C)  Max: 98.9 °F (37.2 °C)   Pulse 65 (07/14/18 0900) Pulse  Min: 65  Max: 102   Respiratory 28 (07/14/18 0900) Resp  Min: 11  Max: 41   Non-Invasive  Blood Pressure 141/78 (07/14/18 0800) BP  Min: 112/74  Max: 244/116   Arterial   Blood Pressure   No Data Recorded   Pulse Oximetry 99 % (07/14/18 0900) SpO2  Min: 92 %  Max: 99 %     Vital Today Admitted   Weight 90.5 kg (07/14/18 0521) Weight: 93.4 kg (07/13/18 1117)   Height N/A Height: 5' 8\" (172.7 cm)  (07/13/18 1433)   BMI N/A BMI (Calculated): 30.4 (07/13/18 1433)     Intake/Output:    No intake/output data recorded.    I/O last 3 completed shifts:  In: 1626 [I.V.:1626]  Out: 1000 [Urine:1000]      Intake/Output Summary (Last 24 hours) at 07/14/18 0924  Last data filed at 07/14/18 0600   Gross per 24 hour   Intake             1626 ml   Output             1000 ml   Net              626 ml         Physical Exam:   GENERAL: No apparent distress  HEENT: Atraumatic, ears and nares patent, no scleral injection or icterus, mucous membranes moist, trachea midline  CARDIOVASCULAR: RRR, no significant edema  CHEST: No increased work of breathing, no wheezing  SKIN: No visible erythematous rash  NEUROLOGY  MSE: Alert and oriented with fluent speech.  CN: PERRL, EOMI without nystagmus, face symmetric, hearing intact, tongue midline  MOTOR: Moves all extremities equally bilaterally  SENSORY: Intact to light touch in upper and lower extremities bilaterally  COORDINATION: ataxia right upper extremity    NIH Stroke Scale          Level of Consciousness 0    LOC Questions 0    LOC Commands 0    Best Gaze 0    Visual 0    Facial Palsy 0    Motor Arms Right  Left    0 0   Motor Legs Right  Left     0 0   Limb Ataxia 1    Sensory 0    Best Language 0    Dysarthria  0    Extinction and Inattention 0    Total 1          TESTS    Labs    Lab Results   Component Value Date    SODIUM 139 07/14/2018    POTASSIUM 3.6 07/14/2018    GLUCOSE 120 (H) 07/14/2018    BUN 7 07/14/2018    CREATININE 0.71 07/14/2018    CALCIUM 8.5 07/14/2018    MG 2.0 07/14/2018    BILIRUBIN 0.7 07/13/2018    AST 12 07/13/2018    GPT 15 07/13/2018    ALBUMIN 3.5 (L) 07/13/2018    PT 10.7 07/13/2018    INR 1.1 07/13/2018    PTT 29 07/13/2018    PHOS 4.1 07/14/2018    RBC 5.00 07/14/2018    WBC 11.5 (H) 07/14/2018    HGB 14.6 07/14/2018    HCT 43.3 07/14/2018     07/14/2018       Imaging / Studies / Procedures     Results for orders placed during the hospital  sup to sit 9/17 max assist x 2  Short term goal 5: Patient demonstrates min assist in [facility] propulsion 150 feet. 9/17 not completed  Long Term Goals  Time Frame for Long term goals : 10/7/2022  Long term goal 1: Patient demonstrates  Modified indep in rolling and sup<>Sit. Long term goal 2: Patient demonstrates   transfers sit<>stand with FWW and min assist.  Long term goal 3: Patient demonstrates bed<>chair min A. Long term goal 4: Patient demonstrates gait wtih transfers  and short distances 50 feet or greater with min assist  Long term goal 5: Patient demonstrates up and down 2 steps or greater. with mod assist of 1  Long term goal 6: Patient demonstrates indep WC propulsion iwth L/R turns indep   150 feet. Long term goal 7: Patient demonstrates stoop to recover object from floor with reacher  with mod assist.  Long term goal 8: Patients family demonstrates ability to  assist patient in and out of car wtih mod assist.    [address] 5-7 times per week  Plan weeks: 21 days  Current Treatment Recommendations: Strengthening;ROM;Balance training;Functional mobility training;Transfer training;Neuromuscular re-education;Stair training;Gait training; Wheelchair mobility training; Endurance training; Therapeutic activities; Positioning;Equipment evaluation, education, & procurement;Home exercise program;Safety education & training;Patient/Caregiver education & training  Safety Devices  Type of Devices: All fall risk precautions in place;Call light within reach; Chair alarm in place; Left in chair;Nurse notified;Gait belt  Restraints  Restraints Initially in Place: No    EDUCATION  Education  Education Given To: Patient  Education Provided: Role of Therapy;Plan of Care;Transfer Training; Fall Prevention Strategies;Precautions; Safety;Equipment;Visual Perceptual Function  Education Provided Comments: pt educated on BP response with position change and activity, safety with transfers using santo sears, and encounter of 07/13/18   MRI BRAIN    Impression IMPRESSION:    Small focus of restricted diffusion involving the LEFT basal ganglia  measuring 2 x 1 cm.    Mild periventricular white matter change is present. 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    Focal linear encephalomalacia and hemosiderin staining involving the  external capsule on the RIGHT consistent with chronic ischemic change.    Results were called to the provider  Dr. Zuñiga at the time of interpretation  on 7/13/2018 5:51 PM         EEG  07/13/2018  EEG personally viewed showing slowed but adequate occipital dominant rhythm with overall slowing and episodic left temporal sharps posing a nidus for seizure activity.  No overt seizure activity was appreciated during this tracing.      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  NIHSS: 1 (2)(10).  MRI brain personally viewed showing area of restriction on DWI sequence in the left subcortex.  EEG personally viewed showing left temporal lobe sharps.  Echocardiogram read reviewed showing no intracardiac shunt.  Review of labs with elevated glucose.    Recommend     Continue Keppra at 2000 mg  Continue telemetry   Change neuro checks to q4h  Lipitor   ASA    Discussed with or notes reviewed:  RN and Patient    With regard to ongoing continuity of care, this patient will be followed by Dr. Craven tomorrow.     Larry Craven, DO  Neurology  Headache Medicine

## 2022-09-29 NOTE — PLAN OF CARE
Patient remains free from falls and injuries. Patient calls for staff assistance when needing to transfer.   Patient does have call light within reach and non skid footwear on. Tj Randhawa RN

## 2022-09-29 NOTE — PROGRESS NOTES
Speech Language Pathology  [facility]: ACUTE REHAB UNIT  SPEECH-LANGUAGE PATHOLOGY      [x] Daily  [] Weekly Care Conference Note  [] Discharge    Patient:Yulissa Tobias      :1940  CUT:[id number]  Rehab Dx/Hx: Acute CVA (cerebrovascular accident) ([facility] [address] [I63.9]    Precautions: falls  Home situation: Pt lives at home [facility] Dx: [] Aphasia  [] Dysarthria  [] Apraxia   [] Oropharyngeal dysphagia [x] Cognitive Impairment  [] Other:   Date of Admit: 9/15/2022  Room #: 0160/0160-01    Current functional status (updated daily):         Pt being seen for : [] Speech/Language Treatment  [x] Dysphagia Treatment [x] Cognitive Treatment  [] Other:  Communication: [x]WFL  [] Aphasia  [] Dysarthria  [] Apraxia  [] Pragmatic Impairment [] Non-verbal  [] Hearing Loss  [] Other:   Cognition: [] WFL  [x] Mild  [x] Moderate  [] Severe [] Unable to Assess  [] Other:  Memory: [] WFL  [] Mild  [x] Moderate  [] Severe [] Unable to Assess  [] Other:  Behavior: [x] Alert  [x] Cooperative  [x]  Pleasant  [] Confused  [] Agitated  [] Uncooperative  [] Distractible [] Motivated  [] Self-Limiting [] Anxious  [] Other:  Endurance:  [x] Adequate for participation in SLP sessions  [] Reduced overall  [] Lethargic  [] Other:  Safety: [x] No concerns at this time  [] Reduced insight into deficits  []  Reduced safety awareness [] Not following call light procedures  [] Unable to Assess  [] Other:    Current Diet Order:ADULT ORAL NUTRITION SUPPLEMENT; Breakfast, Dinner; Standard High Calorie/High Protein Oral Supplement  ADULT DIET;  Regular, thin   Swallowing Precautions: upright for all intake, stay upright for at least 30 mins after intake, small bites/sips, assist feed, oral care 2-3x/day to reduce adverse affects in the event of aspiration, increase physical mobility as able, alternate bites/sips, slow rate of intake, general GERD precautions, and general aspiration precautions        Date: 2022      Tx session 1  [id number] Tx session 2  All tx needs met in session 1   Total Timed Code Min 30    Total Treatment Minutes 30    Individual Treatment Minutes 30    Group Treatment Minutes 0    Co-Treat Minutes 0    Variance/Reason:  N/A    Pain Denies    Pain Intervention [] RN notified  [] Repositioned  [] Intervention offered and patient declined  [x] N/A  [] Other: [] RN notified  [] Repositioned  [] Intervention offered and patient declined  [] N/A  [] Other:   Subjective     Pt alert and oriented, cooperative and agreeable to participate in therapy. Pt seen sitting upright in bed. Objective:  Goals     Dysphagia Goals:    Short-term Goals  Timeframe for Short-term Goals: 5 days (09/20/22)          Goal Met 9/21/22        Goal met 09/22/22. Cognitive Goals:    Short-term Goals  Timeframe for Short-term Goals: 18 days (10/03/22)    Goal 1: Pt will complete recall tasks with 90% acc given min cues. Functional recall of 16 items sorted into four categories:  -68% acc indep improving to 100% acc given mod cues    Short term recall of the same 16 items after a 5 min delay:  -87% acc indep improving to 100% acc given min cues     *reinforced use of visualization and repetition compensatory strategies for improved recall       Goal 2: Pt will complete higher level executive function tasks (meds, math, money, time, etc) with 95% acc given min cues. Safety situations/providing logical solutions:  -ex: what would you do if you lost your wallet? Or had a fire in your home?  -100% acc given min cues       Other areas targeted: N/A    Education:   Edu provided re: compensatory strategies, safety awareness       Safety Devices: [x] Call light within reach  [] Chair alarm activated  [x] Bed alarm activated  [] Other: [] Call light within reach  [] Chair alarm activated  [] Bed alarm activated  [] Other:    Assessment: Pt pleasant and cooperative, agreeable to participate.  Pt completed functional recall task with 68% acc, and short term recall task with 87% acc. Pt continues to benefit from reinforcement and consistent use of compensatory strategies, specifically repetition and visualization for improved recall. Pt able to provide logical solutions to problem solving/safety situations that may occur at home with 100% acc, occasional min cues. Discussed possibility of using a life alert if/when home alone. Pt in agreement. Pt remains motivated to improve. Plan: Continue as per plan of care. Additional Information:     Barriers toward progress: N/A  Discharge recommendations:  [] Home independently  [] Home with assistance [x]  24 hour supervision  [] ECF [x] Other: defer to PT/OT recs  Continued Tx Upon Discharge: ? [x] Yes [] No [] TBD based on progress while on ARU [] Vital Stim indicated [] Other:   Estimated discharge date: 10/08/22    Interventions used this date:  [] Speech/Language Treatment  [] Instruction in HEP [] Group [] Dysphagia Treatment [x] Cognitive Treatment   [] Other: Total Time Breakdown / Charges    Time in Time out Total Time / units   Cognitive Tx 0830 0900 30 min/ 2 units    Speech Tx -- -- --   Dysphagia Tx -- -- --       Electronically Signed by     Aubrey Garcia. A CCC-SLP  Speech-Language Pathologist  [id number]

## 2022-09-30 LAB
ANION GAP SERPL CALCULATED.3IONS-SCNC: 12 MMOL/L (ref 3–16)
BASOPHILS ABSOLUTE: 0 K/UL (ref 0–0.2)
BASOPHILS RELATIVE PERCENT: 0.5 %
BILIRUBIN URINE: NEGATIVE
BLOOD, URINE: NEGATIVE
BUN BLDV-MCNC: 30 MG/DL (ref 7–20)
CALCIUM SERPL-MCNC: 9.6 MG/DL (ref 8.3–10.6)
CHLORIDE BLD-SCNC: 104 MMOL/L (ref 99–110)
CLARITY: CLEAR
CO2: 22 MMOL/L (ref 21–32)
COLOR: YELLOW
CREAT SERPL-MCNC: 0.6 MG/DL (ref 0.6–1.2)
EOSINOPHILS ABSOLUTE: 0.2 K/UL (ref 0–0.6)
EOSINOPHILS RELATIVE PERCENT: 2.8 %
EPITHELIAL CELLS, UA: ABNORMAL /HPF (ref 0–5)
GFR AFRICAN AMERICAN: >60
GFR NON-AFRICAN AMERICAN: >60
GLUCOSE BLD-MCNC: 88 MG/DL (ref 70–99)
GLUCOSE URINE: NEGATIVE MG/DL
HCT VFR BLD CALC: 35.1 % (ref 36–48)
HEMOGLOBIN: 11.6 G/DL (ref 12–16)
KETONES, URINE: NEGATIVE MG/DL
LEUKOCYTE ESTERASE, URINE: ABNORMAL
LYMPHOCYTES ABSOLUTE: 1.6 K/UL (ref 1–5.1)
LYMPHOCYTES RELATIVE PERCENT: 20.2 %
MCH RBC QN AUTO: 31.8 PG (ref 26–34)
MCHC RBC AUTO-ENTMCNC: 33 G/DL (ref 31–36)
MCV RBC AUTO: 96.4 FL (ref 80–100)
MICROSCOPIC EXAMINATION: YES
MONOCYTES ABSOLUTE: 0.4 K/UL (ref 0–1.3)
MONOCYTES RELATIVE PERCENT: 5.6 %
NEUTROPHILS ABSOLUTE: 5.6 K/UL (ref 1.7–7.7)
NEUTROPHILS RELATIVE PERCENT: 70.9 %
NITRITE, URINE: NEGATIVE
PDW BLD-RTO: 16 % (ref 12.4–15.4)
PH UA: 6 (ref 5–8)
PLATELET # BLD: 297 K/UL (ref 135–450)
PMV BLD AUTO: 7.6 FL (ref 5–10.5)
POTASSIUM REFLEX MAGNESIUM: 4.2 MMOL/L (ref 3.5–5.1)
PROTEIN UA: NEGATIVE MG/DL
RBC # BLD: 3.64 M/UL (ref 4–5.2)
RBC UA: ABNORMAL /HPF (ref 0–4)
SODIUM BLD-SCNC: 138 MMOL/L (ref 136–145)
SPECIFIC GRAVITY UA: <=1.005 (ref 1–1.03)
URINE REFLEX TO CULTURE: YES
URINE TYPE: ABNORMAL
UROBILINOGEN, URINE: 0.2 E.U./DL
WBC # BLD: 7.8 K/UL (ref 4–11)
WBC UA: ABNORMAL /HPF (ref 0–5)

## 2022-09-30 PROCEDURE — 97129 THER IVNTJ 1ST 15 MIN: CPT

## 2022-09-30 PROCEDURE — 6370000000 HC RX 637 (ALT 250 FOR IP): Performed by: NURSE PRACTITIONER

## 2022-09-30 PROCEDURE — 36415 COLL VENOUS BLD VENIPUNCTURE: CPT

## 2022-09-30 PROCEDURE — 80048 BASIC METABOLIC PNL TOTAL CA: CPT

## 2022-09-30 PROCEDURE — 87086 URINE CULTURE/COLONY COUNT: CPT

## 2022-09-30 PROCEDURE — 85025 COMPLETE CBC W/AUTO DIFF WBC: CPT

## 2022-09-30 PROCEDURE — 97130 THER IVNTJ EA ADDL 15 MIN: CPT

## 2022-09-30 PROCEDURE — 97530 THERAPEUTIC ACTIVITIES: CPT

## 2022-09-30 PROCEDURE — 97110 THERAPEUTIC EXERCISES: CPT

## 2022-09-30 PROCEDURE — 81001 URINALYSIS AUTO W/SCOPE: CPT

## 2022-09-30 PROCEDURE — 87077 CULTURE AEROBIC IDENTIFY: CPT

## 2022-09-30 PROCEDURE — 6360000002 HC RX W HCPCS: Performed by: STUDENT IN AN ORGANIZED HEALTH CARE EDUCATION/TRAINING PROGRAM

## 2022-09-30 PROCEDURE — 6370000000 HC RX 637 (ALT 250 FOR IP): Performed by: STUDENT IN AN ORGANIZED HEALTH CARE EDUCATION/TRAINING PROGRAM

## 2022-09-30 PROCEDURE — 1280000000 HC REHAB R&B

## 2022-09-30 PROCEDURE — 87186 SC STD MICRODIL/AGAR DIL: CPT

## 2022-09-30 PROCEDURE — 97112 NEUROMUSCULAR REEDUCATION: CPT

## 2022-09-30 RX ADMIN — HEPARIN SODIUM 5000 UNITS: 5000 INJECTION INTRAVENOUS; SUBCUTANEOUS at 22:11

## 2022-09-30 RX ADMIN — Medication 5 MG: at 22:10

## 2022-09-30 RX ADMIN — CYANOCOBALAMIN TAB 500 MCG 1000 MCG: 500 TAB at 08:29

## 2022-09-30 RX ADMIN — ASPIRIN 81 MG 81 MG: 81 TABLET ORAL at 08:29

## 2022-09-30 RX ADMIN — CETIRIZINE HYDROCHLORIDE 5 MG: 5 TABLET, FILM COATED ORAL at 08:29

## 2022-09-30 RX ADMIN — DONEPEZIL HYDROCHLORIDE 10 MG: 5 TABLET, FILM COATED ORAL at 22:10

## 2022-09-30 RX ADMIN — ATORVASTATIN CALCIUM 80 MG: 80 TABLET, FILM COATED ORAL at 22:10

## 2022-09-30 RX ADMIN — CLOPIDOGREL BISULFATE 75 MG: 75 TABLET ORAL at 08:29

## 2022-09-30 RX ADMIN — FOLIC ACID 1 MG: 1 TABLET ORAL at 08:29

## 2022-09-30 RX ADMIN — TRAZODONE HYDROCHLORIDE 50 MG: 50 TABLET ORAL at 22:10

## 2022-09-30 RX ADMIN — Medication 5000 UNITS: at 08:27

## 2022-09-30 RX ADMIN — HEPARIN SODIUM 5000 UNITS: 5000 INJECTION INTRAVENOUS; SUBCUTANEOUS at 13:25

## 2022-09-30 RX ADMIN — FERROUS SULFATE TAB 325 MG (65 MG ELEMENTAL FE) 325 MG: 325 (65 FE) TAB at 08:29

## 2022-09-30 RX ADMIN — TROSPIUM CHLORIDE 20 MG: 20 TABLET, FILM COATED ORAL at 22:10

## 2022-09-30 RX ADMIN — HEPARIN SODIUM 5000 UNITS: 5000 INJECTION INTRAVENOUS; SUBCUTANEOUS at 06:08

## 2022-09-30 RX ADMIN — FLUDROCORTISONE ACETATE 0.1 MG: 0.1 TABLET ORAL at 08:43

## 2022-09-30 RX ADMIN — POTASSIUM CHLORIDE 10 MEQ: 750 TABLET, EXTENDED RELEASE ORAL at 08:29

## 2022-09-30 RX ADMIN — MIDODRINE HYDROCHLORIDE 5 MG: 5 TABLET ORAL at 13:24

## 2022-09-30 ASSESSMENT — PAIN SCALES - GENERAL: PAINLEVEL_OUTOF10: 0

## 2022-09-30 NOTE — PROGRESS NOTES
Occupational Therapy  Facility/Department: [facility]  Rehabilitation Occupational Therapy Daily Treatment Note    Date: 22  Patient Name: Bowen Marcus       Room: [facility]1422-  MRN: [id number]  Account: [de-identified]   : 1940  [de-identified] y.o.) Gender: female            Past Medical History:  has a past medical history of ARF (acute renal failure) ([address] Past Surgical History:   has a past surgical history that includes joint replacement; Tonsillectomy; Intracapsular cataract extraction (Right, 2019); and Intracapsular cataract extraction (Left, 2019). Restrictions  Restrictions/Precautions: Up as Tolerated; Fall Risk;General Precautions  Other position/activity restrictions: abdominal binder/knee high KENN hose; up as tolerated; Notify physician for pulse less than 50 or greater than 120, respiratory rate less than 12 or greater than 25, oral temperature greater than 101.3 F (21.9 C), systolic BP less than 90 or greater than 048, diastolic BP less than 50 or greater than 100. Subjective  Subjective: pt agreeable to OT/PT cotreatment. pt pleasant and agreeable throughout session  Restrictions/Precautions: Up as Tolerated; Fall Risk;General Precautions   Activity tolerance: sitting beginning of session 127/71BP  After toileting, 110/64 BP. After standing in gym 94/61 BP, pt BP drop to 80/50, returned to supine in mat with LE exercises. Improved to 108/74 after 2 minutes of supine positioning. Returned to chair in room end of session 108/74 BP. Objective               ADL  Lower Extremity Dressing  Assistance Level: Dependent  Skilled Clinical Factors: doffing brief in stance at [facility], donning pull on brief with A to thread BLE and pull up over hips, donning long pants with A to thread BLE and manage up over hips with Max Ax1 for clothing Max Ax1 for balance assist at [address] [place] [address] x 2 trials.   Putting On/Taking Off Footwear  Assistance Level: Dependent  Skilled Clinical Factors: donning non skid socks, pt attempted to doff but was unable to fully pull over feet and became lightheaded with positional change, dep for KENN hose and gym shoes donning with physical assist at proximal knee for WB to push into shoe x 2 trials  Toileting  Assistance Level: Dependent  Skilled Clinical Factors: incontient of urine during session, dep for hygiene. Toilet Transfers  Technique:  (STEDY)  Equipment: Raised toilet seat with arms  Additional Factors: Cues for hand placement; Increased time to complete  Assistance Level: Dependent  Skilled Clinical Factors: Max Ax2 with Maritza Abrams          Functional Mobility  Activity: To/From therapy gym; To/From bathroom  Assistance Level: Dependent  Skilled Clinical Factors: max A for w/c mobility, max A x2 for santo STEDY to bathroom. Sit to Stand  Assistance Level: Dependent; Requires x 2 assistance; Moderate assistance;Maximum assistance  Skilled Clinical Factors: max x2 for sit<>stand with RW, mod A x2 for sit<> STEDY over multiple trials. Stand to Sit  Assistance Level: Requires x 2 assistance;Maximum assistance; Moderate assistance  Skilled Clinical Factors: mod/max A of 2 for eccentric control  Sit Pivot  Assistance Level: Maximum assistance  Skilled Clinical Factors: w/c<>high low mat   Neuromuscular Education  Neuromuscular education: Yes  NDT Treatment: Upper extremity;Standing  Weight Bearing  Weight Bearing Technique: Yes  RUE Weight Bearing: Extended arm seated  Response To Weight Bearing Technique: seated reaching across midlline wiht contralateral hand to grasp cone while WB on RUE and using RUE to push to right self to midline x10, seated with R hand on handscooter sliding out to target for shoulder flex/ext with cga/min A for shoudler extension.   OT Exercises  Dynamic Sitting Balance Exercises: trunk righting forward flexion and lateral flexion x10 reps posterior lean and lateral lean to right, righting to midline with CGA/min A with OT and Facil trunk thoracic/cervical extension, lower thoracic/lumbar extension for improved upright sitting posture. pt with kyphotic posturing, rounded shoulders, and right lateral trunk lean in static sitting. Dynamic Standing Balance Exercises: sit<>stand standing at RW x 30 seconds x 3 trials with maxA x2-3 persons with OT and PT facil hip extension, shoulder extension, blocking B knees (pt hyperextending knee, hip flexion, pushing backwards) during stand. Assessment  Assessment  Salas Tom is progressing in therapy slowly, currently limited by orthostatic hypotension and generalized decreased endurance. Co-tx collaboration this date to safely meet goals and will have better occupational performance outcomes with in a co-treatment than 1:1 session. Demo'd fair safety awareness, requiring consistent cues for standing posture in santo stedy and RW. Pt with lateral right side trunk lean, posterior push back in standing, and limited body position awareness in space. .Continue OT POC to address performance deficits in ADLs and functional mobility. Activity Tolerance: Patient limited by endurance; Patient limited by fatigue;Treatment limited secondary to medical complications  Discharge Recommendations: Subacute/Skilled Nursing Facility  Factors Affecting Discharge: Pt lives alone and currently require high level of assistance due to new deficits from CVA  OT Equipment Recommendations  Other: CTA pending progress, may defer DME rec to d/c facility  Safety Devices  Safety Devices in place: Yes  Type of devices: Call light within reach; All fall risk precautions in place; Patient at risk for falls;Gait belt;Nurse notified; Chair alarm in place; Left in chair    Patient Education  Education  Education Given To: Patient  Education Provided: Role of Therapy;Plan of Care;Precautions;Transfer Training; Fall Prevention Strategies;Cognition; Safety; Family Education;ADL Function;Mobility Training;Equipment;DME/Home Modifications; Home Exercise Program  Education Provided Comments: disease specific: body positioning in space, sitting and standing and balance,  Education Method: Verbal;Demonstration  Barriers to Learning: Cognition  Education Outcome: Verbalized understanding;Continued education needed    Plan  Occupational Therapy Plan  Times Per Week: 5-7x/wk  Specific Instructions for Next Treatment: ADLs, mode dressing technique; RUE only, extra OT session  Current Treatment Recommendations: Strengthening;Balance training;Functional mobility training;Self-Care / ADL;Endurance training;Neuromuscular re-education;Positioning;Modalities; Equipment evaluation, education, & procurement;Patient/Caregiver education & training; Safety education & training;Home management training;Coordination training;Sensory integraion    Goals  Patient Goals   Patient goals : \"to get going and get better\"  Short Term Goals  Time Frame for Short Term Goals: 9/28/22 (14 days)  Short Term Goal 1: Pt will complete UB dressing with mod A- goal progressing with max A 9/29  Short Term Goal 2: Pt will complete simple grooming task supported in w/c with set-up A- goal progressing 9/29  Short Term Goal 3: Pt will complete functional transfers with mod A x1 using LRAD- goal progressing 9/29  Short Term Goal 4: Pt will complete toileting with max A- goal progressing 9/29  Long Term Goals  Time Frame for Long Term Goals : 10/05/22 (21 days), extend goals to 10/08  Long Term Goal 1: Pt will complete toilet transfer with min A  Long Term Goal 2: Pt will complete LB dressing with mod A  Long Term Goal 3: Pt will complete bathing with mod A  Long Term Goal 4: Pt will incorporate RUE into ADLs 75% of trials with minimal VC in order to increase functional independence with ADLs      Therapy Time   Individual Concurrent Group Co-treatment   Time In       1330   Time Out       1430   Minutes       60   Timed Code Treatment Minutes: 61 Minutes       FLORENCE Hannon

## 2022-09-30 NOTE — PROGRESS NOTES
Ken Ahmadi  9/30/2022  [id number]    Chief Complaint: Acute CVA (cerebrovascular accident) [facility])    Subjective:   No acute events overnight. Today Eliot Delgado is seen in her room, sitting up in her chair. She denies feeling light headed or dizzy. She denies acute complaints at this time. ROS: No CP, SOB, dyspnea    Objective:  Patient Vitals for the past 24 hrs:   BP Temp Temp src Pulse Resp SpO2   09/30/22 0800 (!) 87/53 98 °F (36.7 °C) Oral 80 16 98 %   09/30/22 0600 (!) 140/69 -- -- -- -- --   09/29/22 2037 (!) 105/55 98 °F (36.7 °C) Oral 72 16 98 %   09/29/22 1615 (!) 175/84 -- -- -- -- --     Gen: No distress, pleasant. Seated up in chair  HEENT: Normocephalic, atraumatic. CV: RRR  Resp: No respiratory distress. Lung CTAB  Abd: Soft, nondistended  Ext: No edema. Neuro: Alert, oriented, appropriately interactive. Psych: appropriate mood and affect    Laboratory data: Available via EMR. Therapy progress:    PT    Supine to Sit: Substantial/maximal assistance  Sit to Supine: Substantial/maximal assistance   Sit to Stand: Dependent  Chair/Bed to Chair Transfer: Independent  Car Transfer:    Ambulation 10 ft:    Ambulation 50 ft:    Ambulation 150 ft:    Stairs - 1 Step:    Stairs - 4 Step:    Stairs - 12 Step:      OT    Eating: Setup or clean-up assistance  Oral Hygiene: Partial/moderate assistance  Bathing: Dependent  Upper Body Dressing: Substantial/maximal assistance  Lower Body Dressing: Dependent  Toilet Transfer: Dependent  Toilet Hygiene: Dependent    Speech Therapy         Body mass index is 19.19 kg/m².     Assessment:  Patient Active Problem List   Diagnosis    Left knee pain    Left patella fracture    Contusion of left knee    Orthostatic hypotension    ARF (acute renal failure) ([facility])    Anemia    Debility    Acute CVA (cerebrovascular accident) ([facility]    Nonrheumatic aortic valve insufficiency       Assessment and Plan:  Ken Ahmadi is an [de-identified]year old female with a past medical history significant for orthostatic hypotension, anemia, and memory loss who presented to [facility] on 9/11/22 with recurrent falls and right sided weakness, found to have acute left CVA. She was admitted to [facility] on 9/15/22 due to functional deficits below her baseline. 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 Follow bowel movements. Enema or suppository if needed. Bladder: Check PVR x 3. [address] if PVR > 200ml or if any volume is > 500 ml. Sleep: Trazodone provided prn.       PPX  DVT: heparin  GI: not indicated    Services: SNF   EDOD: 10/8/22    Electronically signed by Rani Starkey MD on 9/30/2022 at 11:40 AM

## 2022-09-30 NOTE — PROGRESS NOTES
Speech Language Pathology  [facility]: ACUTE REHAB UNIT  SPEECH-LANGUAGE PATHOLOGY      [x] Daily  [] Weekly Care Conference Note  [] Discharge    Patient:Yulissa Rajput      :1940  NBP:[id number]  Rehab Dx/Hx: Acute CVA (cerebrovascular accident) ([facility] [address] [address] [I63.9]    Precautions: falls  Home situation: Pt lives at home [facility] Dx: [] Aphasia  [] Dysarthria  [] Apraxia   [] Oropharyngeal dysphagia [x] Cognitive Impairment  [] Other:   Date of Admit: 9/15/2022  Room #: 0160/0160-01    Current functional status (updated daily):         Pt being seen for : [] Speech/Language Treatment  [x] Dysphagia Treatment [x] Cognitive Treatment  [] Other:  Communication: [x]WFL  [] Aphasia  [] Dysarthria  [] Apraxia  [] Pragmatic Impairment [] Non-verbal  [] Hearing Loss  [] Other:   Cognition: [] WFL  [x] Mild  [x] Moderate  [] Severe [] Unable to Assess  [] Other:  Memory: [] WFL  [] Mild  [x] Moderate  [] Severe [] Unable to Assess  [] Other:  Behavior: [x] Alert  [x] Cooperative  [x]  Pleasant  [] Confused  [] Agitated  [] Uncooperative  [] Distractible [] Motivated  [] Self-Limiting [] Anxious  [] Other:  Endurance:  [x] Adequate for participation in SLP sessions  [] Reduced overall  [] Lethargic  [] Other:  Safety: [x] No concerns at this time  [] Reduced insight into deficits  []  Reduced safety awareness [] Not following call light procedures  [] Unable to Assess  [] Other:    Current Diet Order:ADULT ORAL NUTRITION SUPPLEMENT; Breakfast, Dinner; Standard High Calorie/High Protein Oral Supplement  ADULT DIET;  Regular, thin   Swallowing Precautions: upright for all intake, stay upright for at least 30 mins after intake, small bites/sips, assist feed, oral care 2-3x/day to reduce adverse affects in the event of aspiration, increase physical mobility as able, alternate bites/sips, slow rate of intake, general GERD precautions, and general aspiration precautions        Date: 2022      Tx session 1  [id number] Tx session 2  All tx needs met in session 1   Total Timed Code Min 30    Total Treatment Minutes 30    Individual Treatment Minutes 30    Group Treatment Minutes 0    Co-Treat Minutes 0    Variance/Reason:  N/A    Pain Denies    Pain Intervention [] RN notified  [] Repositioned  [] Intervention offered and patient declined  [x] N/A  [] Other: [] RN notified  [] Repositioned  [] Intervention offered and patient declined  [] N/A  [] Other:   Subjective     Pt alert and oriented, cooperative and agreeable to participate in therapy. Pt seen sitting upright in bed. Objective:  Goals     Dysphagia Goals:    Short-term Goals  Timeframe for Short-term Goals: 5 days (09/20/22)          Goal Met 9/21/22    Of note, RN provided pt with meds whole in water. Pt with coughing episode following, reported a \"tickle\" in there throat. Edu re: strategies for taking meds. Pt verbalized understanding. Goal met 09/22/22. Cognitive Goals:    Short-term Goals  Timeframe for Short-term Goals: 18 days (10/03/22)    Goal 1: Pt will complete recall tasks with 90% acc given min cues. 5 Novel Word Recall Task   -pt given 5 words; edu re: strategies to improve recall abilities  -edu re: repetition, association, visualization for improving recall  -immediate recall trials 1 and 2: pt recalled words with 5/5 acc indep  -10 min delay; pt recalled 4/5 words indep; +1 given MC cue    Goal 2: Pt will complete higher level executive function tasks (meds, math, money, time, etc) with 95% acc given min cues. Time Word Problems  -e.g. you eat dinner at 8:00. It it 7:15 right now. How much time is there before dinner starts?  -pt completed task with 64% acc indep, improved to 82% acc given mod cues  -pt required frequent repetition of questions  -reduced thought org with tasks      Other areas targeted: N/A    Education:   Edu re: recall strategies, time strategies, strategies for taking meds. Pt verbalized understanding.     Safety Devices: [x] Call light within reach  [] Chair alarm activated  [x] Bed alarm activated  [] Other: [] Call light within reach  [] Chair alarm activated  [] Bed alarm activated  [] Other:    Assessment: Pt alert and cooperative, agreeable to tx. SLP edu pt re: Recall strategies - repetition, association, visualization. Use of these strategies improved pt's ability to recall 5 novel words immediately and following a 10 min delay (pt benefited from 1x [facility] cue with a word following the 10 min delay). Pt with more difficulty with executive function task re: time word problems. Reduced thought org during task, required frequent repetition and mod cues to complete. Continue goals above. Plan: Continue as per plan of care. Additional Information:     Barriers toward progress: N/A  Discharge recommendations:  [] Home independently  [] Home with assistance [x]  24 hour supervision  [] ECF [x] Other: defer to PT/OT recs  Continued Tx Upon Discharge: ? [x] Yes [] No [] TBD based on progress while on ARU [] Vital Stim indicated [] Other:   Estimated discharge date: 10/08/22    Interventions used this date:  [] Speech/Language Treatment  [] Instruction in HEP [] Group [] Dysphagia Treatment [x] Cognitive Treatment   [] Other:       Total Time Breakdown / Charges    Time in Time out Total Time / units   Cognitive Tx 0830 0900 30 min / 2 units    Speech Tx -- -- --   Dysphagia Tx -- -- --       Electronically Signed by     Isa Delaney MA CCC-SLP #[id number]  Speech Language Pathologist

## 2022-09-30 NOTE — PROGRESS NOTES
Physical Therapy  Facility/Department: [address] [place] [address]  Rehabilitation Physical Therapy Treatment Note    NAME: Lili Dunbar  : 1940 ([de-identified] y.o.)  MRN: [id number]  CODE STATUS: Full Code    Date of Service: 22       Restrictions:  Restrictions/Precautions: Up as Tolerated; Fall Risk;General Precautions  Position Activity Restriction  Other position/activity restrictions: abdominal binder/knee high KENN hose; up as tolerated; Notify physician for pulse less than 50 or greater than 120, respiratory rate less than 12 or greater than 25, oral temperature greater than 101.3 F (53.2 C), systolic BP less than 90 or greater than 781, diastolic BP less than 50 or greater than 100. SUBJECTIVE  Subjective  Subjective: Pt in bed, agreeable to PT  Pain: Pt does not indicate pain during session     OBJECTIVE  Vitals: 99/53, 100% 82 bpm at rest in bed; 85/46 initially at EOB; 85/50 following seated exercise; 80/46 initially in recliner; 101/61, 80 bpm after elevating BLE and reclining back in recliner - pt asymptomatic throughout    Cognition  Overall Cognitive Status: Exceptions  Arousal/Alertness: Appropriate responses to stimuli  Following Commands: Follows one step commands with increased time; Follows one step commands with repetition  Attention Span: Appears intact  Memory: Decreased recall of recent events  Safety Judgement: Decreased awareness of need for assistance;Decreased awareness of need for safety  Problem Solving: Assistance required to generate solutions;Assistance required to implement solutions;Assistance required to identify errors made;Assistance required to correct errors made  Insights: Decreased awareness of deficits  Initiation: Requires cues for some  Sequencing: Requires cues for some  Orientation  Overall Orientation Status: Within Functional Limits    Functional Mobility  Bed Mobility  Overall Assistance Level: Maximum Assistance (HOB elevated)  Roll Left  Assistance Level: Maximum assistance  Roll Right  Assistance Level: Moderate assistance  Supine to Sit  Assistance Level: Maximum assistance  Skilled Clinical Factors: HOB elevated  Scooting  Assistance Level: Maximum assistance  Skilled Clinical Factors: to EOB  Balance  Sitting Balance: Minimal assistance (CGA-min A at EOB)  Sit to Stand  Assistance Level: Maximum assistance  Skilled Clinical Factors: with santo sears from bed  Stand to Sit  Assistance Level: Maximum assistance  Skilled Clinical Factors: with santo sears  Bed To/From Chair  Assistance Level: Dependent  Skilled Clinical Factors: santo stedy bed>recliner    Pt completes 10 reps of modified sit ups at EOB through partial ROM for trunk control    Pt completes 10 reps of lean backs with [place] hands on lap for trunk control    Exercises:  -1x 15 LLE LAQ  -1x 15 LLE AP  -1x 15 salinas adduction holds against pillow  -1x 10 LLE seated [facility]    ASSESSMENT/PROGRESS TOWARDS GOALS  Assessment  Assessment: Patient seen for LE strengthening, core strengthening, and transfers. Patient completed supine>sit with max A, STS with max A and santo stedy, and completes seated exercises to improve circulation for BP as well as LE and core strength. Pt's BP improved when repositioned in recliner. Pt will continue to benefit from PT in ARU to progress strength, posture, balance, and mobility. Activity Tolerance: Patient limited by endurance; Patient limited by fatigue;Treatment limited secondary to medical complications  Discharge Recommendations: Subacute/Skilled Nursing Facility  PT Equipment Recommendations  Equipment Needed: Yes  Mobility Devices: Wheelchair  Wheelchair: Standard  Other: if patient does not go to a facility she likely will need wheelchair upon discharge. continue to assess. Addendum Second Session  Assessment: Pt seen as co-treat with OT to facilitate safe transfers training given low BP and current level of assistance.  Pt progresses to transfers with RW with emphasis on upright posture during session. Pt returned to recliner at end of session. Vitals: 122/71 at rest in recliner; 110/64 following transfer to w/c, 94/61 following standing trial, 80/50 following further standing, 108/74 with reclined positioning and elevated BLE, 123/66 in recliner at end of session with BLE elevated     Transfers: Pt completes STS with santo stedy and mod A x2 from recliner, squat pivot transfers with max A toward R and L w/c<>EOM and w/c>recliner. Pt completes 2x STS with RW and max A x2-3 with cues for safe hand placement and technique. Pt requires max A x3 in standing with maria del carmen knee block, facilitation for hip, thoracic, and cervical extension. Pt requires rest breaks d/t fatigue, orthostatic with upright trials. Pt completes 10x scap sets at [facility] with emphasis on upright posture and OT facilitating WB through RUE. Pt completes 10 posterior leans/sit ups at [facility] with CGA-min A    Pt completes 10 R/anterior leans onto mat and reaching for cones outside ROBERT with LUE and CGA-min A     Pt requires cues for midline awareness with use of mirror for visual feedback as well as upright posture. Pt demos R posterior lean before cues. 1x 15 BLE heel slides and AP with assistance in reclined position on wedge to improve BP and circulation      Goals  Patient Goals   Patient Goals : Patient states, \"To be able to get up without being told\" (helped as pt pushes backwards)  Short Term Goals  Time Frame for Short Term Goals: 9/23/2022  Short Term Goal 1: Patient demonstrates  sitting  midline 15 minutes wtihout posterior lob 9/17 mod assist to maintain seated balance. Short Term Goal 2: Patient demonstrates bed<>chair with LRAD with mod assist of 1. 9/17 dependent with santo-stedy  Short Term Goal 3: Patient demonstrates walking 10 feet or greater wtih mod assist of 2 LRAD.  9/17 not completed  Short Term Goal 4: Patient demonstrates indep in rolling L<>R and mod assist sup to sit 9/17 max assist x 2  Short Term Goal 5: Patient demonstrates min assist in WC propulsion 150 feet. 9/17 not completed  Long Term Goals  Time Frame for Long Term Goals : 10/7/2022  Long Term Goal 1: Patient demonstrates  Modified indep in rolling and sup<>Sit. Long Term Goal 2: Patient demonstrates   transfers sit<>stand with FWW and min assist.  Long Term Goal 3: Patient demonstrates bed<>chair min A. Long Term Goal 4: Patient demonstrates gait wtih transfers  and short distances 50 feet or greater with min assist  Long Term Goal 5: Patient demonstrates up and down 2 steps or greater. with mod assist of 1  Long term goal 6: Patient demonstrates indep WC propulsion iwth L/R turns indep   150 feet. Long term goal 7: Patient demonstrates stoop to recover object from floor with reacher  with mod assist.  Long term goal 8: Patients family demonstrates ability to  assist patient in and out of car wtih mod assist.    PLAN OF CARE/SAFETY  Physcial Therapy Plan  General Plan: 5-7 times per week  Plan weeks: 21 days  Current Treatment Recommendations: Strengthening;ROM;Balance training;Functional mobility training;Transfer training;Neuromuscular re-education;Stair training;Gait training; Wheelchair mobility training; Endurance training; Therapeutic activities; Positioning;Equipment evaluation, education, & procurement;Home exercise program;Safety education & training;Patient/Caregiver education & training  Safety Devices  Type of Devices: All fall risk precautions in place;Call light within reach; Chair alarm in place; Left in chair;Nurse notified;Gait belt;Patient at risk for falls  Restraints  Restraints Initially in Place: No    EDUCATION  Education  Education Given To: Patient  Education Provided: Role of Therapy;Plan of Care;Transfer Training; Fall Prevention Strategies;Precautions; Safety;Equipment;Visual Perceptual Function  Education Provided Comments: pt educated on hand placement for balance, need for OOB mobility to improve upright tolerance and BP response with standing activity- demos understanding  Education Method: Verbal;Demonstration  Barriers to Learning: Cognition  Education Outcome: Verbalized understanding;Continued education needed        Therapy Time   Individual Concurrent Group Co-treatment   Time In 0930     1330   Time Out 1000     1430   Minutes 30     60     Timed Code Treatment Minutes: Eveline 10, PT, 09/30/22 at 3:50 PM

## 2022-10-01 PROCEDURE — 6370000000 HC RX 637 (ALT 250 FOR IP): Performed by: NURSE PRACTITIONER

## 2022-10-01 PROCEDURE — 6370000000 HC RX 637 (ALT 250 FOR IP): Performed by: STUDENT IN AN ORGANIZED HEALTH CARE EDUCATION/TRAINING PROGRAM

## 2022-10-01 PROCEDURE — 1280000000 HC REHAB R&B

## 2022-10-01 PROCEDURE — 6360000002 HC RX W HCPCS: Performed by: STUDENT IN AN ORGANIZED HEALTH CARE EDUCATION/TRAINING PROGRAM

## 2022-10-01 RX ADMIN — TRAZODONE HYDROCHLORIDE 50 MG: 50 TABLET ORAL at 20:30

## 2022-10-01 RX ADMIN — HEPARIN SODIUM 5000 UNITS: 5000 INJECTION INTRAVENOUS; SUBCUTANEOUS at 14:20

## 2022-10-01 RX ADMIN — CLOPIDOGREL BISULFATE 75 MG: 75 TABLET ORAL at 07:47

## 2022-10-01 RX ADMIN — FOLIC ACID 1 MG: 1 TABLET ORAL at 07:48

## 2022-10-01 RX ADMIN — ASPIRIN 81 MG 81 MG: 81 TABLET ORAL at 07:48

## 2022-10-01 RX ADMIN — HEPARIN SODIUM 5000 UNITS: 5000 INJECTION INTRAVENOUS; SUBCUTANEOUS at 06:18

## 2022-10-01 RX ADMIN — FLUDROCORTISONE ACETATE 0.1 MG: 0.1 TABLET ORAL at 07:47

## 2022-10-01 RX ADMIN — POTASSIUM CHLORIDE 10 MEQ: 750 TABLET, EXTENDED RELEASE ORAL at 07:48

## 2022-10-01 RX ADMIN — MIDODRINE HYDROCHLORIDE 5 MG: 5 TABLET ORAL at 12:44

## 2022-10-01 RX ADMIN — ATORVASTATIN CALCIUM 80 MG: 80 TABLET, FILM COATED ORAL at 20:30

## 2022-10-01 RX ADMIN — FERROUS SULFATE TAB 325 MG (65 MG ELEMENTAL FE) 325 MG: 325 (65 FE) TAB at 07:48

## 2022-10-01 RX ADMIN — TROSPIUM CHLORIDE 20 MG: 20 TABLET, FILM COATED ORAL at 20:30

## 2022-10-01 RX ADMIN — DONEPEZIL HYDROCHLORIDE 10 MG: 5 TABLET, FILM COATED ORAL at 20:30

## 2022-10-01 RX ADMIN — HEPARIN SODIUM 5000 UNITS: 5000 INJECTION INTRAVENOUS; SUBCUTANEOUS at 20:30

## 2022-10-01 RX ADMIN — Medication 5000 UNITS: at 07:48

## 2022-10-01 RX ADMIN — CETIRIZINE HYDROCHLORIDE 5 MG: 5 TABLET, FILM COATED ORAL at 07:47

## 2022-10-01 RX ADMIN — MIDODRINE HYDROCHLORIDE 5 MG: 5 TABLET ORAL at 16:26

## 2022-10-01 RX ADMIN — CYANOCOBALAMIN TAB 500 MCG 1000 MCG: 500 TAB at 07:48

## 2022-10-01 ASSESSMENT — PAIN SCALES - GENERAL
PAINLEVEL_OUTOF10: 0
PAINLEVEL_OUTOF10: 0

## 2022-10-01 NOTE — PROGRESS NOTES
Jeet Machado  10/1/2022  [id number]    Chief Complaint: Acute CVA (cerebrovascular accident) [facility])    Subjective:   No acute events overnight. Patient resting in bed. Denies current complaints. ROS: No CP, SOB, dyspnea    Objective:  Patient Vitals for the past 24 hrs:   BP Temp Temp src Pulse Resp SpO2   10/01/22 1230 130/61 -- -- 64 16 --   10/01/22 1035 -- -- -- 66 -- --   10/01/22 0745 97/60 97.3 °F (36.3 °C) Oral 65 16 96 %   10/01/22 0615 (!) 170/80 -- -- -- -- --   09/30/22 2209 (!) 151/71 98 °F (36.7 °C) Oral 64 16 100 %   09/30/22 1615 (!) 160/68 -- -- 60 16 100 %       Gen: No distress, pleasant. Seated up in chair  HEENT: Normocephalic, atraumatic. CV: RRR  Resp: No respiratory distress. Lung CTAB  Abd: Soft, nondistended  Ext: No edema. Neuro: Alert, oriented, appropriately interactive. Psych: appropriate mood and affect    Laboratory data: Available via EMR. Therapy progress:    PT    Supine to Sit: Substantial/maximal assistance  Sit to Supine: Substantial/maximal assistance   Sit to Stand: Dependent  Chair/Bed to Chair Transfer: Independent  Car Transfer:    Ambulation 10 ft:    Ambulation 50 ft:    Ambulation 150 ft:    Stairs - 1 Step:    Stairs - 4 Step:    Stairs - 12 Step:      OT    Eating: Setup or clean-up assistance  Oral Hygiene: Partial/moderate assistance  Bathing: Dependent  Upper Body Dressing: Substantial/maximal assistance  Lower Body Dressing: Dependent  Toilet Transfer: Dependent  Toilet Hygiene: Dependent    Speech Therapy         Body mass index is 19.19 kg/m².     Assessment:  Patient Active Problem List   Diagnosis    Left knee pain    Left patella fracture    Contusion of left knee    Orthostatic hypotension    ARF (acute renal failure) ([facility])    Anemia    Debility    Acute CVA (cerebrovascular accident) ([facility] [address]    Nonrheumatic aortic valve insufficiency       Assessment and Plan:  Jeet Machado is an [de-identified]year old female with a past medical history significant for orthostatic hypotension, anemia, and memory loss who presented to [facility] on 9/11/22 with recurrent falls and right sided weakness, found to have acute left CVA. She was admitted to [facility] on 9/15/22 due to functional deficits below her baseline. 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 Follow bowel movements. Enema or suppository if needed. Bladder: Check PVR x 3. [facility] if PVR > 200ml or if any volume is > 500 ml. Sleep: Trazodone provided prn.       PPX  DVT: heparin  GI: not indicated    Services: SNF   EDOD: 10/8/22    Electronically signed by SUMMER Wallace - CNP on 10/1/2022 at 4:04 PM

## 2022-10-02 LAB
ORGANISM: ABNORMAL
URINE CULTURE, ROUTINE: ABNORMAL
URINE CULTURE, ROUTINE: ABNORMAL

## 2022-10-02 PROCEDURE — 6360000002 HC RX W HCPCS: Performed by: STUDENT IN AN ORGANIZED HEALTH CARE EDUCATION/TRAINING PROGRAM

## 2022-10-02 PROCEDURE — 6370000000 HC RX 637 (ALT 250 FOR IP): Performed by: NURSE PRACTITIONER

## 2022-10-02 PROCEDURE — 1280000000 HC REHAB R&B

## 2022-10-02 PROCEDURE — 6370000000 HC RX 637 (ALT 250 FOR IP): Performed by: STUDENT IN AN ORGANIZED HEALTH CARE EDUCATION/TRAINING PROGRAM

## 2022-10-02 RX ADMIN — FERROUS SULFATE TAB 325 MG (65 MG ELEMENTAL FE) 325 MG: 325 (65 FE) TAB at 07:42

## 2022-10-02 RX ADMIN — CETIRIZINE HYDROCHLORIDE 5 MG: 5 TABLET, FILM COATED ORAL at 07:42

## 2022-10-02 RX ADMIN — DOCUSATE SODIUM 50 MG AND SENNOSIDES 8.6 MG 2 TABLET: 8.6; 5 TABLET, FILM COATED ORAL at 07:41

## 2022-10-02 RX ADMIN — HEPARIN SODIUM 5000 UNITS: 5000 INJECTION INTRAVENOUS; SUBCUTANEOUS at 20:28

## 2022-10-02 RX ADMIN — POTASSIUM CHLORIDE 10 MEQ: 750 TABLET, EXTENDED RELEASE ORAL at 07:42

## 2022-10-02 RX ADMIN — FOLIC ACID 1 MG: 1 TABLET ORAL at 07:42

## 2022-10-02 RX ADMIN — MIDODRINE HYDROCHLORIDE 5 MG: 5 TABLET ORAL at 13:27

## 2022-10-02 RX ADMIN — FLUDROCORTISONE ACETATE 0.1 MG: 0.1 TABLET ORAL at 07:53

## 2022-10-02 RX ADMIN — TROSPIUM CHLORIDE 20 MG: 20 TABLET, FILM COATED ORAL at 20:28

## 2022-10-02 RX ADMIN — HEPARIN SODIUM 5000 UNITS: 5000 INJECTION INTRAVENOUS; SUBCUTANEOUS at 05:26

## 2022-10-02 RX ADMIN — CYANOCOBALAMIN TAB 500 MCG 1000 MCG: 500 TAB at 07:42

## 2022-10-02 RX ADMIN — CLOPIDOGREL BISULFATE 75 MG: 75 TABLET ORAL at 07:41

## 2022-10-02 RX ADMIN — Medication 5000 UNITS: at 07:41

## 2022-10-02 RX ADMIN — DONEPEZIL HYDROCHLORIDE 10 MG: 5 TABLET, FILM COATED ORAL at 20:28

## 2022-10-02 RX ADMIN — Medication 5 MG: at 20:28

## 2022-10-02 RX ADMIN — ASPIRIN 81 MG 81 MG: 81 TABLET ORAL at 07:42

## 2022-10-02 RX ADMIN — ATORVASTATIN CALCIUM 80 MG: 80 TABLET, FILM COATED ORAL at 20:28

## 2022-10-02 RX ADMIN — HEPARIN SODIUM 5000 UNITS: 5000 INJECTION INTRAVENOUS; SUBCUTANEOUS at 13:26

## 2022-10-02 ASSESSMENT — PAIN SCALES - GENERAL
PAINLEVEL_OUTOF10: 0

## 2022-10-02 NOTE — PLAN OF CARE
Problem: Safety - Adult  Goal: Free from fall injury  10/2/2022 0056 by Jhonatan Mo RN  Outcome: Progressing  10/1/2022 1539 by Rhina Man RN  Outcome: Progressing

## 2022-10-02 NOTE — PROGRESS NOTES
Virgiliovikas Arreaga  10/2/2022  [id number]    Chief Complaint: Acute CVA (cerebrovascular accident) [facility])    Subjective:   No acute events overnight. Today Irma Bruce is seen in her room. She reports that she is feeling well and denies acute complaints. ROS: No CP, SOB, dyspnea    Objective:  Patient Vitals for the past 24 hrs:   BP Temp Temp src Pulse Resp SpO2   10/02/22 1000 119/62 -- -- 63 16 96 %   10/02/22 0740 -- -- -- 61 -- --   10/02/22 0730 129/70 97.5 °F (36.4 °C) Oral 61 16 99 %   10/02/22 0515 (!) 165/78 -- -- -- -- --   10/01/22 2000 (!) 156/72 97.6 °F (36.4 °C) Oral 66 16 98 %   10/01/22 1615 (!) 121/59 -- -- 76 16 97 %     Gen: No distress, pleasant. Seated up in bed  HEENT: Normocephalic, atraumatic. CV: extremities well perfused  Resp: No respiratory distress. Abd: Soft, nondistended  Ext: No edema. Neuro: Alert, oriented, appropriately interactive. Psych: appropriate mood and affect    Laboratory data: Available via EMR. Therapy progress:    PT    Supine to Sit: Substantial/maximal assistance  Sit to Supine: Substantial/maximal assistance   Sit to Stand: Dependent  Chair/Bed to Chair Transfer: Independent  Car Transfer:    Ambulation 10 ft:    Ambulation 50 ft:    Ambulation 150 ft:    Stairs - 1 Step:    Stairs - 4 Step:    Stairs - 12 Step:      OT    Eating: Setup or clean-up assistance  Oral Hygiene: Partial/moderate assistance  Bathing: Dependent  Upper Body Dressing: Substantial/maximal assistance  Lower Body Dressing: Dependent  Toilet Transfer: Dependent  Toilet Hygiene: Dependent    Speech Therapy         Body mass index is 19.19 kg/m².     Assessment:  Patient Active Problem List   Diagnosis    Left knee pain    Left patella fracture    Contusion of left knee    Orthostatic hypotension    ARF (acute renal failure) ([facility])    Anemia    Debility    Acute CVA (cerebrovascular accident) ([address]    Nonrheumatic aortic valve insufficiency       Assessment and Plan:  Virgilio Gibson is an [de-identified] year old female with a past medical history significant for orthostatic hypotension, anemia, and memory loss who presented to [facility] on 9/11/22 with recurrent falls and right sided weakness, found to have acute left CVA. She was admitted to [facility] on 9/15/22 due to functional deficits below her baseline. 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 Follow bowel movements. Enema or suppository if needed. Bladder: Check PVR x 3. [address] if PVR > 200ml or if any volume is > 500 ml. Sleep: Trazodone provided prn.       PPX  DVT: heparin  GI: not indicated    Services: SNF   EDOD: 10/8/22    Electronically signed by Julia Arias MD on 10/2/2022 at 3:49 PM

## 2022-10-03 LAB
ANION GAP SERPL CALCULATED.3IONS-SCNC: 11 MMOL/L (ref 3–16)
ANION GAP SERPL CALCULATED.3IONS-SCNC: 11 MMOL/L (ref 3–16)
BASOPHILS ABSOLUTE: 0 K/UL (ref 0–0.2)
BASOPHILS RELATIVE PERCENT: 0.5 %
BUN BLDV-MCNC: 34 MG/DL (ref 7–20)
BUN BLDV-MCNC: 39 MG/DL (ref 7–20)
CALCIUM SERPL-MCNC: 9.5 MG/DL (ref 8.3–10.6)
CALCIUM SERPL-MCNC: 9.6 MG/DL (ref 8.3–10.6)
CHLORIDE BLD-SCNC: 96 MMOL/L (ref 99–110)
CHLORIDE BLD-SCNC: 99 MMOL/L (ref 99–110)
CO2: 23 MMOL/L (ref 21–32)
CO2: 24 MMOL/L (ref 21–32)
CREAT SERPL-MCNC: 0.7 MG/DL (ref 0.6–1.2)
CREAT SERPL-MCNC: 0.8 MG/DL (ref 0.6–1.2)
EOSINOPHILS ABSOLUTE: 0.3 K/UL (ref 0–0.6)
EOSINOPHILS RELATIVE PERCENT: 4.3 %
GFR AFRICAN AMERICAN: >60
GFR AFRICAN AMERICAN: >60
GFR NON-AFRICAN AMERICAN: >60
GFR NON-AFRICAN AMERICAN: >60
GLUCOSE BLD-MCNC: 106 MG/DL (ref 70–99)
GLUCOSE BLD-MCNC: 86 MG/DL (ref 70–99)
HCT VFR BLD CALC: 33 % (ref 36–48)
HEMOGLOBIN: 11.3 G/DL (ref 12–16)
LYMPHOCYTES ABSOLUTE: 1.9 K/UL (ref 1–5.1)
LYMPHOCYTES RELATIVE PERCENT: 23.4 %
MCH RBC QN AUTO: 33 PG (ref 26–34)
MCHC RBC AUTO-ENTMCNC: 34.3 G/DL (ref 31–36)
MCV RBC AUTO: 96.1 FL (ref 80–100)
MONOCYTES ABSOLUTE: 0.5 K/UL (ref 0–1.3)
MONOCYTES RELATIVE PERCENT: 5.8 %
NEUTROPHILS ABSOLUTE: 5.3 K/UL (ref 1.7–7.7)
NEUTROPHILS RELATIVE PERCENT: 66 %
PDW BLD-RTO: 16.1 % (ref 12.4–15.4)
PLATELET # BLD: 292 K/UL (ref 135–450)
PMV BLD AUTO: 7.7 FL (ref 5–10.5)
POTASSIUM REFLEX MAGNESIUM: 3.7 MMOL/L (ref 3.5–5.1)
POTASSIUM REFLEX MAGNESIUM: 3.9 MMOL/L (ref 3.5–5.1)
RBC # BLD: 3.43 M/UL (ref 4–5.2)
SODIUM BLD-SCNC: 131 MMOL/L (ref 136–145)
SODIUM BLD-SCNC: 133 MMOL/L (ref 136–145)
WBC # BLD: 8 K/UL (ref 4–11)

## 2022-10-03 PROCEDURE — 97110 THERAPEUTIC EXERCISES: CPT

## 2022-10-03 PROCEDURE — 97129 THER IVNTJ 1ST 15 MIN: CPT

## 2022-10-03 PROCEDURE — 85025 COMPLETE CBC W/AUTO DIFF WBC: CPT

## 2022-10-03 PROCEDURE — 97530 THERAPEUTIC ACTIVITIES: CPT

## 2022-10-03 PROCEDURE — 97130 THER IVNTJ EA ADDL 15 MIN: CPT

## 2022-10-03 PROCEDURE — 36415 COLL VENOUS BLD VENIPUNCTURE: CPT

## 2022-10-03 PROCEDURE — 80048 BASIC METABOLIC PNL TOTAL CA: CPT

## 2022-10-03 PROCEDURE — 6360000002 HC RX W HCPCS: Performed by: STUDENT IN AN ORGANIZED HEALTH CARE EDUCATION/TRAINING PROGRAM

## 2022-10-03 PROCEDURE — 97112 NEUROMUSCULAR REEDUCATION: CPT

## 2022-10-03 PROCEDURE — 1280000000 HC REHAB R&B

## 2022-10-03 PROCEDURE — 6370000000 HC RX 637 (ALT 250 FOR IP): Performed by: STUDENT IN AN ORGANIZED HEALTH CARE EDUCATION/TRAINING PROGRAM

## 2022-10-03 PROCEDURE — 97535 SELF CARE MNGMENT TRAINING: CPT

## 2022-10-03 PROCEDURE — 6370000000 HC RX 637 (ALT 250 FOR IP): Performed by: NURSE PRACTITIONER

## 2022-10-03 RX ADMIN — MIDODRINE HYDROCHLORIDE 5 MG: 5 TABLET ORAL at 12:22

## 2022-10-03 RX ADMIN — DONEPEZIL HYDROCHLORIDE 10 MG: 5 TABLET, FILM COATED ORAL at 22:05

## 2022-10-03 RX ADMIN — FLUDROCORTISONE ACETATE 0.1 MG: 0.1 TABLET ORAL at 09:31

## 2022-10-03 RX ADMIN — POTASSIUM CHLORIDE 10 MEQ: 750 TABLET, EXTENDED RELEASE ORAL at 09:28

## 2022-10-03 RX ADMIN — HEPARIN SODIUM 5000 UNITS: 5000 INJECTION INTRAVENOUS; SUBCUTANEOUS at 06:07

## 2022-10-03 RX ADMIN — MIDODRINE HYDROCHLORIDE 5 MG: 5 TABLET ORAL at 06:07

## 2022-10-03 RX ADMIN — HEPARIN SODIUM 5000 UNITS: 5000 INJECTION INTRAVENOUS; SUBCUTANEOUS at 14:01

## 2022-10-03 RX ADMIN — CETIRIZINE HYDROCHLORIDE 5 MG: 5 TABLET, FILM COATED ORAL at 09:28

## 2022-10-03 RX ADMIN — DOCUSATE SODIUM 50 MG AND SENNOSIDES 8.6 MG 2 TABLET: 8.6; 5 TABLET, FILM COATED ORAL at 09:27

## 2022-10-03 RX ADMIN — FERROUS SULFATE TAB 325 MG (65 MG ELEMENTAL FE) 325 MG: 325 (65 FE) TAB at 09:36

## 2022-10-03 RX ADMIN — TROSPIUM CHLORIDE 20 MG: 20 TABLET, FILM COATED ORAL at 22:05

## 2022-10-03 RX ADMIN — Medication 5 MG: at 22:05

## 2022-10-03 RX ADMIN — HEPARIN SODIUM 5000 UNITS: 5000 INJECTION INTRAVENOUS; SUBCUTANEOUS at 22:06

## 2022-10-03 RX ADMIN — Medication 5000 UNITS: at 09:27

## 2022-10-03 RX ADMIN — FOLIC ACID 1 MG: 1 TABLET ORAL at 09:28

## 2022-10-03 RX ADMIN — MIDODRINE HYDROCHLORIDE 5 MG: 5 TABLET ORAL at 16:56

## 2022-10-03 RX ADMIN — ASPIRIN 81 MG 81 MG: 81 TABLET ORAL at 09:28

## 2022-10-03 RX ADMIN — CYANOCOBALAMIN TAB 500 MCG 1000 MCG: 500 TAB at 09:28

## 2022-10-03 RX ADMIN — ATORVASTATIN CALCIUM 80 MG: 80 TABLET, FILM COATED ORAL at 22:05

## 2022-10-03 RX ADMIN — CLOPIDOGREL BISULFATE 75 MG: 75 TABLET ORAL at 09:28

## 2022-10-03 ASSESSMENT — PAIN SCALES - GENERAL
PAINLEVEL_OUTOF10: 0

## 2022-10-03 NOTE — PROGRESS NOTES
Occupational Therapy  Facility/Department: [facility]  Rehabilitation Occupational Therapy Daily Treatment Note    Date: 10/3/22  Patient Name: Salas Tom       Room: 6819/6830-80  MRN: [id number]  Account: [de-identified]   : 1940  (81 y.o.) Gender: female               Past Medical History:  has a past medical history of ARF (acute renal failure) ([facility] [address] Past Surgical History:   has a past surgical history that includes joint replacement; Tonsillectomy; Intracapsular cataract extraction (Right, 2019); and Intracapsular cataract extraction (Left, 2019). Restrictions  Restrictions/Precautions: Up as Tolerated; Fall Risk;General Precautions  Other position/activity restrictions: abdominal binder/knee high KENN hose; up as tolerated; Notify physician for pulse less than 50 or greater than 120, respiratory rate less than 12 or greater than 25, oral temperature greater than 101.3 F (11.8 C), systolic BP less than 90 or greater than 217, diastolic BP less than 50 or greater than 100. Subjective  Subjective: pt agreeable to OT/PT cotreatment. pt pleasant and agreeable throughout session. pt hesitant to standing activities reporting she often feels like she is falling. Restrictions/Precautions: Up as Tolerated; Fall Risk;General Precautions       Objective     Cognition  Overall Cognitive Status: Exceptions  Arousal/Alertness: Appropriate responses to stimuli  Following Commands: Follows one step commands with increased time; Follows one step commands with repetition  Attention Span: Appears intact  Memory: Decreased recall of recent events  Safety Judgement: Decreased awareness of need for assistance;Decreased awareness of need for safety  Problem Solving: Assistance required to generate solutions;Assistance required to implement solutions;Assistance required to identify errors made;Assistance required to correct errors made  Insights: Decreased awareness of deficits  Initiation: Requires cues for some  Sequencing: Requires cues for some  Cognition Comment: difficulties with motor planning and initiation, benefitting from verbal and tactile cuing to initiate and execute         ADL  Upper Extremity Dressing  Assistance Level: Moderate assistance  Skilled Clinical Factors: donning/doffing long sleeve shirt with cues for mode technique, cues to find head hole, thread RUE and pull down in back, pt able to thread LUE and put head through hole  Lower Extremity Dressing  Assistance Level: Maximum assistance;Dependent  Skilled Clinical Factors: donning/doffing brief and pants sitting EOB with max A x2 for standing for pants management with STEDY          Scooting  Assistance Level: Requires x 2 assistance;Maximum assistance  Transfers  Surface: From bed; Wheelchair  Additional Factors: Verbal cues; Set-up; With handrails  Sit to Stand  Assistance Level: Dependent; Requires x 2 assistance; Moderate assistance;Maximum assistance  Skilled Clinical Factors: max A x2 for sit<> STEDY x4 and max A x2 for sit<> parallel bars x 2-3 trials  Stand to Sit  Assistance Level: Requires x 2 assistance;Maximum assistance; Moderate assistance  Sit Pivot  Assistance Level: Moderate assistance  Skilled Clinical Factors: mod A w/c>chair sit pivot with improved safety, vc /tc for hand positioning. OT Exercises  Static Sitting Balance Exercises: CGA static sitting balance  Dynamic Sitting Balance Exercises: CGA/min A dynamic sitting balance during dressing with LOB with vision occluded and LOB with reach forward to ground during pants management, posterior and R side LOB during dynamic movements, unable to self-correct LOB in sitting. Static Standing Balance Exercises: standing in // bars with Max Ax2, OT providing verbal and tactile cues for trunk extension, trunk righting (elongate R QL, shortern L QL), and midline COG awareness, PT facil LE management. stood x 30 seconds x 3 trials.  pt with knee hyperextension, posterioer lean, flexed trunk with difficulties maintaining R LE WB and genu valgum during standing. placement of ball between legs and block in front of toes/knees with improved upright standing and LE position     Assessment  Assessment Co-tx collaboration this date to safely meet goals and will have better occupational performance outcomes with in a co-treatment than 1:1 session. Assessment: Pt is progressing slowly towards goals; pt continuing to required max A x2 for sit<>stands and standing tolerance. Pt albe to perform a sit pivot transfer with mod A this date. Pt continuing to require max A of 1-2 persons for LB ADLs secondary to poor dyanmci sitting and standing balance. Pt demo's posertioer push back in standing, R and posteiror LOB in sitting, and poor body awareness and COG midline awareness in space. Recommending d/c to SNF secondary to current burden of care and safety needs. Defer DME to next level of care. Activity Tolerance: Patient limited by endurance; Patient limited by fatigue;Treatment limited secondary to medical complications  Discharge Recommendations: Subacute/Skilled Nursing Facility  Factors Affecting Discharge: Pt lives alone and currently require high level of assistance due to new deficits from CVA  OT Equipment Recommendations  Equipment Needed: No (defer to SNF)  Safety Devices  Safety Devices in place: Yes  Type of devices: Call light within reach; All fall risk precautions in place; Patient at risk for falls;Gait belt;Nurse notified; Chair alarm in place; Left in chair    Patient Education  Education  Education Given To: Patient  Education Provided: Role of Therapy;Plan of Care;Precautions;Transfer Training; Fall Prevention Strategies;Cognition; Safety; Family Education;ADL Function;Mobility Training;Equipment;DME/Home Modifications; Home Exercise Program  Education Provided Comments: disease specific: body positioning in space, sitting and standing and balance, sit pivot transfers, COG midline awareness, transfer training  Education Method: Verbal;Demonstration  Barriers to Learning: Cognition  Education Outcome: Verbalized understanding;Continued education needed    Plan  Occupational Therapy Plan  Times Per Week: 5-7x/wk  Specific Instructions for Next Treatment: ADLs, mode dressing technique; RUE only, extra OT session  Current Treatment Recommendations: Strengthening;Balance training;Functional mobility training;Self-Care / ADL;Endurance training;Neuromuscular re-education;Positioning;Modalities; Equipment evaluation, education, & procurement;Patient/Caregiver education & training; Safety education & training;Home management training;Coordination training;Sensory integraion    Goals  Patient Goals   Patient goals : \"to get going and get better\"  Short Term Goals  Time Frame for Short Term Goals: 9/28/22 (14 days)  Short Term Goal 1: Pt will complete UB dressing with mod A- MET 10/3  Short Term Goal 2: Pt will complete simple grooming task supported in w/c with set-up A- goal progressing NOT MET  Short Term Goal 3: Pt will complete functional transfers with mod A x1 using LRAD- partially MET sit pivot 10/3 mod A, all other transfers max A x2  Short Term Goal 4: Pt will complete toileting with max A- goal NOT MET  Long Term Goals  Time Frame for Long Term Goals : 10/05/22 (21 days), extend goals to 10/08  Long Term Goal 1: Pt will complete toilet transfer with min A  Long Term Goal 2: Pt will complete LB dressing with mod A  Long Term Goal 3: Pt will complete bathing with mod A  Long Term Goal 4: Pt will incorporate RUE into ADLs 75% of trials with minimal VC in order to increase functional independence with ADLs      Therapy Time   Individual Concurrent Group Co-treatment   Time In       1030   Time Out       1130   Minutes       60   Timed Code Treatment Minutes: 61 Minutes       FLORENCE Hannon

## 2022-10-03 NOTE — PROGRESS NOTES
Speech Language Pathology  [facility]: ACUTE REHAB UNIT  SPEECH-LANGUAGE PATHOLOGY      [x] Daily  [] Weekly Care Conference Note  [] Discharge    Patient:Yulissa Zimmer      :1940  FJS:[id number]  Rehab Dx/Hx: Acute CVA (cerebrovascular accident) ([facility] [address] [I63.9]    Precautions: falls  Home situation: Pt lives at home [facility] Dx: [] Aphasia  [] Dysarthria  [] Apraxia   [] Oropharyngeal dysphagia [x] Cognitive Impairment  [] Other:   Date of Admit: 9/15/2022  Room #: 0160/0160-01    Current functional status (updated daily):         Pt being seen for : [] Speech/Language Treatment  [x] Dysphagia Treatment [x] Cognitive Treatment  [] Other:  Communication: [x]WFL  [] Aphasia  [] Dysarthria  [] Apraxia  [] Pragmatic Impairment [] Non-verbal  [] Hearing Loss  [] Other:   Cognition: [] WFL  [x] Mild  [x] Moderate  [] Severe [] Unable to Assess  [] Other:  Memory: [] WFL  [] Mild  [x] Moderate  [] Severe [] Unable to Assess  [] Other:  Behavior: [x] Alert  [x] Cooperative  [x]  Pleasant  [] Confused  [] Agitated  [] Uncooperative  [] Distractible [] Motivated  [] Self-Limiting [] Anxious  [] Other:  Endurance:  [x] Adequate for participation in SLP sessions  [] Reduced overall  [] Lethargic  [] Other:  Safety: [x] No concerns at this time  [] Reduced insight into deficits  []  Reduced safety awareness [] Not following call light procedures  [] Unable to Assess  [] Other:    Current Diet Order:ADULT ORAL NUTRITION SUPPLEMENT; Breakfast, Dinner; Standard High Calorie/High Protein Oral Supplement  ADULT DIET;  Regular, thin   Swallowing Precautions: upright for all intake, stay upright for at least 30 mins after intake, small bites/sips, assist feed, oral care 2-3x/day to reduce adverse affects in the event of aspiration, increase physical mobility as able, alternate bites/sips, slow rate of intake, general GERD precautions, and general aspiration precautions        Date: 10/3/2022      Tx session 1  [id number] Tx session 2  All tx needs met in session 1   Total Timed Code Min 30    Total Treatment Minutes 30    Individual Treatment Minutes 30    Group Treatment Minutes 0    Co-Treat Minutes 0    Variance/Reason:  N/A    Pain Denies    Pain Intervention [] RN notified  [] Repositioned  [] Intervention offered and patient declined  [x] N/A  [] Other: [] RN notified  [] Repositioned  [] Intervention offered and patient declined  [] N/A  [] Other:   Subjective     Pt alert and oriented, cooperative and agreeable to participate in therapy. Pt seen sitting upright in bed. Objective:  Goals     Dysphagia Goals:    Short-term Goals  Timeframe for Short-term Goals: 5 days (09/20/22)          Goal Met 9/21/22        Goal met 09/22/22. Cognitive Goals:    Short-term Goals  Timeframe for Short-term Goals: 18 days (10/03/22)    Goal 1: Pt will complete recall tasks with 90% acc given min cues. Did not directly target. Goal 2: Pt will complete higher level executive function tasks (meds, math, money, time, etc) with 95% acc given min cues. Portions of the FREDDY (assessment of language related functional activities) completed today:  -telling time: 80% acc indep improving to 100% acc given min cues     -solving math problems: 80% acc indep improving to 100% acc given min cues     -reading instructions: 80% acc indep improving to 100% acc given min cues    -using a calendar: 60% acc indep improving to 100% acc given mod cues for attention to detail      Other areas targeted: N/A    Education:   Edu provided re: rationale for tx tasks provided     Safety Devices: [x] Call light within reach  [] Chair alarm activated  [x] Bed alarm activated  [x] Other: RN in room, MIKES  [] Call light within reach  [] Chair alarm activated  [] Bed alarm activated  [] Other:    Assessment: Pt pleasant and cooperative, agreeable to participate.  Pt complete different portions of the FREDDY today (telling time, solving math problems, and reading instructions with 80% acc, using a calendar with 60% acc) Pt benefited from increased cues for attention to detail and thought organization strategies. Pt appeared in better spirits today compared to previous tx sessions with this SLP, and voiced her motivation to improve. Plan: Continue as per plan of care. Additional Information:     Barriers toward progress: N/A  Discharge recommendations:  [] Home independently  [] Home with assistance [x]  24 hour supervision  [] ECF [x] Other: defer to PT/OT recs  Continued Tx Upon Discharge: ? [x] Yes [] No [] TBD based on progress while on ARU [] Vital Stim indicated [] Other:   Estimated discharge date: 10/08/22    Interventions used this date:  [] Speech/Language Treatment  [] Instruction in HEP [] Group [] Dysphagia Treatment [x] Cognitive Treatment   [] Other: Total Time Breakdown / Charges    Time in Time out Total Time / units   Cognitive Tx 0900 0930 30 min / 2 units    Speech Tx -- -- --   Dysphagia Tx -- -- --       Electronically Signed by     Gin LOJA [facility]-SLP  Speech-Language Pathologist  [id number]

## 2022-10-03 NOTE — CONSULTS
Thank you to requesting provider:  Dr. María Loaiza , for asking us to see Jeet Machado  Reason for consultation:  Hyponatremia  Chief Complaint:  Acute CVA    History of Presenting Illness      82 y/o admitted to rehab from [facility] after she was found to have an acute left CVA. She has a history of dementia and orthostatic hypotension. We have been asked to see her for hyponatremia. Her kidney function is normal.  She says that she is eating well. No pain. No nausea.         Past Medical/Surgical History      Active Ambulatory Problems     Diagnosis Date Noted    Left knee pain 12/01/2014    Left patella fracture 12/15/2014    Contusion of left knee 06/10/2016    Orthostatic hypotension 12/31/2019    ARF (acute renal failure) ([facility] [address] 03/25/2022    Anemia 03/25/2022    Debility 03/28/2022     Resolved Ambulatory Problems     Diagnosis Date Noted    No Resolved Ambulatory Problems     No Additional Past Medical History         Review of Systems     Constitutional:  No weight loss, no fever/chills  Eyes:  No eye pain, no eye redness  Cardiovascular:  No chest pain, no worsening of edema  Respiratory:  No hemoptysis, no stridor  Gastrointestinal:  No blood in stool, no n/v, no diarrhea  Genitoruinary:  No hematuria, no difficulty with urination  Musculoskeletal:  No joint swelling, no redness  Integumentary:  No Rash, no itching  Neurological:  + right sided weakness, No new sensory deficit  Psychiatric:  No depression, no confusion  Endocrine:  No polyuria, no polydipsia       Medications      Reviewed in EMR     Allergies     Penicillins      Family History       Negative for Kidney Disease    Social History      Social History     Socioeconomic History    Marital status:    Tobacco Use    Smoking status: Never    Smokeless tobacco: Never   Vaping Use    Vaping Use: Never used   Substance and Sexual Activity    Alcohol use: Yes     Comment: wine with dinner    Drug use: Never       Physical Exam Blood pressure (!) 115/58, pulse 68, temperature 97.8 °F (36.6 °C), temperature source Oral, resp. rate 15, height 5' 5\" (1.651 m), weight 105 lb 13.1 oz (48 kg), SpO2 98 %, not currently breastfeeding. General:  NAD  HEENT:  PERRL, EOMI  Neck:  Supple, normal range of movement  Chest:  CTAB, good respiratory effort, good air movement  CV:  Regular, no rub   Abdomen:  NTND, soft, +BS, no hepatosplenomegaly  Extremities:  No peripheral edema  Neurological:  Moving all four extremities, CN II-XII grossly intact  Lymphatics:  No palpable lymph nodes  Skin:  No rash, no jaundice  Psychiatric:  Alert, pleasant affect    Data     Recent Labs     10/03/22  0723   WBC 8.0   HGB 11.3*   HCT 33.0*   MCV 96.1        Recent Labs     10/03/22  0723 10/03/22  1215   * 131*   K 3.9 3.7   CL 99 96*   CO2 23 24   GLUCOSE 86 106*   BUN 34* 39*   CREATININE 0.7 0.8   LABGLOM >60 >60   GFRAA >60 >60       Assessment:    Hyponatremia:  - New onset without acute symptoms  - BUN/Cr ratio would indicate a component of volume depletion contributing to ADH stimulation despite florinef     Hypertension with orthostatic hypotension:  - Patient says she is making progress with therapy, does have brief lightheadedness with standing. On midodrine and florinef    Acute CVA:  - In rehab, appears to be making progress    Plan:    Advised liberal sodium diet and agree with added salt  No evidence of polydipsia  Medications reviewed  Will check urine for sodium and osmolarity, follow labs and check serum uric acid.   If tests confirm volume depletion and she is not able to get enough salt in her diet we would need a volume challenge   Follow closely and expand work up as indicated     Thank you for asking us to participate in the management of your patient, please do not hesitate to contact me for any concerns regarding my recommendations as outlined above.    -----------------------------  Jyo Hernandez M.D.   [facility]

## 2022-10-03 NOTE — PROGRESS NOTES
Physical Therapy  Facility/Department: [facility]  Rehabilitation Physical Therapy Treatment Note    NAME: Edy Garvin  : 1940 (81 y.o.)  MRN: [id number]  CODE STATUS: Full Code    Date of Service: 10/3/22       Restrictions:  Restrictions/Precautions: Up as Tolerated; Fall Risk;General Precautions     SUBJECTIVE  Subjective  Subjective: Pt in bed, agreeable to PT  Pain: Pt does not indicate pain during session    OBJECTIVE  Cognition  Overall Cognitive Status: Exceptions  Arousal/Alertness: Appropriate responses to stimuli  Following Commands: Follows one step commands with increased time; Follows one step commands with repetition  Attention Span: Appears intact  Memory: Decreased recall of recent events  Safety Judgement: Decreased awareness of need for assistance;Decreased awareness of need for safety  Problem Solving: Assistance required to generate solutions;Assistance required to implement solutions;Assistance required to identify errors made;Assistance required to correct errors made  Insights: Decreased awareness of deficits  Initiation: Requires cues for some  Sequencing: Requires cues for some  Cognition Comment: difficulties with motor planning and initiation, benefitting from verbal and tactile cuing to initiate and execute  Orientation  Overall Orientation Status: Within Functional Limits  Orientation Level: Oriented X4    First session Functional Mobility  Bed Mobility  Overall Assistance Level: Maximum Assistance; Requires x 2 Assistance  Additional Factors: Increased time to complete; With handrails;Verbal cues; Set-up; Head of bed raised  Supine to Sit  Assistance Level: Maximum assistance; Requires x 2 assistance  Skilled Clinical Factors: HOB elevated  Scooting  Assistance Level: Maximum assistance; Requires x 2 assistance  Skilled Clinical Factors: Req assist for wt shifts and cues for sequencing  Balance  Sitting Balance: Minimal assistance (CGA-Patrica)  Standing Balance  Time: Pt performed multiple standing attempts during session to address standing strength/endurance as well as midline/kinesthesia. Pt grossly req MaxAx2 to perform sit to stand t/f and maintain static stance w cues from PT provided at glute max and quad of RLE, with cues of OT provided at UE and trunk. Pt able to attempt multiple stands to work on these skills, but req frequent seated rest breaks d/t fatigue. Activity: Pt continues to present w/ posterior lean and lateral lean towards right during stance. Req frequent cues and visual/tactile feedback to correct. Pt responds better to verbal and tactile cues vs visual this date. Sit to Stand  Assistance Level: Maximum assistance; Requires x 2 assistance  Skilled Clinical Factors: from bed to stedy, from w/c to stedy, from w/c to parallel bars  Stand to Sit  Assistance Level: Maximum assistance; Requires x 2 assistance  Sit Pivot  Assistance Level: Moderate assistance  Skilled Clinical Factors: Pt req modAx1 to complete sit pivot t/f from w/c to chair to w/c. Pt req set up assist and explaination of head/trunk positioning for efficient transfers (t/f towards stronger side/L side, position head/trunk in the opposite direction, etc). Pt req support/cues at bilat glues and knee block for RLE for t/f. Req cues for UE/LE placement as well. Second Session mobility:   - Pt in chair at start of session  - pt performed sit to stand to stedy w/ ModAx1   - Pt then req 3 min rest break in elevated seated position on stedy.   - Pt performed 2 sets of 5 modified sit to stands, with emphasis on equal use of BUE and glutes. Pt req 3 min rest break between sets. - Pt then performed seated balance in elevated seated position on stedy by lifting BUE off of pull bar and maintaining midline/trunk stability for 5 seconds. Pt performed this for 2x5, with a 3 min rest break between sets.    - Pt was then t/f to bed (totalx1) and perfomed stand to sit w/ ModAx1, sit to supine w/ ModAx1, and scooting w/ total assist.   - Pt left in bed at end of session. ASSESSMENT/PROGRESS TOWARDS GOALS  Vital Signs  Heart Rate: 68  BP: (!) 115/58  BP Location: Left upper arm  MAP (Calculated): 77  SpO2: 98 %  O2 Device: None (Room air)    Assessment  First Session Assessment: Pt seen this AM for PT/OT cotx to improve safe/efficient mobility this date. Pt continues to demo deficits in standing strength/endurance and mobility this date, shows improvements in seated mobility such as seated balance (grossly CGA-MinAx1 w/ verbal cues for proper trunk positioning) and sit pivot t/f (grossly ModAx1). Pt will cont to benefit from cotx in ARU setting to improve level of indep and level of function towards goals. Pt will benefit from d/c to SNF at d/c. Second Session Assessment: Pt seen for PT tx this PM with goals to focus on sit to stand t/f and standing strength. Pt was able to perform sit to stand to stedy this PM w/ ModAx1, with cues for proper set up and glute activation. Pt was able to tolerate modified standing trunk stabilization exercise and modified sit to stands (both seated on elevated platform of stedy) w/ Min-ModAx1. Pt will cont to benefit from skilled PT services during ARU stay. Activity Tolerance: Patient limited by endurance; Patient limited by fatigue  Discharge Recommendations: Subacute/Skilled Nursing Facility  PT Equipment Recommendations  Equipment Needed: Yes  Mobility Devices: Wheelchair  Wheelchair: Standard  Other: if patient does not go to a facility she likely will need wheelchair upon discharge. continue to assess. Goals  Patient Goals   Patient Goals : Patient states, \"To be able to get up without being told\" (helped as pt pushes backwards)  Short Term Goals  Time Frame for Short Term Goals: 9/23/2022  Short Term Goal 1: Patient demonstrates  sitting  midline 15 minutes wtihout posterior lob 9/17 mod assist to maintain seated balance.   Short Term Goal 2: Patient demonstrates bed<>chair with LRAD with mod assist of 1. 9/17 dependent with nathaniel  Short Term Goal 3: Patient demonstrates walking 10 feet or greater wtih mod assist of 2 LRAD. 9/17 not completed  Short Term Goal 4: Patient demonstrates indep in rolling L<>R and mod assist sup to sit 9/17 max assist x 2  Short Term Goal 5: Patient demonstrates min assist in [facility] propulsion 150 feet. 9/17 not completed  Long Term Goals  Time Frame for Long Term Goals : 10/7/2022  Long Term Goal 1: Patient demonstrates  Modified indep in rolling and sup<>Sit. Long Term Goal 2: Patient demonstrates   transfers sit<>stand with FWW and min assist.  Long Term Goal 3: Patient demonstrates bed<>chair min A. Long Term Goal 4: Patient demonstrates gait wtih transfers  and short distances 50 feet or greater with min assist  Long Term Goal 5: Patient demonstrates up and down 2 steps or greater. with mod assist of 1  Additional Goals?: Yes  Long term goal 6: Patient demonstrates indep WC propulsion iwth L/R turns indep   150 feet. Long term goal 7: Patient demonstrates stoop to recover object from floor with reacher  with mod assist.  Long term goal 8: Patients family demonstrates ability to  assist patient in and out of car wtih mod assist.    PLAN OF CARE/SAFETY  Physcial Therapy Plan  General Plan: 5-7 times per week  Plan weeks: 21 days  Current Treatment Recommendations: Strengthening;ROM;Balance training;Functional mobility training;Transfer training;Neuromuscular re-education;Stair training;Gait training; Wheelchair mobility training; Endurance training; Therapeutic activities; Positioning;Equipment evaluation, education, & procurement;Home exercise program;Safety education & training;Patient/Caregiver education & training  Safety Devices  Type of Devices: All fall risk precautions in place;Call light within reach; Chair alarm in place; Left in chair;Nurse notified;Gait belt;Patient at risk for falls    EDUCATION  Education  Education Given To: Patient  Education Provided: Role of Therapy;Plan of Care;Transfer Training; Fall Prevention Strategies;Precautions; Safety;Equipment;Visual Perceptual Function  Education Provided Comments: Pt educated on efficient transfer techniques such as head/hip connection and importance of trunk location/position for LE mobility. Pt also educated on prioritizing skills such as sit pivot t/f for improved indep.   Education Method: Verbal;Demonstration  Barriers to Learning: Cognition  Education Outcome: Verbalized understanding;Continued education needed        Therapy Time   Individual Concurrent Group Co-treatment   Time In      1030   Time Out      1130   Minutes      60     Timed Code Treatment Minutes: 60 Minutes   Second Session Therapy Time:   Individual Concurrent Group Co-treatment   Time In 1330        Time Out 1400        Minutes 30          Timed Code Treatment Minutes:  30    Total Treatment Minutes:  [address] [place], [place], 10/03/22 at 2:46 PM

## 2022-10-03 NOTE — PLAN OF CARE
Problem: Skin/Tissue Integrity  Goal: Absence of new skin breakdown  Description: 1. Monitor for areas of redness and/or skin breakdown  2. Assess vascular access sites hourly  3. Every 4-6 hours minimum:  Change oxygen saturation probe site  4. Every 4-6 hours:  If on nasal continuous positive airway pressure, respiratory therapy assess nares and determine need for appliance change or resting period.   10/2/2022 2209 by Vinny Cotter RN  Outcome: Progressing  10/2/2022 1857 by Carol Hudson RN  Outcome: Progressing

## 2022-10-04 LAB
ALBUMIN SERPL-MCNC: 4.1 G/DL (ref 3.4–5)
ANION GAP SERPL CALCULATED.3IONS-SCNC: 14 MMOL/L (ref 3–16)
BUN BLDV-MCNC: 33 MG/DL (ref 7–20)
CALCIUM SERPL-MCNC: 9.6 MG/DL (ref 8.3–10.6)
CHLORIDE BLD-SCNC: 101 MMOL/L (ref 99–110)
CO2: 23 MMOL/L (ref 21–32)
CREAT SERPL-MCNC: 0.8 MG/DL (ref 0.6–1.2)
GFR AFRICAN AMERICAN: >60
GFR NON-AFRICAN AMERICAN: >60
GLUCOSE BLD-MCNC: 88 MG/DL (ref 70–99)
OSMOLALITY URINE: 583 MOSM/KG (ref 390–1070)
PHOSPHORUS: 4.5 MG/DL (ref 2.5–4.9)
POTASSIUM REFLEX MAGNESIUM: 4.1 MMOL/L (ref 3.5–5.1)
POTASSIUM SERPL-SCNC: 4.1 MMOL/L (ref 3.5–5.1)
SODIUM BLD-SCNC: 138 MMOL/L (ref 136–145)
SODIUM URINE: 71 MMOL/L
URIC ACID, SERUM: 5.8 MG/DL (ref 2.6–6)

## 2022-10-04 PROCEDURE — 97535 SELF CARE MNGMENT TRAINING: CPT

## 2022-10-04 PROCEDURE — 6370000000 HC RX 637 (ALT 250 FOR IP): Performed by: STUDENT IN AN ORGANIZED HEALTH CARE EDUCATION/TRAINING PROGRAM

## 2022-10-04 PROCEDURE — 97130 THER IVNTJ EA ADDL 15 MIN: CPT

## 2022-10-04 PROCEDURE — 97112 NEUROMUSCULAR REEDUCATION: CPT

## 2022-10-04 PROCEDURE — 83935 ASSAY OF URINE OSMOLALITY: CPT

## 2022-10-04 PROCEDURE — 6370000000 HC RX 637 (ALT 250 FOR IP): Performed by: NURSE PRACTITIONER

## 2022-10-04 PROCEDURE — 84300 ASSAY OF URINE SODIUM: CPT

## 2022-10-04 PROCEDURE — 80069 RENAL FUNCTION PANEL: CPT

## 2022-10-04 PROCEDURE — 1280000000 HC REHAB R&B

## 2022-10-04 PROCEDURE — 97530 THERAPEUTIC ACTIVITIES: CPT

## 2022-10-04 PROCEDURE — 6360000002 HC RX W HCPCS: Performed by: STUDENT IN AN ORGANIZED HEALTH CARE EDUCATION/TRAINING PROGRAM

## 2022-10-04 PROCEDURE — 84550 ASSAY OF BLOOD/URIC ACID: CPT

## 2022-10-04 PROCEDURE — 36415 COLL VENOUS BLD VENIPUNCTURE: CPT

## 2022-10-04 PROCEDURE — 97129 THER IVNTJ 1ST 15 MIN: CPT

## 2022-10-04 RX ORDER — MIDODRINE HYDROCHLORIDE 5 MG/1
5 TABLET ORAL
Status: DISCONTINUED | OUTPATIENT
Start: 2022-10-04 | End: 2022-10-08 | Stop reason: HOSPADM

## 2022-10-04 RX ADMIN — ASPIRIN 81 MG 81 MG: 81 TABLET ORAL at 08:38

## 2022-10-04 RX ADMIN — HEPARIN SODIUM 5000 UNITS: 5000 INJECTION INTRAVENOUS; SUBCUTANEOUS at 20:51

## 2022-10-04 RX ADMIN — HEPARIN SODIUM 5000 UNITS: 5000 INJECTION INTRAVENOUS; SUBCUTANEOUS at 06:11

## 2022-10-04 RX ADMIN — Medication 5 MG: at 20:51

## 2022-10-04 RX ADMIN — FERROUS SULFATE TAB 325 MG (65 MG ELEMENTAL FE) 325 MG: 325 (65 FE) TAB at 08:38

## 2022-10-04 RX ADMIN — DONEPEZIL HYDROCHLORIDE 10 MG: 5 TABLET, FILM COATED ORAL at 20:51

## 2022-10-04 RX ADMIN — ATORVASTATIN CALCIUM 80 MG: 80 TABLET, FILM COATED ORAL at 20:51

## 2022-10-04 RX ADMIN — FOLIC ACID 1 MG: 1 TABLET ORAL at 08:37

## 2022-10-04 RX ADMIN — CLOPIDOGREL BISULFATE 75 MG: 75 TABLET ORAL at 08:37

## 2022-10-04 RX ADMIN — CETIRIZINE HYDROCHLORIDE 5 MG: 5 TABLET, FILM COATED ORAL at 08:38

## 2022-10-04 RX ADMIN — HEPARIN SODIUM 5000 UNITS: 5000 INJECTION INTRAVENOUS; SUBCUTANEOUS at 13:17

## 2022-10-04 RX ADMIN — TROSPIUM CHLORIDE 20 MG: 20 TABLET, FILM COATED ORAL at 20:51

## 2022-10-04 RX ADMIN — TRAZODONE HYDROCHLORIDE 50 MG: 50 TABLET ORAL at 20:50

## 2022-10-04 RX ADMIN — POTASSIUM CHLORIDE 10 MEQ: 750 TABLET, EXTENDED RELEASE ORAL at 08:38

## 2022-10-04 RX ADMIN — CYANOCOBALAMIN TAB 500 MCG 1000 MCG: 500 TAB at 08:37

## 2022-10-04 RX ADMIN — Medication 5000 UNITS: at 08:38

## 2022-10-04 RX ADMIN — FLUDROCORTISONE ACETATE 0.1 MG: 0.1 TABLET ORAL at 08:38

## 2022-10-04 RX ADMIN — MIDODRINE HYDROCHLORIDE 5 MG: 5 TABLET ORAL at 13:16

## 2022-10-04 ASSESSMENT — PAIN SCALES - GENERAL
PAINLEVEL_OUTOF10: 0

## 2022-10-04 NOTE — PROGRESS NOTES
Aram Thomas  10/4/2022  [id number]    Chief Complaint: Acute CVA (cerebrovascular accident) [facility])    Subjective:   No acute events overnight. Today Randa Frazier is seen in her room. She reports that she is feeling well and denies acute complaints at this time. ROS: No CP, SOB, dyspnea    Objective:  Patient Vitals for the past 24 hrs:   BP Temp Temp src Pulse Resp SpO2   10/04/22 1312 (!) 98/55 -- -- 86 -- 99 %   10/04/22 1305 (!) 104/59 -- -- 74 -- 100 %   10/04/22 0834 100/62 97.3 °F (36.3 °C) Oral 82 16 100 %   10/04/22 0600 (!) 175/78 -- -- 63 -- --   10/03/22 2200 (!) 157/70 97.7 °F (36.5 °C) Oral 69 16 98 %     Gen: No distress, pleasant. Seated up in chair  HEENT: Normocephalic, atraumatic. CV: RRR  Resp: No respiratory distress. Lungs CTAB  Abd: Soft, nondistended  Ext: No edema. Neuro: Alert, oriented, appropriately interactive. Delayed processing  Psych: appropriate mood and affect    Laboratory data: Available via EMR. Therapy progress:    PT    Supine to Sit: Substantial/maximal assistance  Sit to Supine: Substantial/maximal assistance   Sit to Stand: Dependent  Chair/Bed to Chair Transfer: Independent  Car Transfer:    Ambulation 10 ft:    Ambulation 50 ft:    Ambulation 150 ft:    Stairs - 1 Step:    Stairs - 4 Step:    Stairs - 12 Step:      OT    Eating: Setup or clean-up assistance  Oral Hygiene: Partial/moderate assistance  Bathing: Dependent  Upper Body Dressing: Substantial/maximal assistance  Lower Body Dressing: Dependent  Toilet Transfer: Dependent  Toilet Hygiene: Dependent    Speech Therapy         Body mass index is 17.61 kg/m².     Assessment:  Patient Active Problem List   Diagnosis    Left knee pain    Left patella fracture    Contusion of left knee    Orthostatic hypotension    ARF (acute renal failure) (HCC)    Anemia    Debility    Acute CVA (cerebrovascular accident) ([address]    Nonrheumatic aortic valve insufficiency       Assessment and Plan:  Aram Thomas is an [de-identified]year old female with a past medical history significant for orthostatic hypotension, anemia, and memory loss who presented to [facility] on 9/11/22 with recurrent falls and right sided weakness, found to have acute left CVA. She was admitted to [facility] on 9/15/22 due to functional deficits below her baseline. 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  - with right hemiparesis  - secondary stroke prevention with asa, plavix, statin  - PT, OT, ST    Confusion, intermittent  - UA likely not clean catch, obtain repeat sample    Dysphagia  - ST    Hyponatremia  - Nephrology consulted, appreciate assistance     Autonomic Dysfunction  Orthostatic hypotension  - florinef, midodrine  - Cardiology following, appreciate assistance    Constipation, improved  - continue bowel regimen     Dementia   - possible PD  - donepezil     Overactive Bladder  - home regimen: Myrbetriq, tolterodine  - trospium while inpatient      Bowels: Schedule stool softener. Follow bowel movements. Enema or suppository if needed. Bladder: Check PVR x 3. [facility] if PVR > 200ml or if any volume is > 500 ml. Sleep: Trazodone provided prn.       PPX  DVT: heparin  GI: not indicated    Services: SNF   EDOD: 10/8/22    Electronically signed by Clive Clemente MD on 10/4/2022 at 3:24 PM

## 2022-10-04 NOTE — PROGRESS NOTES
Progress Note    HISTORY     CC:   Acute CVA              We are following for hyponatremia      Subjective/   HPI:  Dizzy today with therapy. BP dropping. Sodium has improved with increased sodium intake. Doing ok.     ROS:  Constitutional:  No fevers, No Chills, + weakness  Cardiovascular:  No palpations, no edema  Respiratory:  No wheezing, no cough  Skin:  No rash, no itching  :  No hematuria, no dysuria     Social Hx:  No Family at the bedside     Past Medical and Surgical History:  - Reviewed, no changes     EXAM       Objective/     Vitals:    10/04/22 0600 10/04/22 0834 10/04/22 1305 10/04/22 1312   BP: (!) 175/78 100/62 (!) 104/59 (!) 98/55   Pulse: 63 82 74 86   Resp:  16     Temp:  97.3 °F (36.3 °C)     TempSrc:  Oral     SpO2:  100% 100% 99%   Weight:       Height:         24HR INTAKE/OUTPUT:    Intake/Output Summary (Last 24 hours) at 10/4/2022 1319  Last data filed at 10/4/2022 1243  Gross per 24 hour   Intake 980 ml   Output 300 ml   Net 680 ml     Constitutional:  Alert, awake, no apparent distress  Eyes:  Pupils reactive, sclera clear   Neck:  Normal thyroid, no masses   Cardiovascular:  Regular, no rub  Respiratory:  No distress, no wheezing  Psychiatry:  Appropriate mood/affect, alert  Abdomen: +bs, soft, nt, no masses   Musculoskeletal: No LE edema, no clubbing   Lymphatics:  No LAD in neck, no supraclavicular nodes       MEDICAL DECISION MAKING       Data/  Recent Labs     10/03/22  0723   WBC 8.0   HGB 11.3*   HCT 33.0*   MCV 96.1        Recent Labs     10/03/22  0723 10/03/22  1215 10/04/22  0630   * 131* 138   K 3.9 3.7 4.1  4.1   CL 99 96* 101   CO2 23 24 23   GLUCOSE 86 106* 88   PHOS  --   --  4.5   BUN 34* 39* 33*   CREATININE 0.7 0.8 0.8   LABGLOM >60 >60 >60   GFRAA >60 >60 >60       Assessment/     Hyponatremia:  - New onset without acute symptoms  - BUN/Cr ratio would indicate a component of volume depletion contributing to ADH stimulation despite florinef - Sodium improved with 138 with additional sodium in the diet      Hypertension with orthostatic hypotension:  - Patient says she is making progress with therapy, does have brief lightheadedness with standing.   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    -----------------------------  Iesha Ho M.D.   [facility]

## 2022-10-04 NOTE — PROGRESS NOTES
Comprehensive Nutrition Assessment    Type and Reason for Visit:  Reassess    Nutrition Recommendations/Plan:   Continue regular diet, salt packets w/ meal per nephrology   Continue ensure TID  Continue to monitor weights, significant wt loss noted   Monitor nutrition adequacy, pertinent labs, bowel habits, wt changes, and clinical progress     Malnutrition Assessment:  Malnutrition Status: Moderate malnutrition (10/04/22 1144)    Context:  Acute Illness     Findings of the 6 clinical characteristics of malnutrition:  Energy Intake:  Mild decrease in energy intake (Comment)  Weight Loss:  Greater than 2% over 1 week     Muscle Mass Loss: Moderate muscle mass loss Clavicles (pectoralis & deltoids), Temples (temporalis)    Nutrition Assessment:    Follow up: Nephrology following and recommend a salt packet w/ each meal and increased intake of salt. Pt continues w/ varying PO intakes, % of meals. Pt reports eating \"something\" with each meal, but not always finishing her plate. RD encouraged pt to add in extra protein to avoid loss of LBM. Pt compliant. Significant wt loss noted, pt was 115 lb on 9/20 and currently 105 lb (-8% wt change x 2 weeks). Pt drinking 2-3 ensure per day, will continue. Continue to encourage PO intake, will continue to monitor. Nutrition Related Findings:    + BM yesterday. Labs reviewed. Wound Type: Skin Tears       Current Nutrition Intake & Therapies:    Average Meal Intake: 26-50%, 51-75%, %  Average Supplements Intake: 51-75%, %  ADULT DIET; Regular  ADULT ORAL NUTRITION SUPPLEMENT; Breakfast, Dinner, Lunch; Standard High Calorie/High Protein Oral Supplement    Anthropometric Measures:  Height: 5' 5\" (165.1 cm)  Ideal Body Weight (IBW): 125 lbs (57 kg)       Current Body Weight: 105 lb (47.6 kg), 86.4 % IBW.  Weight Source: Bed Scale  Current BMI (kg/m2): 17.5  Usual Body Weight: 110 lb (49.9 kg)  % Weight Change (Calculated): -1.8  Weight Adjustment For: No Adjustment                 BMI Categories: Underweight (BMI less than 22) age over 72    Estimated Daily Nutrient Needs:  Energy Requirements Based On: Kcal/kg (25-30 kcals/kg)  Weight Used for Energy Requirements: Ideal (57 kg)  Energy (kcal/day): 7824-4326  Weight Used for Protein Requirements: Ideal (1-1.2 g/kg)  Protein (g/day): 57-68 g  Method Used for Fluid Requirements: 1 ml/kcal    Nutrition Diagnosis:   Moderate malnutrition related to inadequate protein-energy intake as evidenced by intake 26-50%, intake 51-75%, poor intake prior to admission, weight loss, BMI, weight loss greater than or equal to 2% in 1 week, Criteria as identified in malnutrition assessment    Nutrition Interventions:   Food and/or Nutrient Delivery: Continue Current Diet, Continue Oral Nutrition Supplement  Nutrition Education/Counseling: Education not indicated  Coordination of Nutrition Care: Continue to monitor while inpatient       Goals:  Previous Goal Met: Progressing toward Goal(s)  Goals: PO intake 50% or greater, prior to discharge       Nutrition Monitoring and Evaluation:   Behavioral-Environmental Outcomes: None Identified  Food/Nutrient Intake Outcomes: Food and Nutrient Intake, Supplement Intake  Physical Signs/Symptoms Outcomes: Biochemical Data, Chewing or Swallowing, Weight, Nutrition Focused Physical Findings    Discharge Planning:    Continue current diet, Continue Oral Nutrition Supplement     Balbina Hui MS, [address] [place] [address],   Contact: Office: [phone]; [address]: [phone]

## 2022-10-04 NOTE — PATIENT CARE CONFERENCE
[facility]  Inpatient Rehabilitation  Weekly Team Conference Note    Date: 10/4/2022  Patient Name: Cassidy Betancourt        MRN: [id number]    : 1940  (81 y.o.)  Gender: female   Referring Practitioner: Dr. Freddie Arellano  Diagnosis: Acute CVA      Interventions to be utilized toward barriers to discharge, per discipline:  [address] observed barriers to dc: Cognitive deficit, Decreased endurance, Upper extremity weakness, Lower extremity weakness, and Medical complications  Nursing interventions:Assist with ADLs, bowel and bladder management, vitals and medication management. Family Education: yes  Fall Risk:  Yes      PHYSICAL THERAPY  Physical therapy observed barriers to dc:     Baseline: pt lives alone in 1 level home with 2 FELIX. Ind with ADLs and mobility in home with AD. Current level: modA bed mobility, Isauro to modA x 2 sit pivot t/f vs CGA/TD with STEDY, mod A wc mobility, non-ambulatory at this time due to poor standing tolerance/ balance. Barriers to DC: R mode, lives alone, 2 FELIX, low BP, decreased strength, balance, activity tolerance,              Needs in order to achieve dc home/next level of care: if pt is to return home [facility], pt will have to be functioning at a SBA or better level of mobility with LRAD. Recommend SNF at d/c.       Physical therapy interventions:   Current Treatment Recommendations: Strengthening, ROM, Balance training, Functional mobility training, Transfer training, Neuromuscular re-education, Stair training, Gait training, Wheelchair mobility training, Endurance training, Therapeutic activities, Positioning, Equipment evaluation, education, & procurement, Home exercise program, Safety education & training, Patient/Caregiver education & training    PT Goals:            Short Term Goals  Time Frame for Short Term Goals: 2022  Short Term Goal 1: Patient demonstrates  sitting  midline 15 minutes wtihout posterior lob 10/04 SBA to maintain seated balance. Short Term Goal 2: Patient demonstrates bed<>chair with LRAD with mod assist of 1. 10/04 dependent with santo-stedy  Short Term Goal 3: Patient demonstrates walking 10 feet or greater wtih mod assist of 2 LRAD. 10/04 not completed  Short Term Goal 4: Patient demonstrates indep in rolling L<>R and mod assist sup to sit ; 10/04 max assist x 2  Short Term Goal 5: Patient demonstrates min assist in [facility] propulsion 150 feet. 10/04 not completed            Long Term Goals  Time Frame for Long Term Goals : 10/7/2022  Long Term Goal 1: Patient demonstrates  Modified indep in rolling and sup<>Sit. Long Term Goal 2: Patient demonstrates   transfers sit<>stand with FWW and min assist.  Long Term Goal 3: Patient demonstrates bed<>chair min A. Long Term Goal 4: Patient demonstrates gait wtih transfers  and short distances 50 feet or greater with min assist  Long Term Goal 5: Patient demonstrates up and down 2 steps or greater. with mod assist of 1  Additional Goals?: Yes  Long term goal 6: Patient demonstrates indep WC propulsion iwth L/R turns indep   150 feet. Long term goal 7: Patient demonstrates stoop to recover object from floor with reacher  with mod assist.  Long term goal 8: Patients family demonstrates ability to  assist patient in and out of car wtih mod assist.    PT Assessment:   Assessment: Pt seen in am for PT/OT co-tx secondary to complexity of condition, decreased strength, balance and activity tolerance. Pt benefits from x 2 skilled hands to provide increased cues and feedback with mobility and promote improved performance. Co-tx session focused on promotion of anterior WS to improve performance and sequence with t/f, as well as porgression of seated balance and functional t/f. Pt requires grossly modA for bed mobility, Isauro/modA x 2 for sit pivot t/f and up to modA/maxA x 2 for STS from EOM with external cues to promote anterior WS.  Pt is limited by decreased activity tolerance, strength, balance and decreased motor planning/sequencing. Pt will benefit from continued skilled PT in ARU to address above deficits, will continue to progress mobility as tolerated. Activity Tolerance: Patient limited by endurance; Patient limited by fatigue  Discharge Recommendations: Subacute/Skilled Nursing Facility  Recommendation:   PT Equipment Recommendations  Equipment Needed: Yes  Mobility Devices: Wheelchair  Wheelchair: Standard  Other: if patient does not go to a facility she likely will need wheelchair upon discharge. continue to assess.                OCCUPATIONAL THERAPY  Occupational therapy observed barriers to dc:    Baseline: mod I ADLs and transfers with RW, lives alone   Current level: min A for UB ADLs, max A for LB ADLs, mod A sit pivots, max A x2 for standing balance,    Barriers to DC: lack of assist at home, R mode, poor strength, poor activity tolerance, poor sitting balance   Needs in order to achieve dc home/next level of care: min A sit pivot, SNF, set-up UB ADLs, mod A LB ADLs    Occupational Therapy interventions:  Current Treatment Recommendations: Strengthening, Balance training, Functional mobility training, Self-Care / ADL, Endurance training, Neuromuscular re-education, Positioning, Modalities, Equipment evaluation, education, & procurement, Patient/Caregiver education & training, Safety education & training, Home management training, Coordination training, Sensory integraion    OT Goals:  Patient Goals   Patient goals : \"to get going and get better\"  Short Term Goals  Time Frame for Short Term Goals: 9/28/22 (14 days)  Short Term Goal 1: Pt will complete UB dressing with mod A- MET 10/3  Short Term Goal 2: Pt will complete simple grooming task supported in w/c with set-up A- goal progressing NOT MET  Short Term Goal 3: Pt will complete functional transfers with mod A x1 using LRAD- partially MET sit pivot 10/3 mod A, all other transfers max A x2  Short Term Goal 4: Pt will complete toileting with max A- goal NOT MET  Long Term Goals  Time Frame for Long Term Goals : 10/05/22 (21 days), extend goals to 10/08  Long Term Goal 1: Pt will complete toilet transfer with min A  Long Term Goal 2: Pt will complete LB dressing with mod A  Long Term Goal 3: Pt will complete bathing with mod A  Long Term Goal 4: Pt will incorporate RUE into ADLs 75% of trials with minimal VC in order to increase functional independence with ADLs    OT Assessment:  Assessment  Assessment: Pt tolerated OT/PT cotx session well with no reporting of dizziness or lightheadedness. Pt is progressing slowly towards goals; pt continuing to required max A x1 and min A x1 for sit<>stands and standing tolerance. Pt rex to perform a sit pivot transfer with mod A this date. Pt continuing to require max A of 1-2 persons for LB ADLs secondary to poor dyanmic sitting and standing balance. Pt demo's posterior push back in standing, R and posteiror LOB in sitting, and poor body awareness and COG midline awareness in space. Pt showed progress with staying in COG after compeleting anterior weight shfiting exercise. PT facilitated standing at hips while OT faciltated at knees and BUE on dowel ana for keeping in COG and atnerior weight shifting. During sit pivots pt completed activity with PT assisting with hips for scooting and OT facilating at knees and anterior leaning. Pt continues to benefit from 2 skilled therapists. Recommending d/c to SNF secondary to current burden of care and safety needs. Defer DME to next level of care. Activity Tolerance: Patient limited by endurance; Patient limited by fatigue  Discharge Recommendations: Subacute/Skilled Nursing Facility  Factors Affecting Discharge: Pt lives alone and currently require high level of assistance due to new deficits from CVA  OT Equipment Recommendations  Other: CTA pending progress, may defer DME rec to d/c facility         SPEECH THERAPY Speech therapy observed barriers to dc:    Baseline: pt lived indep in house, manages own meds/finances/household tasks   Current level: mild cognitive linguistic deficit    Barriers to DC: physical limitations   Needs in order to achieve dc home/next level of care: assist with meds/finances, carryover of compensatory strategies, 24 hour assist d/t physical limitations     Speech Therapy interventions:  Dysphagia: all goals met   Speech/Language/Cognition: Compensatory strategy training and carryover, recall/STM, problem solving, reasoning, exec function, thought organization, attention. Dysphagia Goals:  Timeframe for Long-term Goals: 7 days (09/22/22)  Goal 1: Pt will tolerate safest and least restrictive diet without any clinical s/s of aspiration. 09/21: goal met  Short-term Goals  Timeframe for Short-term Goals: 5 days (09/20/22)  Dysphagia Goals:   Goal 1. The patient will tolerate recommended diet without observed clinical signs of aspiration. 09/21: goal met  Goal 2. The patient/caregiver will demonstrate understanding of compensatory strategies for improved swallowing safety. 09/22: goal met    Speech/Language/Cog Goals:  Time Frame for Long Term Goals: 21 days (10/06/22)  Goal 1: Pt will improve overall cognitive linguistic skills to increase safety and independence to return to PLOF. 10/04: progressing, ongoing   Short Term Goals  Time Frame for Short Term Goals: 18 days (10/03/22)  Goal 1: Pt will complete recall tasks with 90% acc given min cues. 10/04: progressing, ongoing   Goal 2: Pt will complete higher level executive function tasks (meds, math, money, time, etc) with 95% acc given min cues. 10/04: progressing, ongoing   Time Frame for Short Term Goals: 18 days (10/03/22)    ST Assessment:  Pt pleasant and cooperative, agreeable to participate. Pt continues to benefit from increased repetition of stimuli for improved recall and comprehension.  Pt will occasionally state \"I keep losing my concentration. \" Pt completed counting coins and bills with 80% acc indep improving to 100% acc given min cues, and understanding medicine/sorting pills with 100% acc indep. Pt is making good progress towards cognitive goals, however will recommend 24 hour assist d/t physical limitations. Pt remains motivated to improve. NUTRITION  Weight: 105 lb 13.1 oz (48 kg) / Body mass index is 17.61 kg/m². Diet Order: ADULT DIET; Regular  ADULT ORAL NUTRITION SUPPLEMENT; Breakfast, Dinner, Lunch; Standard High Calorie/High Protein Oral Supplement  PO Meals Eaten (%): 1 - 25%  Education: Not appropriate      CASE MANAGEMENT  Assessment: [de-identified] yr old female. Dx:Acute CVA (cerebrovascular accident). Nephrology following. Independent prior level of function with rolling walker. Lives in a one level home with a 2 step entry that has a tub/shower unit with grab bars. Therapy recommendations are Subacute/Skilled Nursing Facility. CM spoke with daughter about therapy recs and provided a SNF list. DME defer to SNF. Interdisciplinary Goals:   1.) Pt will indep implement use of compensatory strategies for improved recall across all disciplines  2.) Pt completed sit pivot toilet transfer with mod A x 1  3.)  4.)  5.)      [x]  Family Training discussed at conference and to be scheduled. -N/A      Discharge Plan   Estimated discharge date: 10/08/22  Destination: skilled nursing facility  Pass:No  Services at Discharge: SNF Physical Therapy, Occupational Therapy, Speech Therapy, and Nursing   Equipment at Discharge: TBD    Team Members Present at Conference:  : Miroslava Rangel RN    Occupational Therapist: Susan Wu, OTR/L  Physical Therapist: John Nagy PT, DPT  Speech Therapist: Nabila Tam 115 SLP   Carlie Schafer RN  Dietician: Crys Rosenberg, [address], LD  : Tyrese Whitley, OTR/L  Psychiatry: N/A    Family members present at conference: No      I led this team conference and I approve the established interdisciplinary plan of care as documented within the medical record of [facility].     MD: Electronically signed by Gisselle English MD on 10/5/2022 at 12:43 PM

## 2022-10-04 NOTE — PROGRESS NOTES
Occupational Therapy  Facility/Department: [address] [place] [address]  Rehabilitation Occupational Therapy Daily Treatment Note    Date: 10/4/22  Patient Name: Marisel Escobedo       Room: 4647/1091-85  MRN: [id number]  Account: [de-identified]   : 1940  (81 y.o.) Gender: female     Past Medical History:  has a past medical history of ARF (acute renal failure) ([facility] [address] Past Surgical History:   has a past surgical history that includes joint replacement; Tonsillectomy; Intracapsular cataract extraction (Right, 2019); and Intracapsular cataract extraction (Left, 2019). Restrictions  Restrictions/Precautions: Up as Tolerated; Fall Risk;General Precautions  Other position/activity restrictions: knee high KENN hose; up as tolerated; Notify physician for pulse less than 50 or greater than 120, respiratory rate less than 12 or greater than 25, oral temperature greater than 101.3 F (97.7 C), systolic BP less than 90 or greater than 144, diastolic BP less than 50 or greater than 100. Subjective  Subjective: pt agreeable to OT/PT cotreatment. pt pleasant and agreeable throughout session. pt hesitant to standing activities reporting she often feels like she is falling. Restrictions/Precautions: Up as Tolerated; Fall Risk;General Precautions    Objective     Orientation  Overall Orientation Status: Within Functional Limits  Orientation Level: Oriented X4         ADL  Lower Extremity Dressing  Assistance Level: Maximum assistance  Skilled Clinical Factors: donning/doffing brief and pants sitting EOB with max A x1 for standing for pants management with max A x1 and min A x1  Putting On/Taking Off Footwear  Assistance Level: Maximum assistance;Dependent  Skilled Clinical Factors: Pt was dependent to Max A for donning/doffing shoes and socks          Functional Mobility  Device: Wheelchair  Activity: To/From therapy gym  Assistance Level: Dependent  Skilled Clinical Factors: max A for w/c mobility, max A x1 for santo RIBEIRO to recliner  Bed Mobility  Overall Assistance Level: Moderate Assistance  Additional Factors: Increased time to complete;Verbal cues  Roll Left  Assistance Level: Minimal assistance  Roll Right  Assistance Level: Contact guard assist  Supine to Sit  Assistance Level: Moderate assistance  Sit to Stand  Assistance Level: Requires x 2 assistance; Moderate assistance;Maximum assistance;Dependent  Skilled Clinical Factors: max A x1 for sit<> STEDY x1 and max A x1 for sit><stand for donning pants  Sit Pivot  Assistance Level: Moderate assistance  Skilled Clinical Factors: mod A  sit pivot with improved safety, vc /tc for hand positioning. x 6 w/c to mat   OT Exercises  Exercise Treatment: Pt completed pushing dowel ana to focus on anterior weightshfiting during sit<>stand motion x7 with mod Ax2 due to posterior leaning. Pt completed sit pivots from w/c <> high/low mat x6 with min A x2  Postural Correction Exercises: verbal and tactile cues at anterior/posterior shoulders     Assessment  Assessment  Assessment: Pt tolerated OT/PT cotx session well with no reporting of dizziness or lightheadedness. Pt is progressing slowly towards goals; pt continuing to required max A x1 and min A x1 for sit<>stands and standing tolerance. Pt rex to perform a sit pivot transfer with mod A this date. Pt continuing to require max A of 1-2 persons for LB ADLs secondary to poor dyanmic sitting and standing balance. Pt demo's posterior push back in standing, R and posteiror LOB in sitting, and poor body awareness and COG midline awareness in space. Pt showed progress with staying in COG after compeleting anterior weight shfiting exercise. PT facilitated standing at hips while OT faciltated at knees and BUE on dowel ana for keeping in COG and atnerior weight shifting. During sit pivots pt completed activity with PT assisting with hips for scooting and OT facilating at knees and anterior leaning.  Pt continues to benefit from 2 skilled therapists. Recommending d/c to SNF secondary to current burden of care and safety needs. Defer DME to next level of care. Activity Tolerance: Patient limited by endurance; Patient limited by fatigue  Discharge Recommendations: Subacute/Skilled Nursing Facility  Factors Affecting Discharge: Pt lives alone and currently require high level of assistance due to new deficits from CVA  OT Equipment Recommendations  Other: CTA pending progress, may defer DME rec to d/c facility  Safety Devices  Safety Devices in place: Yes  Type of devices: Call light within reach; All fall risk precautions in place; Patient at risk for falls;Gait belt;Nurse notified; Chair alarm in place; Left in chair    Patient Education  Education  Education Given To: Patient  Education Provided: Role of Therapy;Plan of Care;Precautions;Transfer Training; Fall Prevention Strategies;Cognition; Safety; Family Education;ADL Function;Mobility Training;Equipment;DME/Home Modifications; Home Exercise Program  Education Provided Comments: disease specific: body positioning in space, sitting and standing and balance, sit pivot transfers, COG midline awareness, transfer training  Education Method: Verbal;Demonstration  Barriers to Learning: Cognition  Education Outcome: Verbalized understanding;Continued education needed    Plan  Occupational Therapy Plan  Times Per Week: 5-7x/wk  Specific Instructions for Next Treatment: ADLs, mode dressing technique; RUE only, extra OT session  Current Treatment Recommendations: Strengthening;Balance training;Functional mobility training;Self-Care / ADL;Endurance training;Neuromuscular re-education;Positioning;Modalities; Equipment evaluation, education, & procurement;Patient/Caregiver education & training; Safety education & training;Home management training;Coordination training;Sensory integraion    Goals  Patient Goals   Patient goals : \"to get going and get better\"  Short Term Goals  Time Frame for Short Term Goals: 9/28/22 (14 days)  Short Term Goal 1: Pt will complete UB dressing with mod A- MET 10/3  Short Term Goal 2: Pt will complete simple grooming task supported in w/c with set-up A- goal progressing NOT MET  Short Term Goal 3: Pt will complete sit pivot transfers with mod A x1 using LRAD- goal updated to reflect current progress. Short Term Goal 4: Pt will complete toileting with max A- goal NOT MET  Long Term Goals  Time Frame for Long Term Goals : 10/05/22 (21 days), extend goals to 10/08- goals updated to reflect current progress.   Long Term Goal 1: Pt will complete sit pivot w/c<> toilet transfer with min A x1  Long Term Goal 2: Pt will complete LB dressing with mod A  Long Term Goal 3: Pt will complete bathing with mod A  Long Term Goal 4: Pt will incorporate RUE into ADLs 75% of trials with minimal VC in order to increase functional independence with ADLs        Therapy Time   Individual Concurrent Group Co-treatment   Time In       1030   Time Out       1130   Minutes       60   Timed Code Treatment Minutes: 1140 [facility] NoelS/OT

## 2022-10-04 NOTE — PLAN OF CARE
Patient remains free from falls and injuries. Patient does have avasys in room and nonskid sock son for safety. Patient up with staff and call light within reach.  Hay Parada RN

## 2022-10-04 NOTE — PROGRESS NOTES
Speech Language Pathology  [facility]: ACUTE REHAB UNIT  SPEECH-LANGUAGE PATHOLOGY      [x] Daily  [] Weekly Care Conference Note  [] Discharge    Patient:Yulissa Atwood      :1940  VOI:[id number]  Rehab Dx/Hx: Acute CVA (cerebrovascular accident) ([facility] [address] [I63.9]    Precautions: falls  Home situation: Pt lives at home [facility] Dx: [] Aphasia  [] Dysarthria  [] Apraxia   [] Oropharyngeal dysphagia [x] Cognitive Impairment  [] Other:   Date of Admit: 9/15/2022  Room #: 0160/0160-01    Current functional status (updated daily):         Pt being seen for : [] Speech/Language Treatment  [x] Dysphagia Treatment [x] Cognitive Treatment  [] Other:  Communication: [x]WFL  [] Aphasia  [] Dysarthria  [] Apraxia  [] Pragmatic Impairment [] Non-verbal  [] Hearing Loss  [] Other:   Cognition: [] WFL  [x] Mild  [x] Moderate  [] Severe [] Unable to Assess  [] Other:  Memory: [] WFL  [] Mild  [x] Moderate  [] Severe [] Unable to Assess  [] Other:  Behavior: [x] Alert  [x] Cooperative  [x]  Pleasant  [] Confused  [] Agitated  [] Uncooperative  [] Distractible [] Motivated  [] Self-Limiting [] Anxious  [] Other:  Endurance:  [x] Adequate for participation in SLP sessions  [] Reduced overall  [] Lethargic  [] Other:  Safety: [x] No concerns at this time  [] Reduced insight into deficits  []  Reduced safety awareness [] Not following call light procedures  [] Unable to Assess  [] Other:    Current Diet Order:ADULT ORAL NUTRITION SUPPLEMENT; Breakfast, Dinner; Standard High Calorie/High Protein Oral Supplement  ADULT DIET;  Regular, thin   Swallowing Precautions: upright for all intake, stay upright for at least 30 mins after intake, small bites/sips, assist feed, oral care 2-3x/day to reduce adverse affects in the event of aspiration, increase physical mobility as able, alternate bites/sips, slow rate of intake, general GERD precautions, and general aspiration precautions        Date: 10/4/2022      Tx session 1  [id number] Tx session 2  All tx needs met in session 1   Total Timed Code Min 30    Total Treatment Minutes 30    Individual Treatment Minutes 30    Group Treatment Minutes 0    Co-Treat Minutes 0    Variance/Reason:  N/A    Pain Denies    Pain Intervention [] RN notified  [] Repositioned  [] Intervention offered and patient declined  [x] N/A  [] Other: [] RN notified  [] Repositioned  [] Intervention offered and patient declined  [] N/A  [] Other:   Subjective     Pt alert and oriented, cooperative and agreeable to participate in therapy. Pt seen sitting upright in bed. Objective:  Goals     Dysphagia Goals:    Short-term Goals  Timeframe for Short-term Goals: 5 days (09/20/22)          Goal Met 9/21/22        Goal met 09/22/22. Cognitive Goals:    Short-term Goals  Timeframe for Short-term Goals: 18 days (10/03/22)    Goal 1: Pt will complete recall tasks with 90% acc given min cues. Indirectly targeted during counting coins and bills task:  -min cues required for repetition of stimuli for improved recall/comprehension     Goal 2: Pt will complete higher level executive function tasks (meds, math, money, time, etc) with 95% acc given min cues. Portions of the FREDDY (assessment of language related functional activities) completed:  -counting money: 80% acc indep improving to 100% acc given min cues     -understanding medicine labels: 100% acc indep       Other areas targeted: N/A    Education:   Edu provided re: importance of med/finance management      Safety Devices: [x] Call light within reach  [] Chair alarm activated  [x] Bed alarm activated  [x] Other: AVASYS  [] Call light within reach  [] Chair alarm activated  [] Bed alarm activated  [] Other:    Assessment: Pt pleasant and cooperative, agreeable to participate. Pt continues to benefit from increased repetition of stimuli for improved recall and comprehension. Pt will occasionally state \"I keep losing my concentration. \" Pt completed counting coins and bills with 80% acc indep improving to 100% acc given min cues, and understanding medicine/sorting pills with 100% acc indep. Pt is making good progress towards cognitive goals, however will recommend 24 hour assist d/t physical limitations. Pt remains motivated to improve. Plan: Continue as per plan of care. Additional Information:     Barriers toward progress: N/A  Discharge recommendations:  [] Home independently  [] Home with assistance [x]  24 hour supervision  [] ECF [x] Other: defer to PT/OT recs  Continued Tx Upon Discharge: ? [x] Yes [] No [] TBD based on progress while on ARU [] Vital Stim indicated [] Other:   Estimated discharge date: 10/08/22    Interventions used this date:  [] Speech/Language Treatment  [] Instruction in HEP [] Group [] Dysphagia Treatment [x] Cognitive Treatment   [] Other: Total Time Breakdown / Charges    Time in Time out Total Time / units   Cognitive Tx 0900 0930 30 min / 2 units    Speech Tx -- -- --   Dysphagia Tx -- -- --       Electronically Signed by     Malini Joiner. A CCC-SLP  Speech-Language Pathologist  [phone]

## 2022-10-04 NOTE — PROGRESS NOTES
Physical Therapy  Facility/Department: [facility]  Rehabilitation Physical Therapy Treatment Note    NAME: Brandon Zelaya  : 1940 (81 y.o.)  MRN: [id number]  CODE STATUS: Full Code    Date of Service: 10/4/22       Restrictions:  Restrictions/Precautions: Up as Tolerated; Fall Risk;General Precautions  Position Activity Restriction  Other position/activity restrictions: knee high KENN hose; up as tolerated; Notify physician for pulse less than 50 or greater than 120, respiratory rate less than 12 or greater than 25, oral temperature greater than 101.3 F (67.2 C), systolic BP less than 90 or greater than 912, diastolic BP less than 50 or greater than 100. SUBJECTIVE  Subjective  Subjective: Pt supine in bed on approach, pleasant and agreeable to PT/OT co-tx  Pain: Pt denies c/o pain               OBJECTIVE  Cognition  Overall Cognitive Status: Exceptions  Arousal/Alertness: Appropriate responses to stimuli  Following Commands: Follows one step commands with increased time; Follows one step commands with repetition  Attention Span: Appears intact  Memory: Decreased recall of recent events  Safety Judgement: Decreased awareness of need for assistance;Decreased awareness of need for safety  Problem Solving: Assistance required to generate solutions;Assistance required to implement solutions;Assistance required to identify errors made;Assistance required to correct errors made  Insights: Decreased awareness of deficits  Initiation: Requires cues for some  Sequencing: Requires cues for some  Cognition Comment: difficulties with motor planning and initiation, benefitting from verbal and tactile cueing to initiate and execute  Orientation  Overall Orientation Status: Within Functional Limits  Orientation Level: Oriented X4    Functional Mobility  Supine to Sit  Assistance Level:  Moderate assistance  Skilled Clinical Factors: HOB elevated, use of BR, increased time to complete, cues for sequence    Balance  Sitting Balance:  (Isauro to SBA)  Standing Balance  Pt sitting EOB x 8 minutes with initial Isauro for balance from PT with facilitation of anterior WS, improved lumbar and thoracic extension with OT assisting pt to promote anterior WS and completing ADL/clothing management task. Activity: modA to maxA x 2 for standing without AD    Transfers  Surface: From mat; Wheelchair  Additional Factors: Verbal cues; Increased time to complete  Device: Lift equipment (STEDY vs no AD)  Sit to Stand  Assistance Level: Contact guard assist;Dependent  Skilled Clinical Factors: Pt able to stand to stedy from w/c with grossly CGA  Stand to Sit  Assistance Level: Contact guard assist;Dependent  Skilled Clinical Factors: With STEDY  Bed To/From Chair  Technique: Sit pivot  Assistance Level: Dependent;Minimal assistance; Moderate assistance; Requires x 2 assistance  Skilled Clinical Factors: Sit pivot t/f EOB to w/c  Sit Pivot  Assistance Level: Minimal assistance; Moderate assistance; Requires x 2 assistance  Skilled Clinical Factors: Pt completes x 4 reps sit pivot w/c to/from EOM with grossly Isauro to modA x 2, cues for sequence/technique, positioning prior to t/f and promotion of anterior WS, increased assist required towards R with cues for scooting. Neuromuscular Education  Pt sitting EOM x 20 minutes with variable SBA up to Isauro for balance with fatigue. Pt with decreased lumbar and thoracic extension, forward flexed trunk and with fatigue demonstrates increased posterior and R lateral lean with cues and up to Isauro to correct. Mirror placed in front of pt to provide visual feedback of positioning    Pt completes x 8 reps facilitated anterior WS with pt pushing dowel ana anteriorly against resistance from OT and pushing through BLE up to partial stand. Pt requires grossly Isauro up to modA/maxA to complete partial stand/sit. Each bout pt maintains squat positioning 30seconds to 1 minute with variable Isauro to maxA for balance.  PT lateral to pt with cues at hips to promote lumbar extension, anterior WS anterior pelvic tilt, cues down into R foot to maintain positioning and contact with ground and equal WS/WB. OT provides assistance/resistance through dowel ana to promote midline positioning and facilitate anterior WS. OT also assisting with LLE positioning to promote equal WS. Limited by weakness and difficulty with anterior WS at times. Completed to improve performance with t/f, promote improved sequence/coordination to decrease EDGARD and decrease burden of care during t/f.             ASSESSMENT/PROGRESS TOWARDS GOALS   /59  HR: 86  SpO2 99% on RA  Pt denies dizziness or lightheadedness throughout session. Assessment  Assessment: Pt seen in am for PT/OT co-tx secondary to complexity of condition, decreased strength, balance and activity tolerance. Pt benefits from x 2 skilled hands to provide increased cues and feedback with mobility and promote improved performance. Co-tx session focused on promotion of anterior WS to improve performance and sequence with t/f, as well as porgression of seated balance and functional t/f. Pt requires grossly modA for bed mobility, Isauro/modA x 2 for sit pivot t/f and up to modA/maxA x 2 for STS from EOM with external cues to promote anterior WS. Pt is limited by decreased activity tolerance, strength, balance and decreased motor planning/sequencing. Pt will benefit from continued skilled PT in ARU to address above deficits, will continue to progress mobility as tolerated. Activity Tolerance: Patient limited by endurance; Patient limited by fatigue  Discharge Recommendations: Subacute/Skilled Nursing Facility  PT Equipment Recommendations  Equipment Needed: Yes  Mobility Devices: Wheelchair  Wheelchair: Standard  Other: if patient does not go to a facility she likely will need wheelchair upon discharge. continue to assess.     Goals  Patient Goals   Patient Goals : Patient states, \"To be able to get up without being told\" (helped as pt pushes backwards)  Short Term Goals  Time Frame for Short Term Goals: 9/23/2022  Short Term Goal 1: Patient demonstrates  sitting  midline 15 minutes wtihout posterior lob 9/17 mod assist to maintain seated balance. Short Term Goal 2: Patient demonstrates bed<>chair with LRAD with mod assist of 1. 10/04 dependent with santo-stedy  Short Term Goal 3: Patient demonstrates walking 10 feet or greater wtih mod assist of 2 LRAD. 10/04 not completed  Short Term Goal 4: Patient demonstrates indep in rolling L<>R and mod assist sup to sit ; 10/04 not observed  Short Term Goal 5: Patient demonstrates min assist in R Katherine Mikey 23 propulsion 150 feet. 10/04 not completed  Long Term Goals  Time Frame for Long Term Goals : 10/7/2022  Long Term Goal 1: Patient demonstrates  Modified indep in rolling and sup<>Sit. Long Term Goal 2: Patient demonstrates   transfers sit<>stand with FWW and min assist.  Long Term Goal 3: Patient demonstrates bed<>chair min A. Long Term Goal 4: Patient demonstrates gait wtih transfers  and short distances 50 feet or greater with min assist  Long Term Goal 5: Patient demonstrates up and down 2 steps or greater. with mod assist of 1  Additional Goals?: Yes  Long term goal 6: Patient demonstrates indep WC propulsion with L/R turns indep 150 feet. Long term goal 7: Patient demonstrates stoop to recover object from floor with reacher with mod assist.  Long term goal 8: Patients family demonstrates ability to assist patient in and out of car with mod assist.    PLAN OF CARE/SAFETY  Physcial Therapy Plan  General Plan: 5-7 times per week  Plan weeks: 21 days  Current Treatment Recommendations: Strengthening;ROM;Balance training;Functional mobility training;Transfer training;Neuromuscular re-education;Stair training;Gait training; Wheelchair mobility training; Endurance training; Therapeutic activities; Positioning;Equipment evaluation, education, & procurement;Home exercise program;Safety education & training;Patient/Caregiver education & training  Safety Devices  Type of Devices: All fall risk precautions in place;Call light within reach; Chair alarm in place; Left in chair;Nurse notified;Gait belt;Patient at risk for falls    EDUCATION  Education  Education Given To: Patient  Education Provided: Role of Therapy;Plan of Care;Transfer Training; Fall Prevention Strategies;Precautions; Safety;Equipment;Visual Perceptual Function  Education Provided Comments: Educated on progression of mobility and t/f, improving anterior WS and safety with functional t/f.   Education Method: Verbal  Barriers to Learning: Cognition  Education Outcome: Verbalized understanding;Continued education needed        Therapy Time   Individual Concurrent Group Co-treatment   Time In      1030   Time Out      1130   Minutes      60     Timed Code Treatment Minutes: [id number] [facility] Saint Host, PT, DPT 10/04/22 at 3:57 PM

## 2022-10-04 NOTE — PROGRESS NOTES
Physical Therapy  Facility/Department: [facility]  Rehabilitation Physical Therapy Treatment Note    NAME: Max Alicia  : 1940 (81 y.o.)  MRN: [id number]  CODE STATUS: Full Code    Date of Service: 10/4/22       Restrictions:  Restrictions/Precautions: Up as Tolerated; Fall Risk;General Precautions  Position Activity Restriction  Other position/activity restrictions: knee high KENN hose; up as tolerated; Notify physician for pulse less than 50 or greater than 120, respiratory rate less than 12 or greater than 25, oral temperature greater than 101.3 F (94.4 C), systolic BP less than 90 or greater than 382, diastolic BP less than 50 or greater than 100. SUBJECTIVE  Subjective  Subjective: pt sitting up in chair, motivated to participate  Pain: Pt does not indicate pain during session        Post Treatment Pain Screening: none reported          OBJECTIVE  Cognition  Overall Cognitive Status: Exceptions  Arousal/Alertness: Appropriate responses to stimuli  Following Commands: Follows one step commands with increased time; Follows one step commands with repetition  Attention Span: Appears intact  Memory: Decreased recall of recent events  Safety Judgement: Decreased awareness of need for assistance;Decreased awareness of need for safety  Problem Solving: Assistance required to generate solutions;Assistance required to implement solutions;Assistance required to identify errors made;Assistance required to correct errors made  Insights: Decreased awareness of deficits  Initiation: Requires cues for some  Sequencing: Requires cues for some  Cognition Comment: difficulties with motor planning and initiation, benefitting from verbal and tactile cueing to initiate and execute  Orientation  Overall Orientation Status: Within Functional Limits  Orientation Level: Oriented X4    Functional Mobility  Scooting  Assistance Level: Moderate assistance  Skilled Clinical Factors:  Ind with scooting L hip forward in chair, Mod A to scoot R hip forward  Balance  Sitting Balance: Stand by assistance (pt able to correct back to midline from R lean)  Standing Balance: Stand by assistance (standing in stedy with B UE support on bar with verbal cues to regain midline stance)  Standing Balance  Time: Pt performed multiple standing attempts during session to address standing strength/endurance as well as midline/kinesthesia, pt required Max A to complete 75% partial stand from recliner to RW, Max for standing balance, required seated rest break, required Min A to  Moore from recliner, tolerated 5 mins sitting on flaps in stedy with SBA with frequent verbal cues for midline stance  Activity: Pt continues to present w/ posterior lean and lateral lean towards right during stance. Req frequent cues and visual/tactile feedback to correct  Transfers  Surface: From chair with arms; To chair with arms  Additional Factors: Verbal cues; Increased time to complete  Sit to Stand  Assistance Level: Maximum assistance  Skilled Clinical Factors: from recliner to RW 75% stand, Max A stand balance; + Min A from recliner to Moore with SBA for standing balance with verbal cues to midline stance  Stand to Sit  Assistance Level: Minimal assistance  Skilled Clinical Factors: with santo sears      Environmental Mobility         Weight Bearing  Weight Bearing Technique: Yes  Response To Weight Bearing Technique: R LE standing with pillow between legs for tactile feedback to keep R LE in alignment      PT Exercises  Exercise Treatment: seated B LE ankle DF/PF (R LE lacks DF), knee ext, hip flex, glute sets x 8 reps each      ASSESSMENT/PROGRESS TOWARDS GOALS  Vital Signs  Heart Rate: 86  Heart Rate Source: Monitor  BP: (!) 98/55  BP Location: Left upper arm  MAP (Calculated): 69.33  SpO2: 99 %  O2 Device: None (Room air)  Comment: see act partha for orthostatic readings    Assessment  Assessment: pt is progressing toward meeting IPR PT goals as evidenced by tolerance for sit to stand transfer from recliner to RW, required Max A and able to complete 75% stand with B UE on RW, pt able to tolerate standing in Patricio with SBA and verbal cues to correct R lean, pt's attention is disracted by runny nose this session, encouraged pt to use R UE to wipe nose to facilitate strengthening and R side awareness, pt with difficulty performing R knee ext when [employer] or bedside table is in front of her, pt has 4/5 strength in quad when nothing is in front of the R LE, pt states \"it's mental\", pt will benefit from continued IPR Skilled PT to address functional mobility deficits  Activity Tolerance: Patient limited by endurance; Patient limited by fatigue (standing in Stedy BP 70/40 after 5 mins with pt report of \"wooziness\", returned to sitting, BP 94/54, HR consistently around 84bpm, and O2 98-99% throughout session; RN aware of orthostatic reading)  Discharge Recommendations: Subacute/Skilled Nursing Facility  PT Equipment Recommendations  Equipment Needed: Yes  Mobility Devices: Wheelchair  Wheelchair: Standard  Other: if patient does not go to a facility she likely will need wheelchair upon discharge. continue to assess. Goals  Patient Goals   Patient Goals : Patient states, \"To be able to get up without being told\" (helped as pt pushes backwards)  Short Term Goals  Time Frame for Short Term Goals: 9/23/2022  Short Term Goal 1: Patient demonstrates  sitting  midline 15 minutes wtihout posterior lob 9/17 mod assist to maintain seated balance. Short Term Goal 2: Patient demonstrates bed<>chair with LRAD with mod assist of 1. 10/04 dependent with santo-stedy  Short Term Goal 3: Patient demonstrates walking 10 feet or greater wtih mod assist of 2 LRAD. 10/04 not completed  Short Term Goal 4: Patient demonstrates indep in rolling L<>R and mod assist sup to sit ; 10/04 not observed  Short Term Goal 5: Patient demonstrates min assist in [facility] propulsion 150 feet.  10/04 not completed  Long Term Goals  Time Frame for Long Term Goals : 10/7/2022  Long Term Goal 1: Patient demonstrates  Modified indep in rolling and sup<>Sit. Long Term Goal 2: Patient demonstrates   transfers sit<>stand with FWW and min assist.  Long Term Goal 3: Patient demonstrates bed<>chair min A. Long Term Goal 4: Patient demonstrates gait wtih transfers  and short distances 50 feet or greater with min assist  Long Term Goal 5: Patient demonstrates up and down 2 steps or greater. with mod assist of 1  Long term goal 6: Patient demonstrates indep WC propulsion with L/R turns indep 150 feet. Long term goal 7: Patient demonstrates stoop to recover object from floor with reacher with mod assist.  Long term goal 8: Patients family demonstrates ability to assist patient in and out of car with mod assist.    PLAN OF CARE/SAFETY  Physcial Therapy Plan  General Plan: 5-7 times per week  Plan weeks: 21 days  Current Treatment Recommendations: Strengthening;ROM;Balance training;Functional mobility training;Transfer training;Neuromuscular re-education;Stair training;Gait training; Wheelchair mobility training; Endurance training; Therapeutic activities; Positioning;Equipment evaluation, education, & procurement;Home exercise program;Safety education & training;Patient/Caregiver education & training  Safety Devices  Type of Devices: All fall risk precautions in place;Call light within reach; Chair alarm in place; Left in chair;Nurse notified;Gait belt;Patient at risk for falls    EDUCATION  Education  Education Given To: Patient  Education Provided: Role of Therapy;Plan of Care;Transfer Training; Fall Prevention Strategies;Precautions; Safety;Equipment;Visual Perceptual Function  Education Provided Comments: education regarding effects of CVA  Education Method: Verbal  Barriers to Learning: Cognition  Education Outcome: Verbalized understanding;Continued education needed        Therapy Time   Individual Concurrent Group Co-treatment   Time In 1230         Time Out 1300         Minutes 30           Timed Code Treatment Minutes: 30 Minutes       Ayah Velasquez, PT, 10/04/22 at 1:37 PM

## 2022-10-05 LAB
ANION GAP SERPL CALCULATED.3IONS-SCNC: 11 MMOL/L (ref 3–16)
BASOPHILS ABSOLUTE: 0.1 K/UL (ref 0–0.2)
BASOPHILS RELATIVE PERCENT: 0.6 %
BUN BLDV-MCNC: 34 MG/DL (ref 7–20)
CALCIUM SERPL-MCNC: 9.9 MG/DL (ref 8.3–10.6)
CHLORIDE BLD-SCNC: 103 MMOL/L (ref 99–110)
CO2: 25 MMOL/L (ref 21–32)
CREAT SERPL-MCNC: 0.7 MG/DL (ref 0.6–1.2)
EOSINOPHILS ABSOLUTE: 0.3 K/UL (ref 0–0.6)
EOSINOPHILS RELATIVE PERCENT: 3.8 %
GFR AFRICAN AMERICAN: >60
GFR NON-AFRICAN AMERICAN: >60
GLUCOSE BLD-MCNC: 90 MG/DL (ref 70–99)
HCT VFR BLD CALC: 32.8 % (ref 36–48)
HEMOGLOBIN: 11.2 G/DL (ref 12–16)
LYMPHOCYTES ABSOLUTE: 2.1 K/UL (ref 1–5.1)
LYMPHOCYTES RELATIVE PERCENT: 25.8 %
MCH RBC QN AUTO: 32.9 PG (ref 26–34)
MCHC RBC AUTO-ENTMCNC: 34.2 G/DL (ref 31–36)
MCV RBC AUTO: 96.2 FL (ref 80–100)
MONOCYTES ABSOLUTE: 0.5 K/UL (ref 0–1.3)
MONOCYTES RELATIVE PERCENT: 5.9 %
NEUTROPHILS ABSOLUTE: 5.2 K/UL (ref 1.7–7.7)
NEUTROPHILS RELATIVE PERCENT: 63.9 %
PDW BLD-RTO: 15.9 % (ref 12.4–15.4)
PLATELET # BLD: 275 K/UL (ref 135–450)
PMV BLD AUTO: 7.6 FL (ref 5–10.5)
POTASSIUM REFLEX MAGNESIUM: 4.2 MMOL/L (ref 3.5–5.1)
RBC # BLD: 3.41 M/UL (ref 4–5.2)
SODIUM BLD-SCNC: 139 MMOL/L (ref 136–145)
WBC # BLD: 8.2 K/UL (ref 4–11)

## 2022-10-05 PROCEDURE — 80048 BASIC METABOLIC PNL TOTAL CA: CPT

## 2022-10-05 PROCEDURE — 36415 COLL VENOUS BLD VENIPUNCTURE: CPT

## 2022-10-05 PROCEDURE — 1280000000 HC REHAB R&B

## 2022-10-05 PROCEDURE — 6360000002 HC RX W HCPCS: Performed by: STUDENT IN AN ORGANIZED HEALTH CARE EDUCATION/TRAINING PROGRAM

## 2022-10-05 PROCEDURE — 85025 COMPLETE CBC W/AUTO DIFF WBC: CPT

## 2022-10-05 PROCEDURE — 97129 THER IVNTJ 1ST 15 MIN: CPT

## 2022-10-05 PROCEDURE — 6370000000 HC RX 637 (ALT 250 FOR IP): Performed by: STUDENT IN AN ORGANIZED HEALTH CARE EDUCATION/TRAINING PROGRAM

## 2022-10-05 PROCEDURE — 97530 THERAPEUTIC ACTIVITIES: CPT

## 2022-10-05 PROCEDURE — 97130 THER IVNTJ EA ADDL 15 MIN: CPT

## 2022-10-05 PROCEDURE — 97535 SELF CARE MNGMENT TRAINING: CPT

## 2022-10-05 PROCEDURE — 6370000000 HC RX 637 (ALT 250 FOR IP): Performed by: NURSE PRACTITIONER

## 2022-10-05 RX ADMIN — HEPARIN SODIUM 5000 UNITS: 5000 INJECTION INTRAVENOUS; SUBCUTANEOUS at 20:17

## 2022-10-05 RX ADMIN — ASPIRIN 81 MG 81 MG: 81 TABLET ORAL at 10:55

## 2022-10-05 RX ADMIN — DONEPEZIL HYDROCHLORIDE 10 MG: 5 TABLET, FILM COATED ORAL at 20:15

## 2022-10-05 RX ADMIN — CLOPIDOGREL BISULFATE 75 MG: 75 TABLET ORAL at 10:54

## 2022-10-05 RX ADMIN — MIDODRINE HYDROCHLORIDE 5 MG: 5 TABLET ORAL at 14:32

## 2022-10-05 RX ADMIN — DOCUSATE SODIUM 50 MG AND SENNOSIDES 8.6 MG 2 TABLET: 8.6; 5 TABLET, FILM COATED ORAL at 10:55

## 2022-10-05 RX ADMIN — FERROUS SULFATE TAB 325 MG (65 MG ELEMENTAL FE) 325 MG: 325 (65 FE) TAB at 10:55

## 2022-10-05 RX ADMIN — POLYETHYLENE GLYCOL 3350 17 G: 17 POWDER, FOR SOLUTION ORAL at 20:15

## 2022-10-05 RX ADMIN — ATORVASTATIN CALCIUM 80 MG: 80 TABLET, FILM COATED ORAL at 20:15

## 2022-10-05 RX ADMIN — ANTACID TABLETS 500 MG: 500 TABLET, CHEWABLE ORAL at 10:54

## 2022-10-05 RX ADMIN — FLUDROCORTISONE ACETATE 0.1 MG: 0.1 TABLET ORAL at 10:57

## 2022-10-05 RX ADMIN — HEPARIN SODIUM 5000 UNITS: 5000 INJECTION INTRAVENOUS; SUBCUTANEOUS at 14:32

## 2022-10-05 RX ADMIN — CETIRIZINE HYDROCHLORIDE 5 MG: 5 TABLET, FILM COATED ORAL at 10:54

## 2022-10-05 RX ADMIN — MIDODRINE HYDROCHLORIDE 5 MG: 5 TABLET ORAL at 10:54

## 2022-10-05 RX ADMIN — TRAZODONE HYDROCHLORIDE 50 MG: 50 TABLET ORAL at 20:15

## 2022-10-05 RX ADMIN — CYANOCOBALAMIN TAB 500 MCG 1000 MCG: 500 TAB at 10:55

## 2022-10-05 RX ADMIN — Medication 5 MG: at 20:15

## 2022-10-05 RX ADMIN — FOLIC ACID 1 MG: 1 TABLET ORAL at 10:54

## 2022-10-05 RX ADMIN — Medication 5000 UNITS: at 10:56

## 2022-10-05 RX ADMIN — TROSPIUM CHLORIDE 20 MG: 20 TABLET, FILM COATED ORAL at 20:15

## 2022-10-05 RX ADMIN — POTASSIUM CHLORIDE 10 MEQ: 750 TABLET, EXTENDED RELEASE ORAL at 10:54

## 2022-10-05 RX ADMIN — MIDODRINE HYDROCHLORIDE 5 MG: 5 TABLET ORAL at 17:52

## 2022-10-05 RX ADMIN — HEPARIN SODIUM 5000 UNITS: 5000 INJECTION INTRAVENOUS; SUBCUTANEOUS at 06:18

## 2022-10-05 ASSESSMENT — PAIN SCALES - GENERAL
PAINLEVEL_OUTOF10: 0

## 2022-10-05 NOTE — PROGRESS NOTES
Physical Therapy  Facility/Department: [address] [place] [address]  Rehabilitation Physical Therapy Treatment Note    NAME: Saurav Rojo  : 1940 (81 y.o.)  MRN: [id number]  CODE STATUS: Full Code    Date of Service: 10/5/22       Restrictions:  Restrictions/Precautions: Up as Tolerated; Fall Risk;General Precautions  Position Activity Restriction  Other position/activity restrictions: knee high KENN hose; up as tolerated; Notify physician for pulse less than 50 or greater than 120, respiratory rate less than 12 or greater than 25, oral temperature greater than 101.3 F (88.7 C), systolic BP less than 90 or greater than 771, diastolic BP less than 50 or greater than 100. SUBJECTIVE  Subjective  Subjective: Pt supine in bed on approach, pleasant and agreeable to PT session this morning  Pain: Pt denies c/o pain          OBJECTIVE  Orientation  Overall Orientation Status: Within Functional Limits  Orientation Level: Oriented X4    Functional Mobility  Supine to Sit  Assistance Level: Moderate assistance  Skilled Clinical Factors: HOB elevated, use of BR, increased time to complete, cues for sequence  Scooting  Assistance Level: Moderate assistance  Skilled Clinical Factors: to scoot hips to EOB  Transfers  Surface: From bed  Additional Factors: Verbal cues; Increased time to complete  Device: Lift equipment [employer])  Sit to Stand  Assistance Level: Dependent  Skilled Clinical Factors: grossly min A using Stedy  Stand to [employer]  Assistance Level: Dependent  Skilled Clinical Factors: grossly min A using Stedy  Bed To/From Chair  Assistance Level: Dependent  Skilled Clinical Factors: use of Stedy, bed>recliner chair   /68 seated in recliner chair at end of session; pt denies dizziness or lightheadedness      Addendum: 2nd Session  Pt seated in recliner chair upon arrival and agreeable to PT/OT cotx session this morning. Denies pain.   Transfers:   Sit pivot transfers (recliner>w/c, w/c>shower chair) to L with mod A x 2; sit pivot transfer (shower chair>w/c) to R with mod-max A x 2   Sit to/from stand (from shower chair and w/c) with max A x 2; completes x 2 reps total with cues for anterior weight shift, hand placement, and foot placement. Sitting balance on shower chair x 20 mins with assist ranging CGA-mod A for balance without UE support, plus cues for upright posture and correction of anterior R lean. PT assists with sitting balance and cues pt for upright while OT facilitates ADL. Increased assist required when reaching to BLE/feet and with fatigue. Pt reports mild lightheadedness after shower-- /66  Standing balance with max A x 2 with pt demo'ing posterior lean with difficulty extending hips and achieving upright positioning; PT dependently pulls up briefs/pants in standing. Standing tolerance limited to ~20 sec d/t weakness and fatigue. Pt seated in w/c in room with OT at end of session. ASSESSMENT/PROGRESS TOWARDS GOALS  Vital Signs  Heart Rate: 76  Heart Rate Source: Monitor  BP: (!) 92/59  BP Location: Left upper arm  MAP (Calculated): 70  SpO2: 99 %  O2 Device: None (Room air)    Assessment  Assessment: Pt initially seen for PT session this morning focusing on bed mobility and functional transfers using Stedy. Pt able to complete bed mobility with mod A and transfers with [employer] with min A. Pt then seen for PT/OT co-tx secondary to complexity of condition, decreased strength, balance and activity tolerance. Pt benefits from x 2 skilled hands to provide increased cues and feedback with mobility and promote improved performance. Co-tx session focused on facilitation of ADL, functional transfers, and promoting anterior WS to improve performance and sequence with t/f, as well as progression of seated balance and functional t/f. Pt requires modA x 2 for sit pivot t/f and up to modA/maxA x 2 for sit<>stand transfers from shower chair with external cues to promote anterior WS.  Requires grossly CGA-mod A for sitting balance on shower chair during ADL with OT. Pt remains limited by decreased activity tolerance, strength, balance and decreased motor planning/sequencing. Pt will benefit from continued skilled PT in ARU to address above deficits, will continue to progress mobility as tolerated. Activity Tolerance: Patient limited by endurance; Patient limited by fatigue;Patient tolerated treatment well  Discharge Recommendations: Subacute/Skilled Nursing Facility  PT Equipment Recommendations  Equipment Needed: Yes  Mobility Devices: Wheelchair  Wheelchair: Standard  Other: if patient does not go to a facility she likely will need wheelchair upon discharge. continue to assess. Goals  Patient Goals   Patient Goals : Patient states, \"To be able to get up without being told\" (helped as pt pushes backwards)  Short Term Goals  Time Frame for Short Term Goals: 9/23/2022  Short Term Goal 1: Patient demonstrates  sitting  midline 15 minutes wtihout posterior lob 9/17 mod assist to maintain seated balance. Short Term Goal 2: Patient demonstrates bed<>chair with LRAD with mod assist of 1. 10/04 dependent with santo-renu  Short Term Goal 3: Patient demonstrates walking 10 feet or greater wtih mod assist of 2 LRAD. 10/04 not completed  Short Term Goal 4: Patient demonstrates indep in rolling L<>R and mod assist sup to sit ; 10/04 not observed  Short Term Goal 5: Patient demonstrates min assist in [facility] propulsion 150 feet. 10/04 not completed  Long Term Goals  Time Frame for Long Term Goals : 10/7/2022  Long Term Goal 1: Patient demonstrates  Modified indep in rolling and sup<>Sit. Long Term Goal 2: Patient demonstrates   transfers sit<>stand with FWW and min assist.  Long Term Goal 3: Patient demonstrates bed<>chair min A. Long Term Goal 4: Patient demonstrates gait with transfers  and short distances 50 feet or greater with min assist  Long Term Goal 5: Patient demonstrates up and down 2 steps or greater.   with mod assist of 1  Additional Goals?: Yes  Long term goal 6: Patient demonstrates indep WC propulsion with L/R turns indep 150 feet. Long term goal 7: Patient demonstrates stoop to recover object from floor with reacher with mod assist.  Long term goal 8: Patients family demonstrates ability to assist patient in and out of car with mod assist.    PLAN OF CARE/SAFETY  Physcial Therapy Plan  General Plan: 5-7 times per week  Plan weeks: 21 days  Current Treatment Recommendations: Strengthening;ROM;Balance training;Functional mobility training;Transfer training;Neuromuscular re-education;Stair training;Gait training; Wheelchair mobility training; Endurance training; Therapeutic activities; Positioning;Equipment evaluation, education, & procurement;Home exercise program;Safety education & training;Patient/Caregiver education & training  Safety Devices  Type of Devices: All fall risk precautions in place;Call light within reach; Chair alarm in place; Patient at risk for falls; Left in chair;Nurse notified;Telesitter in use    EDUCATION  Education  Education Given To: Patient  Education Provided: Role of Therapy;Plan of Care;Transfer Training; Fall Prevention Strategies;Precautions; Safety;Equipment;Visual Perceptual Function  Education Provided Comments: Educated on progression of mobility and t/f, improving anterior WS and safety with functional t/f.   Education Method: Verbal  Barriers to Learning: Cognition  Education Outcome: Verbalized understanding;Continued education needed        Therapy Time   Individual Concurrent Group Co-treatment   Time In 0800         Time Out 0830         Minutes 30           Timed Code Treatment Minutes: 30 Minutes     Second Session Therapy Time:   Individual Concurrent Group Co-treatment   Time In      0900   Time Out      1000   Minutes      60   Timed Code Treatment Minutes:  60    Total Treatment Minutes:  [address] [employer],  [address], PT, DPT 10/05/22 at 10:33 AM

## 2022-10-05 NOTE — PROGRESS NOTES
[facility]  [facility]ility/Department: [facility]  Rehabilitation Occupational Therapy Daily Treatment Note    Date: 10/5/22  Patient Name: Derik Alarcon       Room: 1913/6846-12  MRN: [id number]  Account: [de-identified]   : 1940  (81 y.o.) Gender: female     Past Medical History:  has a past medical history of ARF (acute renal failure) ([facility] [address] Past Surgical History:   has a past surgical history that includes joint replacement; Tonsillectomy; Intracapsular cataract extraction (Right, 2019); and Intracapsular cataract extraction (Left, 2019). Restrictions  Restrictions/Precautions: Up as Tolerated; Fall Risk;General Precautions  Other position/activity restrictions: knee high KENN hose; up as tolerated; Notify physician for pulse less than 50 or greater than 120, respiratory rate less than 12 or greater than 25, oral temperature greater than 101.3 F (21.3 C), systolic BP less than 90 or greater than 020, diastolic BP less than 50 or greater than 100. Posterior leaning    Subjective  Subjective: pt agreeable to OT/PT cotx. pt pleasant and agreeable throughout session. pt hesitant to standing activities reporting she often feels like she is falling. Pt reports no pain  Restrictions/Precautions: Up as Tolerated; Fall Risk;General Precautions       Objective     Orientation  Overall Orientation Status: Within Normal Limits  Orientation Level: Oriented X4         ADL  Grooming/Oral Hygiene  Assistance Level: Set-up; Verbal cues  Skilled Clinical Factors: Pt brushed teeth x2 in w/c with vc to sequence task and pt needing set-up for donning toothpaste. Pt recieved education on BUE grooming task.  Pt needing less set-up assistance on 2nd trial.  Upper Extremity Bathing  Assistance Level: Minimal assistance;Contact guard assist  Skilled Clinical Factors: on shower chair in walk in shower, phsyical assist to set up washcloth with soap, pt was able to wash, rinse UB and dry UB, except for washing back and hair; cues for positioning while in chair, PT provided sitting support with CGA  Lower Extremity Bathing  Assistance Level: Maximum assistance; Moderate assistance  Skilled Clinical Factors: Pt while seated in shower chair washed all parts of LB except posterior pericare and needing max A, demonstrates abiltity to reach but not for extended amount of time, PT gave mod A when leaning to clean feet. Pt needing assistance drying BLE and cleaning feet. Upper Extremity Dressing  Assistance Level: Minimal assistance; Independent;Verbal cues  Skilled Clinical Factors: Pt needing min A donning/doffing long sleeve shirt with cues for mode technique, cues to find head hole, thread RUE and pull down in back, pt able to thread LUE and put head through hole  Lower Extremity Dressing  Assistance Level: Maximum assistance  Skilled Clinical Factors: donning/doffing brief and pants sitting EOB with max A x1 for standing for pants management with max A x1 and min A x1  Putting On/Taking Off Footwear  Assistance Level: Maximum assistance;Dependent  Skilled Clinical Factors: Pt was dependent to Max A for donning/doffing shoes and socks  Toileting  Assistance Level: Maximum assistance;Dependent  Skilled Clinical Factors: incontient of urine during session, max A for hygiene  Toilet Transfers  Equipment: Raised toilet seat with arms  Additional Factors: Cues for hand placement; Increased time to complete  Assistance Level: Maximum assistance;Verbal cues  Skilled Clinical Factors: Max A for sit pivot and scooting    Functional Mobility  Device: Wheelchair  Activity: To/From bathroom  Assistance Level: Maximum assistance  Skilled Clinical Factors: max A for w/c mobility and max A for sit pivot x1  Bed Mobility  Overall Assistance Level: Moderate Assistance  Additional Factors: Increased time to complete;Verbal cues  Roll Left  Assistance Level: Minimal assistance  Supine to Sit  Assistance Level:  Moderate assistance  Skilled Clinical Factors: mod A x 1  Scooting  Assistance Level: Maximum assistance  Skilled Clinical Factors: Max A for scooting  Sit Pivot  Assistance Level: Maximum assistance  Skilled Clinical Factors: Max A x1 for sit pivot transfer w/c <> TBB, recliner, raised toilet seat         Assessment  Assessment  Assessment: First Session: Pt tolerated OT/PT cotx session well with no reporting of dizziness or lightheadedness. Pt is progressing slowly towards goals; pt continuing to required max A x1 for scooting and sit pivots. On the TBB, pt needing CGA to mod A for sitting balance for R posteiror LOB in sitting, and poor body awareness and COG midline awareness in space. Pt showed progress with staying in COG after shower, OT assisting with washing and drying. Pt needing asssitance for standing balance and pulling up, but educated on reacher for donning/doffing at footwear, but still needing assistance. Pt needing assistance for set-up and  pt continues to benefit from 2 skilled therapists. Second Session: Practicing BUE during grooming acitivty, pt was needing less assistance for 2nd trial. Pt still needing max A x1 for sit pivots. Recommending d/c to SNF secondary to current burden of care and safety needs. Defer DME to next level of care. Activity Tolerance: Patient limited by endurance; Patient limited by fatigue;Patient tolerated treatment well  Discharge Recommendations: Subacute/Skilled Nursing Facility  Factors Affecting Discharge: Pt lives alone and currently require high level of assistance due to new deficits from CVA  OT Equipment Recommendations  Other: CTA pending progress, may defer DME rec to d/c facility  Safety Devices  Type of devices: Call light within reach; All fall risk precautions in place; Patient at risk for falls;Gait belt;Nurse notified; Chair alarm in place; Left in chair    Patient Education  Education  Education Given To: Patient  Education Provided: Role of Therapy;Plan of Care;Precautions;Transfer Training; Fall Prevention Strategies;Cognition; Safety; Family Education;ADL Function;Mobility Training;Equipment;DME/Home Modifications; Home Exercise Program  Education Provided Comments: disease specific: body positioning in space, sitting and standing and balance, sit pivot transfers, COG midline awareness, transfer training  Education Method: Verbal;Demonstration  Barriers to Learning: Cognition  Education Outcome: Verbalized understanding;Continued education needed    Plan  Occupational Therapy Plan  Times Per Week: 5-7x/wk  Specific Instructions for Next Treatment: ADLs, mode dressing technique; RUE only, extra OT session  Current Treatment Recommendations: Strengthening;Balance training;Functional mobility training;Self-Care / ADL;Endurance training;Neuromuscular re-education;Positioning;Modalities; Equipment evaluation, education, & procurement;Patient/Caregiver education & training; Safety education & training;Home management training;Coordination training;Sensory integraion    Goals  Patient Goals   Patient goals : \"to get going and get better\"  Short Term Goals  Time Frame for Short Term Goals: 9/28/22 (14 days)  Short Term Goal 1: Pt will complete UB dressing with mod A- MET 10/3  Short Term Goal 2: Pt will complete simple grooming task supported in w/c with set-up A- MET 10/5/22  Short Term Goal 3: Pt will complete sit pivot transfers with mod A x1 using LRAD- 10/5 ongoing  Short Term Goal 4: Pt will complete toileting with max A- goal  MET - GOAL MET 10/5  Long Term Goals  Time Frame for Long Term Goals : 10/05/22 (21 days), extend goals to 10/08- goals updated to reflect current progress.   Long Term Goal 1: Pt will complete sit pivot w/c<> toilet transfer with min A x1  Long Term Goal 2: Pt will complete LB dressing with mod A  Long Term Goal 3: Pt will complete bathing with mod A  Long Term Goal 4: Pt will incorporate RUE into ADLs 75% of trials with minimal VC in order to increase functional independence with ADLs      Therapy Time   Individual Concurrent Group Co-treatment   Time In 1000     0900   Time Out 1030     1000   Minutes 30     60   Timed Code Treatment Minutes: [address],[place]. [address],S/OT  Cosigned by CHET Hannon/L

## 2022-10-05 NOTE — PROGRESS NOTES
Speech Language Pathology  [facility]: ACUTE REHAB UNIT  SPEECH-LANGUAGE PATHOLOGY      [x] Daily  [] Weekly Care Conference Note  [] Discharge    Patient:Yulissa Rivera      :1940  GZN:[id number]  Rehab Dx/Hx: Acute CVA (cerebrovascular accident) ([facility] [address] [I63.9]    Precautions: falls  Home situation: Pt lives at home [facility] Dx: [] Aphasia  [] Dysarthria  [] Apraxia   [] Oropharyngeal dysphagia [x] Cognitive Impairment  [] Other:   Date of Admit: 9/15/2022  Room #: 0160/0160-01    Current functional status (updated daily):         Pt being seen for : [] Speech/Language Treatment  [x] Dysphagia Treatment [x] Cognitive Treatment  [] Other:  Communication: [x]WFL  [] Aphasia  [] Dysarthria  [] Apraxia  [] Pragmatic Impairment [] Non-verbal  [] Hearing Loss  [] Other:   Cognition: [] WFL  [x] Mild  [x] Moderate  [] Severe [] Unable to Assess  [] Other:  Memory: [] WFL  [] Mild  [x] Moderate  [] Severe [] Unable to Assess  [] Other:  Behavior: [x] Alert  [x] Cooperative  [x]  Pleasant  [] Confused  [] Agitated  [] Uncooperative  [] Distractible [] Motivated  [] Self-Limiting [] Anxious  [] Other:  Endurance:  [x] Adequate for participation in SLP sessions  [] Reduced overall  [] Lethargic  [] Other:  Safety: [x] No concerns at this time  [] Reduced insight into deficits  []  Reduced safety awareness [] Not following call light procedures  [] Unable to Assess  [] Other:    Current Diet Order:ADULT ORAL NUTRITION SUPPLEMENT; Breakfast, Dinner; Standard High Calorie/High Protein Oral Supplement  ADULT DIET;  Regular, thin   Swallowing Precautions: upright for all intake, stay upright for at least 30 mins after intake, small bites/sips, assist feed, oral care 2-3x/day to reduce adverse affects in the event of aspiration, increase physical mobility as able, alternate bites/sips, slow rate of intake, general GERD precautions, and general aspiration precautions        Date: 10/5/2022      Tx session 1  [id number] Tx session 2  All tx needs met in session 1   Total Timed Code Min 30    Total Treatment Minutes 30    Individual Treatment Minutes 30    Group Treatment Minutes 0    Co-Treat Minutes 0    Variance/Reason:  N/A    Pain Denies    Pain Intervention [] RN notified  [] Repositioned  [] Intervention offered and patient declined  [x] N/A  [] Other: [] RN notified  [] Repositioned  [] Intervention offered and patient declined  [] N/A  [] Other:   Subjective     Pt alert and oriented, cooperative and agreeable to participate in therapy. Pt seen sitting upright in bedside chair. Objective:  Goals     Dysphagia Goals:    Short-term Goals  Timeframe for Short-term Goals: 5 days (09/20/22)          Goal Met 9/21/22        Goal met 09/22/22. Cognitive Goals:    Short-term Goals  Timeframe for Short-term Goals: 18 days (10/03/22)    Goal 1: Pt will complete recall tasks with 90% acc given min cues. Word list retention/ category inclusion:  -pt given four words and asked a question re the set of words  -ex: fire, ice, paper, snow (which ones are cold?)  -70% acc indep improving to 100% acc given min cues  -pt benefited from use of repetition and visualization compensatory strategies      Goal 2: Pt will complete higher level executive function tasks (meds, math, money, time, etc) with 95% acc given min cues. Answering questions about a prescription label:  -100% acc indep      Other areas targeted: N/A    Education:   Edu provided re: compensatory strategies, progress towards goals      Safety Devices: [x] Call light within reach  [x] Chair alarm activated  [] Bed alarm activated  [x] Other: AVASYS  [] Call light within reach  [] Chair alarm activated  [] Bed alarm activated  [] Other:    Assessment: Pt pleasant and cooperative, agreeable to participate. Pt answered questions about a prescription label with 100% acc indep. Pt completed functional recall task with 70% acc indep improving to 100% acc given min cues.  Pt benefits from reinforcement for use of repetition and visualization compensatory memory strategies. Pt's cognitive skills appear to be almost back to baseline. Per chart review, pt's caregiver concerned about pt's intermittent confusion, however this does not impact pt's accuracy during structured cognitive tasks, but does impact overall safety and independence. Pt will require 24 hour assist.    Plan: Continue as per plan of care. Additional Information:     Barriers toward progress: N/A  Discharge recommendations:  [] Home independently  [] Home with assistance [x]  24 hour supervision  [] ECF [x] Other: defer to PT/OT recs  Continued Tx Upon Discharge: ? [x] Yes [] No [] TBD based on progress while on ARU [] Vital Stim indicated [] Other:   Estimated discharge date: 10/08/22    Interventions used this date:  [] Speech/Language Treatment  [] Instruction in HEP [] Group [] Dysphagia Treatment [x] Cognitive Treatment   [] Other: Total Time Breakdown / Charges    Time in Time out Total Time / units   Cognitive Tx 1500 1530 30 min / 2 units    Speech Tx -- -- --   Dysphagia Tx -- -- --       Electronically Signed by     Christophe Parmar. A CCC-SLP  Speech-Language Pathologist  [id number]

## 2022-10-05 NOTE — CARE COORDINATION
JEFF left voice message with Patricio Hampton (daughter) via telephone r/t SNF preferences and to return writers call.  Dalia Carreon RN

## 2022-10-05 NOTE — CARE COORDINATION
CM spoke with Luis Lewis (daughter) via telephone. CM discussed DC date and therapy recommendations at this time. CM discussed Subacute/Skilled Nursing Facility preferences. Luis Lewis stated 1) [facility] 2) [facility] 3) [facility]. CM acknowledged. Luis Lewis also has concerns on the patients prognosis and how the patient has been hallucinating when she is visiting. Luis Lewis also has concerns with confusions as well. CM will speak with MD and staff to see if they have witness this. CM discussed that team conference is at 11am and will update her after conference with her concerns.  Chelly Badillo RN

## 2022-10-05 NOTE — PROGRESS NOTES
Cody Martines  10/5/2022  [id number]    Chief Complaint: Acute CVA (cerebrovascular accident) [facility])    Subjective:   No acute events overnight. Today Georgia Lakhani is seen in her room with therapy. She reports feeling \"woozy\", but denies feeling like she is going to pass. She denies other acute complaints at this time. ROS: No CP, SOB, dyspnea    Objective:  Patient Vitals for the past 24 hrs:   BP Temp Temp src Pulse Resp SpO2   10/05/22 0806 (!) 92/59 -- -- 76 -- 99 %   10/05/22 0745 125/67 97.5 °F (36.4 °C) Oral 62 16 100 %   10/04/22 2045 (!) 154/67 97.7 °F (36.5 °C) Oral 67 16 99 %   10/04/22 1312 (!) 98/55 -- -- 86 -- 99 %   10/04/22 1305 (!) 104/59 -- -- 74 -- 100 %     Gen: No distress, pleasant. Seated in wheelchair  HEENT: Normocephalic, atraumatic. CV: extremities well perfused  Resp: No respiratory distress. Abd: Soft, nondistended  Ext: No edema. Neuro: Alert, oriented, appropriately interactive. Independently brushes teeth  Psych: appropriate mood and affect    Laboratory data: Available via EMR. Therapy progress:    PT    Supine to Sit: Substantial/maximal assistance  Sit to Supine: Substantial/maximal assistance   Sit to Stand: Dependent  Chair/Bed to Chair Transfer: Independent  Car Transfer:    Ambulation 10 ft:    Ambulation 50 ft:    Ambulation 150 ft:    Stairs - 1 Step:    Stairs - 4 Step:    Stairs - 12 Step:      OT    Eating: Setup or clean-up assistance  Oral Hygiene: Partial/moderate assistance  Bathing: Dependent  Upper Body Dressing: Substantial/maximal assistance  Lower Body Dressing: Dependent  Toilet Transfer: Dependent  Toilet Hygiene: Dependent    Speech Therapy         Body mass index is 17.61 kg/m².     Assessment:  Patient Active Problem List   Diagnosis    Left knee pain    Left patella fracture    Contusion of left knee    Orthostatic hypotension    ARF (acute renal failure) (HCC)    Anemia    Debility    Acute CVA (cerebrovascular accident) ([address]    Nonrheumatic aortic valve insufficiency       Assessment and Plan:  Cody Martines is an [de-identified]year old female with a past medical history significant for orthostatic hypotension, anemia, and memory loss who presented to [facility] on 9/11/22 with recurrent falls and right sided weakness, found to have acute left CVA. She was admitted to [facility] on 9/15/22 due to functional deficits below her baseline. 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  - with right hemiparesis  - secondary stroke prevention with asa, plavix, statin  - PT, OT, ST    Confusion, intermittent  - suspect this is related to underlying dementia and sundowning  - UA negative x2    Dysphagia  - ST    Hyponatremia, improved  - Nephrology consulted, appreciate assistance     Autonomic Dysfunction  Orthostatic hypotension  - florinef, midodrine  - Cardiology following, appreciate assistance    Constipation, improved  - continue bowel regimen     Dementia   - possible PD  - donepezil     Overactive Bladder  - home regimen: Myrbetriq, tolterodine  - trospium while inpatient      Bowels: Schedule stool softener. Follow bowel movements. Enema or suppository if needed. Bladder: Check PVR x 3. [address] if PVR > 200ml or if any volume is > 500 ml. Sleep: Trazodone provided prn. PPX  DVT: heparin  GI: not indicated    Services: SNF   EDOD: 10/8/22    Interdisciplinary team conference was held today with entire rehab treatment team including PT, OT, SLP, Dietician, RN, and SW. Discussion focused on progress toward rehab goals and discharge planning. Barriers: level of assistance, availability of assistance, orthostatic hypotension, cormorbidities, endurance. Total treatment time >35 min with greater than 50% spent in care coordination.     Electronically signed by Nunu Chavez MD on 10/5/2022 at 11:44 AM

## 2022-10-06 PROCEDURE — 97130 THER IVNTJ EA ADDL 15 MIN: CPT

## 2022-10-06 PROCEDURE — 97110 THERAPEUTIC EXERCISES: CPT

## 2022-10-06 PROCEDURE — 97530 THERAPEUTIC ACTIVITIES: CPT

## 2022-10-06 PROCEDURE — 97542 WHEELCHAIR MNGMENT TRAINING: CPT

## 2022-10-06 PROCEDURE — 6370000000 HC RX 637 (ALT 250 FOR IP): Performed by: NURSE PRACTITIONER

## 2022-10-06 PROCEDURE — 6370000000 HC RX 637 (ALT 250 FOR IP): Performed by: STUDENT IN AN ORGANIZED HEALTH CARE EDUCATION/TRAINING PROGRAM

## 2022-10-06 PROCEDURE — 6360000002 HC RX W HCPCS: Performed by: STUDENT IN AN ORGANIZED HEALTH CARE EDUCATION/TRAINING PROGRAM

## 2022-10-06 PROCEDURE — 97112 NEUROMUSCULAR REEDUCATION: CPT

## 2022-10-06 PROCEDURE — 97535 SELF CARE MNGMENT TRAINING: CPT

## 2022-10-06 PROCEDURE — 97129 THER IVNTJ 1ST 15 MIN: CPT

## 2022-10-06 PROCEDURE — 1280000000 HC REHAB R&B

## 2022-10-06 RX ADMIN — TROSPIUM CHLORIDE 20 MG: 20 TABLET, FILM COATED ORAL at 20:12

## 2022-10-06 RX ADMIN — MIDODRINE HYDROCHLORIDE 5 MG: 5 TABLET ORAL at 07:52

## 2022-10-06 RX ADMIN — FERROUS SULFATE TAB 325 MG (65 MG ELEMENTAL FE) 325 MG: 325 (65 FE) TAB at 07:52

## 2022-10-06 RX ADMIN — Medication 5000 UNITS: at 07:52

## 2022-10-06 RX ADMIN — FLUDROCORTISONE ACETATE 0.1 MG: 0.1 TABLET ORAL at 07:51

## 2022-10-06 RX ADMIN — FOLIC ACID 1 MG: 1 TABLET ORAL at 07:52

## 2022-10-06 RX ADMIN — HEPARIN SODIUM 5000 UNITS: 5000 INJECTION INTRAVENOUS; SUBCUTANEOUS at 20:12

## 2022-10-06 RX ADMIN — Medication 5 MG: at 20:12

## 2022-10-06 RX ADMIN — HEPARIN SODIUM 5000 UNITS: 5000 INJECTION INTRAVENOUS; SUBCUTANEOUS at 13:55

## 2022-10-06 RX ADMIN — DONEPEZIL HYDROCHLORIDE 10 MG: 5 TABLET, FILM COATED ORAL at 20:12

## 2022-10-06 RX ADMIN — CYANOCOBALAMIN TAB 500 MCG 1000 MCG: 500 TAB at 07:51

## 2022-10-06 RX ADMIN — ASPIRIN 81 MG 81 MG: 81 TABLET ORAL at 07:52

## 2022-10-06 RX ADMIN — TRAZODONE HYDROCHLORIDE 50 MG: 50 TABLET ORAL at 20:12

## 2022-10-06 RX ADMIN — HEPARIN SODIUM 5000 UNITS: 5000 INJECTION INTRAVENOUS; SUBCUTANEOUS at 06:09

## 2022-10-06 RX ADMIN — ATORVASTATIN CALCIUM 80 MG: 80 TABLET, FILM COATED ORAL at 20:12

## 2022-10-06 RX ADMIN — MIDODRINE HYDROCHLORIDE 5 MG: 5 TABLET ORAL at 11:41

## 2022-10-06 RX ADMIN — CLOPIDOGREL BISULFATE 75 MG: 75 TABLET ORAL at 07:51

## 2022-10-06 RX ADMIN — ANTACID TABLETS 500 MG: 500 TABLET, CHEWABLE ORAL at 07:51

## 2022-10-06 RX ADMIN — DOCUSATE SODIUM 50 MG AND SENNOSIDES 8.6 MG 2 TABLET: 8.6; 5 TABLET, FILM COATED ORAL at 07:51

## 2022-10-06 RX ADMIN — CETIRIZINE HYDROCHLORIDE 5 MG: 5 TABLET, FILM COATED ORAL at 07:51

## 2022-10-06 RX ADMIN — POTASSIUM CHLORIDE 10 MEQ: 750 TABLET, EXTENDED RELEASE ORAL at 07:52

## 2022-10-06 ASSESSMENT — PAIN SCALES - GENERAL
PAINLEVEL_OUTOF10: 0
PAINLEVEL_OUTOF10: 0

## 2022-10-06 NOTE — PROGRESS NOTES
Danilo Pa  10/6/2022  [id number]    Chief Complaint: Acute CVA (cerebrovascular accident) [facility])    Subjective:   No acute events overnight. Today Briseida Owen is seen in her room. She reports feeling well and denies acute complaints at this time. ROS: No CP, SOB, dyspnea    Objective:  Patient Vitals for the past 24 hrs:   BP Temp Temp src Pulse Resp SpO2   10/06/22 1130 135/64 -- -- 67 -- --   10/06/22 0907 (!) 116/56 -- -- 85 -- --   10/06/22 0745 103/67 97.9 °F (36.6 °C) Oral 70 16 99 %   10/05/22 2015 (!) 106/56 97.7 °F (36.5 °C) Oral 71 16 95 %   10/05/22 1745 101/66 -- -- 95 16 98 %     Gen: No distress, pleasant. Supine in bed  HEENT: Normocephalic, atraumatic. CV: extremities well perfused  Resp: No respiratory distress. Abd: Soft, nondistended  Ext: No edema. Neuro: Alert, oriented, appropriately interactive. Psych: appropriate mood and affect    Laboratory data: Available via EMR. Therapy progress:    PT    Supine to Sit: Substantial/maximal assistance  Sit to Supine: Substantial/maximal assistance   Sit to Stand: Dependent  Chair/Bed to Chair Transfer: Independent  Car Transfer:    Ambulation 10 ft:    Ambulation 50 ft:    Ambulation 150 ft:    Stairs - 1 Step:    Stairs - 4 Step:    Stairs - 12 Step:      OT    Eating: Setup or clean-up assistance  Oral Hygiene: Partial/moderate assistance  Bathing: Dependent  Upper Body Dressing: Substantial/maximal assistance  Lower Body Dressing: Dependent  Toilet Transfer: Dependent  Toilet Hygiene: Dependent    Speech Therapy         Body mass index is 17.61 kg/m².     Assessment:  Patient Active Problem List   Diagnosis    Left knee pain    Left patella fracture    Contusion of left knee    Orthostatic hypotension    ARF (acute renal failure) ([facility])    Anemia    Debility    Acute CVA (cerebrovascular accident) ([address]    Nonrheumatic aortic valve insufficiency       Assessment and Plan:  Danilo Pryor is an [age] [address] old female with a past medical history significant for orthostatic hypotension, anemia, and memory loss who presented to [facility] on 9/11/22 with recurrent falls and right sided weakness, found to have acute left CVA. She was admitted to [facility] on 9/15/22 due to functional deficits below her baseline. 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  - with right hemiparesis  - secondary stroke prevention with asa, plavix, statin  - PT, OT, ST    Confusion, intermittent  - suspect this is related to underlying dementia and sundowning  - UA negative x2    Dysphagia  - ST    Hyponatremia, improved  - Nephrology consulted, appreciate assistance     Autonomic Dysfunction  Orthostatic hypotension  - florinef, midodrine  - Cardiology following, appreciate assistance    Constipation, improved  - continue bowel regimen     Dementia   - possible PD  - donepezil     Overactive Bladder  - home regimen: Myrbetriq, tolterodine  - trospium while inpatient      Bowels: Schedule stool softener. Follow bowel movements. Enema or suppository if needed. Bladder: Check PVR x 3. [facility] if PVR > 200ml or if any volume is > 500 ml. Sleep: Trazodone provided prn.       PPX  DVT: heparin  GI: not indicated    Services: SNF   EDOD: 10/8/22    Electronically signed by Yuri Kumar MD on 10/6/2022 at 2:43 PM

## 2022-10-06 NOTE — CARE COORDINATION
CM received a telephone call from admission with [facility] they do not have any beds till 10/12. Patient is discharging on 10/08. CM Acknowledged. Brien Canales RN    [phone] CM sent referral to [facility]    [phone] CM received a call from Gladis Castorena with [facility] and they have accepted patient. Will need Rapid COVID within 72 hours and hens completed.  Brien Canales RN

## 2022-10-06 NOTE — PROGRESS NOTES
Physical Therapy  Facility/Department: [facility]  Rehabilitation Physical Therapy Treatment Note    NAME: Bowen Marcus  : 1940 (81 y.o.)  MRN: [id number]  CODE STATUS: Full Code    Date of Service: 10/6/22       Restrictions:  Restrictions/Precautions: Up as Tolerated; Fall Risk;General Precautions  Position Activity Restriction  Other position/activity restrictions: knee high KENN hose; up as tolerated; Notify physician for pulse less than 50 or greater than 120, respiratory rate less than 12 or greater than 25, oral temperature greater than 101.3 F (11.4 C), systolic BP less than 90 or greater than 322, diastolic BP less than 50 or greater than 100. Posterior leaning     SUBJECTIVE  Subjective  Subjective: Pt sitting EOB with OT, agreeable to therapy  Pain: Pt does not indicate pain     OBJECTIVE  Vitals: 115/56, 85 bpm, unable to obtain SPO2% reading. 97/64 following standing, 103/58 with seated rest. Pt asymptomatic but reports fatigue. Cognition  Overall Cognitive Status: Exceptions  Arousal/Alertness: Appropriate responses to stimuli  Following Commands: Follows one step commands with increased time; Follows one step commands with repetition  Attention Span: Appears intact  Memory: Decreased recall of recent events  Safety Judgement: Decreased awareness of need for assistance;Decreased awareness of need for safety  Problem Solving: Assistance required to generate solutions;Assistance required to implement solutions;Assistance required to identify errors made;Assistance required to correct errors made  Insights: Decreased awareness of deficits  Initiation: Requires cues for some  Sequencing: Requires cues for some  Orientation  Overall Orientation Status: Within Normal Limits  Orientation Level: Oriented X4    Functional Mobility  Bed To/From Chair  Technique: Sit pivot  Assistance Level: Dependent  Skilled Clinical Factors: max A x2, max A x1 + mod A x1 for managing pants in standing      Environmental Mobility  Wheelchair  Surface: Level surface  Device: Standard wheelchair  Assistance Required to Manage Parts: All wheelchair parts  Assistance Level for Propulsion: Moderate assistance  Propulsion Method: Bilateral upper extremities  Propulsion Quality: Slow velocity; Short strokes  Propulsion Distance: 75 ft    Pt completes 1 standing trial with max A x3 with use of RW and maria del carmen knee block assistance with cues for upright posture, hip and knee extension, and looking up. Mirror used for visual feedback. Pt fatigues quickly, demos posterior lean in standing. Pt tolerates ~20 seconds prior to sitting. Pt completes sitting balance activities at EOM reaching for cones at floor height, shoulder height, and overhead outside ROBERT. Pt required to reach crossbody for cones and shift weight to retrieve cones. Pt required to lean onto R forearm and push up to midline by WB through RUE. Pt requires mostly min-mod A for sitting balance with leaning, up to max A when reaching further outside ROBERT with assistance to maintain R hand on mat. Pt requires cues to sit upright and attend to posture with use of mirror between reps. Pt completes x20 mins with rest breaks. 1x 10 BLE LAQ  1x 10 LLE AP         ASSESSMENT/PROGRESS TOWARDS GOALS  Assessment  Assessment: Patient seen as co-treat with OT to facilitate safe sitting balance and transfers as well as accommodate for low activity tolerance. Patient completed sit pivot transfers with max A x1. Pt completes sitting balance, reaching outside ROBERT, leaning onto RUE, and emphasis on midline awareness and posture with mostly min-mod A for righting and up to max A when reaching further outside ROBERT. Pt completes 1 standing trial with max A X3 and RW. Pt's BP decreasing with activity, as low as 97/64. Pt will continue to benefit from skilled PT at d/c to progress mobility. Activity Tolerance: Patient limited by endurance; Patient limited by fatigue;Patient tolerated treatment well  Discharge Recommendations: Subacute/Skilled Nursing Facility  PT Equipment Recommendations  Equipment Needed: No  Other: defer to facility    Addendum Second Session  Assessment: Pt seen for bed mobility and transfers. Pt's /63 at end of session in recliner. Pt fatigued but able to participate throughout session with rest breaks. Bed mobility: supine<>sit min A 2x with cues to scoop RLE with LLE and for pushing up. Pt completes in flat bed without bedrails with increased time. Transfers: Pt completes transfers with max A x1-2 sit pivots R and L w/c<>bed>recliner      Goals  Patient Goals   Patient Goals : Patient states, \"To be able to get up without being told\" (helped as pt pushes backwards)  Short Term Goals  Time Frame for Short Term Goals: 9/23/2022  Short Term Goal 1: Patient demonstrates  sitting  midline 15 minutes wtihout posterior lob 9/17 mod assist to maintain seated balance. Short Term Goal 2: Patient demonstrates bed<>chair with LRAD with mod assist of 1. 10/04 dependent with nathaniel  Short Term Goal 3: Patient demonstrates walking 10 feet or greater wtih mod assist of 2 LRAD. 10/04 not completed  Short Term Goal 4: Patient demonstrates indep in rolling L<>R and mod assist sup to sit ; 10/04 not observed  Short Term Goal 5: Patient demonstrates min assist in [facility] propulsion 150 feet. 10/04 not completed  Long Term Goals  Time Frame for Long Term Goals : 10/7/2022  Long Term Goal 1: Patient demonstrates  Modified indep in rolling and sup<>Sit. Long Term Goal 2: Patient demonstrates   transfers sit<>stand with FWW and min assist.  Long Term Goal 3: Patient demonstrates bed<>chair min A. Long Term Goal 4: Patient demonstrates gait with transfers  and short distances 50 feet or greater with min assist  Long Term Goal 5: Patient demonstrates up and down 2 steps or greater.   with mod assist of 1  Long term goal 6: Patient demonstrates indep WC propulsion with L/R turns indep 150 feet.  Long term goal 7: Patient demonstrates stoop to recover object from floor with reacher with mod assist.  Long term goal 8: Patients family demonstrates ability to assist patient in and out of car with mod assist.    PLAN OF CARE/SAFETY  Physcial Therapy Plan  General Plan: 5-7 times per week  Plan weeks: 21 days  Current Treatment Recommendations: Strengthening;ROM;Balance training;Functional mobility training;Transfer training;Neuromuscular re-education;Stair training;Gait training; Wheelchair mobility training; Endurance training; Therapeutic activities; Positioning;Equipment evaluation, education, & procurement;Home exercise program;Safety education & training;Patient/Caregiver education & training  Safety Devices  Type of Devices: All fall risk precautions in place;Call light within reach; Patient at risk for falls;Nurse notified;Telesitter in use;Left in chair;Chair alarm in place;Gait belt  Restraints  Restraints Initially in Place: No    EDUCATION  Education  Education Given To: Patient  Education Provided: Role of Therapy;Plan of Care;Transfer Training; Fall Prevention Strategies;Precautions; Safety;Equipment;Visual Perceptual Function  Education Provided Comments: pt educated on w/c propulsion technique, benefits of reaching and leaning for RUE WB, midline awareness - requires reinforcement  Education Method: Verbal  Barriers to Learning: Cognition  Education Outcome: Verbalized understanding;Continued education needed        Therapy Time   Individual Concurrent Group Co-treatment   Time In 1000     0900   Time Out 1030     1000   Minutes 30     60     Timed Code Treatment Minutes:  [facility], PT, 10/06/22 at 4:10 PM

## 2022-10-06 NOTE — PROGRESS NOTES
Occupational Therapy  Facility/Department: [facility]  Rehabilitation Occupational Therapy Daily Treatment Note    Date: 10/6/22  Patient Name: Marlon Noel       Room: [facility]  MRN: [id number]  Account: [de-identified]   : 1940  (81 y.o.) Gender: female       Past Medical History:  has a past medical history of ARF (acute renal failure) ([facility] [address] Past Surgical History:   has a past surgical history that includes joint replacement; Tonsillectomy; Intracapsular cataract extraction (Right, 2019); and Intracapsular cataract extraction (Left, 2019). Restrictions  Restrictions/Precautions: Up as Tolerated; Fall Risk;General Precautions  Other position/activity restrictions: knee high KENN hose; up as tolerated; Notify physician for pulse less than 50 or greater than 120, respiratory rate less than 12 or greater than 25, oral temperature greater than 101.3 F (24.4 C), systolic BP less than 90 or greater than 205, diastolic BP less than 50 or greater than 100. Posterior leaning    Subjective  Subjective: pt agreeable to OT/PT cotx. pt pleasant and agreeable throughout session. pt hesitant to standing activities reporting she often feels like she is falling. Pt reports no pain  Restrictions/Precautions: Up as Tolerated; Fall Risk;General Precautions             Objective     Orientation  Overall Orientation Status: Within Normal Limits  Orientation Level: Oriented X4         ADL  Lower Extremity Dressing  Assistance Level: Maximum assistance  Skilled Clinical Factors: donning/doffing brief and pants sitting EOB with max A x1,  for standing for pants management with max A x1 and min A x1  Putting On/Taking Off Footwear  Assistance Level: Maximum assistance;Dependent  Skilled Clinical Factors: Pt was dependent to Max A for donning/doffing shoes and socks  Toileting  Skilled Clinical Factors: incontient of urine during session, max A for hygiene          Functional Mobility  Device: Wheelchair  Activity: To/From therapy gym  Skilled Clinical Factors: Min A for w/C mobility but needs increased time and mod to max vc for using long arms and to keep posture in midline as well to attend to the environment around pt. Bed Mobility  Overall Assistance Level: Minimal Assistance  Additional Factors: Increased time to complete;Verbal cues  Roll Left  Assistance Level: Minimal assistance  Sit to Supine  Assistance Level: Maximum assistance  Skilled Clinical Factors: max A of 1  Supine to Sit  Assistance Level: Minimal assistance  Skilled Clinical Factors: min A x 1  Scooting  Assistance Level: Moderate assistance; Requires x 2 assistance  Skilled Clinical Factors: Mod A x 2 for scooting  Transfers  Surface: From bed; Wheelchair; To mat;From mat; To chair with arms  Additional Factors: Verbal cues; Set-up; With handrails  Sit to Stand  Assistance Level: Requires x 2 assistance; Moderate assistance;Maximum assistance;Dependent  Skilled Clinical Factors: max A x2 for sit<>stand from high/low mat  Stand to Sit  Assistance Level: Maximum assistance  Skilled Clinical Factors: max A of 2  Bed To/From Chair  Technique: Sit pivot  Assistance Level: Maximum assistance  Skilled Clinical Factors: max A of 1  Sit Pivot  Assistance Level: Maximum assistance  Skilled Clinical Factors: Max A x1 for sit pivot transfer w/c <> high/low mat, recliner,   Neuromuscular Education  Neuromuscular education: Yes  NDT Treatment: Upper extremity; Sitting  Weight Bearing  Weight Bearing Technique: Yes  RUE Weight Bearing: Extended arm seated;Forearm seated  LUE Weight Bearing: Extended arm seated  Response To Weight Bearing Technique: seated reaching across midlline with contralateral hand to grasp cone on the ground while WB on RUE and using RUE to push to right self to midline x10 with PT assisting for balance with min - max A depending on how far out of COG pt needed to be, OT assisted with elbow and hand weight bearing.  Pt also reached with RUE x5 across baseline to grasp cone out of OTs hand while weight bearing with LUE and needing min A for maintaing balance. OT Exercises  Exercise Treatment: Pt completed x1 sit<>stand with Max A x2  Postural Correction Exercises: Pt recieved postural correction during WB activities and while self propelling in w/c     Assessment  Assessment  Assessment: Pt tolerated OT/PT cotx session well with no reporting of dizziness or lightheadedness. Pt is progressing slowly towards goals. Pt conituing to require max A x1 for scooting and sit pivots and max Ax1 for standing while mod A x1 for pants mangement. Pt completed self-propeling to gym in w/c with min A from PT and increased time and mod to max vc for propeling techinques and postural corrections and environmental awareness from OT. Pt completed weight bearing on BUE to reach across midline and out of COG on floor level while seated to grab cones. Pt facilitated balance and posture from min - max A and OT facilitated weight bearing with min A on elbow and hand. Pt sitting balance improving, less posterior leaning, but slouches to right and needs max vc for getting back to midline. Pt continues to benefit from 2 skilled therapists. Recommending d/c to SNF secondary to current bruden of care and safety needs. Defer DME to next level of care. Activity Tolerance: Patient limited by endurance; Patient limited by fatigue;Patient tolerated treatment well  Discharge Recommendations: Subacute/Skilled Nursing Facility  Factors Affecting Discharge: Pt lives alone and currently require high level of assistance due to new deficits from CVA  OT Equipment Recommendations  Other: CTA pending progress, may defer DME rec to d/c facility  Safety Devices  Safety Devices in place: Yes  Type of devices: Call light within reach; All fall risk precautions in place; Patient at risk for falls;Gait belt;Nurse notified; Chair alarm in place; Left in chair    Patient Education  Education  Education Given To: Patient  Education Provided: Role of Therapy;Plan of Care;Precautions;Transfer Training; Fall Prevention Strategies;Cognition; Safety; Family Education;ADL Function;Mobility Training;Equipment;DME/Home Modifications; Home Exercise Program  Education Provided Comments: disease specific: body positioning in space, sitting and standing and balance, sit pivot transfers, COG midline awareness, transfer training  Education Method: Verbal;Demonstration  Barriers to Learning: Cognition  Education Outcome: Verbalized understanding;Continued education needed    Plan  Occupational Therapy Plan  Times Per Week: 5-7x/wk  Specific Instructions for Next Treatment: ADLs, mode dressing technique; RUE only, extra OT session  Current Treatment Recommendations: Strengthening;Balance training;Functional mobility training;Self-Care / ADL;Endurance training;Neuromuscular re-education;Positioning;Modalities; Equipment evaluation, education, & procurement;Patient/Caregiver education & training; Safety education & training;Home management training;Coordination training;Sensory integraion    Goals  Patient Goals   Patient goals : \"to get going and get better\"  Short Term Goals  Time Frame for Short Term Goals: 9/28/22 (14 days)  Short Term Goal 1: Pt will complete UB dressing with mod A- MET 10/3  Short Term Goal 2: Pt will complete simple grooming task supported in w/c with set-up A- MET 10/5/22  Short Term Goal 3: Pt will complete sit pivot transfers with mod A x1 using LRAD- 10/5 ongoing  Short Term Goal 4: Pt will complete toileting with max A- goal  MET - GOAL MET 10/5  Long Term Goals  Time Frame for Long Term Goals : 10/05/22 (21 days), extend goals to 10/08- goals updated to reflect current progress.   Long Term Goal 1: Pt will complete sit pivot w/c<> toilet transfer with min A x1  Long Term Goal 2: Pt will complete LB dressing with mod A  Long Term Goal 3: Pt will complete bathing with mod A  Long Term Goal 4: Pt will incorporate RUE into ADLs 75% of trials with minimal VC in order to increase functional independence with ADLs      Therapy Time   Individual Concurrent Group Co-treatment   Time In       0900   Time Out       1000   Minutes       60   Timed Code Treatment Minutes: [address], S/OT

## 2022-10-06 NOTE — DISCHARGE INSTR - COC
Continuity of Care Form    Patient Name: Cassidy Betancourt   :  1940  MRN:  [id number]    [facility] date:  9/15/2022  Discharge date:  Anticipating DC on 10/08/2022    Code Status Order: Full Code   Advance Directives:     Admitting Physician:  Freddie Arellano MD  PCP: Manjinder Beck MD    Discharging Nurse: Kolton Kim Unit/Room#: 9972/6085-06  Discharging Unit Phone Number: [phone]    Emergency Contact:   Extended Emergency Contact Information  Primary Emergency Contact: [facility] Phone: [phone]  Mobile Phone: [phone]  Relation: Child  Secondary Emergency Contact: [facility] Phone: [phone]  Mobile Phone: [phone]  Relation: Other    Past Surgical History:  Past Surgical History:   Procedure Laterality Date    INTRACAPSULAR CATARACT EXTRACTION Right 2019    PHACOEMULSIFICATION OF CATARACT RIGHT EYE WITH INTRAOCULAR LENS IMPLANT performed by Faiza Torres MD at [facility] Left 2019    PHACOEMULSIFICATION OF CATARACT LEFT EYE WITH INTRAOCULAR LENS IMPLANT performed by Faiza Torres MD at [facility]      both hips    TONSILLECTOMY         Immunization History: There is no immunization history on file for this patient. Active Problems:  Patient Active Problem List   Diagnosis Code    Left knee pain M25.562    Left patella fracture S82.002A    Contusion of left knee S80. 02XA    Orthostatic hypotension I95.1    ARF (acute renal failure) ([facility]) N17.9    Anemia D64.9    Debility R53.81    Acute CVA (cerebrovascular accident) (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       Isolation/Infection:   Isolation            No Isolation          Patient Infection Status       None to display            Nurse Assessment:  Last Vital Signs: /64   Pulse 67   Temp 97.9 °F (36.6 °C) (Oral)   Resp 16   Ht 5' 5\" (1.651 m)   Wt 105 lb 13.1 oz (48 kg) SpO2 99%   BMI 17.61 kg/m²     Last documented pain score (0-10 scale): Pain Level: 0  Last Weight:   Wt Readings from Last 1 Encounters:   10/03/22 105 lb 13.1 oz (48 kg)     Mental Status:  oriented and alert    IV Access:  - None    Nursing Mobility/ADLs:  Walking   Dependent  Transfer  Assisted  Bathing  Assisted  Dressing  Assisted  Toileting  Dependent  Feeding  Independent  Med Admin  Independent  Med Delivery   whole    Wound Care Documentation and Therapy:        Elimination:  Continence: Bowel: No  Bladder: No  Urinary Catheter: None   Colostomy/Ileostomy/Ileal Conduit: No       Date of Last BM: 10/3    Intake/Output Summary (Last 24 hours) at 10/6/2022 1351  Last data filed at 10/6/2022 1335  Gross per 24 hour   Intake 960 ml   Output 1300 ml   Net -340 ml     I/O last 3 completed shifts: In: 5 [P.O.:720]  Out: 300 [Urine:300]    Safety Concerns: At Risk for Falls    Impairments/Disabilities:      Vision and Hearing    Nutrition Therapy:  Current Nutrition Therapy:   - Oral Diet:  General    Routes of Feeding: Oral  Liquids: Thin Liquids  Daily Fluid Restriction: no  Last Modified Barium Swallow with Video (Video Swallowing Test): not done    Treatments at the Time of Hospital Discharge:   Respiratory Treatments:   Oxygen Therapy:  is not on home oxygen therapy.   Ventilator:    - No ventilator support    Rehab Therapies: Physical Therapy and Occupational Therapy  Weight Bearing Status/Restrictions: No weight bearing restrictions  Other Medical Equipment (for information only, NOT a DME order):  Stedy  Other Treatments:     Patient's personal belongings (please select all that are sent with patient):  Michelle    RN SIGNATURE:  Electronically signed by Caroline Wisdom RN on 10/8/22 at 8:16 AM EDT    CASE MANAGEMENT/SOCIAL WORK SECTION    Inpatient Status Date: 09/15/2022    Readmission Risk Assessment Score:  Readmission Risk              Risk of Unplanned Readmission:  26       Discharging to Facility/ Agency     The [address] [place] [address]   [address] [place] [address] [address] [place] [address]   [phone]    / signature: Electronically signed by Sean Vaughn RN on 10/7/22 at 8:13 AM EDT    PHYSICIAN SECTION    Prognosis: Good    Condition at Discharge: Stable    Rehab Potential (if transferring to Rehab): Good    Recommended Labs or Other Treatments After Discharge:   - Follow up with family doctor    Physician Certification: I certify the above information and transfer of Virgilio Yemeni  is necessary for the continuing treatment of the diagnosis listed and that she requires [place] for less 30 days.      Update Admission H&P: No change in H&P    PHYSICIAN SIGNATURE:  Electronically signed by Vignesh Deleon MD on 10/7/22 at 11:26 AM EDT

## 2022-10-06 NOTE — PROGRESS NOTES
session 2  All tx needs met in session 1   Total Timed Code Min 30    Total Treatment Minutes 30    Individual Treatment Minutes 30    Group Treatment Minutes 0    Co-Treat Minutes 0    Variance/Reason:  N/A    Pain Denies    Pain Intervention [] RN notified  [] Repositioned  [] Intervention offered and patient declined  [x] N/A  [] Other: [] RN notified  [] Repositioned  [] Intervention offered and patient declined  [] N/A  [] Other:   Subjective     Pt cooperative and pleasant, seen upright in recliner for tx session. Objective:  Goals     Dysphagia Goals:    Short-term Goals  Timeframe for Short-term Goals: 5 days (09/20/22)          Goal Met 9/21/22        Goal met 09/22/22. Cognitive Goals:    Short-term Goals  Timeframe for Short-term Goals: 18 days (10/03/22)    Goal 1: Pt will complete recall tasks with 90% acc given min cues. High-level complexity recall task --name/face association (5 pairs):    -15-minute delay: 2/5 (40% accuracy), min cues to recall. 5/5 given mod-max cues. Pt benefited from mod cues to encode. Goal 2: Pt will complete higher level executive function tasks (meds, math, money, time, etc) with 95% acc given min cues. Answering questions about various prescription labels: 100% accuracy, no cues        Other areas targeted: N/A    Education:   Edu provided re: compensatory recall strategies, progress towards goals      Safety Devices: [x] Call light within reach  [x] Chair alarm activated  [] Bed alarm activated  [x] Other: AVASYS  [] Call light within reach  [] Chair alarm activated  [] Bed alarm activated  [] Other:    Assessment: Pt pleasant and cooperative throughout tx session, actively participated in structured tasks. She demonstrated significant difficulty with graded recall task of increased complexity this date (see above), requiring mod cues to encode/utilize strategies and mod-max cues to recall. Continue with goals per POC.    Plan: Continue as per plan of care. Additional Information:     Barriers toward progress: N/A  Discharge recommendations:  [] Home independently  [] Home with assistance [x]  24 hour supervision  [] ECF [x] Other: defer to PT/OT recs  Continued Tx Upon Discharge: ? [x] Yes [] No [] TBD based on progress while on ARU [] Vital Stim indicated [] Other:   Estimated discharge date: 10/08/22    Interventions used this date:  [] Speech/Language Treatment  [] Instruction in HEP [] Group [] Dysphagia Treatment [x] Cognitive Treatment   [] Other:       Total Time Breakdown / Charges    Time in Time out Total Time / units   Cognitive Tx 1100 1130 30 min / 2 units    Speech Tx -- -- --   Dysphagia Tx -- -- --       Electronically Signed by     Martir Gutierrez M.S. [address] [facility] [place]  Speech-language pathologist  [id number]

## 2022-10-07 LAB
ANION GAP SERPL CALCULATED.3IONS-SCNC: 12 MMOL/L (ref 3–16)
BASOPHILS ABSOLUTE: 0 K/UL (ref 0–0.2)
BASOPHILS RELATIVE PERCENT: 0.5 %
BUN BLDV-MCNC: 39 MG/DL (ref 7–20)
CALCIUM SERPL-MCNC: 9.3 MG/DL (ref 8.3–10.6)
CHLORIDE BLD-SCNC: 101 MMOL/L (ref 99–110)
CO2: 25 MMOL/L (ref 21–32)
CREAT SERPL-MCNC: 0.8 MG/DL (ref 0.6–1.2)
EOSINOPHILS ABSOLUTE: 0.2 K/UL (ref 0–0.6)
EOSINOPHILS RELATIVE PERCENT: 3.6 %
GFR AFRICAN AMERICAN: >60
GFR NON-AFRICAN AMERICAN: >60
GLUCOSE BLD-MCNC: 88 MG/DL (ref 70–99)
HCT VFR BLD CALC: 31.1 % (ref 36–48)
HEMOGLOBIN: 10.6 G/DL (ref 12–16)
LYMPHOCYTES ABSOLUTE: 1.6 K/UL (ref 1–5.1)
LYMPHOCYTES RELATIVE PERCENT: 27.2 %
MCH RBC QN AUTO: 32.7 PG (ref 26–34)
MCHC RBC AUTO-ENTMCNC: 34 G/DL (ref 31–36)
MCV RBC AUTO: 96.1 FL (ref 80–100)
MONOCYTES ABSOLUTE: 0.4 K/UL (ref 0–1.3)
MONOCYTES RELATIVE PERCENT: 7 %
NEUTROPHILS ABSOLUTE: 3.6 K/UL (ref 1.7–7.7)
NEUTROPHILS RELATIVE PERCENT: 61.7 %
PDW BLD-RTO: 16 % (ref 12.4–15.4)
PLATELET # BLD: 247 K/UL (ref 135–450)
PMV BLD AUTO: 7.2 FL (ref 5–10.5)
POTASSIUM REFLEX MAGNESIUM: 4 MMOL/L (ref 3.5–5.1)
RBC # BLD: 3.23 M/UL (ref 4–5.2)
SARS-COV-2, NAAT: NOT DETECTED
SODIUM BLD-SCNC: 138 MMOL/L (ref 136–145)
WBC # BLD: 5.8 K/UL (ref 4–11)

## 2022-10-07 PROCEDURE — 80048 BASIC METABOLIC PNL TOTAL CA: CPT

## 2022-10-07 PROCEDURE — 97129 THER IVNTJ 1ST 15 MIN: CPT

## 2022-10-07 PROCEDURE — 85025 COMPLETE CBC W/AUTO DIFF WBC: CPT

## 2022-10-07 PROCEDURE — 97130 THER IVNTJ EA ADDL 15 MIN: CPT

## 2022-10-07 PROCEDURE — 6370000000 HC RX 637 (ALT 250 FOR IP): Performed by: NURSE PRACTITIONER

## 2022-10-07 PROCEDURE — 6370000000 HC RX 637 (ALT 250 FOR IP): Performed by: STUDENT IN AN ORGANIZED HEALTH CARE EDUCATION/TRAINING PROGRAM

## 2022-10-07 PROCEDURE — 87635 SARS-COV-2 COVID-19 AMP PRB: CPT

## 2022-10-07 PROCEDURE — 97530 THERAPEUTIC ACTIVITIES: CPT

## 2022-10-07 PROCEDURE — 36415 COLL VENOUS BLD VENIPUNCTURE: CPT

## 2022-10-07 PROCEDURE — 1280000000 HC REHAB R&B

## 2022-10-07 PROCEDURE — 97535 SELF CARE MNGMENT TRAINING: CPT

## 2022-10-07 PROCEDURE — 6360000002 HC RX W HCPCS: Performed by: STUDENT IN AN ORGANIZED HEALTH CARE EDUCATION/TRAINING PROGRAM

## 2022-10-07 PROCEDURE — 97542 WHEELCHAIR MNGMENT TRAINING: CPT

## 2022-10-07 PROCEDURE — 97110 THERAPEUTIC EXERCISES: CPT

## 2022-10-07 RX ORDER — ASPIRIN 81 MG/1
81 TABLET, CHEWABLE ORAL DAILY
Qty: 30 TABLET | Refills: 3
Start: 2022-10-08

## 2022-10-07 RX ORDER — CETIRIZINE HYDROCHLORIDE 10 MG/1
5 TABLET ORAL DAILY
Qty: 30 TABLET | Refills: 0
Start: 2022-10-07

## 2022-10-07 RX ORDER — TROSPIUM CHLORIDE 20 MG/1
20 TABLET, FILM COATED ORAL NIGHTLY
Qty: 60 TABLET | Refills: 0
Start: 2022-10-07

## 2022-10-07 RX ORDER — MECOBALAMIN 5000 MCG
5 TABLET,DISINTEGRATING ORAL NIGHTLY PRN
Qty: 30 TABLET | Refills: 0
Start: 2022-10-07

## 2022-10-07 RX ORDER — CLOPIDOGREL BISULFATE 75 MG/1
75 TABLET ORAL DAILY
Qty: 30 TABLET | Refills: 3
Start: 2022-10-08

## 2022-10-07 RX ORDER — TRAZODONE HYDROCHLORIDE 50 MG/1
50 TABLET ORAL NIGHTLY PRN
Qty: 30 TABLET | Refills: 0
Start: 2022-10-07

## 2022-10-07 RX ORDER — PHENOL 1.4 %
1 AEROSOL, SPRAY (ML) MUCOUS MEMBRANE DAILY
Qty: 30 TABLET | Refills: 3
Start: 2022-10-07

## 2022-10-07 RX ORDER — POLYETHYLENE GLYCOL 3350 17 G/17G
17 POWDER, FOR SOLUTION ORAL DAILY PRN
Qty: 527 G | Refills: 1
Start: 2022-10-07 | End: 2022-11-06

## 2022-10-07 RX ORDER — ATORVASTATIN CALCIUM 80 MG/1
80 TABLET, FILM COATED ORAL NIGHTLY
Qty: 30 TABLET | Refills: 3
Start: 2022-10-07

## 2022-10-07 RX ORDER — POTASSIUM CHLORIDE 20 MEQ/1
10 TABLET, EXTENDED RELEASE ORAL DAILY
Qty: 30 TABLET | Refills: 0
Start: 2022-10-07

## 2022-10-07 RX ORDER — CHOLECALCIFEROL (VITAMIN D3) 25 MCG
5000 TABLET ORAL DAILY
Qty: 60 TABLET | Refills: 0
Start: 2022-10-08

## 2022-10-07 RX ORDER — MIDODRINE HYDROCHLORIDE 5 MG/1
5 TABLET ORAL 3 TIMES DAILY
Qty: 90 TABLET | Refills: 0
Start: 2022-10-07

## 2022-10-07 RX ORDER — FERROUS SULFATE 325(65) MG
325 TABLET ORAL
Qty: 30 TABLET | Refills: 0
Start: 2022-10-07

## 2022-10-07 RX ADMIN — MIDODRINE HYDROCHLORIDE 5 MG: 5 TABLET ORAL at 07:50

## 2022-10-07 RX ADMIN — ASPIRIN 81 MG 81 MG: 81 TABLET ORAL at 07:50

## 2022-10-07 RX ADMIN — ANTACID TABLETS 500 MG: 500 TABLET, CHEWABLE ORAL at 07:50

## 2022-10-07 RX ADMIN — HEPARIN SODIUM 5000 UNITS: 5000 INJECTION INTRAVENOUS; SUBCUTANEOUS at 20:08

## 2022-10-07 RX ADMIN — TRAZODONE HYDROCHLORIDE 50 MG: 50 TABLET ORAL at 20:03

## 2022-10-07 RX ADMIN — HEPARIN SODIUM 5000 UNITS: 5000 INJECTION INTRAVENOUS; SUBCUTANEOUS at 14:58

## 2022-10-07 RX ADMIN — POTASSIUM CHLORIDE 10 MEQ: 750 TABLET, EXTENDED RELEASE ORAL at 07:50

## 2022-10-07 RX ADMIN — ATORVASTATIN CALCIUM 80 MG: 80 TABLET, FILM COATED ORAL at 20:03

## 2022-10-07 RX ADMIN — FLUDROCORTISONE ACETATE 0.1 MG: 0.1 TABLET ORAL at 07:49

## 2022-10-07 RX ADMIN — DONEPEZIL HYDROCHLORIDE 10 MG: 5 TABLET, FILM COATED ORAL at 20:02

## 2022-10-07 RX ADMIN — FOLIC ACID 1 MG: 1 TABLET ORAL at 07:50

## 2022-10-07 RX ADMIN — CETIRIZINE HYDROCHLORIDE 5 MG: 5 TABLET, FILM COATED ORAL at 07:50

## 2022-10-07 RX ADMIN — TROSPIUM CHLORIDE 20 MG: 20 TABLET, FILM COATED ORAL at 20:03

## 2022-10-07 RX ADMIN — Medication 5000 UNITS: at 07:50

## 2022-10-07 RX ADMIN — FERROUS SULFATE TAB 325 MG (65 MG ELEMENTAL FE) 325 MG: 325 (65 FE) TAB at 07:49

## 2022-10-07 RX ADMIN — Medication 5 MG: at 20:03

## 2022-10-07 RX ADMIN — CYANOCOBALAMIN TAB 500 MCG 1000 MCG: 500 TAB at 07:50

## 2022-10-07 RX ADMIN — HEPARIN SODIUM 5000 UNITS: 5000 INJECTION INTRAVENOUS; SUBCUTANEOUS at 06:04

## 2022-10-07 RX ADMIN — CLOPIDOGREL BISULFATE 75 MG: 75 TABLET ORAL at 07:50

## 2022-10-07 NOTE — PROGRESS NOTES
Occupational Therapy  Facility/Department: [facility]  Rehabilitation Occupational Therapy Daily Treatment Note    Date: 10/7/22  Patient Name: Saurav Rojo       Room: 2570/5152-31  MRN: [id number]  Account: [de-identified]   : 1940  (81 y.o.) Gender: female       Past Medical History:  has a past medical history of ARF (acute renal failure) ([facility] [address] Past Surgical History:   has a past surgical history that includes joint replacement; Tonsillectomy; Intracapsular cataract extraction (Right, 2019); and Intracapsular cataract extraction (Left, 2019). Restrictions  Restrictions/Precautions: Up as Tolerated; Fall Risk;General Precautions  Other position/activity restrictions: knee high KENN hose; up as tolerated; Notify physician for pulse less than 50 or greater than 120, respiratory rate less than 12 or greater than 25, oral temperature greater than 101.3 F (12.6 C), systolic BP less than 90 or greater than 079, diastolic BP less than 50 or greater than 100. Posterior leaning    Subjective  Subjective: First session: pt agreeable to OT/PT cotx. pt pleasant and agreeable throughout session. pt hesitant to standing activities reporting she often feels like she is falling. Pt reports no pain Second session: Pt reports feeling tired on shower chair during shower. Pt agreeable to OT. Restrictions/Precautions: Up as Tolerated; Fall Risk;General Precautions  Vitals: Pt started session 102/53, pt throughout session was between 97/53 and 89/55. Pt showed no symptoms of lightheadedness or dizziness. Objective     Orientation  Overall Orientation Status: Within Normal Limits  Orientation Level: Oriented X4         ADL  Feeding  Assistance Level: Set-up  Skilled Clinical Factors: Pt needing assistance with opening containers for set-up  Grooming/Oral Hygiene  Assistance Level: Supervision  Skilled Clinical Factors: Pt completed task with SPV and 1 vc to complete teethbrushing.   Upper Extremity Bathing  Skilled Clinical Factors: Pt demos ability to soap rinse and dry all UB parts, cues for positioning in chair with CGA for support  Lower Extremity Bathing  Assistance Level: Maximum assistance; Moderate assistance  Skilled Clinical Factors: Pt was able to clean all areas on LB except needing max Ax1 for cleaning posterior pericare  Upper Extremity Dressing  Assistance Level: Independent;Verbal cues; Set-up  Skilled Clinical Factors: Pt needing CGA for doffing shirt with mode technique, pt needing mod verbal cues for donning shirt with mode techinque, pt needing CGA to don shirt  Lower Extremity Dressing  Assistance Level: Set-up; Maximum assistance; Requires x 2 assistance; Moderate assistance  Skilled Clinical Factors: donning/doffing brief and pants sitting EOB with max A x1,  for standing for pants management around hips with max A x1 and min A x1, pt able to doff/don with mod A x1 for over feet with reacher  Putting On/Taking Off Footwear  Assistance Level: Moderate assistance  Skilled Clinical Factors: Pt was mod A for donning/doffing socks with reacher and sock aid. Toileting  Assistance Level: Maximum assistance;Dependent  Skilled Clinical Factors: Incontient of urine during all sessions, max A for hygiene  Toilet Transfers  Equipment: Raised toilet seat with arms  Additional Factors: Cues for hand placement; Increased time to complete  Assistance Level: Maximum assistance;Verbal cues  Skilled Clinical Factors: Max A for sit pivot and scooting  Tub/Shower Transfers  Type: Shower  Transfer From: Wheelchair  Transfer To: Shower chair with back  Assistance Level: Maximum assistance  Skilled Clinical Factors:  Max Ax1 sit pivot          Functional Mobility  Device: Wheelchair  Activity: To/From bathroom  Assistance Level: Maximum assistance  Skilled Clinical Factors: Min A for w/C mobility but needs increased time and assistance for tight spaces, pt needs mod vc for feet management in w/c  Bed Mobility  Overall Assistance Level: Minimal Assistance  Additional Factors: Increased time to complete;Verbal cues  Roll Left  Assistance Level: Minimal assistance  Roll Right  Assistance Level: Minimal assistance  Sit to Supine  Assistance Level: Minimal assistance  Skilled Clinical Factors: min A x1  Supine to Sit  Assistance Level: Minimal assistance  Skilled Clinical Factors: min A x 1  Scooting  Assistance Level: Moderate assistance  Skilled Clinical Factors: Mod A x1  Transfers  Surface: From bed; Wheelchair; To chair with arms  Additional Factors: Verbal cues; Set-up; With handrails  Sit to Stand  Assistance Level: Maximum assistance;Dependent; Requires x 2 assistance  Skilled Clinical Factors: max A x2 for sit<>stand  Stand to Sit  Skilled Clinical Factors: max A x2 for sit<>stand  Bed To/From Chair  Technique: Sit pivot  Assistance Level: Maximum assistance  Skilled Clinical Factors: max A of 1  Sit Pivot  Assistance Level: Maximum assistance  Skilled Clinical Factors: Max A x1 for sit pivot transfer w/c <> TBB, recliner,         Assessment  Assessment  Assessment: First Session: Pt tolerated OT/PT cotx session well with no reporting of dizziness or lightheadedness. Pt is progressing slowly towards goals. Pt conituing to require max A x1 for scooting and sit pivots and max Ax1 for standing with mod A x1 for pants mangement. Pt still needing max A x 2 for donning briefs/pants over hips. Pt requriring CGA to SPV for UE ADLs and Max Ax2 to Mod Ax1 for LB ADLs. Pt with use of reacher and sock aid for LB ADLs is able to don/doff footwear with mod Ax1. Pt  Pt sitting balance improving, less posterior leaning, but slouches to right and needs max vc for getting back to midline. Pt benefited from 2 skilled therapists. PT left session on shower chair and OT continued with session from there. Second Session: Pt completed bathing, dressing, grooming, and toileting for the rest of session.  Pt left seated in recliner, with MD. Recommending d/c to SNF secondary to current bruden of care and safety needs. Defer DME to next level of care. Activity Tolerance: Patient limited by endurance; Patient limited by fatigue;Patient tolerated treatment well  Discharge Recommendations: Subacute/Skilled Nursing Facility  Factors Affecting Discharge: Pt lives alone and currently require high level of assistance due to new deficits from CVA  OT Equipment Recommendations  Other: CTA pending progress, may defer DME rec to d/c facility  Safety Devices  Safety Devices in place: Yes  Type of devices: Call light within reach; All fall risk precautions in place; Patient at risk for falls;Gait belt;Nurse notified; Chair alarm in place; Left in chair    Patient Education  Education  Education Given To: Patient  Education Provided: Role of Therapy;Plan of Care;Precautions;Transfer Training; Fall Prevention Strategies;Cognition; Safety; Family Education;ADL Function;Mobility Training;Equipment;DME/Home Modifications; Home Exercise Program  Education Provided Comments: disease specific: body positioning in space, sitting and standing and balance, sit pivot transfers, COG midline awareness, transfer training  Education Method: Verbal;Demonstration  Barriers to Learning: Cognition  Education Outcome: Verbalized understanding;Continued education needed    Plan  Occupational Therapy Plan  Times Per Week: 5-7x/wk  Specific Instructions for Next Treatment: ADLs, mode dressing technique; RUE only, extra OT session  Current Treatment Recommendations: Strengthening;Balance training;Functional mobility training;Self-Care / ADL;Endurance training;Neuromuscular re-education;Positioning;Modalities; Equipment evaluation, education, & procurement;Patient/Caregiver education & training; Safety education & training;Home management training;Coordination training;Sensory integraion    Goals  Patient Goals   Patient goals : \"to get going and get better\"  Short Term Goals  Time Frame for Short Term Goals: 9/28/22 (14 days)  Short Term Goal 1: Pt will complete UB dressing with mod A- MET 10/3  Short Term Goal 2: Pt will complete simple grooming task supported in w/c with set-up A- MET 10/5/22  Short Term Goal 3: Pt will complete sit pivot transfers with mod A x1 using LRAD- goal unmet Max ax1  Short Term Goal 4: Pt will complete toileting with max A- goal  MET - GOAL MET 10/5  Long Term Goals  Time Frame for Long Term Goals : 10/05/22 (21 days), extend goals to 10/08- goals updated to reflect current progress.   Long Term Goal 1: Pt will complete sit pivot w/c<> toilet transfer with min A x1- goal unmet Pt still needing max Ax1  Long Term Goal 2: Pt will complete LB dressing with mod A- goal unmet Pt still needing max Ax2  Long Term Goal 3: Pt will complete bathing with mod A- goal unment Pt still needing max Ax1  Long Term Goal 4: Pt will incorporate RUE into ADLs 75% of trials with minimal VC in order to increase functional independence with ADLs- GOAL MET 10/7      Therapy Time   Individual Concurrent Group Co-treatment   Time In 0830 0800   Time Out 0930 0830   Minutes 60     30   Timed Code Treatment Minutes: [address] [place] [address],Lonnie. [address],S/OT

## 2022-10-07 NOTE — PROGRESS NOTES
Physical Therapy  Facility/Department: [facility]  Rehabilitation Physical Therapy Treatment Note    NAME: Anjali Du  : 1940 (81 y.o.)  MRN: [id number]  CODE STATUS: Full Code    Date of Service: 10/7/22       Restrictions:  Restrictions/Precautions: Up as Tolerated; Fall Risk;General Precautions  Position Activity Restriction  Other position/activity restrictions: knee high KENN hose; up as tolerated; Notify physician for pulse less than 50 or greater than 120, respiratory rate less than 12 or greater than 25, oral temperature greater than 101.3 F (53.1 C), systolic BP less than 90 or greater than 179, diastolic BP less than 50 or greater than 100. Posterior leaning     SUBJECTIVE  Subjective  Subjective: Pt supine in bed on approach, reports fatigue but pleasant and agreeable to PT/OT co-tx  Pain: Pt denies c/o pain                 OBJECTIVE  Orientation  Overall Orientation Status: Within Normal Limits    Functional Mobility  Bed Mobility  Additional Factors: Increased time to complete; With handrails;Verbal cues; Set-up; Head of bed raised  Roll Left  Assistance Level: Minimal assistance  Skilled Clinical Factors: HOB flat, without BR, increased time, cues for sequence  Roll Right  Assistance Level: Minimal assistance  Skilled Clinical Factors: HOB flat, without BR, increased time, cues for sequence  Sit to Supine  Assistance Level: Minimal assistance  Skilled Clinical Factors: HOB flat, without BR, increased time, cues for sequence  Supine to Sit  Assistance Level: Minimal assistance  Skilled Clinical Factors: HOB flat, without BR, increased time, cues for sequence  Balance  Sitting Balance: Minimal assistance (Grossly Isauro, increased posterior lean)  Standing Balance: Maximum assistance  Standing Balance  Activity: MaxA standing balance with UE support, increased R lateral and posterior lean  Transfers  Surface: From bed; Wheelchair  Additional Factors: Verbal cues; Increased time to complete  Device:  (no AD)  Sit to Stand  Assistance Level: Maximum assistance  Skilled Clinical Factors: maxA standing from shower chair  Stand to Sit  Assistance Level: Maximum assistance  Skilled Clinical Factors: at shower chair  Bed To/From Chair  Technique: Sit pivot  Assistance Level: Maximum assistance  Skilled Clinical Factors: maxA x 1 sit pivot EOB to w/c, w/c to shower chair    W/c mobility:  Pt completes w/c mobility x 15' into bathroom with BUE with SBA and increased time to complete, cues for navigating turns and getting through doorway. ASSESSMENT/PROGRESS TOWARDS GOALS  Vital Signs  Heart Rate: 84  Heart Rate Source: Monitor  BP: (!) 102/53  BP Location: Right upper arm  MAP (Calculated): 69.33  SpO2: 98 %  O2 Device: None (Room air)  Comment: BP sitting EOB 93/55, sitting on shower chair BP 89/55    Assessment  Assessment: Pt seen in am as co-tx with OT d/t medical complexity, decreased activity tolerance, strength and balance. Pt benefits from x 2 skilled hands to provide increased cues and feedback with mobility and promote improved safety and I. Pt demonstrates improved performance with bed mobility and sit pivot t/f this date requiring Isauro for bed mobility and maxA x 2 for sit pivot and STS. Pt requires up to Isauro for sitting balance d/t increased posterior lean and maxA for standing balance. BP soft and monitored thorughout session. Pt reports fatigue but denies dizziness. D/t pts current deficits recommend SNF at d/c. Activity Tolerance: Patient limited by endurance; Patient limited by fatigue;Patient tolerated treatment well  Discharge Recommendations: Subacute/Skilled Nursing Facility  PT Equipment Recommendations  Equipment Needed: No  Other: defer to facility    Addendum Second Session (Cristhian López, PT)  Assessment: Pt seen for transfers, w/c mobility propulsion, and LE strengthening. Pt in recliner at beginning and end of session, VSS without report of dizziness. RN aware.  Pt provided with seated HEP handout with instructions. Vitals:107/58, 99%, 87 bpm at start of session in recliner, 118/70 following toileting, 130/64 at end of session up in chair    Transfers: Pt completes SPT recliner<>w/c and w/c<>toilet with max A. Pt completes standing with max A and BUE support on therapist's elbows to reposition and place sheets under pt. Pt completes car transfer with max A x2 with SPT in and out of vehicle with cues for technique, scooting, and sequencing. Exercises:  -1x 10 BLE LAQ  -1x 15 BLE AP (RLE AAROM)  -1x 10 BLE seated marches  -1x 10 maria [place] carmen adduction 3 sec holds against ball  -1x 10 maria [place] carmen seated clams against blue theraband    W/C mobility: Pt propels manual w/c 230 ft with min A for steering around R and L hand turns and cues for technique for larger push strokes. Pt propels with BUE only at slow juice. Goals  Patient Goals   Patient Goals : Patient states, \"To be able to get up without being told\" (helped as pt pushes backwards)  Short Term Goals  Time Frame for Short Term Goals: 9/23/2022  Short Term Goal 1: Patient demonstrates  sitting  midline 15 minutes wtihout posterior lob -- 10/07: GOAL NOT MET up 15 minutes but requires up to Isauro d/t posterior lean. Short Term Goal 2: Patient demonstrates bed<>chair with LRAD with mod assist of 1. -- 10/07: GOAL NOT MET maxA sit pivot t/f  Short Term Goal 3: Patient demonstrates walking 10 feet or greater wtih mod assist of 2 LRAD. -- 10/07: unsafe to attempt  Short Term Goal 4: Patient demonstrates indep in rolling L<>R and mod assist sup to sit ; 10/07: GOAL MET Isauro B rolling and supine to/from sit  Short Term Goal 5: Patient demonstrates min assist in [facility] propulsion 150 feet. Long Term Goals  Time Frame for Long Term Goals : 10/7/2022  Long Term Goal 1: Patient demonstrates  Modified indep in rolling and sup<>Sit.  -- 10/07: GOAL NOT MET Isauro B rolling and supine to/from sit  Long Term Goal 2: Patient demonstrates   transfers sit<>stand with FWW and min assist. -- 10/07: GOAL NOT MET maxA  Long Term Goal 3: Patient demonstrates bed<>chair min A. -- 10/07: GOAL NOT MET maxA sit pivot  Long Term Goal 4: Patient demonstrates gait with transfers  and short distances 50 feet or greater with min assist -- 10/07: GOAL NOT MET unable to complete gait  Long Term Goal 5: Patient demonstrates up and down 2 steps or greater. with mod assist of 1 -- 10/07: GOAL NOT MET, unsafe to attempt, pt d/c to SNF  Additional Goals?: Yes  Long term goal 6: Patient demonstrates indep WC propulsion with L/R turns indep 150 feet; not met 10/7 - pt requires min A for steering  Long term goal 7: Patient demonstrates stoop to recover object from floor with reacher with mod assist. -- 10/07: GOAL NOT MET, unsafe to attempt, pt d/c to SNF  Long term goal 8: Patients family demonstrates ability to assist patient in and out of car with mod assist. -- 10/07: GOAL NOT MET, unsafe to attempt, pt d/c to SNF    [address]: 5-7 times per week  Plan weeks: 21 days  Current Treatment Recommendations: Strengthening;ROM;Balance training;Functional mobility training;Transfer training;Neuromuscular re-education;Stair training;Gait training; Wheelchair mobility training; Endurance training; Therapeutic activities; Positioning;Equipment evaluation, education, & procurement;Home exercise program;Safety education & training;Patient/Caregiver education & training  Safety Devices  Type of Devices: All fall risk precautions in place; Patient at risk for falls;Nurse notified;Gait belt (Pt up to shower chair with OT)    EDUCATION  Education  Education Given To: Patient  Education Provided: Role of Therapy;Plan of Care;Transfer Training; Fall Prevention Strategies;Precautions; Safety;Equipment;Visual Perceptual Function  Education Provided Comments: Educated on progression of mobility, safe techniques with t/f and mobility in w/c.  Education Method: Verbal  Barriers to Learning: Cognition  Education Outcome: Verbalized understanding;Continued education needed        Therapy Time   Individual Concurrent Group Co-treatment   Time In       0800   Time Out       0830   Minutes       30     Second Session Therapy Time:   Individual Concurrent Group Co-treatment   Time In 1230        Time Out 1330        Minutes 60          Timed Code Treatment Minutes: 80 Minutes       Zuhair Talavera PT, DPT 10/07/22 at 9:29 AM

## 2022-10-07 NOTE — PROGRESS NOTES
Physical Therapy  Discharge Summary    Name:Yulissa Gr  IQR:[id number]  :1940  Treatment Diagnosis: difficulty walking right hemiparesis. Diagnosis: Acute CVA    Restrictions/Precautions  Restrictions/Precautions: Up as Tolerated, Fall Risk, General Precautions           Position Activity Restriction  Other position/activity restrictions: knee high KENN hose; up as tolerated; Notify physician for pulse less than 50 or greater than 120, respiratory rate less than 12 or greater than 25, oral temperature greater than 101.3 F (95.8 C), systolic BP less than 90 or greater than 235, diastolic BP less than 50 or greater than 100. Posterior leaning     Goals:                  Short Term Goals  Time Frame for Short Term Goals: 2022  Short Term Goal 1: Patient demonstrates  sitting  midline 15 minutes wtihout posterior lob -- 10/07: GOAL NOT MET up 15 minutes but requires up to Isauro d/t posterior lean. Short Term Goal 2: Patient demonstrates bed<>chair with LRAD with mod assist of 1. -- 10/07: GOAL NOT MET maxA sit pivot t/f  Short Term Goal 3: Patient demonstrates walking 10 feet or greater wtih mod assist of 2 LRAD. -- 10/07: unsafe to attempt  Short Term Goal 4: Patient demonstrates indep in rolling L<>R and mod assist sup to sit ; 10/07: GOAL MET Isauro B rolling and supine to/from sit  Short Term Goal 5: Patient demonstrates min assist in [facility] propulsion 150 feet; not met              Long Term Goals  Time Frame for Long Term Goals : 10/7/2022  Long Term Goal 1: Patient demonstrates  Modified indep in rolling and sup<>Sit.  -- 10/07: GOAL NOT MET Isauro B rolling and supine to/from sit  Long Term Goal 2: Patient demonstrates   transfers sit<>stand with FWW and min assist. -- 10/07: GOAL NOT MET maxA  Long Term Goal 3: Patient demonstrates bed<>chair min A. -- 10/07: GOAL NOT MET maxA sit pivot  Long Term Goal 4: Patient demonstrates gait with transfers  and short distances 50 feet or greater with min assist -- 10/07: GOAL NOT MET unable to complete gait  Long Term Goal 5: Patient demonstrates up and down 2 steps or greater. with mod assist of 1 -- 10/07: GOAL NOT MET, unsafe to attempt, pt d/c to SNF  Additional Goals?: Yes  Long term goal 6: Patient demonstrates indep WC propulsion with L/R turns indep 150 feet; goal not met 10/7 - pt requires min A for steering  Long term goal 7: Patient demonstrates stoop to recover object from floor with reacher with mod assist. -- 10/07: GOAL NOT MET, unsafe to attempt, pt d/c to SNF  Long term goal 8: Patients family demonstrates ability to assist patient in and out of car with mod assist. -- 10/07: GOAL NOT MET, unsafe to attempt, pt d/c to SNF    Pt. Met 1/5 short term goals and 0/8 long term goals.        Sit to/from stand with max A  SPT with max A  Propels manual w/c >150 ft with BUE and min A  Bed mobility with min A  Recommend SNF in order to progress independence with mobility   Provided pt. with written HEP for seated LE strengthening    Electronically signed by Minna Goetz, PT on 10/7/2022 at 3:10 PM

## 2022-10-07 NOTE — PROGRESS NOTES
Brandon Manuelderic  10/7/2022  [id number]    Chief Complaint: Acute CVA (cerebrovascular accident) [facility])    Subjective:   No acute events overnight. Today Jen Toribio is seen in her room. She denies feeling light headed or dizzy during her shower. She is concerned about what her functional prognosis will be. She is discharging to SNF tomorrow. ROS: No CP, SOB, dyspnea    Objective:  Patient Vitals for the past 24 hrs:   BP Temp Temp src Pulse Resp SpO2   10/07/22 0857 (!) 102/53 -- -- 84 -- 98 %   10/07/22 0805 (!) 103/53 -- -- -- -- --   10/07/22 0730 (!) 87/52 97.2 °F (36.2 °C) Oral 72 16 97 %   10/06/22 2011 98/60 98 °F (36.7 °C) Oral 66 16 96 %   10/06/22 1600 (!) 142/67 -- -- 68 -- --   10/06/22 1130 135/64 -- -- 67 -- --     Gen: No distress, pleasant. Seated up in chair  HEENT: Normocephalic, atraumatic. CV: extremities well perfused  Resp: No respiratory distress. Abd: Soft, nondistended  Ext: No edema. Neuro: Alert, oriented, appropriately interactive. Speech fluent without aphasia  Psych: appropriate mood and affect    Laboratory data: Available via EMR. Therapy progress:    PT    Supine to Sit: Substantial/maximal assistance  Sit to Supine: Substantial/maximal assistance   Sit to Stand: Dependent  Chair/Bed to Chair Transfer: Independent  Car Transfer:    Ambulation 10 ft:    Ambulation 50 ft:    Ambulation 150 ft:    Stairs - 1 Step:    Stairs - 4 Step:    Stairs - 12 Step:      OT    Eating: Setup or clean-up assistance  Oral Hygiene: Partial/moderate assistance  Bathing: Dependent  Upper Body Dressing: Substantial/maximal assistance  Lower Body Dressing: Dependent  Toilet Transfer: Dependent  Toilet Hygiene: Dependent    Speech Therapy         Body mass index is 17.61 kg/m².     Assessment:  Patient Active Problem List   Diagnosis    Left knee pain    Left patella fracture    Contusion of left knee    Orthostatic hypotension    ARF (acute renal failure) (HCC)    Anemia    Debility    Acute CVA (cerebrovascular accident) ([facility] [address]    Nonrheumatic aortic valve insufficiency       Assessment and Plan:  Alberto Duong is an [de-identified]year old female with a past medical history significant for orthostatic hypotension, anemia, and memory loss who presented to [facility] on 9/11/22 with recurrent falls and right sided weakness, found to have acute left CVA. She was admitted to [facility] on 9/15/22 due to functional deficits below her baseline. 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  - with right hemiparesis  - secondary stroke prevention with asa, plavix, statin  - PT, OT, ST    Confusion, intermittent  - suspect this is related to underlying dementia and sundowning  - UA negative x2    Dysphagia  - ST    Hyponatremia, improved  - Nephrology consulted, appreciate assistance     Autonomic Dysfunction  Orthostatic hypotension  - florinef, midodrine  - Cardiology following, appreciate assistance    Constipation, improved  - continue bowel regimen     Dementia   - possible PD  - donepezil     Overactive Bladder  - home regimen: Myrbetriq, tolterodine  - trospium while inpatient      Bowels: Schedule stool softener. Follow bowel movements. Enema or suppository if needed. Bladder: Check PVR x 3. [address] if PVR > 200ml or if any volume is > 500 ml. Sleep: Trazodone provided prn.       PPX  DVT: heparin  GI: not indicated    Services: SNF   EDOD: 10/8/22    Electronically signed by Rani Starkey MD on 10/7/2022 at 11:24 AM

## 2022-10-07 NOTE — PROGRESS NOTES
Speech Language Pathology  [facility]: ACUTE REHAB UNIT  SPEECH-LANGUAGE PATHOLOGY      [x] Daily  [] Weekly Care Conference Note  [x] Discharge    Patient:Yulissa Montiel      :1940  QTJ:[id number]  Rehab Dx/Hx: Acute CVA (cerebrovascular accident) ([facility] [address] [I63.9]    Precautions: falls  Home situation: Pt lives at home [facility] Dx: [] Aphasia  [] Dysarthria  [] Apraxia   [] Oropharyngeal dysphagia [x] Cognitive Impairment  [] Other:   Date of Admit: 9/15/2022  Room #: 0160/0160-01    Current functional status (updated daily):         Pt being seen for : [] Speech/Language Treatment  [x] Dysphagia Treatment [x] Cognitive Treatment  [] Other:  Communication: [x]WFL  [] Aphasia  [] Dysarthria  [] Apraxia  [] Pragmatic Impairment [] Non-verbal  [] Hearing Loss  [] Other:   Cognition: [] WFL  [x] Mild  [] Moderate  [] Severe [] Unable to Assess  [] Other:  Memory: [] WFL  [x] Mild  [x] Moderate  [] Severe [] Unable to Assess  [] Other:  Behavior: [x] Alert  [x] Cooperative  [x]  Pleasant  [] Confused  [] Agitated  [] Uncooperative  [] Distractible [] Motivated  [] Self-Limiting [] Anxious  [] Other:  Endurance:  [x] Adequate for participation in SLP sessions  [] Reduced overall  [] Lethargic  [] Other:  Safety: [x] No concerns at this time  [] Reduced insight into deficits  []  Reduced safety awareness [] Not following call light procedures  [] Unable to Assess  [] Other:    Current Diet Order:ADULT ORAL NUTRITION SUPPLEMENT; Breakfast, Dinner; Standard High Calorie/High Protein Oral Supplement  ADULT DIET;  Regular, thin   Swallowing Precautions: upright for all intake, stay upright for at least 30 mins after intake, small bites/sips, assist feed, oral care 2-3x/day to reduce adverse affects in the event of aspiration, increase physical mobility as able, alternate bites/sips, slow rate of intake, general GERD precautions, and general aspiration precautions        Date: 10/7/2022      Tx session 1  [id number] DISCHARGE SUMMARY   Total Timed Code Min 30    Total Treatment Minutes 30    Individual Treatment Minutes 30    Group Treatment Minutes 0    Co-Treat Minutes 0    Variance/Reason:  N/A    Pain Denies    Pain Intervention [] RN notified  [] Repositioned  [] Intervention offered and patient declined  [x] N/A  [] Other: [] RN notified  [] Repositioned  [] Intervention offered and patient declined  [] N/A  [] Other:   Subjective     Pt alert and oriented, cooperative and agreeable to participate in therapy. Pt seen sitting upright in bedside chair. Objective:  Goals     Dysphagia Goals:    Short-term Goals  Timeframe for Short-term Goals: 5 days (09/20/22)          Goal Met 9/21/22   Goal met 09/21/22. Pt tolerating IDDSI Level 7 regular solids with thin liquids, meds whole with water as LRD. Goal met 09/22/22. Goal met 09/22/22. Pt indep demonstrates appropriate use of safe swallow strategies during all meals. Pt will require set up assist with all meals. Cognitive Goals:    Short-term Goals  Timeframe for Short-term Goals: 18 days (10/03/22)    Goal 1: Pt will complete recall tasks with 90% acc given min cues. SLUMS:  -immediate recall: 100% acc indep  -short term recall: 60% acc indep improving to 100% acc given category cue  Goal not met. Pt continues to demonstrate difficulty with short term recall. Discussed ongoing use of compensatory strategies. Goal 2: Pt will complete higher level executive function tasks (meds, math, money, time, etc) with 95% acc given min cues. SLUMS:  -money calculations: 100% acc    Discussed assistance with medication/finance management at home. Goal met. Pt will require assistance with med/finance management d/t physical limitations.     Other areas targeted: SLUMS:  -pt scored a 23/26 today which is the same score pt scored on initial evaluation      Education:   Edu provided re: compensatory strategies, d/c recommendations       Safety Devices: [x] Call light within reach  [x] Chair alarm activated  [] Bed alarm activated  [x] Other: AVASYS  [] Call light within reach  [] Chair alarm activated  [] Bed alarm activated  [] Other:    Assessment: Tx session 1: Pt pleasant and cooperative, agreeable to participate. Pt completed repeat SLUMS, scoring a 23/26 which is the same score pt scored on initial evaluation. Pt continues to present difficulty with short term recall, discussed ongoing use of compensatory strategies. Discharge summary: Pt tolerating IDDSI Level 7 regular solids with thin liquids, meds whole with water as LRD. Pt did not meet recall goal, however pt did meet executive functioning goal. Pt continues to demonstrate difficulty with short term recall, however demonstrates appropriate use of compensatory strategies given cues. Pt will require assist with meds/finances d/t physical limitations, and 24 hour assist at d/c. Pt will benefit from ongoing ST services. Plan: Continue as per plan of care. Additional Information:     Barriers toward progress: N/A  Discharge recommendations:  [] Home independently  [] Home with assistance [x]  24 hour supervision  [] ECF [x] Other: defer to PT/OT recs  Continued Tx Upon Discharge: ? [x] Yes [] No [] TBD based on progress while on ARU [] Vital Stim indicated [] Other:   Estimated discharge date: 10/08/22    Interventions used this date:  [] Speech/Language Treatment  [] Instruction in HEP [] Group [] Dysphagia Treatment [x] Cognitive Treatment   [] Other: Total Time Breakdown / Charges    Time in Time out Total Time / units   Cognitive Tx 1100 1130 30 min / 2 units    Speech Tx -- -- --   Dysphagia Tx -- -- --       Electronically Signed by     Anaya LOJA CCC-SLP  Speech-Language Pathologist  [id number]

## 2022-10-07 NOTE — DISCHARGE SUMMARY
Occupational Therapy  Discharge Summary     Name:Yulissa Justice  DAF:[id number]  :1940  Treatment Diagnosis: difficulty walking right hemiparesis. Diagnosis: Acute CVA    Restrictions/Precautions  Restrictions/Precautions: Up as Tolerated, Fall Risk, General Precautions           Position Activity Restriction  Other position/activity restrictions: knee high KENN hose; up as tolerated; Notify physician for pulse less than 50 or greater than 120, respiratory rate less than 12 or greater than 25, oral temperature greater than 101.3 F (03.6 C), systolic BP less than 90 or greater than 215, diastolic BP less than 50 or greater than 100. Posterior leaning     Goals:   Patient Goals   Patient goals : \"to get going and get better\"  Short Term Goals  Time Frame for Short Term Goals: 22 (14 days)  Short Term Goal 1: Pt will complete UB dressing with mod A- MET 10/3  Short Term Goal 2: Pt will complete simple grooming task supported in w/c with set-up A- MET 10/5/22  Short Term Goal 3: Pt will complete sit pivot transfers with mod A x1 using LRAD- GOAL UNMET Max Ax1  Short Term Goal 4: Pt will complete toileting with max A- goal  MET - GOAL MET 10/5  Long Term Goals  Time Frame for Long Term Goals : 10/05/22 (21 days), extend goals to 10/08- goals updated to reflect current progress. Long Term Goal 1: Pt will complete sit pivot w/c<> toilet transfer with min A x1- goal unmet Pt still needing max Ax1  Long Term Goal 2: Pt will complete LB dressing with mod A- goal unmet Pt still needing max Ax2  Long Term Goal 3: Pt will complete bathing with mod A- goal unment Pt still needing max Ax1  Long Term Goal 4: Pt will incorporate RUE into ADLs 75% of trials with minimal VC in order to increase functional independence with ADLs- GOAL MET 10/7    Pt. Met 3/4 short term goals and 1/4 long term goals. Pt still needing Max Ax1 for sit pivot transfers due to BLE weakness and posterior leaning.  Pt still needing Max Ax2 for LB dressing and needing Max Ax1 for LB bathing, due to weakness and poor ROM in RLE. ADL:  Feeding  Assistance Level: Set-up  Skilled Clinical Factors: Pt needing assistance with opening containers for set-up  Grooming/Oral Hygiene  Assistance Level: Supervision  Skilled Clinical Factors: Pt completed task with SPV and 1 vc to complete teethbrushing. Upper Extremity Bathing  Skilled Clinical Factors: Pt demos ability to soap rinse and dry all UB parts, cues for positioning in chair with CGA for support  Lower Extremity Bathing  Assistance Level: Maximum assistance; Moderate assistance  Skilled Clinical Factors: Pt was able to clean all areas on LB except needing max Ax1 for cleaning posterior pericare  Upper Extremity Dressing  Assistance Level: Independent;Verbal cues; Set-up  Skilled Clinical Factors: Pt needing CGA for doffing shirt with mode technique, pt needing mod verbal cues for donning shirt with mode techinque, pt needing CGA to don shirt  Lower Extremity Dressing  Assistance Level: Set-up; Maximum assistance; Requires x 2 assistance; Moderate assistance  Skilled Clinical Factors: donning/doffing brief and pants sitting EOB with max A x1,  for standing for pants management around hips with max A x1 and min A x1, pt able to doff/don with mod A x1 for over feet with reacher  Putting On/Taking Off Footwear  Assistance Level: Moderate assistance  Skilled Clinical Factors: Pt was mod A for donning/doffing socks with reacher and sock aid. Toileting  Assistance Level: Maximum assistance;Dependent  Skilled Clinical Factors: Incontient of urine during all sessions, max A for hygiene  Toilet Transfers  Equipment: Raised toilet seat with arms  Additional Factors: Cues for hand placement; Increased time to complete  Assistance Level: Maximum assistance;Verbal cues  Skilled Clinical Factors: Max A for sit pivot and scooting  Tub/Shower Transfers  Type: Shower  Transfer From: Wheelchair  Transfer To:  Shower chair with back  Assistance Level: Maximum assistance  Skilled Clinical Factors: Max Ax1 sit pivot    Functional Mobility  Device: Wheelchair  Activity: To/From bathroom  Assistance Level: Maximum assistance  Skilled Clinical Factors: Min A for w/C mobility but needs increased time and assistance for tight spaces, pt needs mod vc for feet management in w/c  Bed Mobility  Overall Assistance Level: Minimal Assistance  Additional Factors: Increased time to complete;Verbal cues  Roll Left  Assistance Level: Minimal assistance  Roll Right  Assistance Level: Minimal assistance  Sit to Supine  Assistance Level: Minimal assistance  Skilled Clinical Factors: min A x1  Supine to Sit  Assistance Level: Minimal assistance  Skilled Clinical Factors: min A x 1  Scooting  Assistance Level: Moderate assistance  Skilled Clinical Factors: Mod A x1  Transfers  Surface: From bed; Wheelchair; To chair with arms  Additional Factors: Verbal cues; Set-up; With handrails  Sit to Stand  Assistance Level: Maximum assistance;Dependent; Requires x 2 assistance  Skilled Clinical Factors: max A x2 for sit<>stand  Stand to Sit  Skilled Clinical Factors: max A x2 for sit<>stand  Bed To/From Chair  Technique: Sit pivot  Assistance Level: Maximum assistance  Skilled Clinical Factors: max A of 1  Sit Pivot  Assistance Level: Maximum assistance  Skilled Clinical Factors: Max A x1 for sit pivot transfer w/c <> TBB, recliner,     Assessment:   Assessment  Assessment: First Session: Pt tolerated OT/PT cotx session well with no reporting of dizziness or lightheadedness. Pt is progressing slowly towards goals. Pt conituing to require max A x1 for scooting and sit pivots and max Ax1 for standing with mod A x1 for pants mangement. Pt still needing max A x 2 for donning briefs/pants over hips. Pt requriring CGA to SPV for UE ADLs and Max Ax2 to Mod Ax1 for LB ADLs. Pt with use of reacher and sock aid for LB ADLs is able to don/doff footwear with mod Ax1.  Pt  Pt sitting balance improving, less posterior leaning, but slouches to right and needs max vc for getting back to midline. Pt benefited from 2 skilled therapists. PT left session on shower chair and OT continued with session from there. Second Session: Pt completed bathing, dressing, grooming, and toileting for the rest of session. Pt left seated in recliner, with MD. Recommending d/c to SNF secondary to current bruden of care and safety needs. Defer DME to next level of care. Activity Tolerance: Patient limited by endurance; Patient limited by fatigue;Patient tolerated treatment well  Discharge Recommendations: Subacute/Skilled Nursing Facility  Factors Affecting Discharge: Pt lives alone and currently require high level of assistance due to new deficits from CVA  OT Equipment Recommendations  Other: CTA pending progress, may defer DME rec to d/c facility  Safety Devices  Safety Devices in place: Yes  Type of devices: Call light within reach; All fall risk precautions in place; Patient at risk for falls;Gait belt;Nurse notified; Chair alarm in place; Left in chair    Equipment Recommendations:  Continue to Assess at Next Level of Care, Pt is recommended to go to SNF. Home Exercise Program  Provided Pt with sock aid, postural corrections, reacher, ADLs. Signs and Symptoms of Delirium (from CAM)  A. Acute Onset Mental Status Change  Is there evidence of an acute change in mental status from the patient's baseline? [x] 0. No [] 1. Yes    B. Inattention: Did the patient have difficulty focusing attention, for example being easily distractible or having difficulty keeping track of what was being said? [x] 0. Behavior not present    [] 1. Behavior continuously present, does not fluctuate   [] 2. Behavior present, fluctuates (comes and goes, changes in severity)    C.  Disorganized thinking: Was the patient's thinking disorganized or incoherent (rambling or irrelevant conversation, unclear or illogical flow of ideas, or unpredictable switching from subject to subject)? [x] 0. Behavior not present    [] 1. Behavior continuously present, does not fluctuate   [] 2. Behavior present, fluctuates (comes and goes, changes in severity)    D. Altered level of consciousness: Did the patient have altered level of consciousness as indicated by any of the following criteria? Vigilant: startled easily to any sound or touch  Lethargic: repeatedly dozed off when being asked questions, but responded to voice or touch  Stuporous: very difficult to arouse and keep aroused for the interview  Comatose: could not be aroused  [x] 0. Behavior not present    [] 1. Behavior continuously present, does not fluctuate   [] 2.  Behavior present, fluctuates (comes and goes, changes in severity)      Electronically signed by Soha Modi on 10/7/2022 at 2:19 PM

## 2022-10-07 NOTE — CARE COORDINATION
Case Management Discharge Summary  [facility] - Acute Rehab Unit    Patient:Yulissa Doyle     Rehab Dx/Hx: Acute CVA (cerebrovascular accident) [facility]) [I63.9]       Today's Date: 10/7/2022    Discharge to: Anticipating DC on 10/08: [facility] skilled [facility] facility   Pre-certification completed:  [] No       [] Yes     [] N/A   Hospital Exemption Notification (HENS) completed:  [] No       [x] Yes     [] N/A    Document ID : [id number]   IMM given:  10/07/2022       New Durable Medical Equipment ordered/agency:  Defer to SNF   Transportation:  Medical Transport explained to pt/family. Pt/family voice no agency preference. [facility] ([phone])   time:10:00am  Ambulance form completed: [] No       [x] Yes   [] N/A       Confirmed discharge plan with:  Patient: [] No       [x] Yes  Family:  [] No       [x] Yes      Name:Cindi Izaguirre (Child)   Contact number: [phone]    Facility/Agency, name:    [address] [place] [address]   [phone]  MINERVA/AVS faxed    Phone number for report to facility: [phone]  RN, name: W/E nurse       Has lack of transportation kept you from medical appointments, meetings, work, or ADL's? [] Yes, it has kept me from medical appointments or from getting my meds  [] Yes, It has kept me from non-medical meetings, appointments, work, or getting things I need  [x] No  [] Pt unable to respond  [] Pt declines to respond     How often do you need to have someone help you when you read instructions, pamphlets, or other written material from your doctor or pharmacy? [] Never                             [] Always  [] Rarely                            [] Patient declines to respond  [x] Sometimes                    [] Patient unable to respond  [] Often       Note: Discharging nurse to complete MINERVA, reconcile AVS, and place final copy with patient's discharge packet.  RN to ensure that written prescriptions for  Level II medications are sent with patient to the facility as per protocol.           Signature: Eduardo Whiteside RN

## 2022-10-08 VITALS
BODY MASS INDEX: 17.63 KG/M2 | RESPIRATION RATE: 16 BRPM | WEIGHT: 105.82 LBS | HEIGHT: 65 IN | OXYGEN SATURATION: 99 % | SYSTOLIC BLOOD PRESSURE: 87 MMHG | DIASTOLIC BLOOD PRESSURE: 44 MMHG | HEART RATE: 81 BPM | TEMPERATURE: 97.4 F

## 2022-10-08 PROCEDURE — 6360000002 HC RX W HCPCS: Performed by: STUDENT IN AN ORGANIZED HEALTH CARE EDUCATION/TRAINING PROGRAM

## 2022-10-08 PROCEDURE — 6370000000 HC RX 637 (ALT 250 FOR IP): Performed by: NURSE PRACTITIONER

## 2022-10-08 PROCEDURE — 6370000000 HC RX 637 (ALT 250 FOR IP): Performed by: STUDENT IN AN ORGANIZED HEALTH CARE EDUCATION/TRAINING PROGRAM

## 2022-10-08 RX ADMIN — FLUDROCORTISONE ACETATE 0.1 MG: 0.1 TABLET ORAL at 08:06

## 2022-10-08 RX ADMIN — FOLIC ACID 1 MG: 1 TABLET ORAL at 08:06

## 2022-10-08 RX ADMIN — Medication 5000 UNITS: at 08:06

## 2022-10-08 RX ADMIN — HEPARIN SODIUM 5000 UNITS: 5000 INJECTION INTRAVENOUS; SUBCUTANEOUS at 06:01

## 2022-10-08 RX ADMIN — MIDODRINE HYDROCHLORIDE 5 MG: 5 TABLET ORAL at 08:06

## 2022-10-08 RX ADMIN — POTASSIUM CHLORIDE 10 MEQ: 750 TABLET, EXTENDED RELEASE ORAL at 08:06

## 2022-10-08 RX ADMIN — FERROUS SULFATE TAB 325 MG (65 MG ELEMENTAL FE) 325 MG: 325 (65 FE) TAB at 08:07

## 2022-10-08 RX ADMIN — CYANOCOBALAMIN TAB 500 MCG 1000 MCG: 500 TAB at 08:07

## 2022-10-08 RX ADMIN — ANTACID TABLETS 500 MG: 500 TABLET, CHEWABLE ORAL at 08:07

## 2022-10-08 RX ADMIN — ASPIRIN 81 MG 81 MG: 81 TABLET ORAL at 08:06

## 2022-10-08 RX ADMIN — CETIRIZINE HYDROCHLORIDE 5 MG: 5 TABLET, FILM COATED ORAL at 08:07

## 2022-10-08 RX ADMIN — CLOPIDOGREL BISULFATE 75 MG: 75 TABLET ORAL at 08:07

## 2022-10-08 NOTE — PLAN OF CARE
Problem: Skin/Tissue Integrity  Goal: Absence of new skin breakdown  Description: 1. Monitor for areas of redness and/or skin breakdown  2. Assess vascular access sites hourly  3. Every 4-6 hours minimum:  Change oxygen saturation probe site  4. Every 4-6 hours:  If on nasal continuous positive airway pressure, respiratory therapy assess nares and determine need for appliance change or resting period.   10/7/2022 2323 by Deborah Briceño RN  Outcome: Progressing  10/7/2022 1529 by Carol Hudson RN  Outcome: Progressing     Problem: Nutrition Deficit:  Goal: Optimize nutritional status  10/7/2022 2323 by Deborah Briceño RN  Outcome: Progressing  10/7/2022 1529 by Carol Hudson RN  Outcome: Progressing     Problem: Discharge Planning  Goal: Discharge to home or other facility with appropriate resources  10/7/2022 2323 by Deborah Briceño RN  Outcome: Progressing  10/7/2022 1529 by Carol Hudson RN  Outcome: Progressing

## 2022-10-08 NOTE — CARE COORDINATION
Prestige asked if transport could be moved up to [facility] Abts verified ok with primary RN. Plan for stretcher pickup at 9AM to [facility], see CM note from yesterday with details of discharge.   MACARIO Rahman-RN

## 2022-10-11 PROCEDURE — 93228 REMOTE 30 DAY ECG REV/REPORT: CPT | Performed by: INTERNAL MEDICINE

## 2022-10-11 NOTE — TELEPHONE ENCOUNTER
Spoke with Hanny, gave her the number for [employer]. Yanna Ibarra is unsure when pt started to wear the patch. I explained to her that the pt needs to wear the monitor for a total of 4 weeks. She will contact [employer] at [phone] and have the patches mailed to her. She will also get enough patches so that the pt wears it for 4 weeks.

## 2022-10-11 NOTE — TELEPHONE ENCOUNTER
Hanny from the [address] [place] [address] stated that the pt arrived there on 10/8/2022. Hanny stated that they replaced the vital connect patch on 10/10/2022. Hanny would like to know if pt needs to wear monitor for more than 2 more weeks. If so pt will need another patch mailed to them pt at  [address] [facility] [address]. Hanny can be reached at [phone].

## 2022-10-13 NOTE — DISCHARGE SUMMARY
Physical Medicine & Rehabilitation  Discharge Summary     Patient Identification:  Danilo Pryor  : 1940  Admit date: 9/15/2022  Discharge date: 10/8/2022   Attending provider: Taylor Gresham. Usha Buitrago MD    Primary care provider: Britta Donaldson MD     Discharge Diagnoses:   Patient Active Problem List   Diagnosis    Left knee pain    Left patella fracture    Contusion of left knee    Orthostatic hypotension    ARF (acute renal failure) (HCC)    Anemia    Debility    Acute CVA (cerebrovascular accident) ([address]    Nonrheumatic aortic valve insufficiency       History of Present Illness/Acute Hospital Course:  Danilo Pryor is an [de-identified]year old female with a past medical history significant for orthostatic hypotension, anemia, and memory loss who presented to [facility] on 22 with recurrent falls and right sided weakness. CT head was negative for acute process. MRI brain revealed infarct in the left basal ganglia and corona radiata. Course was complicated by worsening right sided weakness. Repeat CT head was negative for acute process. Symptoms were suspected to be associated with drop in blood pressure. She was admitted to [facility] on 9/15/22 due to functional deficits below her baseline. This morning patient had an episode of increased right sided weakness. She reports that she had diffuse numbness and tingling in her body at the time. Code stroke was called. Stat CT head was negative for acute process. Her blood pressure was noted to be low at the time. She reports resolution of these symptoms and that she is back to her baseline right sided weakness. Inpatient Rehabilitation Course:   Danilo Pryor is a 81 y.o. female admitted to inpatient rehabilitation on 9/15/2022 with Acute CVA (cerebrovascular accident) [facility]). The patient participated in an aggressive multidisciplinary inpatient rehabilitation program involving 3 hours of therapy per day, at least 5 days per week.      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  - with right hemiparesis  - secondary stroke prevention with asa, plavix, statin  - PT, OT, ST     Confusion, intermittent  - suspect this is related to underlying dementia and sundowning  - UA negative x2     Dysphagia  - ST     Hyponatremia, improved  - Nephrology consulted, appreciate assistance     Autonomic Dysfunction  Orthostatic hypotension  - florinef, midodrine  - Cardiology following, appreciate assistance     Constipation, improved  - continue bowel regimen     Dementia   - possible PD  - donepezil     Overactive Bladder  - home regimen: Myrbetriq, tolterodine  - trospium while inpatient     Discharge Functional Status:    Physical therapy:  Bed Mobility:  Overall Assistance Level: Maximum Assistance, Requires x 2 Assistance  Additional Factors: Increased time to complete, With handrails, Verbal cues, Set-up, Head of bed raised  Sit>supine:  Assistance Level: Minimal assistance  Skilled Clinical Factors: HOB flat, without BR, increased time, cues for sequence  Supine>sit:  Assistance Level: Minimal assistance  Skilled Clinical Factors: HOB flat, without BR, increased time, cues for sequence  Transfers:  Surface: From bed, Wheelchair  Additional Factors: Verbal cues, Increased time to complete  Device:  (no AD)  Sit>stand:  Assistance Level: Maximum assistance  Skilled Clinical Factors: maxA standing from shower chair  Stand>sit:  Assistance Level: Maximum assistance  Skilled Clinical Factors: at shower chair  Bed<>chair  Technique: Sit pivot  Assistance Level: Maximum assistance  Skilled Clinical Factors: maxA x 1 sit pivot EOB to w/c, w/c to shower chair  Stand Pivot:     Lateral transfer:     Car transfer:     Ambulation:     Stairs:     Curb: Wheelchair:  Surface: Level surface  Device: Standard wheelchair  Assistance Required to Manage Parts:  All wheelchair parts  Assistance Level for Propulsion: Moderate assistance  Propulsion Method: Bilateral upper extremities  Propulsion Quality: Slow velocity, Short strokes  Propulsion Distance: 75 ft  Assessment:  Assessment: Pt seen in am as co-tx with OT d/t medical complexity, decreased activity tolerance, strength and balance. Pt benefits from x 2 skilled hands to provide increased cues and feedback with mobility and promote improved safety and I. Pt demonstrates improved performance with bed mobility and sit pivot t/f this date requiring Isauro for bed mobility and maxA x 2 for sit pivot and STS. Pt requires up to Isauro for sitting balance d/t increased posterior lean and maxA for standing balance. BP soft and monitored thorughout session. Pt reports fatigue but denies dizziness. D/t pts current deficits recommend SNF at d/c. Activity Tolerance: Patient limited by endurance, Patient limited by fatigue, Patient tolerated treatment well  Discharge Recommendations: Subacute/Skilled Nursing Facility      Occupational therapy:   Feeding  Assistance Level: Set-up  Skilled Clinical Factors: Pt needing assistance with opening containers for set-up  Grooming/Oral Hygiene  Assistance Level: Supervision  Skilled Clinical Factors: Pt completed task with SPV and 1 vc to complete teethbrushing. UE Bathing  Assistance Level: Minimal assistance, Contact guard assist  Skilled Clinical Factors: Pt demos ability to soap rinse and dry all UB parts, cues for positioning in chair with CGA for support  LE Bathing  Assistance Level: Maximum assistance, Moderate assistance  Skilled Clinical Factors: Pt was able to clean all areas on LB except needing max Ax1 for cleaning posterior pericare  UE Dressing  Assistance Level:  Independent, Verbal cues, Set-up  Skilled Clinical Factors: Pt needing CGA for doffing shirt with mode technique, pt needing mod verbal cues for donning shirt with mode techinque, pt needing CGA to don shirt  LE Dressing  Assistance Level: Set-up, Maximum assistance, Requires x 2 assistance, Moderate assistance  Skilled Clinical Factors: donning/doffing brief and pants sitting EOB with max A x1,  for standing for pants management around hips with max A x1 and min A x1, pt able to doff/don with mod A x1 for over feet with reacher  Putting On/Taking Off Footwear  Assistance Level: Moderate assistance  Skilled Clinical Factors: Pt was mod A for donning/doffing socks with reacher and sock aid. Toileting  Assistance Level: Maximum assistance, Dependent  Skilled Clinical Factors: Incontient of urine during all sessions, max A for hygiene  Transfers: Toilet Transfers  Technique:  (STEDY)  Equipment: Raised toilet seat with arms  Additional Factors: Cues for hand placement, Increased time to complete  Assistance Level: Maximum assistance, Verbal cues  Skilled Clinical Factors: Max A for sit pivot and scooting  Tub/Shower Transfers  Type: Shower  Transfer From: Wheelchair  Transfer To: Shower chair with back  Assistance Level: Maximum assistance  Skilled Clinical Factors: Max Ax1 sit pivot  IADLs:  Meal Prep     Money Management     [place] Management     Assessment:  Assessment: First Session: Pt tolerated OT/PT cotx session well with no reporting of dizziness or lightheadedness. Pt is progressing slowly towards goals. Pt conituing to require max A x1 for scooting and sit pivots and max Ax1 for standing with mod A x1 for pants mangement. Pt still needing max A x 2 for donning briefs/pants over hips. Pt requriring CGA to SPV for UE ADLs and Max Ax2 to Mod Ax1 for LB ADLs. Pt with use of reacher and sock aid for LB ADLs is able to don/doff footwear with mod Ax1. Pt  Pt sitting balance improving, less posterior leaning, but slouches to right and needs max vc for getting back to midline. Pt benefited from 2 skilled therapists. PT left session on shower chair and OT continued with session from there.  Second Session: Pt completed bathing, dressing, grooming, and toileting for the rest of session. Pt left seated in recliner, with MD. Recommending d/c to SNF secondary to current bruden of care and safety needs. Defer DME to next level of care.   Activity Tolerance: Patient limited by endurance, Patient limited by fatigue, Patient tolerated treatment well  Discharge Recommendations: Subacute/Skilled Nursing Facility  Factors Affecting Discharge: Pt lives alone and currently require high level of assistance due to new deficits from CVA    Speech therapy:    Speech Therapy Diagnosis  Cognitive Diagnosis: Mild cognitive linguistic deficit         Significant Diagnostics:   Lab Results   Component Value Date    CREATININE 0.8 10/07/2022    BUN 39 (H) 10/07/2022     10/07/2022    K 4.0 10/07/2022     10/07/2022    CO2 25 10/07/2022       Lab Results   Component Value Date    WBC 5.8 10/07/2022    HGB 10.6 (L) 10/07/2022    HCT 31.1 (L) 10/07/2022    MCV 96.1 10/07/2022     10/07/2022       Disposition:  SNF    Discharge Condition: Stable    Follow-up:  See after visit summary from hospitalization    Discharge Medications:     Medication List        START taking these medications      aspirin 81 MG chewable tablet  Take 1 tablet by mouth daily     atorvastatin 80 MG tablet  Commonly known as: LIPITOR  Take 1 tablet by mouth nightly     clopidogrel 75 MG tablet  Commonly known as: PLAVIX  Take 1 tablet by mouth daily     melatonin 5 MG Tbdp disintegrating tablet  Take 1 tablet by mouth nightly as needed (sleep)     midodrine 5 MG tablet  Commonly known as: PROAMATINE  Take 1 tablet by mouth 3 times daily     polyethylene glycol 17 g packet  Commonly known as: GLYCOLAX  Take 17 g by mouth daily as needed for Constipation     traZODone 50 MG tablet  Commonly known as: DESYREL  Take 1 tablet by mouth nightly as needed for Sleep     trospium 20 MG tablet  Commonly known as: SANCTURA  Take 1 tablet by mouth nightly            CHANGE how you take these medications      calcium carbonate 600 MG Tabs tablet  Take 1 tablet by mouth daily  What changed: additional instructions     cetirizine 10 MG tablet  Commonly known as: ZYRTEC  Take 0.5 tablets by mouth daily  What changed: how much to take     ferrous sulfate 325 (65 Fe) MG tablet  Commonly known as: IRON 325  Take 1 tablet by mouth daily (with breakfast)  What changed: when to take this     potassium chloride 20 MEQ extended release tablet  Commonly known as: KLOR-CON M  Take 0.5 tablets by mouth daily  What changed: how much to take     Vitamin D3 25 MCG Tabs  Take 5 tablets by mouth daily  What changed:   medication strength  how much to take            CONTINUE taking these medications      donepezil 10 MG tablet  Commonly known as: ARICEPT     fludrocortisone 0.1 MG tablet  Commonly known as: FLORINEF  Take 1 tablet by mouth daily     folic acid 1 MG tablet  Commonly known as: FOLVITE  Take 1 tablet by mouth daily     MULTIVITAMIN & MINERAL PO     sennosides-docusate sodium 8.6-50 MG tablet  Commonly known as: SENOKOT-S  Take 2 tablets by mouth daily     vitamin B-12 1000 MCG tablet  Commonly known as: CYANOCOBALAMIN            STOP taking these medications      carbidopa-levodopa  MG per tablet  Commonly known as: SINEMET     magnesium oxide 400 (240 Mg) MG tablet  Commonly known as: MAG-OX     mirabegron 50 MG Tb24  Commonly known as: MYRBETRIQ     solifenacin 10 MG tablet  Commonly known as: VESICARE               Where to Get Your Medications        Information about where to get these medications is not yet available    Ask your nurse or doctor about these medications  aspirin 81 MG chewable tablet  atorvastatin 80 MG tablet  calcium carbonate 600 MG Tabs tablet  cetirizine 10 MG tablet  clopidogrel 75 MG tablet  ferrous sulfate 325 (65 Fe) MG tablet  melatonin 5 MG Tbdp disintegrating tablet  midodrine 5 MG tablet  polyethylene glycol 17 g packet  potassium chloride 20 MEQ extended release tablet  traZODone 50 MG tablet  trospium 20 MG tablet  Vitamin D3 25 MCG Tabs           I spent over 35 minutes on this discharge encounter between counseling, coordination of care, and medication reconciliation. To comply with [address] [facility] jorge R.II.4.1:   Discharge order placed in advance to facilitate patients discharge needs.       Betsy Peters MD

## 2022-10-14 DIAGNOSIS — I49.9 CARDIAC ARRHYTHMIA, UNSPECIFIED CARDIAC ARRHYTHMIA TYPE: ICD-10-CM

## 2022-10-14 DIAGNOSIS — R00.2 HEART PALPITATIONS: ICD-10-CM

## 2022-10-14 DIAGNOSIS — I63.9 ACUTE CVA (CEREBROVASCULAR ACCIDENT) (HCC): ICD-10-CM

## 2022-10-17 ENCOUNTER — TELEPHONE (OUTPATIENT)
Dept: CARDIOLOGY CLINIC | Age: 82
End: 2022-10-17

## 2023-09-19 NOTE — PROGRESS NOTES
Continued Stay SW/CM Assessment/Plan of Care Note       Progress note:    TAYLOR referrals were sent yesterday. Pt is PAN eligible. [facility] and [facility] can medically accept pt pending insurance authorization. Both facilities are PAN. Writer spoke with pt about acceptances. Pt chose [facility] as it is close to home. Writer informed pt insurance authorization will need to be submitted. Discussed transportation. Pt said family might be able to pick him up. Otherwise pt appeared interested in w/c van. Writer spoke with Lianne at [facility]. Lianne will submit for insurance authorization today 9/19.    See SW/CM flowsheets for other objective data.    Disposition Recommendations:  Preliminary discharge destination: Planned Discharge Destination: Rehabilitation/Skilled Care  SW/CM recommendation for discharge: SNF    Discharge Plan/Needs:     Continued Care and Services - Admitted Since 9/3/2023     Destination  Coordination complete.    Service Provider Request Status Selected Services Address Phone Fax    [facility]-SNF PAN  Selected Skilled Nursing [address] [facility] [id number] [phone]    [facility] PAN Accepted N/A [address] [facility] [address] [phone]    [facility] PAN Pending - Request Sent N/A [address] [facility] [address] [phone]    [facility] Pending - Request Sent N/A [address] [facility] [address] [phone]    [facility] Pending - Request Sent N/A [address] [facility] [id number] [phone]    [facility] - SNF Pending - [facility] N[facility]A [address] [facility] [id number] [phone]                Prior To Hospitalization:    Living Situation: Spouse and residing at House    .  Support  Occupational Therapy  Facility/Department: [facility]  Daily Treatment Note  NAME: Alirio Prescott  : 1940  MRN: [id number]    Date of Service: 3/30/2022    Discharge Recommendations:  24 hour supervision or assist,Home with Home health OT,Continue to assess pending progress       Assessment   Performance deficits / Impairments: Decreased functional mobility ; Decreased ADL status; Decreased balance;Decreased safe awareness;Decreased strength;Decreased endurance;Decreased posture;Decreased cognition;Decreased high-level IADLs  Assessment: Pt completed grooming and hygiene while seated in wheelchair at sink. Pt demos low BP, limited on amount of activity provided this date. RN was alerted. Mohinder hose were donned. Cont OT POC. Treatment Diagnosis: weakness  Prognosis: Good  Activity Tolerance  Activity Tolerance: Treatment limited secondary to medical complications (free text)  Activity Tolerance: BP 90/57 while seated in chair. Pt unable to complete BP while standing entire time, pt dropped to 80/46. RN came in to provide new bandages, mohinder hose applied, ace wrap to LLE. Patient Diagnosis(es): There were no encounter diagnoses. has a past medical history of ARF (acute renal failure) ([facility] [address] has a past surgical history that includes joint replacement; Tonsillectomy; Intracapsular cataract extraction (Right, 2019); and Intracapsular cataract extraction (Left, 2019). Restrictions  Restrictions/Precautions  Restrictions/Precautions: General Precautions,Fall Risk  Position Activity Restriction  Other position/activity restrictions:  Up with assist, monitor for orthostatic BP  Subjective   General  Chart Reviewed: Yes  Patient assessed for rehabilitation services?: Yes  Additional Pertinent Hx: ARF, catarct surgery, bilateral MARGOTH, urge incontinence, anemia, memory loss  Response to previous treatment: Patient with no complaints from previous session  Family / Caregiver Present: No  Referring Practitioner: Stephanie Brito MD  Diagnosis: Pt admitted on 3/25/22 due to progressive weakness, frequent falls, ROSE, anemia. Pt transfered to ARU on 3/28/22  Subjective  Subjective: Reports better BP than previous days  General Comment  Comments: RN Agreeable to OT session. Vital Signs  Patient Currently in Pain: Denies   Orientation     Objective    ADL  Grooming: Modified independent  (while seated in wheelchair)  LE Dressing: Maximum assistance (carlos mohinder hose and socks)        Functional Mobility  Functional Mobility Comments: limited this date due to low BP     Transfers  Sit to stand: Minimal assistance  Stand to sit: Minimal assistance              Plan   Plan  Times per week: 5-7 days/week  Plan weeks: 2 weeks  Current Treatment Recommendations: Strengthening,Functional Mobility Training,Endurance Training,Balance Training,Safety Education & Training,Self-Care / ADL,Patient/Caregiver Education & Training,Home Management Training,Equipment Evaluation, Education, & procurement,Gait Training,Cognitive/Perceptual Training,Positioning       Goals  Short term goals  Time Frame for Short term goals: 1 week (4/5 22)  Short term goal 1: CGA LB dressing with AE as needed  Short term goal 2: SBA functional transfers to/from EOB, chair, toilet, WIS  Short term goal 3: CGA grooming in stance at sink  Short term goal 4: CGA toileting  Short term goal 5: CGA total body bathing with AE  Long term goals  Time Frame for Long term goals : 2 weeks (4/12/22)  Long term goal 1: Mod I total body dressing  Long term goal 2: Mod I toileting  Long term goal 3: Mod I functional transfers to/from EOB, chair, toilet  Long term goal 4: Mod I grooming in stance at sink  Long term goal 5: Supervision total body bathing.   Patient Goals   Patient goals : \"to take care of myself without help from others\"       Therapy Time   Individual Concurrent Group Co-treatment   Time In 1230         Time Out 1330         Minutes 60         Timed Code Systems: None   Home Devices/Equipment: None ,   ,    ,      Mobility Assist Devices: Wheelchair   Type of Service Prior to Hospitalization: None ,   ,   ,   ,    ,       Patient/Family discharge goal (s):  SNF     Resources provided:           Therapy Recommendations for Discharge:   PT:      Last Filed Values       Value Time User    PT Discharge Needs  therapy 5 or more times per week 9/19/2023  8:40 AM Gema Jackman PT        OT:       Last Filed Values       Value Time User    OT Discharge Needs  therapy 5 or more times per week 9/15/2023  9:19 AM Ludwig Westbrook, ALEN        SLP:    Last Filed Values     None          Mobility Equipment Recommended for Discharge: monitor need for ww      Barriers to Discharge  Identified Barriers to Discharge/Transition Planning: Consult Pending/Clearance, Pending diagnostic results/procedure, Medical necessity for acute care, Rehab plan of care obstacle   Housing             Treatment Minutes: [place] , [place]

## 2024-01-09 NOTE — PLAN OF CARE
Problem: Falls - Risk of:  Goal: Will remain free from falls  Description: Will remain free from falls  Outcome: Ongoing  Note: Instructed pt to use call light as needed with ambulation. Bed locked and in lowest position. Non-skid socks in place. Call light in reach.       Problem: Skin Integrity:  Goal: Absence of new skin breakdown  Description: Absence of new skin breakdown  Outcome: Ongoing     Problem: Nutrition  Goal: Optimal nutrition therapy  Outcome: Ongoing refill

## 2024-09-24 NOTE — PROGRESS NOTES
Felipe Child  10/3/2022  [id number]    Chief Complaint: Acute CVA (cerebrovascular accident) [facility])    Subjective:   No acute events overnight. Today Godwin Emanuel is seen in her room, seated up in bed. She reports that she is feeling well and denies acute complaints at this time. ROS: No CP, SOB, dyspnea    Objective:  Patient Vitals for the past 24 hrs:   BP Temp Temp src Pulse Resp SpO2 Weight   10/03/22 0800 127/66 97.8 °F (36.6 °C) Oral 61 15 96 % --   10/03/22 0600 126/68 -- -- 60 -- -- --   10/03/22 0005 -- -- -- -- -- -- 105 lb 13.1 oz (48 kg)   10/02/22 1945 (!) 145/74 97.6 °F (36.4 °C) Oral 67 16 97 % --   10/02/22 1715 (!) 144/78 -- -- 74 16 97 % --     Gen: No distress, pleasant. Seated up in bed  HEENT: Normocephalic, atraumatic. CV: RRR  Resp: No respiratory distress. Lungs CTAB  Abd: Soft, nondistended  Ext: No edema. Neuro: Alert, oriented, appropriately interactive. Psych: appropriate mood and affect    Laboratory data: Available via EMR. Therapy progress:    PT    Supine to Sit: Substantial/maximal assistance  Sit to Supine: Substantial/maximal assistance   Sit to Stand: Dependent  Chair/Bed to Chair Transfer: Independent  Car Transfer:    Ambulation 10 ft:    Ambulation 50 ft:    Ambulation 150 ft:    Stairs - 1 Step:    Stairs - 4 Step:    Stairs - 12 Step:      OT    Eating: Setup or clean-up assistance  Oral Hygiene: Partial/moderate assistance  Bathing: Dependent  Upper Body Dressing: Substantial/maximal assistance  Lower Body Dressing: Dependent  Toilet Transfer: Dependent  Toilet Hygiene: Dependent    Speech Therapy         Body mass index is 17.61 kg/m².     Assessment:  Patient Active Problem List   Diagnosis    Left knee pain    Left patella fracture    Contusion of left knee    Orthostatic hypotension    ARF (acute renal failure) (HCC)    Anemia    Debility    Acute CVA (cerebrovascular accident) ([address]    Nonrheumatic aortic valve insufficiency       Assessment and Plan:  Godwin Emanuel Prescription refilled  Sent to [facility] Dilcia Mcfadden is an [de-identified]year old female with a past medical history significant for orthostatic hypotension, anemia, and memory loss who presented to [facility] on 9/11/22 with recurrent falls and right sided weakness, found to have acute left CVA. She was admitted to [facility] on 9/15/22 due to functional deficits below her baseline. 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  - with right hemiparesis  - secondary stroke prevention with asa, plavix, statin  - PT, OT, ST    Confusion, intermittent  - UA likely not clean catch, obtain repeat sample    Dysphagia  - ST    Hyponatremia  - repeat lab      Autonomic Dysfunction  Orthostatic hypotension  - florinef, midodrine  - Cardiology following, appreciate assistance    Constipation, improved  - continue bowel regimen     Dementia   - possible PD  - donepezil     Overactive Bladder  - home regimen: Myrbetriq, tolterodine  - trospium while inpatient      Bowels: Schedule stool softener. Follow bowel movements. Enema or suppository if needed. Bladder: Check PVR x 3. [address] if PVR > 200ml or if any volume is > 500 ml. Sleep: Trazodone provided prn.       PPX  DVT: heparin  GI: not indicated    Services: SNF   EDOD: 10/8/22    Electronically signed by Darion Dawson MD on 10/3/2022 at 11:38 AM

## (undated) DEVICE — GLOVE SURG SZ 8 L12IN FNGR THK94MIL STD WHT LTX FREE

## (undated) DEVICE — SET GRAV VENT NVENT CK VLV 3 NDL FREE PRT 10 GTT

## (undated) DEVICE — TOWEL,OR,DSP,ST,BLUE,STD,4/PK,20PK/CS: Brand: MEDLINE

## (undated) DEVICE — GLOVE SURG SZ 65 L12IN FNGR THK94MIL STD WHT LTX FREE

## (undated) DEVICE — SILICONE I/A TIP STRAIGHT: Brand: ALCON

## (undated) DEVICE — 3M(TM) TRANSPORE SURGICAL TAPE 1527-1: Brand: 3M™ TRANSPORE™

## (undated) DEVICE — ELECTRODE ECG MONITR FOAM TEAR DROP ADLT RED

## (undated) DEVICE — Device

## (undated) DEVICE — AIRLIFE™ NASAL OXYGEN CANNULA CURVED, FLARED TIP, WITH 7 FEET (2.1 M) CRUSH RESISTANT TUBING, OVER-THE-EAR STYLE: Brand: AIRLIFE™

## (undated) DEVICE — NEEDLE FLTR 19GA L1.5IN WALL THK5UM BRN POLYPR HUB S STL

## (undated) DEVICE — PATIENT CARE KIT EYE POST OP

## (undated) DEVICE — SOLUTION IV 1000ML LAC RINGERS PH 6.5 INJ USP VIAFLX PLAS

## (undated) DEVICE — SYRINGE TB 1ML NDL 27GA L0.5IN PLAS SLIP TIP CONVENTIONAL

## (undated) DEVICE — 3M™ TEGADERM™ TRANSPARENT FILM DRESSING FRAME STYLE, 1624W, 2-3/8 IN X 2-3/4 IN (6 CM X 7 CM), 100/CT 4CT/CASE: Brand: 3M™ TEGADERM™

## (undated) DEVICE — SURGICAL PROCEDURE PACK EYE ANDRSN

## (undated) DEVICE — GLOVE,SURG,SENSICARE,ALOE,LF,PF,7: Brand: MEDLINE

## (undated) DEVICE — SET ADMIN PRIMING 7ML L30IN 7.35LB 20 GTT 2ND RLER CLMP

## (undated) DEVICE — THE MONARCH® "D" CARTRIDGE IS A SINGLE-USE POLYPROPYLENE CARTRIDGE FOR POSTERIOR CHAMBER IOL DELIVERY: Brand: MONARCH® III

## (undated) DEVICE — SOLUTION IRRIG 250ML STRL H2O PLAS POUR BTL USP

## (undated) DEVICE — CATHETER IV 20GA L1.25IN PNK FEP SFTY STR HUB RADPQ DISP

## (undated) DEVICE — ELECTRODE,ECG,STRESS,FOAM,3PK: Brand: MEDLINE

## (undated) DEVICE — NEEDLE HYPO 18GA L1IN PNK POLYPR HUB S STL REG BVL STR W/O

## (undated) DEVICE — NEEDLE HYPO 25GA L1.5IN BLU POLYPR HUB S STL REG BVL STR